# Patient Record
Sex: FEMALE | Race: WHITE | HISPANIC OR LATINO | Employment: UNEMPLOYED
[De-identification: names, ages, dates, MRNs, and addresses within clinical notes are randomized per-mention and may not be internally consistent; named-entity substitution may affect disease eponyms.]

---

## 2017-04-18 ENCOUNTER — RECORDS - HEALTHEAST (OUTPATIENT)
Dept: ADMINISTRATIVE | Facility: OTHER | Age: 27
End: 2017-04-18

## 2017-04-18 ENCOUNTER — HOSPITAL ENCOUNTER (OUTPATIENT)
Dept: ULTRASOUND IMAGING | Facility: CLINIC | Age: 27
Discharge: HOME | End: 2017-04-18

## 2017-04-18 DIAGNOSIS — Z87.59 CONFIRM FETAL VIABILITY WITH HISTORY OF MISCARRIAGE, ULTRASOUND: ICD-10-CM

## 2017-04-18 DIAGNOSIS — O36.80X0 CONFIRM FETAL VIABILITY WITH HISTORY OF MISCARRIAGE, ULTRASOUND: ICD-10-CM

## 2017-05-30 ENCOUNTER — MEDICAL CORRESPONDENCE (OUTPATIENT)
Dept: HEALTH INFORMATION MANAGEMENT | Facility: CLINIC | Age: 27
End: 2017-05-30

## 2017-05-30 ENCOUNTER — TRANSFERRED RECORDS (OUTPATIENT)
Dept: HEALTH INFORMATION MANAGEMENT | Facility: CLINIC | Age: 27
End: 2017-05-30

## 2017-05-31 DIAGNOSIS — O35.EXX0 PYELECTASIS OF FETUS ON PRENATAL ULTRASOUND: Primary | ICD-10-CM

## 2017-06-19 ENCOUNTER — PRE VISIT (OUTPATIENT)
Dept: MATERNAL FETAL MEDICINE | Facility: CLINIC | Age: 27
End: 2017-06-19

## 2017-06-21 ENCOUNTER — OFFICE VISIT (OUTPATIENT)
Dept: MATERNAL FETAL MEDICINE | Facility: CLINIC | Age: 27
End: 2017-06-21
Attending: OBSTETRICS & GYNECOLOGY
Payer: COMMERCIAL

## 2017-06-21 ENCOUNTER — HOSPITAL ENCOUNTER (OUTPATIENT)
Dept: ULTRASOUND IMAGING | Facility: CLINIC | Age: 27
Discharge: HOME | End: 2017-06-21
Attending: OBSTETRICS & GYNECOLOGY | Admitting: SOCIAL WORKER
Payer: COMMERCIAL

## 2017-06-21 DIAGNOSIS — E66.01 MORBID OBESITY DUE TO EXCESS CALORIES (H): ICD-10-CM

## 2017-06-21 DIAGNOSIS — O35.EXX0 PYELECTASIS OF FETUS ON PRENATAL ULTRASOUND: ICD-10-CM

## 2017-06-21 DIAGNOSIS — O35.EXX0 ULTRASOUND RECHECK OF FETAL PYELECTASIS, ANTEPARTUM, NOT APPLICABLE OR UNSPECIFIED FETUS: Primary | ICD-10-CM

## 2017-06-21 PROCEDURE — 76811 OB US DETAILED SNGL FETUS: CPT

## 2017-06-21 NOTE — PROGRESS NOTES
Please see ultrasound report under imaging tab for details on ultrasound performed today.    Ricarda Nunez MD  , OB/GYN  Maternal-Fetal Medicine  [email or web address]  [phone] (Academic office)  [phone] (Pager)

## 2017-06-21 NOTE — MR AVS SNAPSHOT
After Visit Summary   6/21/2017    Mariela Adair    MRN: [id number]           Patient Information     Date Of Birth          1990        Visit Information        Provider Department      6/21/2017 2:00 PM Ricarda Nunez MD [facility] Maternal Fetal Medicine [facility]        Today's Diagnoses     Ultrasound recheck of fetal pyelectasis, antepartum, not applicable or unspecified fetus    -  1    Morbid obesity due to excess calories (H)           Follow-ups after your visit        Your next 10 appointments already scheduled     Jul 19, 2017  1:30 PM CDT   MFM US COMPRE SINGLE F/U with [id number]   [facility] Maternal Fetal Medicine Ultrasound - [place] ([facility])    [address]  [facility] [id number] [phone]           Wear comfortable clothes and leave your valuables at home.            Jul 19, 2017  2:00 PM CDT   Radiology MD with UR COREY PEGUERO   [facility] Maternal Fetal Medicine - [facility])    [address]  [facility] [address]   [phone]           Please arrive at the time given for your first appointment.  This visit is used internally to schedule the physician's time during your ultrasound.              Future tests that were ordered for you today     Open Future Orders        Priority Expected Expires Ordered    MFM US Comprehensive Single F/U Routine 7/19/2017 6/21/2018 6/21/2017            Who to contact     If you have questions or need follow up information about today's clinic visit or your schedule please contact [facility] MATERNAL FETAL MEDICINE [facility] directly at [phone].  Normal or non-critical lab and imaging results will be communicated to you by MyChart, letter or phone within 4 business days after the clinic has received the results. If you do not hear from us within 7 days, please contact the clinic through MyChart or phone. If you have a critical or  "abnormal lab result, we will notify you by phone as soon as possible.  Submit refill requests through [facility] or call your pharmacy and they will forward the refill request to us. Please allow 3 business days for your refill to be completed.          Additional Information About Your Visit        MyChart Information     [facility] lets you send messages to your doctor, view your test results, renew your prescriptions, schedule appointments and more. To sign up, go to [email or web address] . Click on \"Log in\" on the left side of the screen, which will take you to the Welcome page. Then click on \"Sign up Now\" on the right side of the page.     You will be asked to enter the access code listed below, as well as some personal information. Please follow the directions to create your username and password.     Your access code is: [email or web address]  Expires: 2017  4:22 PM     Your access code will  in 90 days. If you need help or a new code, please call your [facility] clinic or [phone].        Care EveryWhere ID     This is your Care EveryWhere ID. This could be used by other organizations to access your [facility] medical records  [id number]        Your Vitals Were     Last Period                   2016            Blood Pressure from Last 3 Encounters:   04/02/15 126/74   14 115/55    Weight from Last 3 Encounters:   04/02/15 (!) 150.6 kg (332 lb)   14 (!) 147.4 kg (325 lb)               Primary Care Provider Office Phone # Fax #    Kenzie Mccoy [phone]       [address] [place] [facility] [address]        Equal Access to Services     [facility]: Hadii talia Castañeda, lizabeth ferrara, qaybbatool merrillalyahaira coto . So [facility] [phone].    ATENCIÓN: Si habla español, tiene a patrick disposición servicios gratuitos de asistencia lingüística. Llame al [phone].    We comply with applicable federal civil rights " laws and [place] laws. We do not discriminate on the basis of race, color, national origin, age, disability sex, sexual orientation or gender identity.            Thank you!     Thank you for choosing [facility] MATERNAL FETAL [facility]IC[facility]  for your care. Our goal is always to provide you with excellent care. Hearing back from our patients is one way we can continue to improve our services. Please take a few minutes to complete the written survey that you may receive in the mail after your visit with us. Thank you!             Your Updated Medication List - Protect others around you: Learn how to safely use, store and throw away your medicines at www.disposemymeds.org.      Notice  As of 6/21/2017  4:22 PM    You have not been prescribed any medications.

## 2017-07-31 ENCOUNTER — RECORDS - HEALTHEAST (OUTPATIENT)
Dept: ADMINISTRATIVE | Facility: OTHER | Age: 27
End: 2017-07-31

## 2017-08-29 ENCOUNTER — RECORDS - HEALTHEAST (OUTPATIENT)
Dept: ADMINISTRATIVE | Facility: OTHER | Age: 27
End: 2017-08-29

## 2017-09-14 ENCOUNTER — RECORDS - HEALTHEAST (OUTPATIENT)
Dept: ADMINISTRATIVE | Facility: OTHER | Age: 27
End: 2017-09-14

## 2017-09-18 ENCOUNTER — ANESTHESIA - HEALTHEAST (OUTPATIENT)
Dept: OBGYN | Facility: HOSPITAL | Age: 27
End: 2017-09-18

## 2017-09-18 ASSESSMENT — MIFFLIN-ST. JEOR: SCORE: 2268.98

## 2017-09-19 ENCOUNTER — SURGERY - HEALTHEAST (OUTPATIENT)
Dept: OBGYN | Facility: HOSPITAL | Age: 27
End: 2017-09-19

## 2017-09-19 ASSESSMENT — MIFFLIN-ST. JEOR: SCORE: 2268.98

## 2017-09-22 ENCOUNTER — HOME CARE/HOSPICE - HEALTHEAST (OUTPATIENT)
Dept: HOME HEALTH SERVICES | Facility: HOME HEALTH | Age: 27
End: 2017-09-22

## 2017-09-23 ENCOUNTER — HOME CARE/HOSPICE - HEALTHEAST (OUTPATIENT)
Dept: HOME HEALTH SERVICES | Facility: HOME HEALTH | Age: 27
End: 2017-09-23

## 2017-09-24 ENCOUNTER — HOME CARE/HOSPICE - HEALTHEAST (OUTPATIENT)
Dept: HOME HEALTH SERVICES | Facility: HOME HEALTH | Age: 27
End: 2017-09-24

## 2017-10-18 ENCOUNTER — OFFICE VISIT - HEALTHEAST (OUTPATIENT)
Dept: VASCULAR SURGERY | Facility: CLINIC | Age: 27
End: 2017-10-18

## 2017-10-18 DIAGNOSIS — L98.491: ICD-10-CM

## 2017-10-25 ENCOUNTER — OFFICE VISIT - HEALTHEAST (OUTPATIENT)
Dept: VASCULAR SURGERY | Facility: CLINIC | Age: 27
End: 2017-10-25

## 2017-10-25 DIAGNOSIS — Z98.891 H/O CESAREAN SECTION: ICD-10-CM

## 2017-10-25 DIAGNOSIS — L98.491: ICD-10-CM

## 2017-11-08 ENCOUNTER — COMMUNICATION - HEALTHEAST (OUTPATIENT)
Dept: VASCULAR SURGERY | Facility: CLINIC | Age: 27
End: 2017-11-08

## 2018-06-28 ASSESSMENT — MIFFLIN-ST. JEOR: SCORE: 2059.42

## 2018-06-29 ENCOUNTER — ANESTHESIA - HEALTHEAST (OUTPATIENT)
Dept: SURGERY | Facility: HOSPITAL | Age: 28
End: 2018-06-29

## 2018-06-29 ENCOUNTER — SURGERY - HEALTHEAST (OUTPATIENT)
Dept: SURGERY | Facility: HOSPITAL | Age: 28
End: 2018-06-29

## 2018-07-13 ENCOUNTER — COMMUNICATION - HEALTHEAST (OUTPATIENT)
Dept: SURGERY | Facility: CLINIC | Age: 28
End: 2018-07-13

## 2018-07-13 ENCOUNTER — OFFICE VISIT - HEALTHEAST (OUTPATIENT)
Dept: SURGERY | Facility: CLINIC | Age: 28
End: 2018-07-13

## 2018-07-13 DIAGNOSIS — Z48.89 POSTOPERATIVE VISIT: ICD-10-CM

## 2018-07-13 DIAGNOSIS — G89.18 POST-OP PAIN: ICD-10-CM

## 2018-07-16 ENCOUNTER — COMMUNICATION - HEALTHEAST (OUTPATIENT)
Dept: SURGERY | Facility: CLINIC | Age: 28
End: 2018-07-16

## 2018-07-17 ENCOUNTER — OFFICE VISIT - HEALTHEAST (OUTPATIENT)
Dept: SURGERY | Facility: CLINIC | Age: 28
End: 2018-07-17

## 2018-07-17 DIAGNOSIS — L76.34 POSTOPERATIVE SEROMA OF SUBCUTANEOUS TISSUE AFTER NON-DERMATOLOGIC PROCEDURE: ICD-10-CM

## 2018-07-17 ASSESSMENT — MIFFLIN-ST. JEOR: SCORE: 2070.3

## 2018-07-24 ENCOUNTER — OFFICE VISIT - HEALTHEAST (OUTPATIENT)
Dept: SURGERY | Facility: CLINIC | Age: 28
End: 2018-07-24

## 2018-07-24 DIAGNOSIS — G89.18 POST-OP PAIN: ICD-10-CM

## 2018-07-24 ASSESSMENT — MIFFLIN-ST. JEOR: SCORE: 2069.4

## 2018-07-25 ENCOUNTER — HOSPITAL ENCOUNTER (OUTPATIENT)
Dept: CT IMAGING | Facility: HOSPITAL | Age: 28
Discharge: HOME | End: 2018-07-25
Attending: PHYSICIAN ASSISTANT

## 2018-07-25 DIAGNOSIS — G89.18 POST-OP PAIN: ICD-10-CM

## 2018-07-27 ENCOUNTER — COMMUNICATION - HEALTHEAST (OUTPATIENT)
Dept: SURGERY | Facility: CLINIC | Age: 28
End: 2018-07-27

## 2018-07-27 ENCOUNTER — OFFICE VISIT - HEALTHEAST (OUTPATIENT)
Dept: SURGERY | Facility: CLINIC | Age: 28
End: 2018-07-27

## 2018-07-27 DIAGNOSIS — Z98.890 POST-OPERATIVE STATE: ICD-10-CM

## 2018-07-31 ENCOUNTER — OFFICE VISIT - HEALTHEAST (OUTPATIENT)
Dept: SURGERY | Facility: CLINIC | Age: 28
End: 2018-07-31

## 2018-07-31 DIAGNOSIS — Z48.89 POSTOPERATIVE VISIT: ICD-10-CM

## 2018-07-31 ASSESSMENT — MIFFLIN-ST. JEOR: SCORE: 2069.4

## 2018-08-14 ENCOUNTER — OFFICE VISIT - HEALTHEAST (OUTPATIENT)
Dept: SURGERY | Facility: CLINIC | Age: 28
End: 2018-08-14

## 2018-08-14 DIAGNOSIS — Z48.89 POSTOPERATIVE VISIT: ICD-10-CM

## 2018-08-14 ASSESSMENT — MIFFLIN-ST. JEOR: SCORE: 2069.4

## 2019-06-04 LAB
HBV SURFACE AG SERPL QL IA: NEGATIVE
HIV 1+2 AB+HIV1 P24 AG SERPL QL IA: NEGATIVE
RUBELLA ABY IGG: 1.48
TREPONEMA ANTIBODIES: NEGATIVE

## 2019-09-24 ENCOUNTER — AMBULATORY - HEALTHEAST (OUTPATIENT)
Dept: MATERNAL FETAL MEDICINE | Facility: HOSPITAL | Age: 29
End: 2019-09-24

## 2019-09-24 DIAGNOSIS — O26.90 PREGNANCY, ANTEPARTUM, COMPLICATIONS: ICD-10-CM

## 2019-09-25 ENCOUNTER — AMBULATORY - HEALTHEAST (OUTPATIENT)
Dept: MATERNAL FETAL MEDICINE | Facility: HOSPITAL | Age: 29
End: 2019-09-25

## 2019-09-27 ENCOUNTER — RECORDS - HEALTHEAST (OUTPATIENT)
Dept: ULTRASOUND IMAGING | Facility: HOSPITAL | Age: 29
End: 2019-09-27

## 2019-09-27 ENCOUNTER — OFFICE VISIT - HEALTHEAST (OUTPATIENT)
Dept: MATERNAL FETAL MEDICINE | Facility: HOSPITAL | Age: 29
End: 2019-09-27

## 2019-09-27 ENCOUNTER — RECORDS - HEALTHEAST (OUTPATIENT)
Dept: ADMINISTRATIVE | Facility: OTHER | Age: 29
End: 2019-09-27

## 2019-09-27 DIAGNOSIS — O44.02 COMPLETE PLACENTA PREVIA NOS OR WITHOUT HEMORRHAGE, SECOND TRIMESTER: ICD-10-CM

## 2019-09-27 DIAGNOSIS — O44.02 PLACENTA PREVIA FOUND DURING PREGNANCY WITHOUT HEMORRHAGE, SECOND TRIMESTER: ICD-10-CM

## 2019-09-27 DIAGNOSIS — O26.90 PREGNANCY RELATED CONDITIONS, UNSPECIFIED, UNSPECIFIED TRIMESTER: ICD-10-CM

## 2019-10-08 LAB — GLU GEST SCREEN 1HR 50G: 82

## 2019-10-29 ENCOUNTER — TRANSFERRED RECORDS (OUTPATIENT)
Dept: HEALTH INFORMATION MANAGEMENT | Facility: CLINIC | Age: 29
End: 2019-10-29

## 2019-11-01 ENCOUNTER — RECORDS - HEALTHEAST (OUTPATIENT)
Dept: ULTRASOUND IMAGING | Facility: HOSPITAL | Age: 29
End: 2019-11-01

## 2019-11-01 ENCOUNTER — OFFICE VISIT - HEALTHEAST (OUTPATIENT)
Dept: MATERNAL FETAL MEDICINE | Facility: HOSPITAL | Age: 29
End: 2019-11-01

## 2019-11-01 DIAGNOSIS — O44.02 COMPLETE PLACENTA PREVIA NOS OR WITHOUT HEMORRHAGE, SECOND TRIMESTER: ICD-10-CM

## 2019-11-01 DIAGNOSIS — O44.00 PLACENTA PREVIA ANTEPARTUM: ICD-10-CM

## 2019-11-06 ENCOUNTER — RECORDS - HEALTHEAST (OUTPATIENT)
Dept: ADMINISTRATIVE | Facility: OTHER | Age: 29
End: 2019-11-06

## 2019-11-14 ENCOUNTER — OFFICE VISIT - HEALTHEAST (OUTPATIENT)
Dept: MATERNAL FETAL MEDICINE | Facility: HOSPITAL | Age: 29
End: 2019-11-14

## 2019-11-14 ENCOUNTER — RECORDS - HEALTHEAST (OUTPATIENT)
Dept: ULTRASOUND IMAGING | Facility: HOSPITAL | Age: 29
End: 2019-11-14

## 2019-11-14 ENCOUNTER — RECORDS - HEALTHEAST (OUTPATIENT)
Dept: ADMINISTRATIVE | Facility: OTHER | Age: 29
End: 2019-11-14

## 2019-11-14 DIAGNOSIS — O44.00 COMPLETE PLACENTA PREVIA NOS OR WITHOUT HEMORRHAGE, UNSPECIFIED TRIMESTER: ICD-10-CM

## 2019-11-22 ENCOUNTER — HOSPITAL ENCOUNTER (INPATIENT)
Facility: CLINIC | Age: 29
LOS: 25 days | Discharge: HOME | End: 2019-12-17
Attending: OBSTETRICS & GYNECOLOGY | Admitting: OBSTETRICS & GYNECOLOGY
Payer: COMMERCIAL

## 2019-11-22 ENCOUNTER — HOSPITAL ENCOUNTER (EMERGENCY)
Facility: CLINIC | Age: 29
Discharge: HOME | End: 2019-11-22
Payer: COMMERCIAL

## 2019-11-22 DIAGNOSIS — Z98.891 HISTORY OF CESAREAN SECTION: ICD-10-CM

## 2019-11-22 DIAGNOSIS — Z98.891 S/P C-SECTION: ICD-10-CM

## 2019-11-22 DIAGNOSIS — D62 ANEMIA DUE TO BLOOD LOSS, ACUTE: ICD-10-CM

## 2019-11-22 DIAGNOSIS — F41.9 ANXIETY: Primary | ICD-10-CM

## 2019-11-22 LAB
ABO + RH BLD: NORMAL
ABO + RH BLD: NORMAL
BASOPHILS # BLD AUTO: 0 10E9/L (ref 0–0.2)
BASOPHILS NFR BLD AUTO: 0.1 %
BLD GP AB SCN SERPL QL: NORMAL
BLOOD BANK CMNT PATIENT-IMP: NORMAL
DIFFERENTIAL METHOD BLD: ABNORMAL
EOSINOPHIL # BLD AUTO: 0 10E9/L (ref 0–0.7)
EOSINOPHIL NFR BLD AUTO: 0.3 %
ERYTHROCYTE [DISTWIDTH] IN BLOOD BY AUTOMATED COUNT: 14 % (ref 10–15)
HCT VFR BLD AUTO: 32.5 % (ref 35–47)
HGB BLD-MCNC: 10.6 G/DL (ref 11.7–15.7)
IMM GRANULOCYTES # BLD: 0 10E9/L (ref 0–0.4)
IMM GRANULOCYTES NFR BLD: 0.3 %
LYMPHOCYTES # BLD AUTO: 2.2 10E9/L (ref 0.8–5.3)
LYMPHOCYTES NFR BLD AUTO: 22.4 %
MCH RBC QN AUTO: 29.8 PG (ref 26.5–33)
MCHC RBC AUTO-ENTMCNC: 32.6 G/DL (ref 31.5–36.5)
MCV RBC AUTO: 91 FL (ref 78–100)
MONOCYTES # BLD AUTO: 0.5 10E9/L (ref 0–1.3)
MONOCYTES NFR BLD AUTO: 5 %
NEUTROPHILS # BLD AUTO: 7 10E9/L (ref 1.6–8.3)
NEUTROPHILS NFR BLD AUTO: 71.9 %
NRBC # BLD AUTO: 0 10*3/UL
NRBC BLD AUTO-RTO: 0 /100
PLATELET # BLD AUTO: 194 10E9/L (ref 150–450)
RBC # BLD AUTO: 3.56 10E12/L (ref 3.8–5.2)
SPECIMEN EXP DATE BLD: NORMAL
WBC # BLD AUTO: 9.8 10E9/L (ref 4–11)

## 2019-11-22 PROCEDURE — 36415 COLL VENOUS BLD VENIPUNCTURE: CPT | Performed by: STUDENT IN AN ORGANIZED HEALTH CARE EDUCATION/TRAINING PROGRAM

## 2019-11-22 PROCEDURE — 25000132 ZZH RX MED GY IP 250 OP 250 PS 637: Performed by: STUDENT IN AN ORGANIZED HEALTH CARE EDUCATION/TRAINING PROGRAM

## 2019-11-22 PROCEDURE — 85025 COMPLETE CBC W/AUTO DIFF WBC: CPT | Performed by: STUDENT IN AN ORGANIZED HEALTH CARE EDUCATION/TRAINING PROGRAM

## 2019-11-22 PROCEDURE — 86850 RBC ANTIBODY SCREEN: CPT | Performed by: STUDENT IN AN ORGANIZED HEALTH CARE EDUCATION/TRAINING PROGRAM

## 2019-11-22 PROCEDURE — 25000128 H RX IP 250 OP 636: Performed by: STUDENT IN AN ORGANIZED HEALTH CARE EDUCATION/TRAINING PROGRAM

## 2019-11-22 PROCEDURE — 90715 TDAP VACCINE 7 YRS/> IM: CPT | Performed by: STUDENT IN AN ORGANIZED HEALTH CARE EDUCATION/TRAINING PROGRAM

## 2019-11-22 PROCEDURE — 86901 BLOOD TYPING SEROLOGIC RH(D): CPT | Performed by: STUDENT IN AN ORGANIZED HEALTH CARE EDUCATION/TRAINING PROGRAM

## 2019-11-22 PROCEDURE — 86900 BLOOD TYPING SEROLOGIC ABO: CPT | Performed by: STUDENT IN AN ORGANIZED HEALTH CARE EDUCATION/TRAINING PROGRAM

## 2019-11-22 PROCEDURE — 12000001 ZZH R&B MED SURG/OB UMMC

## 2019-11-22 RX ORDER — ACETAMINOPHEN 325 MG/1
975 TABLET ORAL EVERY 8 HOURS PRN
Status: DISCONTINUED | OUTPATIENT
Start: 2019-11-22 | End: 2019-12-13

## 2019-11-22 RX ORDER — ASPIRIN 81 MG/1
81 TABLET, CHEWABLE ORAL DAILY
Status: DISCONTINUED | OUTPATIENT
Start: 2019-11-22 | End: 2019-12-13

## 2019-11-22 RX ORDER — BETAMETHASONE SODIUM PHOSPHATE AND BETAMETHASONE ACETATE 3; 3 MG/ML; MG/ML
12 INJECTION, SUSPENSION INTRA-ARTICULAR; INTRALESIONAL; INTRAMUSCULAR; SOFT TISSUE EVERY 24 HOURS
Status: COMPLETED | OUTPATIENT
Start: 2019-11-22 | End: 2019-11-23

## 2019-11-22 RX ORDER — AMOXICILLIN 250 MG
2 CAPSULE ORAL 2 TIMES DAILY
Status: DISCONTINUED | OUTPATIENT
Start: 2019-11-22 | End: 2019-12-01

## 2019-11-22 RX ORDER — SIMETHICONE 80 MG
160 TABLET,CHEWABLE ORAL EVERY 4 HOURS PRN
Status: DISCONTINUED | OUTPATIENT
Start: 2019-11-22 | End: 2019-12-13

## 2019-11-22 RX ORDER — AMOXICILLIN 250 MG
1 CAPSULE ORAL 2 TIMES DAILY
Status: DISCONTINUED | OUTPATIENT
Start: 2019-11-22 | End: 2019-12-01

## 2019-11-22 RX ORDER — ONDANSETRON 2 MG/ML
4 INJECTION INTRAMUSCULAR; INTRAVENOUS EVERY 6 HOURS PRN
Status: DISCONTINUED | OUTPATIENT
Start: 2019-11-22 | End: 2019-12-13

## 2019-11-22 RX ORDER — PRENATAL VIT/IRON FUM/FOLIC AC 27MG-0.8MG
1 TABLET ORAL DAILY
Status: DISCONTINUED | OUTPATIENT
Start: 2019-11-22 | End: 2019-12-13

## 2019-11-22 RX ADMIN — BETAMETHASONE SODIUM PHOSPHATE AND BETAMETHASONE ACETATE 12 MG: 3; 3 INJECTION, SUSPENSION INTRA-ARTICULAR; INTRALESIONAL; INTRAMUSCULAR at 16:58

## 2019-11-22 RX ADMIN — PRENATAL VIT W/ FE FUMARATE-FA TAB 27-0.8 MG 1 TABLET: 27-0.8 TAB at 16:57

## 2019-11-22 RX ADMIN — ACETAMINOPHEN 975 MG: 325 TABLET, FILM COATED ORAL at 18:58

## 2019-11-22 RX ADMIN — CLOSTRIDIUM TETANI TOXOID ANTIGEN (FORMALDEHYDE INACTIVATED), CORYNEBACTERIUM DIPHTHERIAE TOXOID ANTIGEN (FORMALDEHYDE INACTIVATED), BORDETELLA PERTUSSIS TOXOID ANTIGEN (GLUTARALDEHYDE INACTIVATED), BORDETELLA PERTUSSIS FILAMENTOUS HEMAGGLUTININ ANTIGEN (FORMALDEHYDE INACTIVATED), BORDETELLA PERTUSSIS PERTACTIN ANTIGEN, AND BORDETELLA PERTUSSIS FIMBRIAE 2/3 ANTIGEN 0.5 ML: 5; 2; 2.5; 5; 3; 5 INJECTION, SUSPENSION INTRAMUSCULAR at 16:58

## 2019-11-22 RX ADMIN — ASPIRIN 81 MG CHEWABLE TABLET 81 MG: 81 TABLET CHEWABLE at 16:57

## 2019-11-22 RX ADMIN — SENNOSIDES AND DOCUSATE SODIUM 1 TABLET: 8.6; 5 TABLET ORAL at 20:09

## 2019-11-22 NOTE — H&P
Maternal Fetal Medicine   History and Physical    Patient's last menstrual period was 2019.  Estimated Date of Delivery: 2020   Gestational age:  32w0d   Obstetric History:              History of Present Illness      Mariela Hawkins is a 29 year old  at 32w0d by LMP c/w 7w5d US who presents today for scheduled admission for vasa previa and placenta previa This patient received regular prenatal care at [facility] in [place] and also followed with MFM at [facility]. The patient reports an increase in her baseline anxiety because of all that is going on with this pregnancy. She has had some difficulty sleeping, light headaches, and one episode of vomiting yesterday that she attributes to her increase in stress and anxiety. She would like to speak with NICU to discuss what an early delivery would mean for the health and care of baby. The patient is also aware that mental health professionals are available while she is here to talk to regarding her anxiety, but she states she currently feels like it is manageable.     The patient had one episode of bleeding at 8 weeks, but none after that episode. She has not had any high blood pressures in this or prior pregnancies. Denies any contractions, cramping, or LOF, Active fetus. Also denies recent illnesses, fevers, chills, headaches, vision changes/spots, chest pain, shortness of breath, upper abdominal pain, contractions, nausea, diarrhea, or constipation.     Her pregnancy is complicated by:  1. Vasa previa  2. Placenta previa  3. Possible placenta accreta  4. History of CS x 3  5. BMI 50     OB history notable for:   OB History    Para Term  AB Living   5 3 3 0 1 2   SAB TAB Ectopic Multiple Live Births   1 0 0 0 2      # Outcome Date GA Lbr Narinder/2nd Weight Sex Delivery Anes PTL Lv   5 Current            4 Term 2017 39w0d   M -SEC   SREEKANTH   3 SAB 2016     SAB      2 Term  40w0d   M -SEC   SREEKANTH    1 Term  41w0d   M   N       Complications: Fetal Intolerance       Prenatal Labs  Rh +, rhogam not indicated   Senait negative   HBV surface antigen negative   HIV negative  G/C negative (19)  RPR NR   Rubella immune  GCT: 85  GBS Status:   Last Pap: 17  Low risk NIPT    Weight  Pre-pregnancy BMI: 50         Past Medical History     Past Medical History:   Diagnosis Date     Acanthosis nigricans      BV (bacterial vaginosis)      Migraines      Ovarian cystic mass      Pain in limb      Pelvic pain in female             Past Surgical History     Past Surgical History:   Procedure Laterality Date      SECTION       RECESSION RESECTION (REPAIR STRABISMUS) BILATERAL Bilateral 2014    Procedure: RECESSION RESECTION (REPAIR STRABISMUS) BILATERAL;  Surgeon: Titi Awan MD;  Location:  [facility]     RECESSION RESECTION (REPAIR STRABISMUS) BILATERAL Bilateral 2015    Procedure: RECESSION RESECTION (REPAIR STRABISMUS) BILATERAL;  Surgeon: Titi Awan MD;  Location:  [facility]            Medications     Medications:   Sertraline 50 mg daily  Aspirin 81 mg daily   Prenatal vitamin     Allergies   Allergen Reactions     Amoxicillin Rash            Social History     Social History     Tobacco Use     Smoking status: Never Smoker   Substance Use Topics     Alcohol use: No     Drug use: No            Review of Systems     Pertinent positives and negatives as described above in HPI           Physical Exam   There were no vitals filed for this visit.     Gen: Cooperative, asking appropriate questions. Sitting up in bed, in NAD.  Heart:: regular rate and rhythm without murmur, gallop or rub    Lungs: CTA B/L, no wheezing or crackles.  Abdomen: Non-tender, gravid  Extremities: Trace lower extremity edema   Psychiatry: anxious, tearful, appropriate affect     Membranes: Intact        Contractions: none  FHR:  Rate 140, moderate variability, present accelerations, no decelerations.             Category: 1         Laboratory/Imaging     Pending labs: Hg, Type and Screen, RPR     Ultrasounds:  -Dating US: 7w5d    [facility] US Comp  IMPRESSION  ---------------------------------------------------------------------------------------------------------  1) Martinez intrauterine pregnancy at 24 & 0/7 weeks gestational age.  2) None of the anomalies commonly detected by ultrasound were evident in the detailed fetal anatomic survey, however some views were suboptimal, as  described above.  3) Growth parameters and estimated fetal weight were consistent with established dates.  4) The amniotic fluid volume appeared normal.  5) Normal fetal activity for gestational age.  6) On transvaginal imaging the cervix appears long and closed.  7) There is a complete placenta previa. A posterior succenturiate lobe of the placenta is noted and overlies the cervix. There are fetal vessels coursing through  the membranes connecting the two lobes of the placenta, however these do not appear to lie over the cervical os at this time. The anterior lobe of the placenta  overlies her previous  scars, with no obvious evidence of morbidly adherent placenta.    [facility] US IMPRESSION  ---------------------------------------------------------------------------------------------------------  1) Martinez intrauterine pregnancy at 30 & 6/7 weeks gestational age.  2) Normal fetal activity for gestational age  3) The amniotic fluid volume appeared normal.  4) There appears to be an unsupported fetal vessel within one centimeter of the internal cervical os. There may be an attenuated area of placenta beneath this,  however I cannot see this in all transvaginal views.  5) On transvaginal imaging, the cervix is long and closed.           Assessment and Plan     Mariela Hawkins is a 29 year old  at 32w0d by LMP c/w 7w5d US who presents for scheduled admission for monitoring d/t vasa previa and placenta previa with concern for possible  accreta.     Vasa previa  Complete anterior placenta previa  - Admission until delivery   - Plan for RLTCS at 35 weeks or sooner with bleeding or concerns for accreta   - Outpatient MFM consult recommended MRI for surgical planning if there is concern for accreta   - Type/screen q72h   - TVUS to assess placenta     Obesity  - Continue aspirin 81 mg daily   - SCDs while in bed, lovenox in postpartum period     Anxiety  - Continue PTA sertraline   - Social work antepartum assessment ordered   - Monitor mood closely     Fetal well being  - Category 1 fetal heart tracing.  - EFW at 29w0d US: 1151g (34%ile), appropriate for gestational age.  - Betamethasone series to be started today   - NICU consult to be requested   - Continue Qshift NSTs  - Q4 week growth US (next ).    Prenatal care  - Rh positive, Antibody negative.  - Rubella immune, Hep B SAg NR, RPR neg, HIV NR, GC/CT NR.  - GCT 85  - Low risk NIPT   - s/p influenza immunization and Tdap to be given today.    Method of delivery  - History of LTCS x 3. Plan for RLTCS at 35 weeks or sooner with bleeding or concerns for accreta   - S/p ventral hernia repair with mesh (2018)  - Fetal presentation: transverse, head to maternal left. Placenta: anterior previa   - Plan to consent for low transverse  delivery if no concerns for accreta     Xena Garcia MD  Obstetrics/Gynecology, PGY-1

## 2019-11-22 NOTE — PROGRESS NOTES
Data: Patient admitted to room 408 at  1305. Patient is a . Prenatal record reviewed.   OB History    Para Term  AB Living   5 3 3 0 1 2   SAB TAB Ectopic Multiple Live Births   1 0 0 0 2      # Outcome Date GA Lbr Narinder/2nd Weight Sex Delivery Anes PTL Lv   5 Current            4 Term 2017 39w0d   M -SEC   SREEKANTH   3 SAB 2016     SAB      2 Term  40w0d   M -SEC   SREEKANTH   1 Term  41w0d   M   N       Complications: Fetal Intolerance   .  Medical History:   Past Medical History:   Diagnosis Date     Acanthosis nigricans      BV (bacterial vaginosis)      Migraines      Ovarian cystic mass      Pain in limb      Pelvic pain in female    .  Gestational age 32w0d. Vital signs per doc flowsheet. Fetal movement present. Patient reports vasa previa as reason for admission. Support persons present.  Action: Verbal consent for EFM, external fetal monitors applied. Admission assessment completed. Patient instructed to report change in fetal movement, contractions, vaginal leaking of fluid or bleeding, abdominal pain, or any concerns related to the pregnancy to her nurse/physician. Patient oriented to room, call light in reach.   Response: Resident informed of arrival. Plan per provider is to stay until patient delivered. Patient verbalized understanding of education and verbalized agreement with plan.

## 2019-11-22 NOTE — DISCHARGE SUMMARY
[facility] HYSTERECTOM[facility] DISCHARGE SUMMARY    Mariela Hawkins  : 1990  MRN: [id number]    Admit date: 2019  Discharge date: 2019    Admit Dx:   - 29 year old  at 32w0d  - Vasa previa  - Placenta previa  - Obesity (BMI 54)  - s/p CS x 3  - s/p ventral hernia repair with mesh     Discharge Dx:  - Same as above, s/p  hysterectomy via VML incision   - Post-op pain  - Acute blood loss anemia    Procedures:  -  hysterectomy, bilateral salpingectomy, cystoscopy  - Spinal analgesia  - Fetal monitoring  - Betamethasone administration x2  - Bilateral salpingectomy  - Cystoscopy  - TAP block x2    Admit HPI:  Ms. Mariela Hawkins is a 29 year old  at 32w0d who was a planned admission on 2019 for close monitoring due to vasa previa and placenta previa with plan for delivery at 35 weeks.     Please see her admit H&P for full details of her PMH, PSH, Meds, Allergies and exam on admit.    Hospital course:  During her admission, she received BMZ for fetal lung maturity (-). There was initially some concern for MAP on ultrasound, however on repeat images this was thought not be to be the case. She was too claustrophobic to obtain an MRI. She was monitored TID without concerns. Consent for repeat  section with possible classical incision or hysterectomy was signed on admission.  She had no complaints while admitted and was managed expectantly. She received rescue betamethasone prior to delivery. On HD#22, she underwent her scheduled surgery. She had planned for a  section; however, was found to have a morbidly adherent placenta and a hysterectomy was also performed.    Operative Findings:  1. Minimal rectofascial adhesions. Omental adhesions to anterior abdominal wall. Minimal filmy intraabdominal adhesions.  2. Viable male infant, Apgars 5, 9, and 9  3. Evidence of morbidly adherent placenta with markedly increased vascularity visible at bulging  lower uterine segment, no evidence of bladder involvement.  4. Upon closure, defect in fascia approximately 2.5 cm in diameter noted to left of umbilicus. Not contiguous with incision. Discussed with Dr. Doyle who recommended completing closure as planned as defect likely has been present for awhile.   5. Cystoscopy revealed brisk efflux from bilateral ureteral ostia and no evidence of injury to dome.    EBL from the delivery was 100mL. Please see her Operative Notes for full details regarding her delivery.    Her postoperative course was overall uncomplicated.  She had some difficulty with pain control and had a second TAP block performed on POD#4.  On POD#4, she was meeting all of her postpartum goals and deemed stable for discharge. She was voiding without difficulty, tolerating a regular diet without nausea and vomiting, her pain was well controlled on oral pain medicines and her lochia was appropriate. Her hemoglobin prior to delivery was 11.0 and after delivery, at time of discharge was 9.1. Her Rh status was positive and Rhogam was not indicated.      Discharge Medications:  Current Discharge Medication List      START taking these medications    Details   acetaminophen (TYLENOL) 325 MG tablet Take 2 tablets (650 mg) by mouth every 6 hours as needed for mild pain Start after Delivery.  Qty: 100 tablet, Refills: 0    Associated Diagnoses: S/P       ferrous sulfate (FEROSUL) 325 (65 Fe) MG tablet Take 1 tablet (325 mg) by mouth daily (with breakfast)  Qty: 60 tablet, Refills: 0    Associated Diagnoses: Anemia due to blood loss, acute      ibuprofen (ADVIL/MOTRIN) 600 MG tablet Take 1 tablet (600 mg) by mouth every 6 hours as needed for moderate pain Start after delivery  Qty: 60 tablet, Refills: 0    Associated Diagnoses: S/P       oxyCODONE (ROXICODONE) 5 MG tablet Take 1-2 tablets (5-10 mg) by mouth every 3 hours as needed for moderate to severe pain  Qty: 28 tablet, Refills: 0     Associated Diagnoses: S/P       pregabalin (LYRICA) 75 MG capsule Take 1 capsule (75 mg) by mouth 2 times daily  Qty: 30 capsule, Refills: 1    Associated Diagnoses: S/P       senna-docusate (SENOKOT-S/PERICOLACE) 8.6-50 MG tablet Take 1 tablet by mouth daily Start after delivery.  Qty: 100 tablet, Refills: 0    Associated Diagnoses: S/P          CONTINUE these medications which have CHANGED    Details   sertraline (ZOLOFT) 50 MG tablet Take 1 tablet (50 mg) by mouth daily  Qty: 60 tablet, Refills: 1    Associated Diagnoses: Anxiety         CONTINUE these medications which have NOT CHANGED    Details   Prenatal Multivit-Min-Fe-FA (PRENATAL VITAMINS PO) Take 1 tablet by mouth daily         STOP taking these medications       aspirin (ASA) 81 MG chewable tablet Comments:   Reason for Stopping:               Discharge/Disposition:  Mariela Hawkins was discharged to home in stable condition with the following instructions/medications:  1) Call for temperature > 100.4, bright red vaginal bleeding >1 pad an hour x 2 hours, foul smelling vaginal discharge, pain not controlled by usual oral pain meds, persistent nausea and vomiting not controlled on medications, drainage or redness from incision site  2) For feeding she decided to breast feed.  3) She was instructed to follow-up with her primary OB in 6 weeks for a routine postpartum visit and in 2 days after discharge for incision/mood check and follow up of pain. She will need staples removed 10 days post-op.  4) Discharge activity:  No heavy lifting >15 lbs or strenuous activity for 6 weeks, pelvic rest for 6 weeks, no driving or operating machinery while on narcotics.      Eva Oneill MD  Obstetrics and Gynecology, PGY2  2019 7:39 PM    Appreciate Dr. Oneill's summary above, patient also seen and examined by me on the day of discharge. I agree with the summary above.   Bhavya Medina MD

## 2019-11-23 LAB
ALBUMIN UR-MCNC: NEGATIVE MG/DL
APPEARANCE UR: CLEAR
BILIRUB UR QL STRIP: NEGATIVE
COLOR UR AUTO: NORMAL
GLUCOSE UR STRIP-MCNC: NEGATIVE MG/DL
HGB UR QL STRIP: NEGATIVE
KETONES UR STRIP-MCNC: NEGATIVE MG/DL
LEUKOCYTE ESTERASE UR QL STRIP: NEGATIVE
NITRATE UR QL: NEGATIVE
PH UR STRIP: 6.5 PH (ref 5–7)
SOURCE: NORMAL
SP GR UR STRIP: 1.01 (ref 1–1.03)
UROBILINOGEN UR STRIP-MCNC: NORMAL MG/DL (ref 0–2)

## 2019-11-23 PROCEDURE — 12000001 ZZH R&B MED SURG/OB UMMC

## 2019-11-23 PROCEDURE — 87086 URINE CULTURE/COLONY COUNT: CPT | Performed by: STUDENT IN AN ORGANIZED HEALTH CARE EDUCATION/TRAINING PROGRAM

## 2019-11-23 PROCEDURE — 25000132 ZZH RX MED GY IP 250 OP 250 PS 637: Performed by: STUDENT IN AN ORGANIZED HEALTH CARE EDUCATION/TRAINING PROGRAM

## 2019-11-23 PROCEDURE — 81003 URINALYSIS AUTO W/O SCOPE: CPT | Performed by: STUDENT IN AN ORGANIZED HEALTH CARE EDUCATION/TRAINING PROGRAM

## 2019-11-23 PROCEDURE — 87088 URINE BACTERIA CULTURE: CPT | Performed by: STUDENT IN AN ORGANIZED HEALTH CARE EDUCATION/TRAINING PROGRAM

## 2019-11-23 PROCEDURE — 25000128 H RX IP 250 OP 636: Performed by: STUDENT IN AN ORGANIZED HEALTH CARE EDUCATION/TRAINING PROGRAM

## 2019-11-23 PROCEDURE — 99231 SBSQ HOSP IP/OBS SF/LOW 25: CPT | Performed by: PHYSICIAN ASSISTANT

## 2019-11-23 PROCEDURE — 87186 SC STD MICRODIL/AGAR DIL: CPT | Performed by: STUDENT IN AN ORGANIZED HEALTH CARE EDUCATION/TRAINING PROGRAM

## 2019-11-23 RX ORDER — HYDROXYZINE HYDROCHLORIDE 50 MG/1
50 TABLET, FILM COATED ORAL
Status: COMPLETED | OUTPATIENT
Start: 2019-11-23 | End: 2019-11-23

## 2019-11-23 RX ORDER — HYDROXYZINE HYDROCHLORIDE 50 MG/1
100 TABLET, FILM COATED ORAL
Status: COMPLETED | OUTPATIENT
Start: 2019-11-23 | End: 2019-11-23

## 2019-11-23 RX ADMIN — BETAMETHASONE SODIUM PHOSPHATE AND BETAMETHASONE ACETATE 12 MG: 3; 3 INJECTION, SUSPENSION INTRA-ARTICULAR; INTRALESIONAL; INTRAMUSCULAR at 16:59

## 2019-11-23 RX ADMIN — HYDROXYZINE HYDROCHLORIDE 100 MG: 50 TABLET, FILM COATED ORAL at 01:08

## 2019-11-23 RX ADMIN — SENNOSIDES AND DOCUSATE SODIUM 1 TABLET: 8.6; 5 TABLET ORAL at 10:30

## 2019-11-23 RX ADMIN — SENNOSIDES AND DOCUSATE SODIUM 1 TABLET: 8.6; 5 TABLET ORAL at 21:56

## 2019-11-23 RX ADMIN — PRENATAL VIT W/ FE FUMARATE-FA TAB 27-0.8 MG 1 TABLET: 27-0.8 TAB at 10:31

## 2019-11-23 RX ADMIN — ASPIRIN 81 MG CHEWABLE TABLET 81 MG: 81 TABLET CHEWABLE at 10:31

## 2019-11-23 RX ADMIN — SERTRALINE HYDROCHLORIDE 50 MG: 50 TABLET ORAL at 10:31

## 2019-11-23 NOTE — PROGRESS NOTES
VSS. Reactive NST. Reports positive fetal movement. Denies VB, LOF, ctx, N&V, HA, and/or RUQ pain. Received first dose of beta today, will receive second dose tomorrow. Plan is to do csection at 35 weeks or sooner if concerns.

## 2019-11-23 NOTE — PLAN OF CARE
Pt is G53, 32.1 wks here w vasa previa, placenta previa and acreta. Pr reports no vag bleeding. Intact. No s/s of PTL.  +FM.  VSS.  Pt reports since 11/22 frequency with urination.  Dr Martinez and Dr Nunez updated during rounds.  Pt states understanding to leave a midstream sample to send for UA/UC.  Pt would like to sleep longer as reports did not sleep well last night.  Requesting meds after breakfast.  Call light within reach.

## 2019-11-23 NOTE — CONSULTS
[facility]  MATERNAL CHILD HEALTH   SOCIAL WORK PROGRESS NOTE        DATA:      Pt is Mariela, admitted to the Patient Specialty Care Unit since 2019 for Vasa Previa. She is presently at 32w1d gestation, with a delivery plan for CSection at 35 weeks (), if not urgently indicated beforehand. FOB is Joel, pt's .       INTERVENTION:        Writer met with pt at bedside, provided supportive listening and validation of emotions.    Discussed pt's stressors and concerns, primarily    Anxiety related to being  from her children, as well as guilt for not being home.    Anticipated boredom during long hospital stay    Provided information about hospital and community resources, primarily    Wellness Center Activities, Family Resource Center activities    Ideas for staying in touch with her family while they are .    Provided notebook and pen for journaling    Provided monthly parking support.    Encouraged pt to reach out with additional needs or concerns.     ASSESSMENT:      Mariela is pleasant and easy to engage.  She has good insight into her anxiety and is able to appreciate the normalcy of its presence. She demonstrates mindfulness toward self care as she speaks about taking time off from work (vs. Offering to work from home), as she views this period of bedrest and hospitalization as a sign for her to be in the quiet space for a while.    She has good family support and good internal resources, and expresses an appreciation from ongoing support and visits from SW.     PLAN:      SW will continue to follow to provide supportive intervention, particularly regarding:       Wellness support    Friday Moms in Waiting group     MARIO Alston, SORAYA   Social Worker  Maternal Child Health  [facility]  Direct: [phone]  Pager: [phone]

## 2019-11-23 NOTE — PROVIDER NOTIFICATION
11/23/19 1350   Provider Notification   Provider Name/Title Dr SARIKA Nunez   Method of Notification Electronic Page   pt reports nasal congestion requesting medication to tx it.

## 2019-11-23 NOTE — PLAN OF CARE
Patient had trouble falling asleep due to new environment/noises  and was given PRN 100mg oral vistaril around 0100. Patient was able to sleep more comfortably the rest of the night. VSS, patient denies any contractions, leaking of fluid, cramping, bleeding, RUQ pain or headaches. Pt complaining of increase in frequency of urination, but denies any UTI like symptoms. EFM applied, normal baseline with moderate variability and accles, no decels or contractions.Plan is to receive second dose of beta later today at 1700 and to have a repeat  around 35 weeks or sooner if concerns occur.

## 2019-11-23 NOTE — PROVIDER NOTIFICATION
11/22/19 8573   Provider Notification   Provider Name/Title Dr. Myhre   Method of Notification Electronic Page   Notification Reason Patient Request  (pt wants a medication to help her sleep )   pt requested to have something to help her sleep. Pt c/o new environment and extra noises. Verbal order was given from Dr. Myhre to order 100mg oral vistaril to help pt sleep. Given to pt and will continue to monitor.

## 2019-11-23 NOTE — PROGRESS NOTES
Pt comfortable and stable.  No vaginal bleeding.  FHR AGA w accels.  No s/s of PTL.  Intact.  +FM.  Pt takes zoloft.  Pt was in darkened room sleeping on/off.  Pleasant and cooperative w cares.  Per pt, she took vistaril last night to sleep and has been groggy all day.  ERNESTINA VeraW in to see pt.  Shift change report to CM Torres RN.

## 2019-11-23 NOTE — PROGRESS NOTES
Antepartum Progress Note    S: Patient feeling well. No complaints this morning. Did report to the RN that she was feeling some urinary frequency. Otherwise denies vaginal bleeding, contractions or leakage of fluid. + FM.     O:   Patient Vitals for the past 24 hrs:   BP Temp Temp src Pulse Resp   19 0754 92/54 98.4  F (36.9  C) Oral 86 16   19 2319 124/58 97.5  F (36.4  C) -- 84 16   19 1600 126/56 -- -- -- --   19 1500 120/80 -- -- 68 --   19 1407 120/73 98.3  F (36.8  C) -- 71 16     Gen: Appears comfortable, NAD  CV: Regular rate  Pulm: non-labored breathing  Abd: Gravid, obese    FHT: 130 baseline, moderate variability, 15x15 accels +, no decels  TOCO: 0 ctx in 10 minute period    Labs:   No new labs    Imaging:   No recent US    A/P: Mariela Hawkins is a 29 year old  female at 32w1d by LMP c/w 7w5d US, HD#2 here for a planned admission for vasa previa and placenta previa. Pregnancy notable for obesity, anxiety, h/o prior CS x3, h/o ventral hernia repair, anixety.    # Vasa previa  # Complete anterior placenta previa  - Admission until delivery   - Plan for RLTCS at 35 weeks or sooner with bleeding or concerns for accreta   - Outpatient MFM consult recommended MRI for surgical planning if there is concern for accreta, will likely perform this next week   - T&S q72h   - Weekly cervical lengths  - S/p Betamethasone for fetal lung maturity x1, will repeat second dose today     # Obesity  - Continue aspirin 81 mg daily   - SCDs while in bed, lovenox in postpartum period      # Anxiety  - Continue PTA sertraline   - Social work antepartum assessment ordered   - Monitor mood closely     # Urinary frequency  - UA with UCx ordered     # Fetal well being  - Category 1, reactive and reassuring  - EFW at 29w0d US: 1151g (34%ile), appropriate for gestational age.  - NICU NNP consult ordered, not seen yet  - TID monitoring  - Q4 week growth US (next ).     # Prenatal care  - Rh  positive, Antibody negative.  - Rubella immune, Hep B SAg NR, RPR neg, HIV NR, GC/CT NR.  - GCT 85  - Low risk NIPT   - s/p influenza immunization, s/p Tdap   - GBS to be collected     # Method of delivery  - History of LTCS x 3. Had a prior wound dehiscence with prolonged healing with prior Pfannenstiel incision. Plan for RLTCS at 35 weeks or sooner with bleeding or concerns for accreta.   - S/p ventral hernia repair with mesh (2018)  - Fetal presentation: transverse, head to maternal left.  - Discussed repeat  section today. Discussed possible risk of classical incision. Discussed possible risk for hysterectomy either for abnormal placentation of massive hemorrhage. Reviewed risks of bleeding, infection, injury to surrounding organs, fetal injury in event of . She consented to a blood transfusion in the event of a life threatening amount of bleeding. She had time to ask questions and agreed to proceed. Reviewed that there is a chance the delivery MD would perform a vertical midline incision due to h/o prior CS x3 and wound dehiscence. Also reviewed concern for ventral mesh. Patient amendable to whichever incision is safest. She declined a tubal ligation for contraception postpartum. Surgical consent was signed today.    Delivery Plan: Repeat  section at 35 weeks    Patient seen and care plan discussed with Dr. Martinez.     María Nunez MD  OBGYN Resident PGY3  2019 9:16 AM

## 2019-11-24 PROCEDURE — 87186 SC STD MICRODIL/AGAR DIL: CPT | Performed by: STUDENT IN AN ORGANIZED HEALTH CARE EDUCATION/TRAINING PROGRAM

## 2019-11-24 PROCEDURE — 12000001 ZZH R&B MED SURG/OB UMMC

## 2019-11-24 PROCEDURE — 25000132 ZZH RX MED GY IP 250 OP 250 PS 637: Performed by: STUDENT IN AN ORGANIZED HEALTH CARE EDUCATION/TRAINING PROGRAM

## 2019-11-24 PROCEDURE — 87653 STREP B DNA AMP PROBE: CPT | Performed by: STUDENT IN AN ORGANIZED HEALTH CARE EDUCATION/TRAINING PROGRAM

## 2019-11-24 RX ADMIN — ASPIRIN 81 MG CHEWABLE TABLET 81 MG: 81 TABLET CHEWABLE at 08:32

## 2019-11-24 RX ADMIN — PRENATAL VIT W/ FE FUMARATE-FA TAB 27-0.8 MG 1 TABLET: 27-0.8 TAB at 08:32

## 2019-11-24 RX ADMIN — SENNOSIDES AND DOCUSATE SODIUM 1 TABLET: 8.6; 5 TABLET ORAL at 08:32

## 2019-11-24 RX ADMIN — SERTRALINE HYDROCHLORIDE 50 MG: 50 TABLET ORAL at 08:33

## 2019-11-24 NOTE — PLAN OF CARE
Patient slept well throughout the night with no complaints. Pt took a shower before bed independently. Denies any cramping, bleeding, headaches, leaking of fluids, headaches or RUQ pain. VSS, EFM applied with normal baseline, moderate varaibility and accelerations, no decels or contractions. Continue to monitor patient and present plan of care.

## 2019-11-24 NOTE — PLAN OF CARE
Received shift change report from Radha Darby RN.  Seen by Dr Martinez and Dr Myhre.  Pt is comfortable.  No vag bleeding.  Intact. +FM.  VSS.  Plan to collect GBS today.  Pt reports no voiding frequency today.  No complaints.  Pt states understanding to put on call light while awake.  Call light is within reach.

## 2019-11-24 NOTE — PLAN OF CARE
Pt has not had bleeding, denies pain or contractions. Monitoring shows moderate variability with accels, no decels. VSS. 2nd beta shot given this evening. Continue to monitor.

## 2019-11-24 NOTE — PROGRESS NOTES
Antepartum Progress Note    S: Patient feeling well this morning. Sitting upright eating breakfast. Has some difficulty sleeping and feels that vistaril makes her groggy. No other complaints this morning. Denies vaginal bleeding, contractions or leakage of fluid. Active fetal movement.    O:   Patient Vitals for the past 24 hrs:   BP Temp Temp src Pulse Resp   19 0105 119/60 97.9  F (36.6  C) Oral 99 20   19 1656 123/70 98.2  F (36.8  C) Oral -- 20     Gen: Appears comfortable, NAD  CV: Regular rate, well perfused  Pulm: non-labored breathing  Abd: Gravid, obese    FHT: 130 baseline, moderate variability, 15x15 accels +, no decels  TOCO: 0 ctx in 10 minute period    Labs:   UC pending    Imaging:   No recent US    A/P: Mariela Hawkins is a 29 year old  female at 32w2d by LMP c/w 7w5d US, HD#3 here for a planned admission for vasa previa and placenta previa. Pregnancy notable for obesity, anxiety, h/o prior CS x3, h/o ventral hernia repair, anixety.    # Vasa previa  # Complete anterior placenta previa  - Admission until delivery   - Plan for RLTCS at 35 weeks or sooner with bleeding or concerns for accreta   - Outpatient MFM consult recommended MRI for surgical planning if there is concern for accreta, will likely perform this next week   - T&S q72h   - Weekly cervical lengths  - S/p Betamethasone for fetal lung maturity x2     # Obesity  - Continue aspirin 81 mg daily   - SCDs while in bed, lovenox in postpartum period      # Anxiety  - Continue PTA sertraline   - Social work antepartum assessment ordered   - Monitor mood closely     # Urinary frequency  - UA negative, UC pending     # Fetal well being  - Category 1, reactive and reassuring  - EFW at 29w0d US: 1151g (34%ile), appropriate for gestational age.  - NICU NNP consult ordered, not seen yet  - TID monitoring  - Q4 week growth US (next ), w/ TVUS cervical length     # Prenatal care  - Rh positive, Antibody negative.  - Rubella  immune, Hep B SAg NR, RPR neg, HIV NR, GC/CT NR.  - GCT 85  - Low risk NIPT   - s/p influenza immunization, s/p Tdap   - GBS to be collected     # Method of delivery  - History of LTCS x 3. Had a prior wound dehiscence with prolonged healing with prior Pfannenstiel incision. Plan for RLTCS at 35 weeks or sooner with bleeding or concerns for accreta.   - S/p ventral hernia repair with mesh (2018)  - Fetal presentation: transverse, head to maternal left.  - Discussed repeat  section today. Discussed possible risk of classical incision. Discussed possible risk for hysterectomy either for abnormal placentation of massive hemorrhage. Reviewed risks of bleeding, infection, injury to surrounding organs, fetal injury in event of . She consented to a blood transfusion in the event of a life threatening amount of bleeding. She had time to ask questions and agreed to proceed. Reviewed that there is a chance the delivery MD would perform a vertical midline incision due to h/o prior CS x3 and wound dehiscence. Also reviewed concern for ventral mesh. Patient amendable to whichever incision is safest. She declined a tubal ligation for contraception postpartum. Surgical consent was signed today.    Delivery Plan: Repeat  section at 35 weeks    Patient seen and care plan discussed with Dr. Martinez.     Alicia Myhre,   Obstetrics and Gyncology, PGY-3  2019, 9:22 AM

## 2019-11-24 NOTE — CONSULTS
Neonatology Antepartum Counseling Consult    I was asked to provide antepartum counseling for Mariela Hawkins at the request of Kevyn Martinez MD secondary to vasa previa. Ms. Hawkins is currently 32 weeks and has a hx significant for vasa previa with planned caesarean section for 35 weeks. Ms. Hawkins was counseled on the expected hospital course, potential risks, and outcomes associated with an infant born at approximately 35 weeks gestation. The counseling included: morbidity, mortality, initial delivery room stabilization, respiratory course, lung development, hyperbilirubinemia, nutrition, growth and development, and long term outcomes. Please feel free to call with any additional questions or concerns.      Floor Time (min): 5  Face to Face Time (min): 15  Total Time (minutes): 20  More than 50% of my time was spent in direct, face to face, antepartum counseling with the above patient.    Mireya Tang PA-C 6:21 PM 2019   Advanced Practice Provider  [facility]

## 2019-11-25 LAB
BACTERIA SPEC CULT: ABNORMAL
GP B STREP DNA SPEC QL NAA+PROBE: POSITIVE
Lab: ABNORMAL
SPECIMEN SOURCE: ABNORMAL
SPECIMEN SOURCE: ABNORMAL

## 2019-11-25 PROCEDURE — 25000132 ZZH RX MED GY IP 250 OP 250 PS 637: Performed by: STUDENT IN AN ORGANIZED HEALTH CARE EDUCATION/TRAINING PROGRAM

## 2019-11-25 PROCEDURE — 12000001 ZZH R&B MED SURG/OB UMMC

## 2019-11-25 RX ORDER — NITROFURANTOIN 25; 75 MG/1; MG/1
100 CAPSULE ORAL EVERY 12 HOURS SCHEDULED
Status: COMPLETED | OUTPATIENT
Start: 2019-11-25 | End: 2019-11-30

## 2019-11-25 RX ADMIN — PRENATAL VIT W/ FE FUMARATE-FA TAB 27-0.8 MG 1 TABLET: 27-0.8 TAB at 09:42

## 2019-11-25 RX ADMIN — ASPIRIN 81 MG CHEWABLE TABLET 81 MG: 81 TABLET CHEWABLE at 09:42

## 2019-11-25 RX ADMIN — SERTRALINE HYDROCHLORIDE 50 MG: 50 TABLET ORAL at 09:42

## 2019-11-25 RX ADMIN — SENNOSIDES AND DOCUSATE SODIUM 1 TABLET: 8.6; 5 TABLET ORAL at 09:42

## 2019-11-25 NOTE — PROGRESS NOTES
Antepartum Progress Note    S: Patient feeling well this morning. Rare cramping. No VB or LOF. Many questions about planned assessment of placenta and delivery planning.     O:   Patient Vitals for the past 24 hrs:   BP Temp Temp src Pulse Resp   19 0208 118/56 97.7  F (36.5  C) Oral -- 18   19 1553 115/70 -- -- -- 20   19 0948 117/58 98.3  F (36.8  C) Oral 90 16     Gen: Appears comfortable, NAD  CV: Regular rate, well perfused  Pulm: non-labored breathing  Abd: Gravid, obese, nontender    FHT: 140 baseline, moderate variability, 15x15 accels +, no decels  TOCO: 0 ctx in 10 minute period    Labs:   UC pending    Imaging:   No recent US    A/P: Mariela Hwakins is a 29 year old  female at 32w3d by LMP c/w 7w5d US, HD#4 here for a planned admission for vasa previa and placenta previa. Pregnancy notable for obesity, anxiety, h/o prior CS x3, h/o ventral hernia repair, anixety.    # Vasa previa  # Complete anterior placenta previa  - Admission until delivery   - Plan for RLTCS at 35 weeks or sooner with bleeding or concerns for accreta, consented for  and possible hysterectomy  - T&S q72h   - Weekly cervical lengths, next tomorrow  with growth and placental evaluation  -  Consider MRI, but likely will not need as previous imaging not concerning for accreta.  Patient is also claustrophobic and is not sure if she could tolerate laying flat x 45 min.  - S/p Betamethasone for fetal lung maturity x2     # Obesity  - Continue aspirin 81 mg daily   - SCDs while in bed, lovenox in postpartum period      # Anxiety  - Continue PTA sertraline   - Social work antepartum assessment ordered   - Monitor mood closely     # Urinary frequency  - UA negative, UC pending     # Fetal well being  -  reactive and reassuring  - EFW at 29w0d US: 1151g (34%ile), appropriate for gestational age.  - s/p NICU NNP consult  - TID monitoring  - Q4 week growth US (next ), w/ TVUS cervical length     #  Prenatal care  - Rh positive, Antibody negative.  - Rubella immune, Hep B SAg NR, RPR neg, HIV NR, GC/CT NR.  - GCT 85  - Low risk NIPT   - s/p influenza immunization, s/p Tdap   - GBS to be collected     # Method of delivery  - History of LTCS x 3. Had a prior wound dehiscence with prolonged healing with prior Pfannenstiel incision. Plan for RLTCS at 35 weeks or sooner with bleeding or concerns for accreta.   - S/p ventral hernia repair with mesh (2018)  - Fetal presentation: transverse, head to maternal left.  - s/p discussion of: possible risk of classical incision. Discussed possible risk for hysterectomy either for abnormal placentation of massive hemorrhage. Reviewed risks of bleeding, infection, injury to surrounding organs, fetal injury in event of . She consented to a blood transfusion in the event of a life threatening amount of bleeding. She had time to ask questions and agreed to proceed. Reviewed that there is a chance the delivery MD would perform a vertical midline incision due to h/o prior CS x3 and wound dehiscence. Also reviewed concern for ventral mesh. Patient amendable to whichever incision is safest. She declined a tubal ligation for contraception postpartum. Surgical consent was signed     Delivery Plan: Repeat  section at 35 weeks    Ally Silvestre MD, MD  Ob/Gyn PGY-3  19 10:42 AM    Physician Attestation   I, Kiki Hauser DO, saw and evaluated Mariela Hawkins with the resident.      I personally reviewed the vital signs, medications, labs and imaging.    My key history or physical exam findings:   Patient feels well.  Denies contractions, leaking or bleeding. +FM.    Key management decisions made by me:   Central placenta previa with attenuated area near cord insertion near internal os.  Being managed as a vasa previa.  The placental/uterine interface appears normal.  Plan for growth US and TVUS tomorrow.  Will further assess placental/bladder  interface.  Patient is claustrophobic and does not think she would tolerate an MRI very well.  Reviewed that both US and MRI are not 100% sensitive/specific for placenta accreta spectrum.      Kiki Hauser DO  Date of Service (when I saw the patient): 11/25/19    Time Spent on this Encounter   I, Kiki Hauser DO, spent a total of 15 minutes face to face or coordinating care of Mariela Hawkins.  Over 50% of my time on the unit was spent counseling the patient and/or coordinating care regarding probable vasa previa.

## 2019-11-25 NOTE — PLAN OF CARE
Pt denies contractions or bleeding. States baby is active. [facility]SS[facility] Monitoring shows moderate variability with accels, no decels. No contractions. Continue to monitor.

## 2019-11-25 NOTE — PLAN OF CARE
VSS. Pt is afebrile. No signs of loss of fluid, contractions or pain. Aside from one cramp, no contractions or vaginal bleeding. See flow sheet for FHR and contraction pattern. Pt comfortable and slept most of the night during shift. Continue with plan of care.

## 2019-11-26 ENCOUNTER — HOSPITAL ENCOUNTER (INPATIENT)
Dept: ULTRASOUND IMAGING | Facility: CLINIC | Age: 29
End: 2019-11-26
Attending: STUDENT IN AN ORGANIZED HEALTH CARE EDUCATION/TRAINING PROGRAM
Payer: COMMERCIAL

## 2019-11-26 PROCEDURE — 25000132 ZZH RX MED GY IP 250 OP 250 PS 637: Performed by: STUDENT IN AN ORGANIZED HEALTH CARE EDUCATION/TRAINING PROGRAM

## 2019-11-26 PROCEDURE — 76816 OB US FOLLOW-UP PER FETUS: CPT

## 2019-11-26 PROCEDURE — 12000001 ZZH R&B MED SURG/OB UMMC

## 2019-11-26 PROCEDURE — 76819 FETAL BIOPHYS PROFIL W/O NST: CPT | Performed by: OBSTETRICS & GYNECOLOGY

## 2019-11-26 RX ADMIN — SERTRALINE HYDROCHLORIDE 50 MG: 50 TABLET ORAL at 08:44

## 2019-11-26 RX ADMIN — PRENATAL VIT W/ FE FUMARATE-FA TAB 27-0.8 MG 1 TABLET: 27-0.8 TAB at 08:44

## 2019-11-26 RX ADMIN — NITROFURANTOIN (MONOHYDRATE/MACROCRYSTALS) 100 MG: 75; 25 CAPSULE ORAL at 08:44

## 2019-11-26 RX ADMIN — NITROFURANTOIN (MONOHYDRATE/MACROCRYSTALS) 100 MG: 75; 25 CAPSULE ORAL at 00:12

## 2019-11-26 RX ADMIN — NITROFURANTOIN (MONOHYDRATE/MACROCRYSTALS) 100 MG: 75; 25 CAPSULE ORAL at 22:09

## 2019-11-26 RX ADMIN — ASPIRIN 81 MG CHEWABLE TABLET 81 MG: 81 TABLET CHEWABLE at 08:44

## 2019-11-26 RX ADMIN — SENNOSIDES AND DOCUSATE SODIUM 1 TABLET: 8.6; 5 TABLET ORAL at 08:43

## 2019-11-26 NOTE — PLAN OF CARE
Patient resting this morning after not being able to sleep through the night last night.  MD talked with patient during her ultrasound and will return to discuss plan of care once the ultrasound is read this morning.  Will continue to monitor patient closely and notify MD of any change in patient status.

## 2019-11-26 NOTE — PLAN OF CARE
VSS.  FHT- reactive (see flow sheets).  No contractions. Denies pain. Light pink spotting. Napped this evening. Plan on showering tonight before bed.

## 2019-11-26 NOTE — PROGRESS NOTES
Antepartum Progress Note    S: Patient feeling well this morning. Denies cramping or bleeding. Good FM.     O:   Patient Vitals for the past 24 hrs:   BP Temp Temp src Resp Weight   19 1330 -- 98  F (36.7  C) Oral 18 --   19 0945 116/63 98  F (36.7  C) Oral 18 --   19 0538 -- 98  F (36.7  C) Oral -- 145.4 kg (320 lb 8 oz)   19 2324 106/55 97.7  F (36.5  C) Oral 18 --   19 2155 121/65 97.8  F (36.6  C) Oral 18 --   19 1735 114/58 97.8  F (36.6  C) Oral -- --     Gen: Appears comfortable, NAD  CV: Regular rate, well perfused  Pulm: non-labored breathing  Abd: Gravid, obese, nontender    FHT: 140 baseline, moderate variability, 15x15 accels +, no decels  TOCO: 0 ctx in 10 minute period    Labs:   Ucx: 50,000 to 100,000 colonies/mL   Enterococcus faecalis     Imagin) Intrauterine pregnancy at 32 4/7 weeks gestational age.  2) Visualized fetal anatomy appears normal, but very limited due to advanced gestational age and maternal body habitus.  3) Growth parameters and estimated fetal weight were consistent with fetal growth restriction with lagging AC.  4) The amniotic fluid volume appeared low/normal.  5) The UA Doppler waveform is within normal limits.  6) The BPP is reassuring.  7) The transvaginal cervical length is reassuring. There is a bilobed placenta with attenuation cephalad to the internal os. The cord insertion is difficult to see, however there  are fetal vessels directly cephalad to the internal os.       A/P: Mariela Hawkins is a 29 year old  female at 32w4d by LMP c/w 7w5d US, HD#5 here for a planned admission for vasa previa and placenta previa. Pregnancy notable for obesity, anxiety, h/o prior CS x3, h/o ventral hernia repair, anixety.    # Vasa previa  # Complete anterior placenta previa  - Admission until delivery   - Plan for RLTCS at 35 weeks or sooner with bleeding or concerns for accreta, consented for  and possible hysterectomy. Likely  plan for   - T&S q72h   - Weekly cervical lengths  -  Will continue to review imaging; though imaging today does not seem c/w accreta, so likely will not proceed with MRI  - S/p Betamethasone for fetal lung maturity x2     # Obesity  - Continue aspirin 81 mg daily   - SCDs while in bed, lovenox in postpartum period      # Anxiety  - Continue PTA sertraline   - Social work antepartum assessment ordered   - Monitor mood closely     # Urinary tract infection  - UCx as above--D#1 Macrobid    # IUGR/lagging AC  - newly diagnosed today  - EFW 11%ile  - weekly BPPs with dopplers, normal today    # Fetal well being  - FHR reactive and reassuring  - EFW at 29w0d US: 1151g (34%ile), appropriate for gestational age.  - s/p NICU NNP consult  - TID monitoring  - Q4 week growth US (next ), w/ TVUS cervical length     # Prenatal care  - Rh positive, Antibody negative.  - Rubella immune, Hep B SAg NR, RPR neg, HIV NR, GC/CT NR.  - GCT 85  - Low risk NIPT   - s/p influenza immunization, s/p Tdap   - GBS Positive     # Method of delivery  - History of LTCS x 3. Had a prior wound dehiscence with prolonged healing with prior Pfannenstiel incision. Plan for RLTCS at 35 weeks or sooner with bleeding or concerns for accreta.   - S/p ventral hernia repair with mesh (2018)  - Fetal presentation: transverse, head to maternal left.  - s/p discussion of: possible risk of classical incision. Discussed possible risk for hysterectomy either for abnormal placentation of massive hemorrhage. Reviewed risks of bleeding, infection, injury to surrounding organs, fetal injury in event of . She consented to a blood transfusion in the event of a life threatening amount of bleeding. She had time to ask questions and agreed to proceed. Reviewed that there is a chance the delivery MD would perform a vertical midline incision due to h/o prior CS x3 and wound dehiscence. Also reviewed concern for ventral mesh. Patient amendable to  whichever incision is safest. She declined a tubal ligation for contraception postpartum. Surgical consent was signed .    Delivery Plan: Repeat  section at 35 weeks    Ally Silvestre MD  Ob/Gyn PGY-3  19    Physician Attestation   Kiki WHITEHEAD DO, saw and evaluated Mariela Hawkins with the resident.     I personally reviewed the vital signs, medications, labs and imaging.    My key history or physical exam findings:   No complaints.+FM.  No LOF or VB.  Denies contractions.    Key management decisions made by me:   Reviewed imaging including suspected vasa previa and lagging AC growth.  Plan for weekly BPPs with UA Dopplers along with TVUS.  Plan for delivery at 35 weeks, but could be earlier due to shortened cervical length, contractions, etc.  Recommend staying on the unit due to the potential for catastrophic fetal outcome with ROM.      Risk for placenta accreta spectrum is approx 60% (this will be 4th c/s + previa).  No obvious sonographic findings that are concerning for accreta.  Discussed that MRI would likely not be that helpful and patient is claustrophobic.      Consider delivery in main OR in case of need for hyst.  Will discuss this with [facility] and MFM on staff during planned delivery time (35 weeks).  If urgent delivery is needed recommend L&D OR.  Plan for anesthesia consultation given maternal body habitus in case GETA is needed.    Patient is not against birth control, but did not like having hormonal manipulation of her cycles.  Discussed that there is increased risk for patient with each c/s.  She does want to know how much scarring she has, etc.  Her partner has not considered a vasectomy, but he may consider it if further pregnancies were felt to be dangerous for patient.    Kiki Hauser DO  Date of Service (when I saw the patient): 19    Time Spent on this Encounter   IKiki DO, spent a total of 25 minutes face to face or  coordinating care of Mariela Hawkins.  Over 50% of my time on the unit was spent counseling the patient and/or coordinating care regarding vasa previa, hx of 3 prior c/s, possible placenta accreta spectrum.

## 2019-11-26 NOTE — PLAN OF CARE
VSS. Pt is afebrile. Denies any cramping, contractions or vaginal bleeding. No signs of leakage. See flow sheet for FHR and contraction pattern. Pt comfortable and slept most of the night during shift. Continue with plan of care.

## 2019-11-26 NOTE — PROGRESS NOTES
VSS. Afebrile. Reports intermittent cramping earlier this am but none since. Denies ctx, LOF, or VB. Reactive NST. Positive fetal movement noted. Ambulated in room and throughout unit during day. Plan is to repeat  section at 35 weeks. Continue POC.

## 2019-11-27 ENCOUNTER — PREP FOR PROCEDURE (OUTPATIENT)
Dept: OBGYN | Facility: CLINIC | Age: 29
End: 2019-11-27

## 2019-11-27 DIAGNOSIS — Z98.891 HISTORY OF CESAREAN SECTION: Primary | ICD-10-CM

## 2019-11-27 DIAGNOSIS — Z98.891 HISTORY OF C-SECTION: Primary | ICD-10-CM

## 2019-11-27 PROCEDURE — 25000132 ZZH RX MED GY IP 250 OP 250 PS 637: Performed by: STUDENT IN AN ORGANIZED HEALTH CARE EDUCATION/TRAINING PROGRAM

## 2019-11-27 PROCEDURE — 12000001 ZZH R&B MED SURG/OB UMMC

## 2019-11-27 RX ORDER — SODIUM CHLORIDE, SODIUM LACTATE, POTASSIUM CHLORIDE, CALCIUM CHLORIDE 600; 310; 30; 20 MG/100ML; MG/100ML; MG/100ML; MG/100ML
INJECTION, SOLUTION INTRAVENOUS CONTINUOUS
Status: CANCELLED | OUTPATIENT
Start: 2019-11-27

## 2019-11-27 RX ORDER — CEFAZOLIN SODIUM IN 0.9 % NACL 3 G/100 ML
3 INTRAVENOUS SOLUTION, PIGGYBACK (ML) INTRAVENOUS
Status: CANCELLED | OUTPATIENT
Start: 2019-11-27

## 2019-11-27 RX ORDER — CEFAZOLIN SODIUM 1 G/3ML
1 INJECTION, POWDER, FOR SOLUTION INTRAMUSCULAR; INTRAVENOUS SEE ADMIN INSTRUCTIONS
Status: CANCELLED | OUTPATIENT
Start: 2019-11-27

## 2019-11-27 RX ORDER — CITRIC ACID/SODIUM CITRATE 334-500MG
30 SOLUTION, ORAL ORAL
Status: CANCELLED | OUTPATIENT
Start: 2019-11-27

## 2019-11-27 RX ADMIN — NITROFURANTOIN (MONOHYDRATE/MACROCRYSTALS) 100 MG: 75; 25 CAPSULE ORAL at 21:46

## 2019-11-27 RX ADMIN — SERTRALINE HYDROCHLORIDE 50 MG: 50 TABLET ORAL at 08:23

## 2019-11-27 RX ADMIN — ASPIRIN 81 MG CHEWABLE TABLET 81 MG: 81 TABLET CHEWABLE at 08:20

## 2019-11-27 RX ADMIN — NITROFURANTOIN (MONOHYDRATE/MACROCRYSTALS) 100 MG: 75; 25 CAPSULE ORAL at 08:20

## 2019-11-27 RX ADMIN — SENNOSIDES AND DOCUSATE SODIUM 2 TABLET: 8.6; 5 TABLET ORAL at 08:21

## 2019-11-27 RX ADMIN — PRENATAL VIT W/ FE FUMARATE-FA TAB 27-0.8 MG 1 TABLET: 27-0.8 TAB at 08:20

## 2019-11-27 NOTE — PLAN OF CARE
Pt has had no bleeding, leaking of fluid. VSS. Monitoring shows moderate variability with accels. See provider notification note re: uterine activity. Pt does report occasional feeling a cramp that she associates with baby moving and has been ongoing on/off while in the hospital. Feeling sad about missing her family and wants to be home with them. Continue to monitor.

## 2019-11-27 NOTE — PROVIDER NOTIFICATION
11/27/19 0910   Provider Notification   Provider Name/Title Konrad Haro   Method of Notification At Bedside   Request Evaluate in Person   Notification Reason Status Update   Provider at bedside to evaluate in person. Pt. reports tightening and cramping overnight. Denies currently, to proceed with ongoing assessment related to uterine activity. Plan for anesthesia to assess in person and discuss plan for anesthesia during delivery. Plan at this time for 35 week c/s delivery, and to discuss hx of amoxicillin allergy with pharmacy prior to c/s. Will proceed with ongoing assessment.

## 2019-11-27 NOTE — PROGRESS NOTES
CLINICAL NUTRITION SERVICES    Reviewed nutrition risk factors due to LOS. Pt is tolerating diet and ordering TID meals appropriately per [facility]. No nutrition issues identified at this time. RD will do a full assessment by LOS day 14.       Taylor Olivares RD, LD  Pager: [phone]

## 2019-11-27 NOTE — PROVIDER NOTIFICATION
11/26/19 2216   Provider Notification   Provider Name/Title Dr. SARIKA Nunez   Method of Notification In Department   Request Evaluate - Remote   Notification Reason Uterine Activity     Pt having 40 sec irritability/contractions during first part of monitoring. TOCO was moved due to difficulty finding baby and then was quiet. Pt does not feel contractions.  Response: Continue to monitor, may come off monitoring after 1hr time.

## 2019-11-27 NOTE — PROGRESS NOTES
Antepartum Progress Note    S: Patient feeling well this morning. Had some cramping last night but it was not too painful. She was unsure what to count as cramping/contractions but did feel some lower abdominal lightenings. No strong contractions like when she was in labor with her first baby. Good FM.     O:   Patient Vitals for the past 24 hrs:   BP Temp Temp src Resp   19 0815 -- 97.9  F (36.6  C) Oral 17   19 0810 105/58 -- -- --   19 2343 114/62 98.1  F (36.7  C) Oral 18   19 1600 121/76 98.4  F (36.9  C) Oral 20   19 1330 -- 98  F (36.7  C) Oral 18     Gen: Appears comfortable, NAD  CV: Regular rate, well perfused  Pulm: non-labored breathing  Abd: Gravid, obese, nontender    FHT: 140 baseline, moderate variability, 15x15 accels +, no decels  TOCO: 0 ctx in 10 minute period    Labs:   No new labs    Imaging:   No new imaging     A/P: Mariela Hawkins is a 29 year old  female at 32w5d by LMP c/w 7w5d US, HD#6 here for a planned admission for vasa previa and placenta previa. Pregnancy notable for obesity, anxiety, h/o prior CS x3, h/o ventral hernia repair, anixety.    # Vasa previa  # Complete anterior placenta previa  - Admission until delivery   - Plan for RLTCS at 35 weeks or sooner with bleeding or concerns for accreta, consented for  and possible hysterectomy. Likely plan for --worksheet completed and message sent to surgery scheduler today.   - T&S q72h   - Weekly cervical lengths  -  Will continue to review imaging; though imaging today does not seem c/w accreta, so likely will not proceed with MRI  - S/p Betamethasone (-)     # Obesity  - Continue aspirin 81 mg daily   - SCDs while in bed, lovenox in postpartum period      # Anxiety  - Continue PTA sertraline   - Social work antepartum assessment ordered   - Monitor mood closely   - Discussed  Psych consult ordered after discussion with patient    # Urinary tract infection  - UCx as  above--D#2 Macrobid    # IUGR/lagging AC  - : EFW 11%ile  - weekly BPPs with dopplers, normal today    # Fetal well being  - FHR reactive and reassuring  - EFW at 29w0d US: 1151g (34%ile), appropriate for gestational age.  - s/p NICU NNP consult  - TID monitoring  - Q4 week growth US (next ), w/ TVUS cervical length  - S/p Betamethasone (-)      # Prenatal care  - Rh positive, Antibody negative.  - Rubella immune, Hep B SAg NR, RPR neg, HIV NR, GC/CT NR.  - GCT 85  - Low risk NIPT   - s/p influenza immunization, s/p Tdap   - GBS Positive     # Method of delivery  - History of LTCS x 3. Had a prior wound dehiscence with prolonged healing with prior Pfannenstiel incision. Plan for RLTCS at 35 weeks or sooner with bleeding or concerns for accreta.   - S/p ventral hernia repair with mesh (2018)  - Fetal presentation: transverse, head to maternal left.  - s/p discussion of: possible risk of classical incision. Discussed possible risk for hysterectomy either for abnormal placentation of massive hemorrhage. Reviewed risks of bleeding, infection, injury to surrounding organs, fetal injury in event of . She consented to a blood transfusion in the event of a life threatening amount of bleeding. She had time to ask questions and agreed to proceed. Reviewed that there is a chance the delivery MD would perform a vertical midline incision due to h/o prior CS x3 and wound dehiscence. Also reviewed concern for ventral mesh. Patient amendable to whichever incision is safest. She declined a tubal ligation for contraception postpartum. Surgical consent was signed .    # Ppx  - SCDs while in bed    Delivery Plan: Repeat  section at 35 weeks    Ally Silvestre MD  Ob/Gyn PGY-3  19    Physician Attestation   I, Kiki Hauser DO, saw and evaluated Mariela NESTOR Hawkins with the resident.     I personally reviewed the vital signs, medications, labs and imaging.    My key history or  physical exam findings:   Patient is doing well.  Figuring out how to cope with hospitalization- misses her family.  They are coming to visit tomorrow.  +FM.  Possible contractions, very mild that were noted overnight. They are not painful.      Key management decisions made by me:   Continue close observation due to vasa previa.      Kiki Hauser DO  Date of Service (when I saw the patient): 11/27/19    Time Spent on this Encounter   I, Kiki Hauser DO, spent a total of 15 minutes face to face or coordinating care of Mariela Hawkins.  Over 50% of my time on the unit was spent counseling the patient and/or coordinating care regarding vasa previa.

## 2019-11-27 NOTE — PLAN OF CARE
"Patient VSS and afebrile. Patient denies any s/s of bleeding, pain or change in condition, pt reports no tightening/cramping/ctx during shift. Patient reports feeling \"hopeless and helpless\" related to inpatient status away from family.  psych order placed, awaiting completion. 's, category 1, no ctx noted on toco. Patient notified when to call out to RN, verbalized understanding and agreed to plan, will proceed with ongoing assessment.   "

## 2019-11-27 NOTE — PLAN OF CARE
Afebrile, VSS. Pt denies bldg, LOF, pain, ctx. EFM as charted. Continue to monitor, contact MD with questions/concerns.

## 2019-11-28 LAB
ABO + RH BLD: NORMAL
ABO + RH BLD: NORMAL
BACTERIA SPEC CULT: ABNORMAL
BLD GP AB SCN SERPL QL: NORMAL
BLOOD BANK CMNT PATIENT-IMP: NORMAL
ERYTHROCYTE [DISTWIDTH] IN BLOOD BY AUTOMATED COUNT: 14.1 % (ref 10–15)
HCT VFR BLD AUTO: 35.6 % (ref 35–47)
HGB BLD-MCNC: 11.3 G/DL (ref 11.7–15.7)
MCH RBC QN AUTO: 29.1 PG (ref 26.5–33)
MCHC RBC AUTO-ENTMCNC: 31.7 G/DL (ref 31.5–36.5)
MCV RBC AUTO: 92 FL (ref 78–100)
PLATELET # BLD AUTO: 231 10E9/L (ref 150–450)
RBC # BLD AUTO: 3.88 10E12/L (ref 3.8–5.2)
SPECIMEN EXP DATE BLD: NORMAL
SPECIMEN SOURCE: ABNORMAL
WBC # BLD AUTO: 14.6 10E9/L (ref 4–11)

## 2019-11-28 PROCEDURE — 25000132 ZZH RX MED GY IP 250 OP 250 PS 637: Performed by: STUDENT IN AN ORGANIZED HEALTH CARE EDUCATION/TRAINING PROGRAM

## 2019-11-28 PROCEDURE — 85027 COMPLETE CBC AUTOMATED: CPT | Performed by: STUDENT IN AN ORGANIZED HEALTH CARE EDUCATION/TRAINING PROGRAM

## 2019-11-28 PROCEDURE — 25800030 ZZH RX IP 258 OP 636: Performed by: STUDENT IN AN ORGANIZED HEALTH CARE EDUCATION/TRAINING PROGRAM

## 2019-11-28 PROCEDURE — 86900 BLOOD TYPING SEROLOGIC ABO: CPT | Performed by: STUDENT IN AN ORGANIZED HEALTH CARE EDUCATION/TRAINING PROGRAM

## 2019-11-28 PROCEDURE — 86901 BLOOD TYPING SEROLOGIC RH(D): CPT | Performed by: STUDENT IN AN ORGANIZED HEALTH CARE EDUCATION/TRAINING PROGRAM

## 2019-11-28 PROCEDURE — 36415 COLL VENOUS BLD VENIPUNCTURE: CPT | Performed by: STUDENT IN AN ORGANIZED HEALTH CARE EDUCATION/TRAINING PROGRAM

## 2019-11-28 PROCEDURE — 86850 RBC ANTIBODY SCREEN: CPT | Performed by: STUDENT IN AN ORGANIZED HEALTH CARE EDUCATION/TRAINING PROGRAM

## 2019-11-28 PROCEDURE — 86780 TREPONEMA PALLIDUM: CPT | Performed by: STUDENT IN AN ORGANIZED HEALTH CARE EDUCATION/TRAINING PROGRAM

## 2019-11-28 PROCEDURE — 12000001 ZZH R&B MED SURG/OB UMMC

## 2019-11-28 RX ORDER — SODIUM CHLORIDE, SODIUM LACTATE, POTASSIUM CHLORIDE, CALCIUM CHLORIDE 600; 310; 30; 20 MG/100ML; MG/100ML; MG/100ML; MG/100ML
INJECTION, SOLUTION INTRAVENOUS CONTINUOUS
Status: DISCONTINUED | OUTPATIENT
Start: 2019-11-28 | End: 2019-12-08

## 2019-11-28 RX ADMIN — SERTRALINE HYDROCHLORIDE 50 MG: 50 TABLET ORAL at 09:40

## 2019-11-28 RX ADMIN — SODIUM CHLORIDE, POTASSIUM CHLORIDE, SODIUM LACTATE AND CALCIUM CHLORIDE: 600; 310; 30; 20 INJECTION, SOLUTION INTRAVENOUS at 08:38

## 2019-11-28 RX ADMIN — SODIUM CHLORIDE, POTASSIUM CHLORIDE, SODIUM LACTATE AND CALCIUM CHLORIDE 500 ML: 600; 310; 30; 20 INJECTION, SOLUTION INTRAVENOUS at 08:00

## 2019-11-28 RX ADMIN — ACETAMINOPHEN 975 MG: 325 TABLET, FILM COATED ORAL at 12:17

## 2019-11-28 RX ADMIN — ASPIRIN 81 MG CHEWABLE TABLET 81 MG: 81 TABLET CHEWABLE at 08:53

## 2019-11-28 RX ADMIN — NITROFURANTOIN (MONOHYDRATE/MACROCRYSTALS) 100 MG: 75; 25 CAPSULE ORAL at 20:13

## 2019-11-28 RX ADMIN — NITROFURANTOIN (MONOHYDRATE/MACROCRYSTALS) 100 MG: 75; 25 CAPSULE ORAL at 08:53

## 2019-11-28 RX ADMIN — PRENATAL VIT W/ FE FUMARATE-FA TAB 27-0.8 MG 1 TABLET: 27-0.8 TAB at 08:53

## 2019-11-28 NOTE — PROVIDER NOTIFICATION
11/28/19 0905   Provider Notification   Provider Name/Title Dr Harris   Method of Notification In Department   Notification Reason Other (Comment)   Patient is requesting to removed from monitor. Provider okayed patient to come off for now.

## 2019-11-28 NOTE — PLAN OF CARE
"Patient VSS. Patient complains of headache this afternoon, Tylenol has eased it but patient states it is \"still there.\" FHT appropriate for afternoon monitoring, see flow record. Patient denies any needs at this time. Patient denies bleeding, leaking, cramping, contractions. Continue with plan of care.  "

## 2019-11-28 NOTE — PROVIDER NOTIFICATION
11/28/19 1505   Provider Notification   Provider Name/Title Dr Harris   Method of Notification In Department   Notification Reason Other (Comment)   Patient not feeling well, requested RN to take temperature, 98.4F orally. BP stable. Patient just feels foggy and tired. Provider just wants to monitor patient at this time, no other concerns, possibly getting a cold.

## 2019-11-28 NOTE — PROVIDER NOTIFICATION
11/28/19 1223   Provider Notification   Provider Name/Title Dr Harris   Method of Notification In Department   Request Evaluate - Remote   Notification Reason Other (Comment)   Patient stated she wasn't feeling well, has been sleepy all day, has a headache that just started, and feeling foggy. BP was stable and provided patient with Tylenol. FHT on monitor looks appropriate for gestational age, will continue to monitor closely.

## 2019-11-28 NOTE — PROVIDER NOTIFICATION
11/28/19 0645   Provider Notification   Provider Name/Title Nunez   Method of Notification In Department   Request Evaluate - Remote   Notification Reason Fetal Baseline Change  (tachy)   Started 500mL fluid bolus.

## 2019-11-28 NOTE — PLAN OF CARE
Pt denies pain or bleeding. Has not felt tightening or cramping tonight. VSS. /80 but recheck 115/55. Monitoring shows moderate variability with accles, no decels. No contractions noted. Continue to monitor.

## 2019-11-28 NOTE — PROGRESS NOTES
Notified that on routine monitoring this AM at 0630 fetal tachycardia developed with baseline of 180.     Patient feeling a little congested and chilled. One of her other children has a URI and she thinks she may be developing one. Denies back pain, cramping, fundal tenderness, vaginal bleeding, abnormal vaginal discharge.     Patient Vitals for the past 4 hrs:   Temp Temp src   11/28/19 0640 97.8  F (36.6  C) Oral   /55, HR 87     Gen: Well appearing, laying in bed  CV: RRR  Resp: Breathing comfortably on room air  Abd: Morbidly obese, no fundal tenderness present    FHT: Baseline 180, moderate variability, difficult to differentiate accels vs decels due to frequent baseline changes  No ctx     - 1L IV fluid bolus   - IV noted to be leaking and possibly infiltrated while bolusing. New IV stat.   - CBC, T&S, RPR stat  - Continuous fetal monitoring  - NPO   - Discussed possible need of moving toward CS delivery if continued tachycardia or other signs of chorioamnionitis    Moni Harris MD  Ob/Gyn, PGY4

## 2019-11-28 NOTE — PLAN OF CARE
VSS. Pt is afebrile. Pt mentioned that she woke up this morning sweating, which is new for her. No signs of loss of fluid. Denies any contractions, cramping, vaginal bleeding or pain. FHR 150s into the 180s. No contractions. Provider was paged. Fluid bolus started for five minutes until her IV gave out. New IV site started. Pt expressed feeling sad that she cannot go black Friday shopping and to pick out a Rahul tree with her family this year. Continue with plan of care.

## 2019-11-28 NOTE — PROGRESS NOTES
Antepartum Progress Note    S: Feeling a little congested and had an episode of feeling sweaty this AM. Denies nausea, vomiting, diarrhea, back pain, cramping, vaginal bleeding, abnormal vaginal discharge, dysuria. Good FM.     O:   Patient Vitals for the past 24 hrs:   BP Temp Temp src Resp   19 0742 -- -- -- 18   19 0720 -- 98.2  F (36.8  C) Oral --   19 0646 110/55 -- -- 18   19 0640 -- 97.8  F (36.6  C) Oral --   19 2342 113/57 97.5  F (36.4  C) Oral 18   19 2206 115/55 -- -- --   19 1732 (!) 142/80 -- -- 20     Gen: Appears comfortable, NAD  CV: Regular rate, well perfused  Pulm: non-labored breathing  Abd: Gravid, obese, nontender    FHT: HR improved from tachycardia earlier to 150 baseline, moderate variability, 15x15 accels +, no decels  TOCO: 0 ctx    Labs:   WBC 14.6  Hgb 11.3  Plt 231  O pos, vidya neg    Imaging:   No new imaging     A/P: Mariela Hawkins is a 29 year old  female at 32w6d by LMP c/w 7w5d US, HD#7 here for a planned admission for vasa previa and placenta previa. Pregnancy notable for obesity, anxiety, h/o prior CS x3, h/o ventral hernia repair, anixety.    # Vasa previa  # Complete anterior placenta previa  - Admission until delivery   - Plan for RLTCS at 35 weeks or sooner with bleeding or concerns for accreta, consented for  and possible hysterectomy. Likely plan for --worksheet completed and message sent to surgery scheduler.  - T&S q72h   - Weekly cervical lengths  -  Will continue to review imaging; though imaging thus far does not seem c/w accreta, so likely will not proceed with MRI  - S/p Betamethasone (-)  - Anesthesia consult ordered      # Obesity  - Continue aspirin 81 mg daily   - SCDs while in bed, lovenox in postpartum period      # Anxiety  - Continue PTA sertraline   - Social work antepartum assessment ordered   - Monitor mood closely   -  Psych consult ordered after discussion with  patient    # Urinary tract infection  - UCx as above--D#3 Macrobid    # IUGR/lagging AC  - : EFW 11%ile 1445g  - weekly BPPs with dopplers and cervical length, next 12/3    # Fetal well being  - FHR reactive and reassuring after tachycardia this AM  - s/p NICU NNP consult  - TID monitoring  - S/p Betamethasone (-)      # Prenatal care  - Rh positive, Antibody negative.  - Rubella immune, Hep B SAg NR, RPR neg, HIV NR, GC/CT NR.  - GCT 85  - Low risk NIPT   - s/p influenza immunization, s/p Tdap   - GBS Positive     # Method of delivery  - History of LTCS x 3. Had a prior wound dehiscence with prolonged healing with prior Pfannenstiel incision. Plan for RLTCS through vertical midline incision at 35 weeks or sooner with bleeding or concerns for accreta.    - S/p ventral hernia repair with mesh (2018)- Records in care everywhere- small supraumbilical hernia with fat strangulation, small amt of mesh used.  - Fetal presentation: transverse, head to maternal left.  - s/p discussion of: possible risk of classical incision. Discussed possible risk for hysterectomy either for abnormal placentation of massive hemorrhage. Reviewed risks of bleeding, infection, injury to surrounding organs, fetal injury in event of . She consented to a blood transfusion in the event of a life threatening amount of bleeding. She had time to ask questions and agreed to proceed. Reviewed that there is a chance the delivery MD would perform a vertical midline incision due to h/o prior CS x3 and wound dehiscence. Also reviewed concern for ventral mesh. Prior operative note describes a small piece of mesh placed supraumbilically which should not interfere with incision for . Patient amendable to whichever incision is safest. She declined a tubal ligation for contraception postpartum. Surgical consent was signed .    # Ppx  - SCDs while in bed    Delivery Plan: Repeat  section at 35 weeks    Moni  MD Kimberly  Ob/Gyn, PGY4    Physician Attestation   IKiki DO, saw and evaluated Mariela Hawkins with the resident.      I personally reviewed the vital signs, medications, labs and imaging.    My key history or physical exam findings:   Feels like she is getting a cold.  Denies CP or SOB. +FM.  No cramping.  Nasal congestion. Denies fever. Fetal tachycardia this morning- now resolved.  Reactive with accels to 180s.    Key management decisions made by me:   Continue close observation  Saline nasal spray as needed  Watch BPs  Plan delivery via repeat c/s at 34-35 weeks depending on ctx pattern, cervical length due to vasa previa.    Kiki Hauser DO  Date of Service (when I saw the patient): 11/28/19    Time Spent on this Encounter   I, Kiki Hauser DO, spent a total of 15 minutes face to face or coordinating care of Mariela Hawkins.  Over 50% of my time on the unit was spent counseling the patient and/or coordinating care regarding vasa previa, 3 prior c/s.

## 2019-11-29 LAB — T PALLIDUM AB SER QL: NONREACTIVE

## 2019-11-29 PROCEDURE — 12000001 ZZH R&B MED SURG/OB UMMC

## 2019-11-29 PROCEDURE — 25000132 ZZH RX MED GY IP 250 OP 250 PS 637: Performed by: STUDENT IN AN ORGANIZED HEALTH CARE EDUCATION/TRAINING PROGRAM

## 2019-11-29 PROCEDURE — 90834 PSYTX W PT 45 MINUTES: CPT | Performed by: SOCIAL WORKER

## 2019-11-29 RX ADMIN — ACETAMINOPHEN 975 MG: 325 TABLET, FILM COATED ORAL at 09:43

## 2019-11-29 RX ADMIN — NITROFURANTOIN (MONOHYDRATE/MACROCRYSTALS) 100 MG: 75; 25 CAPSULE ORAL at 20:01

## 2019-11-29 RX ADMIN — SERTRALINE HYDROCHLORIDE 50 MG: 50 TABLET ORAL at 08:13

## 2019-11-29 RX ADMIN — PRENATAL VIT W/ FE FUMARATE-FA TAB 27-0.8 MG 1 TABLET: 27-0.8 TAB at 08:13

## 2019-11-29 RX ADMIN — ASPIRIN 81 MG CHEWABLE TABLET 81 MG: 81 TABLET CHEWABLE at 08:13

## 2019-11-29 RX ADMIN — NITROFURANTOIN (MONOHYDRATE/MACROCRYSTALS) 100 MG: 75; 25 CAPSULE ORAL at 08:13

## 2019-11-29 NOTE — PLAN OF CARE
Patient VSS and afebrile. Pt. Denies any bleeding, pain, s/s PTL or change in condition. Patient 's, moderate variability, no ctx noted on monitor, see flowsheet for more details. Patient seen by  psych today, declines further visits. Patient notified when to call out to RN, verbalized understanding and agreed to plan, will proceed with ongoing assessment.

## 2019-11-29 NOTE — PLAN OF CARE
Data: Contraction pattern none . Fetal assessment Reactive. Interventions: Continue uterine/fetal assessment 3 times daily. Activity level:Regular activity. Encourage active range of motion and frequent position changes.  Plan: Observe for and notify care provider of indications of progressing labor or signs of fetal/maternal compromise.

## 2019-11-29 NOTE — PROGRESS NOTES
Antepartum Progress Note    S: Has some nasal congestion.  No fever/chills.  Slept well. +FM.  No LOF or VB.  Denies contractions.     O:   Patient Vitals for the past 24 hrs:   BP Temp Temp src Resp   19 0400 110/57 98  F (36.7  C) Oral 16   19 2340 135/60 97.9  F (36.6  C) Oral 16   19 2138 132/71 98  F (36.7  C) Oral 18   19 1502 108/54 -- -- 18   19 1501 -- 98.4  F (36.9  C) Oral --   19 1221 123/58 -- -- --   19 1102 125/72 98.5  F (36.9  C) Oral 18   19 0742 -- -- -- 18     Gen: Appears comfortable, NAD  CV: Regular rate, well perfused  Pulm: non-labored breathing  Abd: Gravid, obese, nontender    FHT: 150 baseline, moderate variability, reactive, no decels, Cat 1   TOCO: none    Labs:   No new labs today    Imaging:   No new imaging today    A/P: Mariela Hawkins is a 29 year old  female at 33w0d by LMP c/w 7w5d US, HD#8 here for a planned admission for vasa previa and placenta previa. Pregnancy notable for obesity, anxiety, h/o prior CS x3, h/o ventral hernia repair, anixety.    # Vasa previa  # Complete anterior placenta previa  - Admission until delivery   - Plan for RLTCS at 35 weeks or sooner with bleeding or concerns for accreta, consented for  and possible hysterectomy. Likely plan for --worksheet completed and message sent to surgery scheduler.  - T&S q72h   - Weekly cervical lengths (Tues)  - Will continue to review imaging; though imaging thus far does not seem c/w accreta, so likely will not proceed with MRI  - S/p Betamethasone (-)  - Anesthesia consult ordered      # Obesity  - Continue aspirin 81 mg daily   - SCDs while in bed, lovenox in postpartum period      # Anxiety  - Continue PTA sertraline   - Social work antepartum assessment ordered   - Monitor mood closely   -  Psych consult ordered after discussion with patient    # Urinary tract infection  - UCx as above--D#4 Macrobid    # IUGR/lagging AC  -  : EFW 11%ile 1445g  - weekly BPPs with dopplers and cervical length, next 12/3    # Fetal well being  - FHR reactive and reassuring after tachycardia this AM  - s/p NICU NNP consult  - TID monitoring  - S/p Betamethasone (-)      # Prenatal care  - Rh positive, Antibody negative.  - Rubella immune, Hep B SAg NR, RPR neg, HIV NR, GC/CT NR.  - GCT 85  - Low risk NIPT   - s/p influenza immunization, s/p Tdap   - GBS Positive     # Method of delivery  - History of LTCS x 3. Had a prior wound dehiscence with prolonged healing with prior Pfannenstiel incision. Plan for RLTCS through vertical midline incision at 35 weeks or sooner with bleeding or concerns for accreta.  - S/p ventral hernia repair with mesh ()- Records in care everywhere- small supraumbilical hernia with fat strangulation, small amt of mesh used.  - Fetal presentation: transverse, head to maternal left.  - s/p discussion of: possible risk of classical incision. Discussed possible risk for hysterectomy either for abnormal placentation of massive hemorrhage. Reviewed risks of bleeding, infection, injury to surrounding organs, fetal injury in event of . She consented to a blood transfusion in the event of a life threatening amount of bleeding. She had time to ask questions and agreed to proceed. Reviewed that there is a chance the delivery MD would perform a vertical midline incision due to h/o prior CS x3 and wound dehiscence. Also reviewed concern for ventral mesh. Prior operative note describes a small piece of mesh placed supraumbilically which should not interfere with incision for . Patient amendable to whichever incision is safest. She declined a tubal ligation for contraception postpartum. Surgical consent was signed .    # Ppx  - SCDs while in bed    Delivery Plan: Repeat  section at 35 weeks    Kiki Hauser DO [facility] [place]  Maternal Fetal Medicine Specialist  Pager: [phone]  Mobile:  [phone]    Time Spent on this Encounter   I, Kiki Hauser DO, spent a total of 15 minutes face to face or coordinating care of Mariela Hawkins.  Over 50% of my time on the unit was spent counseling the patient and/or coordinating care regarding vasa previa, hx of 3 prior c/s.

## 2019-11-29 NOTE — CONSULTS
"                                                                                        Mental Health Inpatient Consult      PATIENT'S NAME: Mariela Hawkins  MRN:   [id number]  :   1990  DATE OF SERVICE: 2019   START TIME: 9:00  END TIME: 9:45  CONSULT LENGTH: 45      Identifying Information:  Patient is a 29 year old year old, ,  female.  Patient is 32 weeks pregnant .  Patient was referred for a consultation by Dr. Silvestre at [facility]. Patient is currently employed full time and reports @HIS@ is able to function appropriately at work.  She has started to get on FMLA.  Patient was alone during this consult.        Reason for Consult:    The reason for this consultation is: recommendation by treating physician to assess for anxiety and depressive symptoms.        Patient's Report of Presenting Concern:  Patient reports that she has been experiencing anxiety symptoms as it related to being on Antepartum and away from her family.  Patient has three young sons at home (11, 7, and 2) and feels that she \"should\" be at home.  She is worried about being away from her children despite knowing that they are getting care at home (mother and  are taking care of the children).  Worries are mainly around the seperation.  She denies any other worries (work, relationships).  She has been worried about her baby- knows that baby will arrive in two weeks.  Client is able to verbalize her understanding that these are normal thoughts (worrying about children at home and baby).  She uses self-talk to help herself manage through them.        Review of Symptoms:    Patient was assessed for the following symptoms and reports the following:    Depression: No symptoms  Yvonne:  No symptoms  Psychosis: No symptoms  Anxiety: Worries Nervousness  Panic:  No symptoms  Post Traumatic Stress Disorder: No symptoms  Obsessive Compulsive Disorder: No symptoms  Eating Disorder: No symptoms  ADD / " ADHD: No symptoms      Mental Health History:  Patient previously received the following mental health diagnosis: Anxiety.  Patient has received the following mental health services in the past: medication(s) from physician / PCP.  Psychiatric Hospitalizations: None.  Patient is not currently receiving mental health services.  Patient reported no family history of mental health issues.        Chemical Use Review:    Patient denies using alcohol.  Patient denies using tobacco.  Patient denies using marijuana.  Patient denies using caffeine.  Patient denies using street drugs.  Patient denies the non-medical use of prescription or over the counter drugs.    CAGE: None of the patient's responses to the CAGE screening were positive / Negative CAGE score   Based on the negative Cage-Aid score and clinical interview there  are not indications of drug or alcohol abuse.    Patient denies a history of substance use problems.    Patient has not received chemical dependency treatment in the past. Patient is not currently receiving any chemical dependency treatment. Patient reported no family history of chemical health issues.        Medical Issues:  Patient reports the following current medical concerns: related to pregnancy and early delivery of the baby.    Patient reports current meds as:   Current Facility-Administered Medications   Medication     acetaminophen (TYLENOL) tablet 975 mg     aspirin (ASA) chewable tablet 81 mg     lactated ringers infusion     nitroFURantoin macrocrystal-monohydrate (MACROBID) capsule 100 mg     No Tdap Needed - Assessment: Patient does not need Tdap vaccine     ondansetron (ZOFRAN) injection 4 mg     prenatal multivitamin w/iron per tablet 1 tablet     senna-docusate (SENOKOT-S/PERICOLACE) 8.6-50 MG per tablet 1 tablet    Or     senna-docusate (SENOKOT-S/PERICOLACE) 8.6-50 MG per tablet 2 tablet     sertraline (ZOLOFT) tablet 50 mg     simethicone (MYLICON) chewable tablet 160 mg     sodium  chloride (OCEAN) 0.65 % nasal spray 1 spray     sodium chloride (PF) 0.9% PF flush 3 mL       Patient reports they were not  taking psychiatric medications prior to pregnancy       the following allergies to medications: Amoxicillin    Medical History:  No past medical history on file.      Medication Adherence:  Patient reports taking prescribed medications as prescribed    Follow-up: Patient was encouraged to follow-up with their prescribing provider      Safety Issues and Plan for Safety and Risk Management:    Patient denies a history of suicidal ideation, suicide attempts, self-injurious behavior, homicidal ideation, homicidal behavior and and other safety concerns    Patient denies current fears or concerns for personal safety.  Patient denies current or recent suicidal ideation or behaviors.  Patient denies current or recent homicidal ideation or behaviors.  Patient denies any current or recent ideation and/or behaviors to harm her baby  Patient denies current or recent self injurious behavior or ideation.  Patient denies other safety concerns.  Patient reports there are no firearms in the house      Report to child / adult protection services was NA.    Mental Status Assessment:  Appearance:   Appropriate   Eye Contact:   Fair   Psychomotor Behavior: Normal   Attitude:   Guarded   Orientation:   All  Speech   Rate / Production: Monotone    Volume:  Normal   Mood:    Anxious   Affect:    Flat   Thought Content:  Clear   Thought Form:  Coherent  Logical   Insight:    Fair     Diagnostic Criteria:  Anxiety disorder is present, but at this time therapist is unable to determine whether it is primary.  Further assessment needed.   - Excessive anxiety and worry about a number of events or activities (such as work or school performance).    - The person finds it difficult to control the worry.   - Being easily fatigued.    - Sleep disturbance (difficulty falling or staying asleep, or restless unsatisfying sleep).     - The anxiety, worry, or physical symptoms cause clinically significant distress or impairment in social, occupational, or other important areas of functioning.     - The aformentioned symptoms began worsening through her pregnancy   ago and occurs 7 days per week and is experienced as mild.      Functional Status:  Patient's symptoms are causing reduced functional status in the following areas: tolerating current medical restrictions      DSM5 Diagnoses: (Sustained by DSM5 Criteria Listed Above)  Behavioral and Medical Diagnosis: 300.00 (F41.9) Unspecified Anxiety Disorder;   Psychosocial & Contextual Factors: Pregnancy complications  WHODAS2.0 Score: not completed  SUMMARY:    Client meets criteria for Anxiety NOS.  Client reports that she is doing well with the anxiety and her current use of coping skills.  Denies needing any additional supports. Located within [facility] Consults while she in inpatient was offered.     screened for depression using ROBIN-7, conducted diagnostic interview and assessed for safety risk issues    RECOMMENDATIONS:      Provider reviewed the results of this consultation and treatment recommendations with referring provider and nurse.     Referral to another professional/service is not indicated at this time..      Julissa Heart, [facility] Center[facility]   November 29, 2019

## 2019-11-29 NOTE — PLAN OF CARE
"VSS. Denies LOF, bleeding, ctx. No ctx on monitor. FHR appropriate for gestational age - see flowsheets for details. Pts family visited this evening. Pt discussed how difficult it has been for her to be \"stuck here in mom alf.\" Encouraged patient to stay busy with different activities on the unit. Pt is looking forward to acupuncture tomorrow. Will continue with current plan of care.  "

## 2019-11-29 NOTE — PROVIDER NOTIFICATION
11/29/19 0920   Provider Notification   Provider Name/Title Dr. Hauser   Method of Notification At Bedside   Request Evaluate in Person   Notification Reason Status Update   Provider present to evaluate in person. Patient denies any new complaints, change in condition, requests. Will proceed with ongoing assessment.

## 2019-11-30 PROCEDURE — 25000132 ZZH RX MED GY IP 250 OP 250 PS 637: Performed by: STUDENT IN AN ORGANIZED HEALTH CARE EDUCATION/TRAINING PROGRAM

## 2019-11-30 PROCEDURE — 25800030 ZZH RX IP 258 OP 636: Performed by: STUDENT IN AN ORGANIZED HEALTH CARE EDUCATION/TRAINING PROGRAM

## 2019-11-30 PROCEDURE — 12000001 ZZH R&B MED SURG/OB UMMC

## 2019-11-30 RX ADMIN — ACETAMINOPHEN 975 MG: 325 TABLET, FILM COATED ORAL at 15:22

## 2019-11-30 RX ADMIN — SODIUM CHLORIDE, POTASSIUM CHLORIDE, SODIUM LACTATE AND CALCIUM CHLORIDE 500 ML: 600; 310; 30; 20 INJECTION, SOLUTION INTRAVENOUS at 07:02

## 2019-11-30 RX ADMIN — SERTRALINE HYDROCHLORIDE 50 MG: 50 TABLET ORAL at 08:29

## 2019-11-30 RX ADMIN — NITROFURANTOIN (MONOHYDRATE/MACROCRYSTALS) 100 MG: 75; 25 CAPSULE ORAL at 08:29

## 2019-11-30 RX ADMIN — PRENATAL VIT W/ FE FUMARATE-FA TAB 27-0.8 MG 1 TABLET: 27-0.8 TAB at 08:29

## 2019-11-30 RX ADMIN — ASPIRIN 81 MG CHEWABLE TABLET 81 MG: 81 TABLET CHEWABLE at 08:29

## 2019-11-30 NOTE — PROVIDER NOTIFICATION
11/30/19 0649   Provider Notification   Provider Name/Title Dr. Myhre   Method of Notification Electronic Page   Request Evaluate - Remote   Notification Reason Fetal Baseline Change   Notified provider of FHR baseline change. FHR tachy in 170's-180.

## 2019-11-30 NOTE — PROVIDER NOTIFICATION
11/30/19 0715   Provider Notification   Provider Name/Title Hauser   Method of Notification At Bedside   Request Evaluate in Person   Notification Reason Status Update   Provider at bedside for AM rounds. Aware of FHR, plan for IVFB. Pt denies any s/s of bleeding, pain or other change in condition. Will proceed with ongoing assessment.

## 2019-11-30 NOTE — PROVIDER NOTIFICATION
11/30/19 0650   Provider Notification   Provider Name/Title Dr. Myhre   Method of Notification Phone   Notification Reason Fetal Baseline Change   Discussed tachy FHR. Will provide 500 mL fluid bolus per provider orders and continue to monitor closely.

## 2019-11-30 NOTE — PLAN OF CARE
Second/Third Trimester Bleeding Shift Note  Data: Bleeding: Absent. Vital Signs: WNL.  Contractions: none. Contraction pattern stable and within parameters, uterus palpates soft. Fetal assessment Reactive.    Interventions:  Continue uterine/fetal assessment 3 times daily. Activity level:Regular activity.    Plan:  Pad weights/notify provider for any bleeding.  Labs as ordered.

## 2019-11-30 NOTE — PROGRESS NOTES
Antepartum Progress Note    S: Feels well.  New IV site is tender.  Using hot packs.  Lots of fetal movement today.  Denies contractions.  No bleeding or leaking.    O:   Patient Vitals for the past 24 hrs:   BP Temp Temp src Resp   19 0605 102/52 97.7  F (36.5  C) Oral 16   19 2337 116/64 98.5  F (36.9  C) Oral 16   19 2130 122/55 98.2  F (36.8  C) Oral 18   19 1527 112/58 98.2  F (36.8  C) Oral 18   19 0808 107/55 98.1  F (36.7  C) Oral 16     Gen: Appears comfortable, NAD  Abd: obese, gravid, non tender  Ext: no edema, no calf pain.    FHT: 150 baseline, moderate variability, reactive, no decels, Cat 1.  Prolonged accels to 170-180 this morning, settled back down to baseline.  TOCO: none    Labs:   No new labs today    Imaging:   No new imaging today    A/P: Mariela Hawkins is a 29 year old  female at 33w1d by LMP c/w 7w5d US, HD#9 here for a planned admission for vasa previa and placenta previa. Pregnancy notable for obesity, anxiety, h/o prior CS x3, h/o ventral hernia repair, anixety.    # Vasa previa  # Complete anterior placenta previa  - Admission until delivery   - Plan for RLTCS at 35 weeks or sooner with bleeding or concerns for accreta, consented for  and possible hysterectomy. Likely plan for --worksheet completed and message sent to surgery scheduler.  - T&S q72h   - Weekly cervical lengths (Tues)  - Will continue to review imaging; though imaging thus far does not seem c/w accreta, so likely will not proceed with MRI  - S/p Betamethasone (-)  - Anesthesia consult ordered      # Obesity  - Continue aspirin 81 mg daily   - SCDs while in bed, lovenox in postpartum period      # Anxiety  - Continue PTA sertraline   - Social work antepartum assessment ordered   - Monitor mood closely   -  Psych consult ordered after discussion with patient    # Urinary tract infection  - UCx as above--D#4 Macrobid    # IUGR/lagging AC  - : TATUM  11%ile 1445g  - weekly BPPs with dopplers and cervical length, next 12/3    # Fetal well being  - FHR reactive and reassuring after tachycardia this AM  - s/p NICU NNP consult  - TID monitoring  - S/p Betamethasone (-)      # Prenatal care  - Rh positive, Antibody negative.  - Rubella immune, Hep B SAg NR, RPR neg, HIV NR, GC/CT NR.  - GCT 85  - Low risk NIPT   - s/p influenza immunization, s/p Tdap   - GBS Positive     # Method of delivery  - History of LTCS x 3. Had a prior wound dehiscence with prolonged healing with prior Pfannenstiel incision. Plan for RLTCS through vertical midline incision at 35 weeks or sooner with bleeding or concerns for accreta.  - S/p ventral hernia repair with mesh (2018)- Records in care everywhere- small supraumbilical hernia with fat strangulation, small amt of mesh used.  - Fetal presentation: transverse, head to maternal left.  - s/p discussion of: possible risk of classical incision. Discussed possible risk for hysterectomy either for abnormal placentation of massive hemorrhage. Reviewed risks of bleeding, infection, injury to surrounding organs, fetal injury in event of . She consented to a blood transfusion in the event of a life threatening amount of bleeding. She had time to ask questions and agreed to proceed. Reviewed that there is a chance the delivery MD would perform a vertical midline incision due to h/o prior CS x3 and wound dehiscence. Also reviewed concern for ventral mesh. Prior operative note describes a small piece of mesh placed supraumbilically which should not interfere with incision for . Patient amendable to whichever incision is safest. She declined a tubal ligation for contraception postpartum. Surgical consent was signed .    # Ppx  - SCDs while in bed    Delivery Plan: Repeat  section at 35 weeks    Kiki Hauser DO FACOG  Maternal Fetal Medicine Specialist  Pager: [phone]  Mobile: [phone]    Time  Spent on this Encounter   I, Kiki Hauser DO, spent a total of 15 minutes face to face or coordinating care of Mariela Hawkins.  Over 50% of my time on the unit was spent counseling the patient and/or coordinating care regarding vasa previa, hx of 3 prior c/s.

## 2019-11-30 NOTE — PLAN OF CARE
Pt stable overnight. VSS and afebrile. Denies bleeding, LOF, pain, or s/s of PTL. FHR tachy during monitoring (see flowsheets for details). IV fluid bolus administered per provider orders. No ctxs present. Pt offers no complaints. Pt is aware when to notify RN and has call light within reach. Will continue to monitor.

## 2019-11-30 NOTE — PLAN OF CARE
Patient VSS and afebrile. Patient denies any s/s of bleeding, pain or change in condition. Patient spent day resting in bed and speaking with family on video chat. Patient 's category 1, occasional variability noted on toco, see flowsheet for more details. Patient notified when to call out to RN, verbalized understanding and agreed to plan. Will proceed with ongoing assessment.

## 2019-12-01 LAB
ABO + RH BLD: NORMAL
ABO + RH BLD: NORMAL
BLD GP AB SCN SERPL QL: NORMAL
BLOOD BANK CMNT PATIENT-IMP: NORMAL
SPECIMEN EXP DATE BLD: NORMAL

## 2019-12-01 PROCEDURE — 25000132 ZZH RX MED GY IP 250 OP 250 PS 637: Performed by: STUDENT IN AN ORGANIZED HEALTH CARE EDUCATION/TRAINING PROGRAM

## 2019-12-01 PROCEDURE — 86900 BLOOD TYPING SEROLOGIC ABO: CPT | Performed by: STUDENT IN AN ORGANIZED HEALTH CARE EDUCATION/TRAINING PROGRAM

## 2019-12-01 PROCEDURE — 86850 RBC ANTIBODY SCREEN: CPT | Performed by: STUDENT IN AN ORGANIZED HEALTH CARE EDUCATION/TRAINING PROGRAM

## 2019-12-01 PROCEDURE — 86901 BLOOD TYPING SEROLOGIC RH(D): CPT | Performed by: STUDENT IN AN ORGANIZED HEALTH CARE EDUCATION/TRAINING PROGRAM

## 2019-12-01 PROCEDURE — 12000001 ZZH R&B MED SURG/OB UMMC

## 2019-12-01 PROCEDURE — 36415 COLL VENOUS BLD VENIPUNCTURE: CPT | Performed by: STUDENT IN AN ORGANIZED HEALTH CARE EDUCATION/TRAINING PROGRAM

## 2019-12-01 RX ORDER — ASPIRIN 81 MG/1
81 TABLET, CHEWABLE ORAL DAILY
Status: ON HOLD | COMMUNITY
End: 2019-12-17

## 2019-12-01 RX ORDER — AMOXICILLIN 250 MG
2 CAPSULE ORAL DAILY PRN
Status: DISCONTINUED | OUTPATIENT
Start: 2019-12-01 | End: 2019-12-13

## 2019-12-01 RX ORDER — SERTRALINE HYDROCHLORIDE 100 MG/1
50 TABLET, FILM COATED ORAL DAILY
Status: ON HOLD | COMMUNITY
Start: 2019-03-01 | End: 2019-12-14 | Stop reason: DRUGHIGH

## 2019-12-01 RX ORDER — AMOXICILLIN 250 MG
1 CAPSULE ORAL DAILY PRN
Status: DISCONTINUED | OUTPATIENT
Start: 2019-12-01 | End: 2019-12-13

## 2019-12-01 RX ADMIN — SERTRALINE HYDROCHLORIDE 50 MG: 50 TABLET ORAL at 09:42

## 2019-12-01 RX ADMIN — PRENATAL VIT W/ FE FUMARATE-FA TAB 27-0.8 MG 1 TABLET: 27-0.8 TAB at 09:42

## 2019-12-01 RX ADMIN — ASPIRIN 81 MG CHEWABLE TABLET 81 MG: 81 TABLET CHEWABLE at 09:42

## 2019-12-01 NOTE — PROVIDER NOTIFICATION
12/01/19 0024   Provider Notification   Provider Name/Title Dr. Inman   Method of Notification At Bedside   Notification Reason Other (Comment)  (bedside ultrasound)   RN requested bedside US to determine fetal positioning after two RNs had difficulties maintaining continuous tracing with FHR monitoring.

## 2019-12-01 NOTE — PLAN OF CARE
Data: Patient reports sleeping very poorly last night, but after a lengthy nap she is in much better spirits. Bleeding absent. Vital Signs: WNL. Contraction pattern stable and within parameters, uterus palpates soft. Fetal assessment Reactive.    Interventions:  Continue uterine/fetal assessment 3 times daily. Activity level:Regular activity.    Plan:  Pad weights for any bleeding. Labs as ordered.

## 2019-12-01 NOTE — PLAN OF CARE
Assumed care [id number].  Pt in stable condition this evening, denies bleeding, leaking, cramping/carroll.  Has visitors this evening and refusing monitoring until visitors leave, states that will be within the next hour.  Plan to monitor as soon as visitors leave, continue with current care plan.

## 2019-12-01 NOTE — PROGRESS NOTES
Antepartum Progress Note    S: Feels well.  Plans to take a nap today as she didn't sleep well.  +FM.  Denies leaking,bleeding or cramping/contractions.    O:   Patient Vitals for the past 24 hrs:   BP Temp Temp src Heart Rate Resp   19 0730 119/62 98.3  F (36.8  C) Oral 85 18   19 0016 103/53 97.8  F (36.6  C) Oral -- 18   19 1621 120/62 98.2  F (36.8  C) Oral -- 20   19 1146 92/53 -- -- -- --     Gen: Appears comfortable, NAD  Abd: obese, gravid, non tender  Ext: no edema, no calf pain.    FHT: 140 baseline, moderate variability, reactive, no decels, Cat 1.   [place]: quiet     Labs:   No new labs today    Imaging:   No new imaging today    A/P: Mariela Hawkins is a 29 year old  female at 33w2d by LMP c/w 7w5d US, HD#10 here for a planned admission for vasa previa and placenta previa. Pregnancy notable for obesity, anxiety, h/o prior CS x3, h/o ventral hernia repair, anixety.    # Vasa previa  # Complete anterior placenta previa  - Admission until delivery   - Plan for RLTCS at 35 weeks or sooner with bleeding or concerns for accreta, consented for  and possible hysterectomy. Likely plan for --worksheet completed and message sent to surgery scheduler.  - T&S q72h   - Weekly cervical lengths (Tues)  - Will continue to review imaging; though imaging thus far does not seem c/w accreta, so likely will not proceed with MRI  - S/p Betamethasone (-).  Consider rescue course at 33 5/7 and 33 6/7 weeks if patient desires.  - Anesthesia consult ordered      # Obesity  - Continue aspirin 81 mg daily   - SCDs while in bed, lovenox in postpartum period      # Anxiety  - Continue PTA sertraline   - Social work antepartum assessment ordered   - Monitor mood closely   -  Psych consult ordered after discussion with patient    # Urinary tract infection  - UCx as above--s/p macrobid    # IUGR/lagging AC  - : EFW 11%ile 1445g  - weekly BPPs with dopplers and  cervical length, next 12/3    # Fetal well being  - FHR reactive   - s/p NICU NNP consult  - TID monitoring  - S/p Betamethasone (-)      # Prenatal care  - Rh positive, Antibody negative.  - Rubella immune, Hep B SAg NR, RPR neg, HIV NR, GC/CT NR.  - GCT 85  - Low risk NIPT   - s/p influenza immunization, s/p Tdap   - GBS Positive     # Method of delivery  - History of LTCS x 3. Had a prior wound dehiscence with prolonged healing with prior Pfannenstiel incision. Plan for RLTCS through vertical midline incision at 35 weeks or sooner with bleeding or concerns for accreta.  - S/p ventral hernia repair with mesh (2018)- Records in care everywhere- small supraumbilical hernia with fat strangulation, small amt of mesh used.  - Fetal presentation: transverse, head to maternal left.  - s/p discussion of: possible risk of classical incision. Discussed possible risk for hysterectomy either for abnormal placentation of massive hemorrhage. Reviewed risks of bleeding, infection, injury to surrounding organs, fetal injury in event of . She consented to a blood transfusion in the event of a life threatening amount of bleeding. She had time to ask questions and agreed to proceed. Reviewed that there is a chance the delivery MD would perform a vertical midline incision due to h/o prior CS x3 and wound dehiscence. Also reviewed concern for ventral mesh. Prior operative note describes a small piece of mesh placed supraumbilically which should not interfere with incision for . Patient amendable to whichever incision is safest. She declined a tubal ligation for contraception postpartum. Surgical consent was signed .    # Ppx  - SCDs while in bed    Delivery Plan: Repeat  section at 35 weeks    Kiki Hauser DO FACOG  Maternal Fetal Medicine Specialist  Pager: [phone]  Mobile: [phone]    Time Spent on this Encounter   I, Kiki Hauser DO, spent a total of 15 minutes  face to face or coordinating care of Mariela Hawkins.  Over 50% of my time on the unit was spent counseling the patient and/or coordinating care regarding vasa previa, hx of 3 prior c/s.

## 2019-12-01 NOTE — PLAN OF CARE
Pt stable overnight. Visitor present at beginning of shift. VSS and afebrile. Denies bleeding, LOF, uterine ctxs, or s/s of PTL. FHR in 140s-150s, no UC's noted on monitor (see flowsheet more details). Pt offers no complaints. Pt states she will notify RN of any change in status. Will continue with plan of care.

## 2019-12-02 ENCOUNTER — ANESTHESIA EVENT (OUTPATIENT)
Dept: SURGERY | Facility: CLINIC | Age: 29
End: 2019-12-02
Payer: COMMERCIAL

## 2019-12-02 PROCEDURE — 25000132 ZZH RX MED GY IP 250 OP 250 PS 637: Performed by: STUDENT IN AN ORGANIZED HEALTH CARE EDUCATION/TRAINING PROGRAM

## 2019-12-02 PROCEDURE — 12000001 ZZH R&B MED SURG/OB UMMC

## 2019-12-02 RX ADMIN — PRENATAL VIT W/ FE FUMARATE-FA TAB 27-0.8 MG 1 TABLET: 27-0.8 TAB at 08:04

## 2019-12-02 RX ADMIN — ACETAMINOPHEN 975 MG: 325 TABLET, FILM COATED ORAL at 14:48

## 2019-12-02 RX ADMIN — SERTRALINE HYDROCHLORIDE 50 MG: 50 TABLET ORAL at 08:04

## 2019-12-02 RX ADMIN — ASPIRIN 81 MG CHEWABLE TABLET 81 MG: 81 TABLET CHEWABLE at 08:04

## 2019-12-02 NOTE — PLAN OF CARE
"Pt denies any LOF, bleeding, cramping or pelvic pressure this morning. Had a couple \"jolts\" in her lower abdomen last evening but resolved quickly with change of position and drinking water. IV flushes but is tender and she has had it for a couple days. Stable uterine activity and FHR. See flowsheets. VSS, afebrile. Pleasant mood overnight. Will continue to monitor and with plan of care.  "

## 2019-12-02 NOTE — ANESTHESIA PREPROCEDURE EVALUATION
Anesthesia Pre-Procedure Evaluation    Patient: Mariela Hawkins   MRN:     [id number] Gender:   female   Age:    29 year old :      1990        Preoperative Diagnosis: History of  section [Z98.891]  Vasa previa [O69.4XX0]   Procedure(s):  repeat  section     Past Medical History:   Diagnosis Date     Acanthosis nigricans      BV (bacterial vaginosis)      Migraines      Ovarian cystic mass      Pain in limb      Pelvic pain in female       Past Surgical History:   Procedure Laterality Date      SECTION       RECESSION RESECTION (REPAIR STRABISMUS) BILATERAL Bilateral 2014    Procedure: RECESSION RESECTION (REPAIR STRABISMUS) BILATERAL;  Surgeon: Titi Awan MD;  Location: [facility]     RECESSION RESECTION (REPAIR STRABISMUS) BILATERAL Bilateral 2015    Procedure: RECESSION RESECTION (REPAIR STRABISMUS) BILATERAL;  Surgeon: Titi Awan MD;  Location: [facility]          Anesthesia Evaluation     . Pt has had prior anesthetic. Type: General, MAC and Regional    No history of anesthetic complications          ROS/MED HX    ENT/Pulmonary:  - neg pulmonary ROS     Neurologic:     (+)migraines,     Cardiovascular:  - neg cardiovascular ROS   (+) ----. : . . . :. . No previous cardiac testing       METS/Exercise Tolerance:  >4 METS   Hematologic:     (+) Anemia, -      Musculoskeletal:   (+)  other musculoskeletal- pelvic pain      GI/Hepatic:  - neg GI/hepatic ROS       Renal/Genitourinary:  - ROS Renal section negative       Endo: Comment: Acanthosis nigricans suggestive of possible insulin resistance    (+) Obesity, .      Psychiatric:     (+) psychiatric history depression      Infectious Disease:  - neg infectious disease ROS       Malignancy:      - no malignancy   Other: Comment: - 29 year old  at 32w0d  - Vasa previa  - Placenta previa  - s/p CS x 3  - s/p ventral hernia repair with mesh    (+) H/O Chronic Pain,no other significant disability  "                       PHYSICAL EXAM:   Mental Status/Neuro: A/A/O   Airway: Facies: Feasible  Mallampati: II  Mouth/Opening: Full  TM distance: > 6 cm  Neck ROM: Full   Respiratory: Auscultation: CTAB     Resp. Rate: Normal     Resp. Effort: Normal      CV: Rhythm: Regular  Rate: Age appropriate  Heart: Normal Sounds  Edema: None   Comments:      Dental: Normal Dentition                LABS:  CBC:   Lab Results   Component Value Date    WBC 14.6 (H) 11/28/2019    WBC 9.8 11/22/2019    HGB 11.3 (L) 11/28/2019    HGB 10.6 (L) 11/22/2019    HCT 35.6 11/28/2019    HCT 32.5 (L) 11/22/2019     11/28/2019     11/22/2019     BMP: No results found for: NA, POTASSIUM, CHLORIDE, CO2, BUN, CR, GLC  COAGS: No results found for: PTT, INR, FIBR  POC:   Lab Results   Component Value Date    HCG Negative 04/02/2015     OTHER: No results found for: PH, LACT, A1C, NURIS, PHOS, MAG, ALBUMIN, PROTTOTAL, ALT, AST, GGT, ALKPHOS, BILITOTAL, BILIDIRECT, LIPASE, AMYLASE, UVALDO, TSH, T4, T3, CRP, SED     Preop Vitals    BP Readings from Last 3 Encounters:   12/01/19 112/64   04/02/15 126/74   12/11/14 115/55    Pulse Readings from Last 3 Encounters:   11/24/19 90      Resp Readings from Last 3 Encounters:   12/01/19 18   04/02/15 16   12/11/14 14    SpO2 Readings from Last 3 Encounters:   04/02/15 94%   12/11/14 97%      Temp Readings from Last 1 Encounters:   12/01/19 36.8  C (98.3  F) (Oral)    Ht Readings from Last 1 Encounters:   04/02/15 1.676 m (5' 5.98\")      Wt Readings from Last 1 Encounters:   11/26/19 145.4 kg (320 lb 8 oz)    Estimated body mass index is 51.76 kg/m  as calculated from the following:    Height as of 4/2/15: 1.676 m (5' 5.98\").    Weight as of this encounter: 145.4 kg (320 lb 8 oz).     LDA:  Peripheral IV 11/30/19 Right;Dorsal Lower forearm (Active)   Site Assessment WDL 12/1/2019 10:00 PM   Line Status Saline locked 12/2/2019  8:05 AM   Phlebitis Scale 0-->no symptoms 12/1/2019 10:00 PM   Infiltration " Scale 0 2019 10:00 PM   Dressing Intervention New dressing  2019  8:00 AM   Number of days: 2        Assessment:   ASA SCORE: 3    H&P: History and physical reviewed and following examination; no interval change.   Smoking Status:  Non-Smoker/Unknown   NPO Status: ELEVATED Aspiration Risk/Unknown     Plan:   Anes. Type:  For Post-op pain in coordination with surgeon; Spinal; General   Pre-Medication: None   Induction:  IV (RSI)   Airway: ETT   Access/Monitoring: PIV; 2nd PIV; A-Line (A-line if indicated at the time of surgery)   Maintenance: Balanced     Blood products: PRBC; Cell Saver     Postop Plan:   Postop Pain: Regional  Postop Sedation/Airway: Not planned  Disposition: Inpatient/Admit     PONV Management:   Adult Risk Factors: Female, Non-Smoker   Prevention: Ondansetron     CONSENT: Direct conversation   Plan and risks discussed with: Patient   Blood Products: Consented (ALL Blood Products)                PAC Discussion and Assessment    ASA Classification: 3  [place] suitable [place] [facility] [place]  Anesthetic techniques and relevant risks discussed: GA, Regional, GA with regional block for post-op pain control and MAC with GA as backup  Invasive monitoring and risk discussed: Yes  Types: A-line  Possibility and Risk of blood transfusion discussed: Yes  NPO instructions given: No solids 6 hours before surgery, stop clear liquids 2 hours before surgery  Additional anesthetic preparation and risks discussed: Regional anesthesia for procedure, Invasive monitoring and Regional anesthesia for post-op pain control  Needs early admission to pre-op area: No  Other:     PAC Resident/NP Anesthesia Assessment:  Mariela Hawkins is a 29 year old female  at 32w0d scheduled for  and possible hysterectomy in the main ORat 35 weeks or sooner if bleeding.  Anesthesiology consult for risk assessment and optimization for anesthesia with comorbid conditions of:  - Vasa previa  - Placenta previa  - Obesity  (BMI 54)  - s/p CS x 3  - s/p ventral hernia repair with mesh     Pre-operative considerations:  1.  Cardiac:  Functional status- METS > 4.  Low risk surgery with 3.9% (RCRI #1) risk of major adverse cardiac event.   2.  Pulm:  Airway feasible.  KANDICE risk: low  3.  GI:  Risk of PONV score = 2.  If > 2, anti-emetic intervention recommended.  4.  Morbid obesity: consider use of long spinal needle or Pencan   5.  Complete placenta previa and blood loss anemia s/p consent for c-hyst; procedure will be done in the main OR; type + screen; consented for all blood products; informed of possible need for invasive monitoring and backup GA+ETT  6.  Please refer to anesthesia plan for details; recommend CSE for anesthesia + 2 PIV + possible A-line+ TAP block for post-op pain control + NSAIDs pending discussion with surgeons + post-op opioids if needed      VTE risk: high; thrombophylaxis recommended, mechanical and pharmacologic when feasible      Patient is optimized and is acceptable candidate for the proposed procedure. CBC on day of procedure.      Patient also evaluated by Dr. Yee. See recommendations below.       19:  Evita Rodríguez MD   Anesthesia re-consulted:  I discussed at length with the patient the anesthesia plan for her future    -spinal anesthesia was discussed at length and she is agreeable   -combined spinal/epidural was discussed in case day of surgery anesthesia team felt this was a better option and is agreeable.   Previous CSE in 2017:   Location: L3-L4  Injection technique: LINDA saline  Number of Attempts:1  Spinal Needle  Needle type: Quincke   Needle gauge: 24 G  Epidural Needle and Catheter:   Needle type: Other   Epidural needle gauge: 17 G.  : LINDA at 8 cm. Catheter at 19 cm at skin.  Assessment  Sensory level: T4  SAB with marcaine 0.75% 1.6 cc, fentanyl 10 mcg, PF morphine 150 mcg.  -general anesthesia was discussed as a back up(last resort) option in case of hemorrhage, or patient  intolerance and is agreeable   -She has a lot of anxiety at baseline and requests she arms not be strapped down. She requests no facemask and is agreeable to nasal cannula if needed. She understands the risks of receiving benzodiazapine's before baby is out and is ok with holding off. She may request anti anxiety medicine once baby is out. IF conversion GA is necessary patient requests anti anxiety medication to help facilitate masking.         Reviewed and Signed by PAC Mid-Level Provider/Resident  Mid-Level Provider/Resident: Kassi Griffin  Date: 12/2/2019  Time: 0917    Attending Anesthesiologist Anesthesia Assessment:        Anesthesiologist:   Date:   Time:   Pass/Fail:   Disposition:     PAC Pharmacist Assessment:        Pharmacist:   Date:   Time:    Kassi Griffin MD

## 2019-12-02 NOTE — PLAN OF CARE
Pt slept most of the day. Woke pt up at 1330 for monitoring. No complaints other than her IV is tender/hurting with saline flush, although flushed well. Pt hesitant to have a new one because it took numerous pokes to place it. Pt will let us know if it gets worse. No contractions or bleeding. Continue close monitoring.

## 2019-12-02 NOTE — PROGRESS NOTES
Antepartum Progress Note    Subjective:   Mariela Hawkins feels well this morning.  No new concerns or symptoms. Becoming more difficult to stay inpatient.     Objective:   Patient Vitals for the past 24 hrs:   BP Temp Temp src Heart Rate Resp   19 2100 112/64 98.3  F (36.8  C) Oral -- 18   19 1500 129/74 98.4  F (36.9  C) Oral 86 18     Gen: Appears comfortable, NAD  Pulm: Normal respiratory effort  CV: Regular rate  Abd: obese, gravid, non tender  Ext: no edema, non-tender.    FHT: baseline 150s, moderate variability, accels present, no decels  [place]: 0 contractions in 10 minutes     Assessment/Plan:   Mariela Hawkins is a 29 year old  female at 33w3d by LMP c/w 7w5d US, HD#11 here for a planned admission for vasa previa and placenta previa. Pregnancy notable for obesity, anxiety, h/o prior CS x3, h/o ventral hernia repair, anixety.    # Vasa previa  # Complete anterior placenta previa  - Planned admission until delivery   - Plan for RLTCS at 35 weeks or sooner with bleeding or concerns for accreta. S/p consent for  and possible hysterectomy. Current plan for delivery on . Anesthesia consult ordered.  - S/p Betamethasone (-).  Consider rescue course at 33 5/7 and 33 6/7 weeks if patient desires.  - Continue T&S q72h   - Continue weekly cervical lengths (Tues)  - Will continue to review imaging; though imaging thus far does not seem c/w accreta, so likely will not proceed with MRI      # Obesity  - Continue aspirin 81 mg daily   - SCDs while in bed, lovenox in postpartum period      # Anxiety  - Continue PTA sertraline   - Social work antepartum assessment ordered   - Monitor mood closely   -  Psych consult ordered after discussion with patient    # Urinary tract infection  - UCx as above--s/p macrobid    # IUGR/lagging AC  - : EFW 11%ile 1445g  - weekly BPPs with dopplers and cervical length, next 12/3    # Fetal well being  - FHR reactive   - s/p NICU  NNP consult  - TID monitoring  - S/p Betamethasone (-)      # Prenatal care  - Rh positive, Antibody negative.  - Rubella immune, Hep B SAg NR, RPR neg, HIV NR, GC/CT NR.  - GCT 85  - Low risk NIPT   - s/p influenza immunization, s/p Tdap   - GBS Positive     # Method of delivery  - History of LTCS x 3. Had a prior wound dehiscence with prolonged healing with prior Pfannenstiel incision. Plan for RLTCS through vertical midline incision at 35 weeks or sooner with bleeding or concerns for accreta.  - S/p ventral hernia repair with mesh (2018)- Records in care everywhere- small supraumbilical hernia with fat strangulation, small amt of mesh used.  - Fetal presentation: transverse, head to maternal left.  - s/p discussion of: possible risk of classical incision. Discussed possible risk for hysterectomy either for abnormal placentation of massive hemorrhage. Reviewed risks of bleeding, infection, injury to surrounding organs, fetal injury in event of . She consented to a blood transfusion in the event of a life threatening amount of bleeding. She had time to ask questions and agreed to proceed. Reviewed that there is a chance the delivery MD would perform a vertical midline incision due to h/o prior CS x3 and wound dehiscence. Also reviewed concern for ventral mesh. Prior operative note describes a small piece of mesh placed supraumbilically which should not interfere with incision for . Patient amendable to whichever incision is safest. She declined a tubal ligation for contraception postpartum. Surgical consent was signed .    # Ppx  - SCDs while in bed    # Delivery Plan:  - Repeat  section at 35 weeks    Seen and staffed with Dr. Juan Whitman MD  Maternal Fetal Medicine Fellow  2019 8:52 AM

## 2019-12-03 ENCOUNTER — HOSPITAL ENCOUNTER (INPATIENT)
Dept: ULTRASOUND IMAGING | Facility: CLINIC | Age: 29
End: 2019-12-03
Payer: COMMERCIAL

## 2019-12-03 ENCOUNTER — PREP FOR PROCEDURE (OUTPATIENT)
Dept: OBGYN | Facility: CLINIC | Age: 29
End: 2019-12-03

## 2019-12-03 PROCEDURE — 76820 UMBILICAL ARTERY ECHO: CPT | Performed by: OBSTETRICS & GYNECOLOGY

## 2019-12-03 PROCEDURE — 12000001 ZZH R&B MED SURG/OB UMMC

## 2019-12-03 PROCEDURE — 76817 TRANSVAGINAL US OBSTETRIC: CPT | Performed by: OBSTETRICS & GYNECOLOGY

## 2019-12-03 PROCEDURE — 25000132 ZZH RX MED GY IP 250 OP 250 PS 637: Performed by: STUDENT IN AN ORGANIZED HEALTH CARE EDUCATION/TRAINING PROGRAM

## 2019-12-03 PROCEDURE — 76819 FETAL BIOPHYS PROFIL W/O NST: CPT

## 2019-12-03 PROCEDURE — 25000125 ZZHC RX 250

## 2019-12-03 RX ORDER — LIDOCAINE HYDROCHLORIDE 10 MG/ML
INJECTION, SOLUTION EPIDURAL; INFILTRATION; INTRACAUDAL; PERINEURAL
Status: COMPLETED
Start: 2019-12-03 | End: 2019-12-03

## 2019-12-03 RX ADMIN — PRENATAL VIT W/ FE FUMARATE-FA TAB 27-0.8 MG 1 TABLET: 27-0.8 TAB at 08:43

## 2019-12-03 RX ADMIN — LIDOCAINE HYDROCHLORIDE 1 ML: 10 INJECTION, SOLUTION EPIDURAL; INFILTRATION; INTRACAUDAL; PERINEURAL at 01:45

## 2019-12-03 RX ADMIN — SERTRALINE HYDROCHLORIDE 50 MG: 50 TABLET ORAL at 08:43

## 2019-12-03 RX ADMIN — ASPIRIN 81 MG CHEWABLE TABLET 81 MG: 81 TABLET CHEWABLE at 08:43

## 2019-12-03 NOTE — PLAN OF CARE
Pt did not sleep well last night. Very tired today. Slept off and on from [id number]. Denies contractions and bleeding. Baby reactive, moving a lot. Continue current plan of care.

## 2019-12-03 NOTE — PLAN OF CARE
Pt in stable condition this shift.  Denies bleeding, leaking, cramping, carroll.  FHT appropriate for GA this evening, difficult to monitor as very active and had hiccups.  PIV flushed this evening, very painful for patient and noted to be infiltrated so pulled out.  Anesthesia requested to come to bedside to replace at 2240, anesthesia resident states he will come after placing epidural.  Plan to secure PIV access, continue with current care plan.

## 2019-12-03 NOTE — PROGRESS NOTES
Antepartum Progress Note    Subjective:   Mariela Hawkins feels well this morning.  No new concerns or symptoms. Requesting to leave the floor.     Objective:   Patient Vitals for the past 24 hrs:   BP Temp Temp src Pulse Resp Weight   19 0655 -- -- -- -- -- 147.4 kg (324 lb 14.4 oz)   19 0100 126/65 98  F (36.7  C) Oral 87 18 --   19 1330 130/69 98.4  F (36.9  C) Oral -- 18 --     Gen: Appears comfortable, NAD  Pulm: Normal respiratory effort  CV: Regular rate  Abd: obese, gravid  Ext: no edema, non-tender.    FHT: baseline 145, moderate variability, accels present, no decels  [place]: 0 contractions in 10 minutes     Imaging:   [facility] US 12/3  IMPRESSION  ---------------------------------------------------------------------------------------------------------  Normal amniotic fluid volume. BPP is reassuring. Normal umbilical artery doppler flow studies. Normal cervical length with no funneling.       Assessment/Plan:   Mariela Hawkins is a 29 year old  female at 33w3d by LMP c/w 7w5d US, HD#11 here for a planned admission for vasa previa and placenta previa. Pregnancy notable for obesity, anxiety, h/o prior CS x3, h/o ventral hernia repair, anixety.    # Vasa previa  # Complete anterior placenta previa  - Planned admission until delivery   - Plan for RLTCS at 35 weeks or sooner with bleeding or concerns for accreta. S/p consent for  and possible hysterectomy. Current plan for delivery on . Anesthesia consult ordered.  - S/p Betamethasone (-).  Consider rescue course at 33 5/7 and 33 6/7 weeks if patient desires.  - Continue T&S q72h   - Continue weekly cervical lengths, today is normal (Tues)  - 60% risk of accreta due to h/o 3 C-sections and complete anterior previa, but US does not show signs of accreta, and patient is claustrophobic so will forgo MRI  - patient has been stable, so ok to be off the floor in the hospital for <1 hour at a time    # Obesity  -  Continue aspirin 81 mg daily   - SCDs while in bed, lovenox in postpartum period      # Anxiety  - Continue PTA sertraline   - Social work antepartum assessment ordered   - Monitor mood closely   -  Psych consult ordered after discussion with patient    # Urinary tract infection  - UCx as above--s/p macrobid    # IUGR/lagging AC  - : EFW 11%ile 1445g  - weekly BPPs with dopplers; normal today    # Fetal well being  - FHR reactive   - s/p NICU NNP consult  - TID monitoring  - S/p Betamethasone (-)      # Prenatal care  - Rh positive, Antibody negative.  - Rubella immune, Hep B SAg NR, RPR neg, HIV NR, GC/CT NR.  - GCT 85  - Low risk NIPT   - s/p influenza immunization, s/p Tdap   - GBS Positive     # Method of delivery  - History of LTCS x 3. Had a prior wound dehiscence with prolonged healing with prior Pfannenstiel incision. Plan for RLTCS through vertical midline incision at 35 weeks or sooner with bleeding or concerns for accreta.   - S/p ventral hernia repair with mesh (2018)- Records in care everywhere- small supraumbilical hernia with fat strangulation, small amt of mesh used.  - Fetal presentation: transverse, head to maternal right  - s/p discussion of: possible risk of classical incision. Discussed possible risk for hysterectomy either for abnormal placentation of massive hemorrhage. Reviewed risks of bleeding, infection, injury to surrounding organs, fetal injury in event of . She consented to a blood transfusion in the event of a life threatening amount of bleeding. She had time to ask questions and agreed to proceed. Reviewed that there is a chance the delivery MD would perform a vertical midline incision due to h/o prior CS x3 and wound dehiscence. Also reviewed concern for ventral mesh. Prior operative note describes a small piece of mesh placed supraumbilically which should not interfere with incision for . Patient amendable to whichever incision is  safest. She declined a tubal ligation for contraception postpartum. Surgical consent was signed .    # Ppx  - SCDs while in bed    # Delivery Plan:  - Repeat  section at 35 weeks    Seen and staffed with Dr. Juan Silvestre MD  Ob/Gyn PGY-3  19 9:01 AM

## 2019-12-03 NOTE — PROVIDER NOTIFICATION
12/03/19 0715   Uterine Activity Assessment   Method external tocotransducer   Contraction Frequency (Minutes) None seen   Contraction Intensity Patient not feeling   Uterine Resting Tone soft by palpation     During application of monitors, nurse had TOCO sitting on pt's abdomen without strap. First 15 minutes of monitoring was not irritability.

## 2019-12-03 NOTE — PLAN OF CARE
VSS. Pt denies any leaking of fluid, bleeding, or cramping/pain. FHR monitored and within normal, see flowsheet. PIV replaced by anesthesia at 0145 with US. Pt able to rest throughout night. Continue with current care plan, US scheduled for this morning.

## 2019-12-04 PROCEDURE — 86901 BLOOD TYPING SEROLOGIC RH(D): CPT | Performed by: STUDENT IN AN ORGANIZED HEALTH CARE EDUCATION/TRAINING PROGRAM

## 2019-12-04 PROCEDURE — 25000128 H RX IP 250 OP 636: Performed by: STUDENT IN AN ORGANIZED HEALTH CARE EDUCATION/TRAINING PROGRAM

## 2019-12-04 PROCEDURE — 86850 RBC ANTIBODY SCREEN: CPT | Performed by: STUDENT IN AN ORGANIZED HEALTH CARE EDUCATION/TRAINING PROGRAM

## 2019-12-04 PROCEDURE — 12000001 ZZH R&B MED SURG/OB UMMC

## 2019-12-04 PROCEDURE — 86900 BLOOD TYPING SEROLOGIC ABO: CPT | Performed by: STUDENT IN AN ORGANIZED HEALTH CARE EDUCATION/TRAINING PROGRAM

## 2019-12-04 PROCEDURE — 25000132 ZZH RX MED GY IP 250 OP 250 PS 637: Performed by: STUDENT IN AN ORGANIZED HEALTH CARE EDUCATION/TRAINING PROGRAM

## 2019-12-04 PROCEDURE — 36415 COLL VENOUS BLD VENIPUNCTURE: CPT | Performed by: STUDENT IN AN ORGANIZED HEALTH CARE EDUCATION/TRAINING PROGRAM

## 2019-12-04 RX ORDER — BETAMETHASONE SODIUM PHOSPHATE AND BETAMETHASONE ACETATE 3; 3 MG/ML; MG/ML
12 INJECTION, SUSPENSION INTRA-ARTICULAR; INTRALESIONAL; INTRAMUSCULAR; SOFT TISSUE EVERY 24 HOURS
Status: COMPLETED | OUTPATIENT
Start: 2019-12-04 | End: 2019-12-05

## 2019-12-04 RX ADMIN — PRENATAL VIT W/ FE FUMARATE-FA TAB 27-0.8 MG 1 TABLET: 27-0.8 TAB at 09:04

## 2019-12-04 RX ADMIN — BETAMETHASONE SODIUM PHOSPHATE AND BETAMETHASONE ACETATE 12 MG: 3; 3 INJECTION, SUSPENSION INTRA-ARTICULAR; INTRALESIONAL; INTRAMUSCULAR at 15:49

## 2019-12-04 RX ADMIN — ACETAMINOPHEN 975 MG: 325 TABLET, FILM COATED ORAL at 23:32

## 2019-12-04 RX ADMIN — ASPIRIN 81 MG CHEWABLE TABLET 81 MG: 81 TABLET CHEWABLE at 09:03

## 2019-12-04 RX ADMIN — ACETAMINOPHEN 975 MG: 325 TABLET, FILM COATED ORAL at 10:25

## 2019-12-04 RX ADMIN — SERTRALINE HYDROCHLORIDE 50 MG: 50 TABLET ORAL at 09:04

## 2019-12-04 NOTE — PROGRESS NOTES
Antepartum Progress Note    Subjective:   Mariela is feeling fine today. Questions about surgery. Had NICU visit yesterday which ws good for her mood.    Objective:   Patient Vitals for the past 24 hrs:   BP Temp Temp src Resp   19 2330 112/57 97.9  F (36.6  C) Oral 18   19 1752 131/69 97.8  F (36.6  C) Oral 18     Gen: Appears comfortable, NAD  Pulm: Normal respiratory effort  CV: Regular rate  Abd: obese, gravid  Ext: no edema    FHT: baseline 130, moderate variability, accels present, no decels  [place]: 0 contractions in 10 minutes     Imaging:     Assessment/Plan:   Mariela Hawkins is a 29 year old  female at 33w5d by LMP c/w 7w5d US, HD#13 here for a planned admission for vasa previa and placenta previa. Pregnancy notable for obesity, anxiety, h/o prior CS x3, h/o ventral hernia repair, anixety.    # Vasa previa  # Complete anterior placenta previa  - Planned admission until delivery   - Plan for RLTCS at 35 weeks or sooner with bleeding or concerns for accreta. S/p consent for  and possible hysterectomy. Current plan for delivery on . Anesthesia consult ordered.  - S/p Betamethasone (-). Consider rescue coures next week.   - Continue T&S q72h.    - Continue weekly cervical lengths, today is normal (Tues)  - 60% risk of accreta due to h/o 3 C-sections and complete anterior previa, but US does not show signs of accreta, and patient is claustrophobic so will forgo MRI  - patient has been stable, so ok to be off the floor in the hospital for <1 hour at a time    # Obesity  - Continue aspirin 81 mg daily   - SCDs while in bed, lovenox in postpartum period      # Anxiety  - Continue PTA sertraline   - Social work antepartum assessment ordered   - Monitor mood closely   -  Psych consult ordered after discussion with patient    # Urinary tract infection  - UCx as above--s/p macrobid    # IUGR/lagging AC  - : EFW 11%ile 1445g  - weekly BPPs with dopplers; last  normal (12/3)    # Fetal well being  - FHR reactive   - s/p NICU NNP consult  - TID monitoring  - S/p Betamethasone (-)      # Prenatal care  - Rh positive, Antibody negative.  - Rubella immune, Hep B SAg NR, RPR neg, HIV NR, GC/CT NR.  - GCT 85  - Low risk NIPT   - s/p influenza immunization, s/p Tdap   - GBS Positive     # Method of delivery  - History of LTCS x 3. Had a prior wound dehiscence with prolonged healing with prior Pfannenstiel incision. Plan for RLTCS through vertical midline incision at 35 weeks or sooner with bleeding or concerns for accreta.   - S/p ventral hernia repair with mesh ()- Records in care everywhere- small supraumbilical hernia with fat strangulation, small amt of mesh used.  - Fetal presentation: transverse, head to maternal right  - s/p discussion of: possible risk of classical incision. Discussed possible risk for hysterectomy either for abnormal placentation of massive hemorrhage. Reviewed risks of bleeding, infection, injury to surrounding organs, fetal injury in event of . She consented to a blood transfusion in the event of a life threatening amount of bleeding. She had time to ask questions and agreed to proceed. Reviewed that there is a chance the delivery MD would perform a vertical midline incision due to h/o prior CS x3 and wound dehiscence. Also reviewed concern for ventral mesh. Prior operative note describes a small piece of mesh placed supraumbilically which should not interfere with incision for . Patient amendable to whichever incision is safest. She declined a tubal ligation for contraception postpartum. Surgical consent was signed .    - Reviewed planned surgery and consent form today. Patient consented for bilateral salpingectomy with hysterectomy and cystoscopy. Reviewed plan for an infraumbilical VML incision for delivery and hysterectomy w/BS and cysto if the placenta does not deliver easily.     # Ppx  - SCDs while in  bed    # Delivery Plan:  - Repeat  section at 35 weeks    Seen and staffed with Dr. Juan Silvestre MD  Ob/Gyn PGY-3  19 9:01 AM

## 2019-12-04 NOTE — PROVIDER NOTIFICATION
12/04/19 1630   Provider Notification   Provider Name/Title Dr. Whitman   Method of Notification In Department   Request Evaluate - Remote   Notification Reason Other (Comment)   Dr. Whitman was asked to review fetal and uterine tracing, and plan per MD that patient may have oral hydration and continue to monitor for now.

## 2019-12-04 NOTE — PLAN OF CARE
Pt A&Ox4, RR even & unlabored, pwd, nad noted. Pt denies any pain, cramping, bleeding, or other symptoms. Slept well. Aware of planned  on  @ 12 pm and agreed to plan. No changes to Pt overnight

## 2019-12-04 NOTE — PLAN OF CARE
Pt A&Ox4, RR even & unlabored, nad noted. Pt denies any pain, bleeding, or changes overnight. Pt aware no change in plan of care at this time since she is stable. Plan for  on

## 2019-12-04 NOTE — PLAN OF CARE
VSS. FHR appropriate for gestational age - see flowsheets for details. No ctx on toco, pt denies cramping/bleeding/leaking fluid. Pt very happy she has off floor privileges. Pt had NICU and postpartum tours this evening. Will continue with current plan of care.

## 2019-12-05 PROCEDURE — 25000132 ZZH RX MED GY IP 250 OP 250 PS 637: Performed by: STUDENT IN AN ORGANIZED HEALTH CARE EDUCATION/TRAINING PROGRAM

## 2019-12-05 PROCEDURE — 25000128 H RX IP 250 OP 636: Performed by: STUDENT IN AN ORGANIZED HEALTH CARE EDUCATION/TRAINING PROGRAM

## 2019-12-05 PROCEDURE — 12000001 ZZH R&B MED SURG/OB UMMC

## 2019-12-05 RX ADMIN — SERTRALINE HYDROCHLORIDE 50 MG: 50 TABLET ORAL at 08:45

## 2019-12-05 RX ADMIN — PRENATAL VIT W/ FE FUMARATE-FA TAB 27-0.8 MG 1 TABLET: 27-0.8 TAB at 08:45

## 2019-12-05 RX ADMIN — BETAMETHASONE SODIUM PHOSPHATE AND BETAMETHASONE ACETATE 12 MG: 3; 3 INJECTION, SUSPENSION INTRA-ARTICULAR; INTRALESIONAL; INTRAMUSCULAR at 16:30

## 2019-12-05 RX ADMIN — ASPIRIN 81 MG CHEWABLE TABLET 81 MG: 81 TABLET CHEWABLE at 08:45

## 2019-12-05 NOTE — PROVIDER NOTIFICATION
12/05/19 1412   Provider Notification   Provider Name/Title Dr. Whitman   Method of Notification In Department   Request Evaluate - Remote   Notification Reason Status Update   Dr. Whitman reviewing EFM and toco, after EFM hand-held by RN.  Plan per MD that patient may be off of monitor now.

## 2019-12-05 NOTE — PLAN OF CARE
Patient denies contractions, bleeding and leaking fluid.  Patient having left thigh pain from betamethasone injection.  Tylenol and hot packs given, patient states that helped and pain was decreased.  FHT's are appropriate for gestational age.  Positive fetal movement per patient.  Plan of care reviewed with patient, understanding verbalized.  Continue with current plan of care.

## 2019-12-05 NOTE — PROGRESS NOTES
Antepartum Progress Note    Subjective:   Doing well today. Had a chance to meet with anesthesia yesterday and feels as though the surgical planning has been well explained to her. She denies any cramping, contractions, leakage of fluid or vaginal bleeding. Confirms active fetal movement.    Objective:   Patient Vitals for the past 24 hrs:   BP Temp Temp src Resp   19 0600 105/56 98.2  F (36.8  C) Oral 18   19 -- 97.9  F (36.6  C) Oral 18   19 1557 103/59 98.1  F (36.7  C) Oral 18   19 0907 130/70 97.9  F (36.6  C) Oral 18     Gen: Appears comfortable, NAD  Pulm: Normal respiratory effort  CV: Regular rate  Abd: obese, gravid  Ext: no edema    FHT: baseline 130, moderate variability, accels present, no decels  [place]: 0 contractions in 10 minutes     Imaging:     Assessment/Plan:   Mariela Hawkins is a 29 year old  female at 33w6d by LMP c/w 7w5d US, HD#14 here for a planned admission for vasa previa and placenta previa. Pregnancy notable for obesity, anxiety, h/o prior CS x3, h/o ventral hernia repair, anixety.    # Vasa previa  # Complete anterior placenta previa  - Planned admission until delivery   - Plan for RLTCS at 35 weeks or sooner with bleeding or concerns for accreta. S/p consent for  and possible hysterectomy. Current plan for delivery on . Anesthesia consult ordered.  - S/p Betamethasone (-). Consider rescue coures next week.  - Continue T&S q72h.    - Continue weekly cervical lengths, today is normal (Tues)  - 60% risk of accreta due to h/o 3 C-sections and complete anterior previa, but US does not show signs of accreta, and patient is claustrophobic so will forgo MRI  - patient has been stable, approved for time within the hospital and off floor for <1 hour at a time    # Obesity  - Continue aspirin 81 mg daily   - SCDs while in bed, lovenox in postpartum period      # Anxiety  - Continue PTA sertraline   - Social work antepartum assessment  ordered   - Monitor mood closely   -  Psych consult ordered after discussion with patient    # IUGR/lagging AC  - : EFW 11%ile 1445g  - Weekly BPPs with dopplers; last normal (12/3)    # Fetal well being  - FHR reactive. Continue TID monitoring  - S/p NICU, [facility] (-)      # Prenatal care  - Rh positive, Antibody negative.  - Rubella immune, Hep B SAg NR, RPR neg, HIV NR, GC/CT NR.  - GCT 85  - Low risk NIPT   - s/p influenza immunization, s/p Tdap   - GBS Positive     # Method of delivery  - History of LTCS x 3. Had a prior wound dehiscence with prolonged healing with prior Pfannenstiel incision. Plan for RLTCS through vertical midline incision at 35 weeks or sooner with bleeding or concerns for accreta.   - S/p ventral hernia repair with mesh ()- Records in care everywhere- small supraumbilical hernia with fat strangulation, small amt of mesh used.  - Fetal presentation: transverse, head to maternal right  - S/p extensive surgical counseling, please see previous notes for details. Surgical consent was signed .     # Ppx  - SCDs while in bed    # Delivery Plan:  - Repeat  section at 35 weeks, scheduled for     Seen and staffed with Dr. Juan Whitman MD  Maternal Fetal Medicine Fellow  2019 8:31 AM

## 2019-12-05 NOTE — PLAN OF CARE
Data: Contraction pattern absent . Fetal assessment Appropriate for Gestational Age.  Interventions: Continue uterine/fetal assessment every shift. Activity level:Regular activity, and preventive measures including Positioning and Frequent voiding. Encourage active range of motion and frequent position changes.  Plan: Continue expectant management. Observe for and notify care provider of indications of progressing labor or signs of fetal/maternal compromise.

## 2019-12-05 NOTE — PROVIDER NOTIFICATION
12/04/19 1729   Provider Notification   Provider Name/Title Dr. Whitman   Method of Notification In Department   Request Evaluate - Remote   Notification Reason Status Update   Dr. Whitman reviewing fetal and uterine tracing, and plan per MD, that monitors may be off now and continue to monitor every shift.  Patient verbalizing agreement with plan, and EFM and toco removed.

## 2019-12-06 PROCEDURE — 25000132 ZZH RX MED GY IP 250 OP 250 PS 637: Performed by: STUDENT IN AN ORGANIZED HEALTH CARE EDUCATION/TRAINING PROGRAM

## 2019-12-06 PROCEDURE — 12000001 ZZH R&B MED SURG/OB UMMC

## 2019-12-06 RX ORDER — BENZOCAINE/MENTHOL 6 MG-10 MG
LOZENGE MUCOUS MEMBRANE 2 TIMES DAILY PRN
Status: DISCONTINUED | OUTPATIENT
Start: 2019-12-06 | End: 2019-12-13

## 2019-12-06 RX ADMIN — HYDROCORTISONE: 1 CREAM TOPICAL at 17:55

## 2019-12-06 RX ADMIN — ASPIRIN 81 MG CHEWABLE TABLET 81 MG: 81 TABLET CHEWABLE at 08:24

## 2019-12-06 RX ADMIN — SERTRALINE HYDROCHLORIDE 50 MG: 50 TABLET ORAL at 08:24

## 2019-12-06 RX ADMIN — PRENATAL VIT W/ FE FUMARATE-FA TAB 27-0.8 MG 1 TABLET: 27-0.8 TAB at 08:24

## 2019-12-06 NOTE — LACTATION NOTE
D:  I met with Mariela in her room today, she had requested an antepartum consult from us.  I:  I brought her an information sheet of things she can do/learn prior to delivery.  I asked for her questions, they centered mainly on losing the time after birth to be skin to skin with her baby due to a Csection with a planned hysterectomy.  We talked through different scenarios, as the time her surgery will take and the condition of her baby are unknowns at this time.  I explained that if her baby needs NICU care, we want her to hold the baby skin to skin as soon as possible (could be done when she swings by on a gurney prior to going to her room).  We also talked about types of pumps, as she did not feel she had good luck with the brand she chose last time and has had a history of low supply.  A:  Mom's questions were answered, information was given.  P:  I told her she can call with any other questions she may have before delivery.    Hailey Gregorio, RNC, IBCLC    No

## 2019-12-06 NOTE — PROGRESS NOTES
Antepartum Progress Note    Subjective:   Doing well today. Reports anxiety is increasing as delivery date approaches. She is requesting something for sleep. She denies other systemic symptoms.    Objective:   Patient Vitals for the past 24 hrs:   BP Temp Temp src Resp   19 2234 123/68 98.1  F (36.7  C) Oral 18   19 1411 107/56 98.1  F (36.7  C) Oral 18     Gen: Appears comfortable, NAD  Pulm: Normal respiratory effort  CV: Regular rate  Abd: obese, gravid  Ext: no edema    FHT: baseline 130, moderate variability, accels present, no decels  [place]: 0 contractions in 10 minutes     Imaging:     Assessment/Plan:   Mariela Hawkins is a 29 year old  female at 34w0d by LMP c/w 7w5d US, HD#15 here for a planned admission for vasa previa and placenta previa. Pregnancy notable for obesity, anxiety, h/o prior CS x3, h/o ventral hernia repair, anixety.    # Vasa previa  # Complete anterior placenta previa  - Planned admission until delivery. There is a 60% risk of accreta due to h/o 3 C-sections and complete anterior previa. Thus far, US has not demonstrated any evidence of accreta. Due to lack of ultrasound findings and claustrophobia, MRI was not obtained.  - Plan for RLTCS at 35 weeks or sooner with bleeding or concerns for accreta. S/p consent for  and possible hysterectomy. Current plan for delivery on  1200. S/p Anesthesia consult.  - S/p Betamethasone (-) and rescure course (-).  - Continue T&S q72h.    - Continue weekly cervical lengths. Last check on .  - She has remained stable and is approved for time off the floor within the hospital for short durations <1 hour    # Obesity  - Continue aspirin 81 mg daily   - SCDs while in bed, lovenox in postpartum period      # Anxiety  - Continue PTA sertraline   - Social work antepartum assessment ordered   - Monitor mood closely. S/p  psych.    # IUGR/lagging AC  - : EFW 11%ile 1445g  - Weekly BPPs with  dopplers; last normal (12/3)    # Fetal well being  - FHR reactive. Continue TID monitoring  - S/p NICU, BM (-)      # Prenatal care  - PNL:    - Rh positive, Antibody negative. Rubella immune, Hep B SAg NR, RPR neg, HIV NR, GC/CT NR.   - GCT 85   - GBS Positive  - Genetics: Low risk NIPT   - Immunizations: s/p influenza and TDap  - Other: Melatonin and vistaril for sleep     # Method of delivery  - History of LTCS x 3. Had a prior wound dehiscence with prolonged healing with prior Pfannenstiel incision. Plan for RLTCS through vertical midline incision at 35 weeks or sooner with bleeding or concerns for accreta.   - S/p ventral hernia repair with mesh ()- Records in care everywhere- small supraumbilical hernia with fat strangulation, small amt of mesh used.  - S/p extensive surgical counseling, please see previous notes for details. Surgical consent was signed .     # Ppx  - SCDs while in bed    # Delivery Plan:  - Repeat  section at 35 weeks, scheduled for  1200    Seen and staffed with Dr. Juan Whitman MD  Maternal Fetal Medicine Fellow  2019 8:17 AM

## 2019-12-06 NOTE — PROGRESS NOTES
"CLINICAL NUTRITION SERVICES - ASSESSMENT NOTE     Nutrition Prescription    RECOMMENDATIONS FOR MDs/PROVIDERS TO ORDER:  None    Malnutrition Status:    Patient does not meet two of the established criteria necessary for diagnosing malnutrition    Recommendations already ordered by Registered Dietitian (RD):  -Allow PEDS menu options due to menu fatigue     Future/Additional Recommendations:  -Monitor meal intakes      REASON FOR ASSESSMENT  Mariela Hawkins is a/an 29 year old female assessed by the dietitian for [facility]    NUTRITION HISTORY  Reviewed pre-pregnancy weight below. Pt reports eating well, denies needing any nutrition interventions, admits to menu fatigue, RD provided PEDS menu and will allow PEDS options.     CURRENT NUTRITION ORDERS  Diet: Regular  Intake/Tolerance: Per health touch review over the last 3 days, pt ordering on average at least 2200 kcal and 80 gm protein/day    LABS  Labs reviewed    MEDICATIONS  Medications reviewed    ANTHROPOMETRICS  Height: 5' 6\" per weight history review   Most Recent Weight: 147.4 kg (324 lb 14.4 oz)    Pre-pregnancy Weight: 280 lbs per pt report   Suggested pregnancy wt gain: 5-9 kg/11-20 lbs  IBW: 59 kg   Pre-pregnancy Adjusted BW: 76.1 kg   BMI: Obesity Grade III BMI >40  Weight History:   Wt Readings from Last 10 Encounters:   19 147.4 kg (324 lb 14.4 oz)   04/02/15 (!) 150.6 kg (332 lb)   14 (!) 147.4 kg (325 lb)     Dosing Weight: 76.1 kg    ASSESSED NUTRITION NEEDS  Estimated Energy Needs: 6165-0396 kcals/day (20 - 25 kcals/kg - +450 kcals for 3rd Trimester)   Justification: Pregnancy   Estimated Protein Needs:  grams protein/day (1.1 - 1.4 grams of pro/kg)  Justification: Pregnancy   Estimated Fluid Needs: 3000 mL/day (Pregnancy guide)   Justification: Pregnancy     PHYSICAL FINDINGS   female at 34w0d     MALNUTRITION  % Intake: No decreased intake noted  % Weight Loss: None noted  Subcutaneous Fat Loss: None observed  Muscle Loss: " None observed  Fluid Accumulation/Edema: None noted  Malnutrition Diagnosis: Patient does not meet two of the established criteria necessary for diagnosing malnutrition    NUTRITION DIAGNOSIS  None    INTERVENTIONS  Implementation  Menu options reviewed/encouraged oral intakes     Goals  Patient to consume % of nutritionally adequate meal trays TID, or the equivalent with supplements/snacks.     Monitoring/Evaluation  Progress toward goals will be monitored and evaluated per protocol.

## 2019-12-06 NOTE — PLAN OF CARE
Afebrile. VSS. Mariela denies leaking of fluid, contractions, or bleeding. Her  surprised her with a visit this afternoon and feeling in better spirits. EFM applied. Baby with moderate variability and accels. No complaints at this time.

## 2019-12-06 NOTE — PLAN OF CARE
VSS. Patient denies leaking of fluid, vaginal bleeding or contractions/cramping. IV dressing replaced due to being wet from shower. EFM per flowheet.

## 2019-12-06 NOTE — PROVIDER NOTIFICATION
12/05/19 9259   Provider Notification   Provider Name/Title Dr. Nunez    Method of Notification Phone   Request Evaluate - Remote   Notification Reason Status Update   EFM monitoring only 25 minutes total. RN at bedside holding monitor on and frequently re adjusting

## 2019-12-06 NOTE — PLAN OF CARE
Data: Contraction pattern absent . Fetal assessment appropriate for gestational age . No vaginal bleeding reported today.  Interventions: Continue uterine/fetal assessment every shift. Activity level:Regular activity, and preventive measures including Positioning and Frequent voiding. Encourage active range of motion and frequent position changes.  Plan: Continue expectant management. Observe for and notify care provider of indications of progressing labor or signs of fetal/maternal compromise.

## 2019-12-07 PROCEDURE — 12000001 ZZH R&B MED SURG/OB UMMC

## 2019-12-07 PROCEDURE — 25000132 ZZH RX MED GY IP 250 OP 250 PS 637: Performed by: STUDENT IN AN ORGANIZED HEALTH CARE EDUCATION/TRAINING PROGRAM

## 2019-12-07 RX ADMIN — ASPIRIN 81 MG CHEWABLE TABLET 81 MG: 81 TABLET CHEWABLE at 11:47

## 2019-12-07 RX ADMIN — PRENATAL VIT W/ FE FUMARATE-FA TAB 27-0.8 MG 1 TABLET: 27-0.8 TAB at 11:48

## 2019-12-07 RX ADMIN — Medication 1 MG: at 03:50

## 2019-12-07 RX ADMIN — SERTRALINE HYDROCHLORIDE 50 MG: 50 TABLET ORAL at 11:48

## 2019-12-07 NOTE — PLAN OF CARE
Patient's VSS. Patient denies any pain or cramping. Patient denies any bleeding. No concerns at this time. Will continue to monitor and update provider with any changes or concerns.

## 2019-12-07 NOTE — PROGRESS NOTES
Antepartum Progress Note    Subjective:   Doing well today. Anxiety continues to increase, and she is having trouble sleeping. She took a melatonin at 2 AM and finally fell asleep around 4. Staying busy and distracted during the day, but nights are harder.     Objective:   Patient Vitals for the past 24 hrs:   BP Temp Temp src Resp   19 1020 116/59 98.1  F (36.7  C) Oral 18   19 0353 124/65 98.5  F (36.9  C) -- 18   19 2218 109/56 -- -- 18   19 1420 112/55 98.2  F (36.8  C) Oral 18     Gen: Appears comfortable, NAD  Pulm: Normal respiratory effort  CV: Regular rate  Abd: obese, gravid    FHT: baseline 145, moderate variability, accels present, no decels  [place]: 0 contractions in 10 minutes     Imaging:     Assessment/Plan:   Mariela Hawkins is a 29 year old  female at 34w1d by LMP c/w 7w5d US, HD#16 here for a planned admission for vasa previa and placenta previa. Pregnancy notable for obesity, anxiety, h/o prior CS x3, h/o ventral hernia repair, anixety.    # Vasa previa  # Complete anterior placenta previa  - Planned admission until delivery. There is a 60% risk of accreta due to h/o 3 C-sections and complete anterior previa. Thus far, US has not demonstrated any evidence of accreta. Due to lack of ultrasound findings and claustrophobia, MRI was not obtained.  - Plan for RLTCS at 35 weeks or sooner with bleeding or concerns for accreta. S/p consent for  and possible hysterectomy. Current plan for delivery on  1200. S/p Anesthesia consult.  - S/p Betamethasone (-) and rescue course (-).  - Continue T&S q72h.    - Continue weekly cervical lengths. Next will be 12/10, which will be the last time.  - She has remained stable and is approved for time off the floor within the hospital for short durations <1 hour    # Obesity  - Continue aspirin 81 mg daily   - SCDs while in bed, lovenox in postpartum period      # Anxiety  - Continue PTA sertraline   -  Social work antepartum assessment ordered   - Monitor mood closely. S/p  psych.  - will have  visit during the week.     # IUGR/lagging AC  - : EFW 11%ile 1445g  - Weekly BPPs with Dopplers; last normal (12/3.) Next will be 12/10    # Fetal well being  - FHR reactive. Continue TID monitoring  - S/p NICU, BMZ (-, -)      # Prenatal care  - PNL:    - Rh positive, Antibody negative. Rubella immune, Hep B SAg NR, RPR neg, HIV NR, GC/CT NR.   - GCT 85   - GBS Positive  - Genetics: Low risk NIPT   - Immunizations: s/p influenza and TDap  - Other: Melatonin and vistaril for sleep     # Method of delivery  - History of LTCS x 3. Had a prior wound dehiscence with prolonged healing with prior Pfannenstiel incision. Plan for RLTCS through vertical midline incision at 35 weeks or sooner with bleeding or concerns for accreta.   - S/p ventral hernia repair with mesh (2018)- Records in care everywhere- small supraumbilical hernia with fat strangulation, small amt of mesh used.  - S/p extensive surgical counseling, please see previous notes for details. Surgical consent was signed .     # Ppx  - SCDs while in bed    # Delivery Plan:  - Repeat  section at 35 weeks, scheduled for  1200    Seen and staffed with Dr. Thao.    Ally Silvestre MD  Ob/Gyn PGY-3  19 11:18 AM    Physician Attestation   I, Arti Thao MD, saw this patient with the resident and agree with the resident/fellow's findings and plan of care as documented in the note.      I personally reviewed vital signs, medications, labs and imaging.    Key findings: No new concerns. Mariela reports increasing difficult sleeping due to anxiety over upcoming delivery. Reports that she cannot stop thinking about all the potential scenarios that could happen. Noted that melatonin did help her sleep, but that it took until 4 am. We discussed measures to help stay in the present.     No new obstetrical  concerns. No bleeding or leakage of fluid or signs or symptoms of labor. Planned delivery on Friday if remains stable.    Arti Thao MD  Date of Service (when I saw the patient): 12/07/19

## 2019-12-08 ENCOUNTER — ANESTHESIA EVENT (OUTPATIENT)
Dept: OBGYN | Facility: CLINIC | Age: 29
End: 2019-12-08
Payer: COMMERCIAL

## 2019-12-08 ENCOUNTER — ANESTHESIA (OUTPATIENT)
Dept: OBGYN | Facility: CLINIC | Age: 29
End: 2019-12-08
Payer: COMMERCIAL

## 2019-12-08 PROCEDURE — 86901 BLOOD TYPING SEROLOGIC RH(D): CPT | Performed by: OBSTETRICS & GYNECOLOGY

## 2019-12-08 PROCEDURE — 40000671 ZZH STATISTIC ANESTHESIA CASE

## 2019-12-08 PROCEDURE — 86850 RBC ANTIBODY SCREEN: CPT | Performed by: OBSTETRICS & GYNECOLOGY

## 2019-12-08 PROCEDURE — 25000132 ZZH RX MED GY IP 250 OP 250 PS 637: Performed by: STUDENT IN AN ORGANIZED HEALTH CARE EDUCATION/TRAINING PROGRAM

## 2019-12-08 PROCEDURE — 25000125 ZZHC RX 250: Performed by: NURSE ANESTHETIST, CERTIFIED REGISTERED

## 2019-12-08 PROCEDURE — 86900 BLOOD TYPING SEROLOGIC ABO: CPT | Performed by: OBSTETRICS & GYNECOLOGY

## 2019-12-08 PROCEDURE — 12000001 ZZH R&B MED SURG/OB UMMC

## 2019-12-08 RX ORDER — LIDOCAINE HYDROCHLORIDE 10 MG/ML
INJECTION, SOLUTION INFILTRATION; PERINEURAL PRN
Status: DISCONTINUED | OUTPATIENT
Start: 2019-12-08 | End: 2019-12-08

## 2019-12-08 RX ADMIN — PRENATAL VIT W/ FE FUMARATE-FA TAB 27-0.8 MG 1 TABLET: 27-0.8 TAB at 13:28

## 2019-12-08 RX ADMIN — LIDOCAINE HYDROCHLORIDE 0.2 ML: 10 INJECTION, SOLUTION INFILTRATION; PERINEURAL at 14:02

## 2019-12-08 RX ADMIN — ASPIRIN 81 MG CHEWABLE TABLET 81 MG: 81 TABLET CHEWABLE at 13:28

## 2019-12-08 RX ADMIN — SERTRALINE HYDROCHLORIDE 50 MG: 50 TABLET ORAL at 13:28

## 2019-12-08 NOTE — PROGRESS NOTES
Antepartum Progress Note    Subjective:   Doing well today. Continuing to have difficulty sleeping. Dreamt that missed surgery. No obstetric concerns - no LOF, bleeding or cramping.    Objective:   Patient Vitals for the past 24 hrs:   BP Temp Temp src Resp   19 0735 106/52 98.2  F (36.8  C) Oral 18   19 0000 111/59 97.8  F (36.6  C) Oral 16   19 1525 123/67 98  F (36.7  C) Oral 18   19 1020 116/59 98.1  F (36.7  C) Oral 18     Gen: Appears comfortable, NAD  Pulm: Normal respiratory effort  CV: Regular rate  Abd: obese, gravid  Ext: Negative    FHT: baseline 135, moderate variability, accels present, no decels  [place]: 0 contractions in 10 minutes     Imaging:     Assessment/Plan:   Mariela Hawkins is a 29 year old  female at 34w2d by LMP c/w 7w5d US, HD#17 here for a planned admission for vasa previa and placenta previa. Pregnancy notable for obesity, anxiety, h/o prior CS x3, h/o ventral hernia repair, anixety.    # Vasa previa  # Complete anterior placenta previa  - Planned admission until delivery. There is a 60% risk of accreta due to h/o 3 C-sections and complete anterior previa. Thus far, US has not demonstrated any evidence of accreta. Due to lack of ultrasound findings and claustrophobia, MRI was not obtained.  - Plan for RLTCS on  1200, but will attempt to move case to 7:30 am due OR/staff availability or sooner with bleeding or concerns for accreta. Consented for  and possible hysterectomy. S/p Anesthesia consult.  - S/p Betamethasone (-) and rescue course (-).  - Continue T&S q72h - ordered for today. Will attempt to   - Continue weekly cervical lengths. Next will be 12/10, which will be the last time.  - She has remained stable and is approved for time off the floor within the hospital for short durations <1 hour    # Obesity  - Continue aspirin 81 mg daily   - SCDs while in bed, lovenox in postpartum period      # Anxiety  - Continue PTA  sertraline   - Social work antepartum assessment ordered   - Monitor mood closely. S/p  psych.  - will have  visit during the week.     # IUGR/lagging AC  - : EFW 11%ile 1445g  - Weekly BPPs with Dopplers; last normal (12/3.) Next will be 12/10    # Fetal well being  - FHR reactive. Continue TID monitoring  - S/p NICU, BMZ (-, -)      # Prenatal care  - PNL:    - Rh positive, Antibody negative. Rubella immune, Hep B SAg NR, RPR neg, HIV NR, GC/CT NR.   - GCT 85   - GBS Positive  - Genetics: Low risk NIPT   - Immunizations: s/p influenza and TDap  - Other: Melatonin and vistaril for sleep     # Delivery Plan  - History of LTCS x 3. Had a prior wound dehiscence with prolonged healing with prior Pfannenstiel incision. Plan for RLTCS through vertical midline incision on  or sooner with bleeding or concerns for accreta.   - S/p ventral hernia repair with mesh (2018)- Records in care everywhere- small supraumbilical hernia with fat strangulation, small amt of mesh used.  - S/p extensive surgical counseling, please see previous notes for details. Surgical consent was signed .     # Ppx  - SCDs while in bed      Arti Thao MD  Specialist in Maternal-Fetal Medicine

## 2019-12-08 NOTE — PLAN OF CARE
Pt A&Ox4, rr even & unlabored, pwd, nad noted. VSS. Pt denies pain, contractions, bleeding or leaking fluid. See flow sheets for FHR tracing. Will continue with plan of care

## 2019-12-08 NOTE — PLAN OF CARE
Cared for the patient from [id number]. Denies LOF, bleeding, ctx. No ctx on monitor. FHR appropriate for gestational age - see flowsheets for details. Will continue with current plan of care.

## 2019-12-08 NOTE — PLAN OF CARE
Afebrile. VSSRozina Mariela denies leaking of fluid, bleeding, or contractions. She had a difficult time sleeping last night so has been trying to nap throughout the morning. Baby is active. EFM applied with moderate variability and accels.

## 2019-12-08 NOTE — PLAN OF CARE
Mariela was able to sleep in this morning until 10 and had morning monitoring late. Afebrile. VSS. Baby is active. Denies bleeding or contractions. Baby with moderate variability and accels. No complaints at this time.

## 2019-12-09 ENCOUNTER — PREP FOR PROCEDURE (OUTPATIENT)
Dept: OBGYN | Facility: CLINIC | Age: 29
End: 2019-12-09

## 2019-12-09 PROCEDURE — 25000132 ZZH RX MED GY IP 250 OP 250 PS 637: Performed by: STUDENT IN AN ORGANIZED HEALTH CARE EDUCATION/TRAINING PROGRAM

## 2019-12-09 PROCEDURE — 12000001 ZZH R&B MED SURG/OB UMMC

## 2019-12-09 RX ADMIN — ASPIRIN 81 MG CHEWABLE TABLET 81 MG: 81 TABLET CHEWABLE at 08:44

## 2019-12-09 RX ADMIN — PRENATAL VIT W/ FE FUMARATE-FA TAB 27-0.8 MG 1 TABLET: 27-0.8 TAB at 08:44

## 2019-12-09 RX ADMIN — Medication 1 MG: at 00:51

## 2019-12-09 RX ADMIN — SERTRALINE HYDROCHLORIDE 50 MG: 50 TABLET ORAL at 08:44

## 2019-12-09 NOTE — PLAN OF CARE
Pt alert, active, and stable. No signs or symptoms of distress. VSS. Pt not sleeping well while at the hospital. Denies loss of fluid, bleeding, contractions, and pain. Nurse unable to find FHR via EFM. Dr. Thao to bedside with ultrasound to help assist with placement for FHR. Still unable to get FHR on EFM. Dr. Thao stated to retry at 1530. Next nurse was passed on this information. No questions or concerns at this time. Will continue to monitor pt closely.

## 2019-12-09 NOTE — PLAN OF CARE
Pt up ad amanda in unit tonight. Pt denies any pain, contractions, bleeding, or leaking of fluid. See flowsheet for FHR monitoring.

## 2019-12-09 NOTE — PROGRESS NOTES
Called to assist with locating heart position for fetal heart rate monitoring. Ultrasound demonstrates fetus is cephalic. The arm and leg are over the fetal thorax. The fetus was very active with flexion and extension, as well as gross body movements observed.  by M-mode. Due to current fetal position and reassuring ultrasound findings will try again to obtain fetal heart rate tracing in approximately 30 minutes.    Arti Thao MD  Specialist in Maternal-Fetal Medicine

## 2019-12-10 ENCOUNTER — HOSPITAL ENCOUNTER (INPATIENT)
Dept: ULTRASOUND IMAGING | Facility: CLINIC | Age: 29
End: 2019-12-10
Attending: STUDENT IN AN ORGANIZED HEALTH CARE EDUCATION/TRAINING PROGRAM
Payer: COMMERCIAL

## 2019-12-10 PROCEDURE — 76819 FETAL BIOPHYS PROFIL W/O NST: CPT | Performed by: OBSTETRICS & GYNECOLOGY

## 2019-12-10 PROCEDURE — 25000132 ZZH RX MED GY IP 250 OP 250 PS 637: Performed by: STUDENT IN AN ORGANIZED HEALTH CARE EDUCATION/TRAINING PROGRAM

## 2019-12-10 PROCEDURE — 12000001 ZZH R&B MED SURG/OB UMMC

## 2019-12-10 PROCEDURE — 76816 OB US FOLLOW-UP PER FETUS: CPT

## 2019-12-10 RX ORDER — LANOLIN ALCOHOL/MO/W.PET/CERES
3 CREAM (GRAM) TOPICAL
Status: DISCONTINUED | OUTPATIENT
Start: 2019-12-10 | End: 2019-12-13

## 2019-12-10 RX ADMIN — SERTRALINE HYDROCHLORIDE 50 MG: 50 TABLET ORAL at 08:36

## 2019-12-10 RX ADMIN — ASPIRIN 81 MG CHEWABLE TABLET 81 MG: 81 TABLET CHEWABLE at 08:37

## 2019-12-10 RX ADMIN — PRENATAL VIT W/ FE FUMARATE-FA TAB 27-0.8 MG 1 TABLET: 27-0.8 TAB at 08:36

## 2019-12-10 NOTE — PLAN OF CARE
Data: VSS, afebrile, no complains. Maternal status stable. Fetal assessment is normal.   Action: Continue with plan of care, which is TID fetal and uterine monitoring. Patient encouraged to move extremities while in bed. Patient in up ad amanda ambulate in the [address] way sometimes.   Response: Patient coping with plan of care.

## 2019-12-10 NOTE — CARE CONFERENCE
This is protected health information and must be strictly protected for patient privacy. Violations will be followed up per compliance and HIPPA policies.    Dec 10,  2019 Plan of Care for Mariela Hawkins MRN: [id number] :  1990   Multidisciplinary Team care conference held 2019    Situation:    Mariela Hawkins is a 29 year old  at 32w0d by LMP c/w 7w5d US who presents today for scheduled admission for vasa previa and placenta previa This patient received regular prenatal care at [facility] in [place] and also followed with MFM at [facility].          Back Ground:      HPI:   OBSTETRIC HISTORY:  Pregnancy Complications:   Her pregnancy is complicated by:  Vasa previa and Complete anterior placenta previa  - Planned admission until delivery. There is a 60% risk of accreta due to h/o 3 C-sections and complete anterior previa. US has not demonstrated any evidence of accreta. Due to lack of ultrasound findings and claustrophobia, MRI was not obtained.  - Plan for RLTCS on  0730 or sooner with bleeding or concerns for accreta. Consented for  and possible hysterectomy. S/p Anesthesia consult.  - Continue T&S q72h until surgery.  - Continue weekly cervical lengths, next scheduled for 12/10.  - She has remained stable and is approved for time off the floor within the hospital for short durations <1 hour  History of CS x 3  BMI 50  - Continue aspirin 81 mg daily   - Thromboprophylaxis not used in the antepartum period given activity not restricted, risk for urgent delivery and bleeding risk with placenta previa  - SCDs while in bed, lovenox in postpartum period    Imagin19                                                                   IMPRESSION  ---------------------------------------------------------------------------------------------------------  1) Intrauterine pregnancy at 32 4/7 weeks gestational age.  2) Visualized fetal anatomy appears  normal, but very limited due to advanced gestational age and maternal body habitus.  3) Growth parameters and estimated fetal weight were consistent with fetal growth restriction with lagging AC.  4) The amniotic fluid volume appeared low/normal.  5) The UA Doppler waveform is within normal limits.  6) The BPP is reassuring.  7) The transvaginal cervical length is reassuring. There is a bilobed placenta with attenuation cephalad to the internal os. The cord insertion is difficult to see, however there are fetal vessels directly cephalad to the internal os.       Medical History:    Surgical History: previous LTCS x3  Had a prior wound dehiscence with prolonged healing with prior Pfannenstiel incision.   - S/p ventral hernia repair with mesh ()- Records in care everywhere- small supraumbilical hernia with fat strangulation, small amt of mesh used.    Social History:    Anxiety patient reports an increase in her baseline anxiety because of all that is going on with this pregnancy.   - Continue PTA sertraline   - Social work antepartum assessment ordered   - Monitor mood closely. S/p  psych.  -  visit during the week  - S/P Social Work visit.    Assessment and Plan:     Fetal Well Being:  IUGR/lagging AC  - : EFW 11%ile 1445g  - Weekly BPPs with Dopplers; last normal (12/3). Next scheduled for 12/10.S/p NICU, MOOK (-, -)  - S/P NICU consult     Obstetric Plan/Surgery Plan:    Plan for RLTCS through vertical midline incision at 35 weeks or sooner with bleeding or concerns for accreta.    Scheduled  with possible hysterectomy  in main OR 3rd Floor at 0730    Patient is currently in patient on the Pregnancy Special Care Unit (PSCU).      Continue T & S every 72 hours    Pre-op labs and pre-op showers will be completed on the PSCU. Pre-op CS orders will be placed the evening before the case.  If orders are not available, call the Senior Resident  [phone].    3A Main Pre-op area will call unit to bring patient to 3A - have patient ready by 0600     Dot Carmona MD is the primary surgeon. Dr. Ruiz Gyn/Onc will be available to scrub in as needed.    Dr. Faith s radical hyst tray and Bookwalter Retractor available pre-counted prior to incision. Urinary tray, stents and cysto available, but not open     Have the OR Sonosite US available with a long sterile sleeve.    Lithotomy position - confirm positioning.     Type and prepare for 2 units PRBCs, available in a cooler in the OR at the beginning surgery.  Resident to place orders for blood and any additional pre-op labs.     Oxytocin in the OR at the start of the case along with Tranexamic available in the pharmacy OR satellite at beginning of surgery - not recommend prophylactically. Team must notify pharmacy to make TXA and send to the OR.  If 2nd dose is needed, may administer 30 minutes following the first dose.    Overall reminders to everyone on the care team.  Minimize conversations and extra chatter in the surgical suite.  Minimize extra people (limit to 20 if possible) in the room to minimize the risk of infection and door swings.     NICU to set up room between [id number].  OR team will call NICU for delivery when needed.     Recommend that extra team members and NICU wait outside of the surgical suite until the anesthesia induction is complete.    Anesthesia Plan: Consult 12/2/10  ASA Classification: 3  [place] [facility]  Anesthetic techniques and relevant risks discussed: GA, Regional, GA with regional block for post-op pain control and MAC with GA as backup  Invasive monitoring and risk discussed: Yes  Types: A-line  Possibility and Risk of blood transfusion discussed: Yes  NPO instructions given: No solids 6 hours before surgery, stop clear liquids 2 hours before surgery  Additional anesthetic preparation and risks discussed: Regional anesthesia for procedure, Invasive  monitoring and Regional anesthesia for post-op pain control  Needs early admission to pre-op area: No  Anesthesia re-consulted:  Discussed at length with the patient the anesthesia plan for her future    -spinal anesthesia was discussed at length and she is agreeable   -combined spinal/epidural was discussed in case day of surgery anesthesia team felt this was a better option and is agreeable.   -general anesthesia was discussed as a back up (last resort) option in case of hemorrhage, or patient intolerance and is agreeable   -She has a lot of anxiety at baseline and requests she arms not be strapped down. She requests no facemask and is agreeable to nasal cannula if needed. She understands the risks of receiving benzodiazapine's before baby is out and is ok with holding off. She may request anti-anxiety medicine once baby is out. IF conversion GA is necessary patient requests anti-anxiety medication to help facilitate masking.        Perioperative Plan:  Main OR team will have primary circulator and scrub tech.  OB will supply RN who will not be the main circulator role.  Main OR circulator responsible for specimen collection and sending to pathology.  OB RN will collect umbilical cord for umbilical cord gases.    Nursing Plan:     See Preop orders for showering. No solids 6 hours before surgery, stop clear liquids 2 hours before surgery NPO status    Patient is currently inpatient on Pregnancy Specialty Care Unit.    Continue T & S every 72 hours    Pre-op labs and pre-op showers will be completed on the PSCU. Pre-op CS orders will be placed the evening before the case.  If orders are not available, call the Senior Resident [phone].    Pre-op checklist to be completed on PSCU.    3A Main Pre-op area will call unit to bring patient to 3A - have patient ready by 0600 including fetal strip completed     Her  Joel, plans to be present for the birth in the operating room.  OB RN responsible for  assisting dad out of the room in case of an emergency.     The OB RN will perform the usual maternal and fetal assessments preoperatively, be present during the surgery and is responsible for delivery documentation and  identification.  Bring  bands, belly band for skin to skin, clear drape, sterile blankets purple oral feeding syringes and cord gas collection kit as well as the portable fetal monitor to the 3rd floor for fetal assessment. Save the paper fetal strip and send to HIM per downtime procedure if strip is performed on 3rd floor.    The Panda warmer in the L & D OR#3 will be brought to the 3rd floor OR.  The warmer is stocked with C/S supplies,  resuscitation supplies, and doptone.      NICU team will set up the room between 8807-8802 and then can return to the NICU until needed for the delivery.     The OB RN will notify the NICU when they are needed for delivery.    OB RN will verify that an infusion pump, oxytocin and Tranexamic Acid are available in the OR during the case.      Due to the logistics of moving from a spinal to possible general anesthesia and the hysterectomy procedure, the NICU will stabilize the baby and may bring the baby to mom for STS a short timeframe.  Retractors on mother will limit the space for STS. OB RN to negotiate this with the NICU keeping in mind that her  will need to be escorted out of the room when spinal is converted to general. Disposition to the NICU or NFCC depending on baby s status.     The OB RN is responsible for assisting her  out of the OR and escorting him to the NICU or NFCC as needed. (May need to call for a bassinette for transfer to [facility])    The OB RN is responsible for completing the Delivery Summary with the exception of the  Apgar score and resuscitation notes.      Plan for OB RN to assist with manual expression/massage as she is able in the PACU. Please discuss the possibility of use of donor breast milk  preoperatively in case she doesn t recover well from the surgery.  Consider Donor milk agreement as needed. If not able to complete, the FOB may sign the form.    Routine postpartum and post-surgical care for post c/s (with possible hysterectomy).  Occasionally, there may be a concern regarding hemodynamic stability in the post-operative setting and for this, she may need to be maintained in the SICU that is [place]. She will need some support with hand expression and breast pumping there if that is the case and will need to be set up with video Care Space if available. A plan will be made at that time if needed.    Placement after surgery depends on course of the surgery and patient status.  It is anticipated that she will recover in the Main PACU with a Main PACU RN and OB RN performing collaborative care.  There is a possibility she will not have a uterus so OB RN to help with milk expression at this time and OB RN may not need to stay the entire recovery.  If she did not have a hysterectomy, the OB RN will stay with her for the entire recovery and provide collaborative care. Her postpartum stay is anticipated to be on the [facility] but could need transfer to [facility] if her condition warrants a higher level of care.    [facility] has been notified of baby possibly coming to [facility] however is 35 weeks and IUGR.    NICU lactation has consulted 12/6.  Patient expressed concerns about missing skin to skin time during the surgery.    Blood Bank Plan:     Periop lab staff carry Ascom [phone].    Type and cross for 2 PRBCs in a cooler at the beginning surgery. OB resident will place orders for crossmatched blood products available and checked in a cooler in the OR at the time of the incision.  If case is emergent, plan on an MTP being called.    MTP is a possibility even with a scheduled and controlled case. Blood bank is alerted to possibility of MTP. Reminder that an Ascom phone will be included with the  first cooler of blood and is to be used for primary communication back and forth with the blood bank.    The [facility] (cell saver provider) [phone] is not alerted to her case per Periop Team.    If the case is emergent or she needs to deliver soon than the planned date, the team has a 30 minute timeframe to be available . Perfusion Team has devised a technique to differentiate the various suction tubing that will be needed for the case - wall suction for amniotic fluid, and a reservoir system with differentiated  to assist the surgeons will appropriate collection of blood. He will also obtain the leukocyte filters.     Plan:    Baby will be Delivery at 35 weeks     NICU will attend the delivery in the 3rd floor Surgery Area and are responsible for any resuscitation needs.    OB or NICU nurse will support skin to skin  - space will be tight due to maternal retractors    Plan for delayed cord clamping at delivery     NICU to set up room between 0600  and 0630.  OR team will call NICU for delivery when needed.     Recommend that extra team members and NICU wait outside of the surgical suite until the anesthesia induction is complete.    Depending on the baby s clinical condition at birth:   o If not well-appearing, NICU will stabilize and transport the baby to the NICU and NICU team to communicate with her  about accompanying the baby to the NICU.  o If well-appearing, OB RN will transfer to [facility] immediately after birth and OB RN will talk to her  about accompanying the baby to the [facility].  May need to call for a bassinette for transfer.    The OB RN is responsible for completing the Delivery Summary with the exception of the  Apgar score and resuscitation notes.      Infant feeding plans are breastfeeding. Donor breastmilk agreement may need to be reviewed.    Social Work:   Continue to support family in the postpartum recovery  time.    Contact Phone #s and Pagers -     Maternal Fetal Medicine/OB Team  If any questions about medications or routine cares please page the senior MFM resident: [phone]   OB surgeon, Dot Carmona [phone]  MFM Fellow    MFM on service   Resident - [phone]  Fellow - Gyn Onc  yes one will be present  MFM Patient Care Coordinator line: [phone]    Gyn/Oncology Team   Dr. Sara Ortega is  [phone]    Periop Team:  3rd floor Control Desk [phone]  PACU [phone]  Scarlet BLACK () for gyn/urology in the [phone] main contact for OB/ gyn/urology in the OR  Annita Villatoro [phone] OR day Patient Care Supervisor & main support for OB/GYN/Urol and Ortho/Spine AHNs/teams   Katy Robles OR Manager [phone]  Julianne Paulisch Pre/Post Nurse Manager [phone]  Farhana Person Pre/Post Patient Care Supervisor [phone]    Blood Bank/Transfusion Medicine:  Blood Bank, [place] [phone];  [place] [facility] [phone]   Kaley Tolbert [phone]  Liliam Vazquez Acute Lab Supervisor [phone]  Karlee Duran-[place] Blood Bank Manager [phone]     Cell Saver  Saran [email or web address] [email or web address]. [phone]  [facility] (cell saver provider) [phone], Jc Cornejo, clinical manager    Labor and Delivery Unit   Charge Nurse Ascom [phone]  [phone] (to call from another campus [phone] and enter the Ascom #)  [place] [facility]   Charge Nurse Ascom [phone] (to call from another campus [phone] and enter the Ascom #)  HUNTER Lindquist, Birthplace [phone], pgr [phone], cell [phone]  Kaylah Macias, [place] Labor and Delivery and Pregnancy Special Care Unit, [phone], cell   Klaudia Finn or Jesika Ang, IBCLC, [phone] for lactation support  ZAYDA Aparicio [facility], Labor and Delivery and Pregnancy Special Care Unit, [phone]  Maggie Whiteside,  Director [phone]    Anesthesia/Surgery  Main Pager  - [phone]   Noemi [email or web address]  cell is [phone]    NICU Team  Re Kim MD Neonatology pgr [phone]  Kelly Bedolla, Lead NNP, [phone]   Jessica Gooden, [facility] [phone]  Celia Taylor, [place] NICU, [phone]  Mana Rodriguez, [place] NICU, [phone]    Support Services  Krys Shepard [facility] Social Work [facility] [phone], pager [phone]  Magi Stuart [facility] Social Work [facility] [phone], pager [phone]  Jody Montano [facility] Social Work [facility] [phone]  Kjerstin Rydeen, [facility] Social Work [facility] [phone]  Genevieve carpenter pager [phone]  Maciel Thomas [facility] [phone]  ANS and PPM teams Sneha Corona

## 2019-12-10 NOTE — PLAN OF CARE
Pt stable and excited for delivery. Status unchanged, will continue to monitor for bleeding or signs of labor.

## 2019-12-10 NOTE — PROGRESS NOTES
Antepartum Progress Note    Subjective:   Doing well today.  Continues to have difficulty sleeping due to excitement over upcoming delivery.  She denies any contractions, leakage of fluid, vaginal bleeding, or other systemic symptoms. +FM.    Objective:   Patient Vitals for the past 24 hrs:   BP Temp Temp src Heart Rate Resp Weight   12/10/19 0600 -- -- -- -- -- 148.3 kg (326 lb 14.4 oz)   12/10/19 0001 109/52 98  F (36.7  C) Oral 96 16 --   19 1546 114/66 -- -- -- -- --   19 1545 -- 98.1  F (36.7  C) Oral -- 20 --     Gen: Appears comfortable, NAD  Pulm: Normal respiratory effort  CV: Regular rate  Abd: obese, gravid  Ext: Negative    FHT: baseline 130s, moderate variability, accels absent, decels absent  [place]: 0 contractions in 10 minutes     Imaging: EFW 4# 11 ounces, bilobed placenta with velamentous vessels overlying the os. BPP 8/8. Uteroplacental interface appears intact.    Assessment/Plan:   Mariela Hawkins is a 29 year old  female at 34w4d by LMP c/w 7w5d US, HD#19 here for a planned admission for vasa previa and placenta previa. Pregnancy notable for obesity, anxiety, h/o prior CS x3, h/o ventral hernia repair, anixety.    # Vasa previa  # Complete anterior placenta previa  - Planned admission until delivery. There is a 60% risk of accreta due to h/o 3 C-sections and complete anterior previa. Ultrasound still does not demonstrate any evidence of accreta, although any imaging cannot rule out accreta definitively.  - Plan for RLTCS on  0730 or sooner with bleeding or concerns for accreta. Consented for  and possible hysterectomy. S/p Anesthesia consult.  - S/p Betamethasone (-) and rescue course (-).  - Continue T&S q72h until surgery.  - She has remained stable and is approved for time off the floor within the hospital for short durations <1 hour    # Obesity  - Continue aspirin 81 mg daily   - Thromboprophylaxis not used in the antepartum period given  activity not restricted, risk for urgent delivery and bleeding risk with placenta previa  - SCDs while in bed, lovenox in postpartum period      # Anxiety  - Continue PTA sertraline   - Social work antepartum assessment ordered   - Monitor mood closely. S/p  psych.  -  visit during the week.     # IUGR/lagging AC - resolved  - No further ultrasound surveillance planned as delivery scheduled     # Fetal well being  - FHR reactive. Continue TID monitoring  - S/p NICU, BM (-, -)      # Prenatal care  - PNL:    - Rh positive, Antibody negative. Rubella immune, Hep B SAg NR, RPR neg, HIV NR, GC/CT NR.   - GCT 85   - GBS Positive  - Genetics: Low risk NIPT   - Immunizations: s/p influenza and TDap  - Other: Melatonin and vistaril for sleep     # Delivery Plan  - History of LTCS x 3. Had a prior wound dehiscence with prolonged healing with prior Pfannenstiel incision. Plan for RLTCS through vertical midline incision on  at 7:30 am or sooner with bleeding or concerns for accreta.   - S/p ventral hernia repair with mesh (2018)- Records in care everywhere- small supraumbilical hernia with fat strangulation, small amt of mesh used.  - S/p extensive surgical counseling, please see previous notes for details. Surgical consent was signed .     # Ppx  - SCDs while in bed    Arti Thao MD  Specialist in Maternal-Fetal Medicine

## 2019-12-10 NOTE — PLAN OF CARE
Pt denies pain, contractions, bleeding. Baby is active. SL in place. Monitoring shows moderate variability with accels, no decels. Continue to monitor.

## 2019-12-11 ENCOUNTER — PREP FOR PROCEDURE (OUTPATIENT)
Dept: OBGYN | Facility: CLINIC | Age: 29
End: 2019-12-11

## 2019-12-11 PROCEDURE — 86923 COMPATIBILITY TEST ELECTRIC: CPT | Performed by: STUDENT IN AN ORGANIZED HEALTH CARE EDUCATION/TRAINING PROGRAM

## 2019-12-11 PROCEDURE — 25000132 ZZH RX MED GY IP 250 OP 250 PS 637: Performed by: STUDENT IN AN ORGANIZED HEALTH CARE EDUCATION/TRAINING PROGRAM

## 2019-12-11 PROCEDURE — 12000001 ZZH R&B MED SURG/OB UMMC

## 2019-12-11 PROCEDURE — 86900 BLOOD TYPING SEROLOGIC ABO: CPT | Performed by: STUDENT IN AN ORGANIZED HEALTH CARE EDUCATION/TRAINING PROGRAM

## 2019-12-11 PROCEDURE — 86901 BLOOD TYPING SEROLOGIC RH(D): CPT | Performed by: STUDENT IN AN ORGANIZED HEALTH CARE EDUCATION/TRAINING PROGRAM

## 2019-12-11 PROCEDURE — 86850 RBC ANTIBODY SCREEN: CPT | Performed by: STUDENT IN AN ORGANIZED HEALTH CARE EDUCATION/TRAINING PROGRAM

## 2019-12-11 PROCEDURE — 36415 COLL VENOUS BLD VENIPUNCTURE: CPT | Performed by: STUDENT IN AN ORGANIZED HEALTH CARE EDUCATION/TRAINING PROGRAM

## 2019-12-11 RX ADMIN — SERTRALINE HYDROCHLORIDE 50 MG: 50 TABLET ORAL at 08:07

## 2019-12-11 RX ADMIN — Medication 3 MG: at 02:43

## 2019-12-11 RX ADMIN — PRENATAL VIT W/ FE FUMARATE-FA TAB 27-0.8 MG 1 TABLET: 27-0.8 TAB at 08:07

## 2019-12-11 RX ADMIN — ASPIRIN 81 MG CHEWABLE TABLET 81 MG: 81 TABLET CHEWABLE at 08:07

## 2019-12-11 NOTE — PLAN OF CARE
VSS. Patient denies cramping/contractions, bleeding, leaking of fluid. Patient able to sleep overnight. EFM per flowsheet.

## 2019-12-11 NOTE — PLAN OF CARE
Data: VSS. Denies contractions.  EFM Reactive. Denies vaginal bleeding or LOF.  Interventions: Continue uterine/fetal assessment every shift. Activity level:Regular activity, and preventive measures including Positioning and Frequent voiding. Encourage active range of motion and frequent position changes.  Plan: Continue expectant management. Observe for and notify care provider of indications of progressing labor, vaginal bleeding, or signs of fetal/maternal compromise.

## 2019-12-11 NOTE — PROGRESS NOTES
Antepartum Progress Note    Subjective:   Doing well today. Increase in melatonin has improved her sleep.  She denies any contractions, leakage of fluid, vaginal bleeding, or other systemic symptoms. Confirms active fetal movement    Objective:   Patient Vitals for the past 24 hrs:   BP Temp Temp src Resp   19 0814 119/83 98  F (36.7  C) Oral 18   12/10/19 1709 128/68 -- -- --   12/10/19 1708 -- 98.7  F (37.1  C) Oral 16   12/10/19 1200 114/61 98.1  F (36.7  C) Oral 16     Gen: Appears comfortable, NAD  Pulm: Normal respiratory effort  CV: Regular rate  Abd: obese, gravid  Ext: Negative    FHT: baseline 140s, moderate variability, accels present, decels absent  [place]: 0 contractions in 10 minutes     Assessment/Plan:   Mariela Hawkins is a 29 year old  female at 34w5d by LMP c/w 7w5d US, HD#20 here for a planned admission for vasa previa and placenta previa. Pregnancy notable for obesity, anxiety, h/o prior CS x3, h/o ventral hernia repair, anixety.    # Vasa previa  # Complete anterior placenta previa  - Planned admission until delivery. There is a 60% risk of accreta due to h/o 3 C-sections and complete anterior previa. Ultrasound still does not demonstrate any evidence of accreta, although any imaging cannot rule out accreta definitively.  - Plan for RLTCS on  0730 or sooner with bleeding or concerns for accreta. Consented for  and possible hysterectomy. S/p Anesthesia consult.  - S/p Betamethasone (-) and rescue course (-).  - Continue T&S q72h until surgery.  - She has remained stable and is approved for time off the floor within the hospital for short durations <1 hour    # Obesity  - Continue aspirin 81 mg daily   - Thromboprophylaxis not used in the antepartum period given activity not restricted, risk for urgent delivery and bleeding risk with placenta previa  - SCDs while in bed, lovenox in postpartum period      # Anxiety  - Continue PTA sertraline   - Social  work antepartum assessment ordered   - Monitor mood closely. S/p  psych.  -  visit during the week.     # IUGR/lagging AC - resolved  - No further ultrasound surveillance planned as delivery scheduled     # Fetal well being  - FHR reactive. Continue TID monitoring  - S/p NICU, BMZ (-, -)      # Prenatal care  - PNL:    - Rh positive, Antibody negative. Rubella immune, Hep B SAg NR, RPR neg, HIV NR, GC/CT NR.   - GCT 85   - GBS Positive  - Genetics: Low risk NIPT   - Immunizations: s/p influenza and TDap  - Other: Melatonin and vistaril for sleep     # Delivery Plan  - History of LTCS x 3. Had a prior wound dehiscence with prolonged healing with prior Pfannenstiel incision. Plan for RLTCS through vertical midline incision on  at 7:30 am or sooner with bleeding or concerns for accreta.   - S/p ventral hernia repair with mesh ()- Records in care everywhere- small supraumbilical hernia with fat strangulation, small amt of mesh used.  - S/p extensive surgical counseling, please see previous notes for details. Surgical consent was signed .     # Ppx  - SCDs while in bed    Seen and staffed with Dr. Master Whitman MD  Maternal Fetal Medicine Fellow  2019 9:38 AM      Physician Attestation   I, Arti Thao MD, saw this patient with the resident and agree with the resident/fellow's findings and plan of care as documented in the note.      I personally reviewed vital signs, medications, labs and imaging.    Key findings: No bleeding, LOF or contractions. Excited about planned delivery 19. Planning C/S with possible C-hyst if invasive placental disease is encountered. Case scheduled with [facility]/Gynecologic Oncology. Plan to type and cross for 2 units of PRBC.    Arti Thao MD  Date of Service (when I saw the patient): 19

## 2019-12-12 PROCEDURE — 25000132 ZZH RX MED GY IP 250 OP 250 PS 637: Performed by: STUDENT IN AN ORGANIZED HEALTH CARE EDUCATION/TRAINING PROGRAM

## 2019-12-12 PROCEDURE — 12000001 ZZH R&B MED SURG/OB UMMC

## 2019-12-12 RX ORDER — MISOPROSTOL 200 UG/1
TABLET ORAL
Status: DISCONTINUED
Start: 2019-12-12 | End: 2019-12-12 | Stop reason: HOSPADM

## 2019-12-12 RX ORDER — OXYTOCIN/0.9 % SODIUM CHLORIDE 30/500 ML
PLASTIC BAG, INJECTION (ML) INTRAVENOUS
Status: DISCONTINUED
Start: 2019-12-12 | End: 2019-12-12 | Stop reason: HOSPADM

## 2019-12-12 RX ADMIN — SERTRALINE HYDROCHLORIDE 50 MG: 50 TABLET ORAL at 07:37

## 2019-12-12 RX ADMIN — ASPIRIN 81 MG CHEWABLE TABLET 81 MG: 81 TABLET CHEWABLE at 07:37

## 2019-12-12 RX ADMIN — ACETAMINOPHEN 975 MG: 325 TABLET, FILM COATED ORAL at 07:37

## 2019-12-12 RX ADMIN — Medication 3 MG: at 02:02

## 2019-12-12 RX ADMIN — PRENATAL VIT W/ FE FUMARATE-FA TAB 27-0.8 MG 1 TABLET: 27-0.8 TAB at 07:37

## 2019-12-12 NOTE — PROGRESS NOTES
Antepartum Progress Note    Subjective:   Doing well today. Feeling very excited for delivery tomorrow! Denies cramping/contractions or any new issues. Good FM.    Objective:   Patient Vitals for the past 24 hrs:   BP Temp Temp src Resp   19 0740 116/63 98.2  F (36.8  C) Oral 18   19 2228 116/67 97.9  F (36.6  C) Oral 18   19 1657 134/65 97.9  F (36.6  C) Oral 18     Gen: Appears comfortable, NAD  Pulm: Normal respiratory effort  CV: Regular rate  Abd: obese, gravid  Ext: Negative    FHT: baseline 130, moderate variability, accels present, decels absent  [place]: 0 contractions in 10 minutes     Assessment/Plan:   Mariela Hawkins is a 29 year old  female at 34w6d by LMP c/w 7w5d US, HD#21 here for a planned admission for vasa previa and placenta previa. Pregnancy notable for obesity, anxiety, h/o prior CS x3, h/o ventral hernia repair, anixety.    # Vasa previa  # Complete anterior placenta previa  - Planned admission until delivery. There is a 60% risk of accreta due to h/o 3 C-sections and complete anterior previa. Ultrasound still does not demonstrate any evidence of accreta, although any imaging cannot rule out accreta definitively.  - Plan for RLTCS on 730 or sooner with bleeding or concerns for accreta. Consented for  and possible hysterectomy. S/p Anesthesia consult and Care Conference.  - S/p Betamethasone (-) and rescue course (-).  - Continue T&S q72h until surgery.  - She has remained stable and is approved for time off the floor within the hospital for short durations <1 hour    # Obesity  - Continue aspirin 81 mg daily   - Thromboprophylaxis not used in the antepartum period given activity not restricted, risk for urgent delivery and bleeding risk with placenta previa  - SCDs while in bed, lovenox in postpartum period      # Anxiety  - Continue PTA sertraline   - Social work antepartum assessment ordered   - Monitor mood closely. S/p   psych.  -  visit during the week.     # IUGR/lagging AC - resolved  - No further ultrasound surveillance planned as delivery scheduled 12/13    # Fetal well being  - FHR reactive. Continue TID monitoring  - S/p NICU, BMZ (11/22-11/23, 12/4-12/5)      # Prenatal care  - PNL:    - Rh positive, Antibody negative. Rubella immune, Hep B SAg NR, RPR neg, HIV NR, GC/CT NR.   - GCT 85   - GBS Positive  - Genetics: Low risk NIPT   - Immunizations: s/p influenza and TDap  - Other: Melatonin and vistaril for sleep     # Delivery Plan  - History of LTCS x 3. Had a prior wound dehiscence with prolonged healing with prior Pfannenstiel incision. Plan for RLTCS through vertical midline incision on 12/13 at 7:30 am or sooner with bleeding or concerns for accreta.   - S/p ventral hernia repair with mesh (2018)- Records in care everywhere- small supraumbilical hernia with fat strangulation, small amt of mesh used.  - S/p extensive surgical counseling, please see previous notes for details. Surgical consent was signed 11/24.     # Ppx  - SCDs while in bed    Seen and staffed with Dr. Master Silvestre MD  Ob/Gyn PGY-3  12/12/19 9:20 AM    Physician Attestation   I, Arti Thao MD, saw this patient with the resident and agree with the resident/fellow's findings and plan of care as documented in the note.      I personally reviewed vital signs, medications, labs and imaging.    Key findings: Excited for delivery tomorrow. Dr. Carmona from S and Dr. Johnson from Gyn/Onc scheduled for delivery tomorrow with Dr. Johnson to assist with hysterectomy if indicated. Recommend classical C/S to avoid placenta. Type and cross 2 units for case. Blood bank aware of possible MTP if proceed with hysterectomy if invasive placental disease is encountered.    Arti Thao MD  Date of Service (when I saw the patient): 12/12/19

## 2019-12-13 ENCOUNTER — SURGERY (OUTPATIENT)
Age: 29
End: 2019-12-13
Payer: COMMERCIAL

## 2019-12-13 ENCOUNTER — ANESTHESIA (OUTPATIENT)
Dept: SURGERY | Facility: CLINIC | Age: 29
End: 2019-12-13
Payer: COMMERCIAL

## 2019-12-13 PROBLEM — Z98.891 S/P C-SECTION: Status: ACTIVE | Noted: 2019-12-13

## 2019-12-13 LAB
ABO + RH BLD: NORMAL
ABO + RH BLD: NORMAL
BLD GP AB SCN SERPL QL: NORMAL
BLD PROD TYP BPU: NORMAL
BLD UNIT ID BPU: 0
BLD UNIT ID BPU: 0
BLOOD BANK CMNT PATIENT-IMP: NORMAL
BLOOD PRODUCT CODE: NORMAL
BLOOD PRODUCT CODE: NORMAL
BPU ID: NORMAL
BPU ID: NORMAL
HGB BLD-MCNC: 11 G/DL (ref 11.7–15.7)
NUM BPU REQUESTED: 4
SPECIMEN EXP DATE BLD: NORMAL
TRANSFUSION STATUS PATIENT QL: NORMAL

## 2019-12-13 PROCEDURE — 25000566 ZZH SEVOFLURANE, EA 15 MIN: Performed by: OBSTETRICS & GYNECOLOGY

## 2019-12-13 PROCEDURE — 71000017 ZZH RECOVERY PHASE 1 LEVEL 3 EA ADDTL HR: Performed by: OBSTETRICS & GYNECOLOGY

## 2019-12-13 PROCEDURE — 88307 TISSUE EXAM BY PATHOLOGIST: CPT | Performed by: OBSTETRICS & GYNECOLOGY

## 2019-12-13 PROCEDURE — 37000009 ZZH ANESTHESIA TECHNICAL FEE, EACH ADDTL 15 MIN: Performed by: OBSTETRICS & GYNECOLOGY

## 2019-12-13 PROCEDURE — 25000132 ZZH RX MED GY IP 250 OP 250 PS 637: Performed by: STUDENT IN AN ORGANIZED HEALTH CARE EDUCATION/TRAINING PROGRAM

## 2019-12-13 PROCEDURE — 25000128 H RX IP 250 OP 636: Performed by: STUDENT IN AN ORGANIZED HEALTH CARE EDUCATION/TRAINING PROGRAM

## 2019-12-13 PROCEDURE — 82565 ASSAY OF CREATININE: CPT | Performed by: STUDENT IN AN ORGANIZED HEALTH CARE EDUCATION/TRAINING PROGRAM

## 2019-12-13 PROCEDURE — 36415 COLL VENOUS BLD VENIPUNCTURE: CPT | Performed by: OBSTETRICS & GYNECOLOGY

## 2019-12-13 PROCEDURE — 0UT70ZZ RESECTION OF BILATERAL FALLOPIAN TUBES, OPEN APPROACH: ICD-10-PCS | Performed by: OBSTETRICS & GYNECOLOGY

## 2019-12-13 PROCEDURE — 88302 TISSUE EXAM BY PATHOLOGIST: CPT | Performed by: OBSTETRICS & GYNECOLOGY

## 2019-12-13 PROCEDURE — 58150 TOTAL HYSTERECTOMY: CPT | Mod: GC | Performed by: OBSTETRICS & GYNECOLOGY

## 2019-12-13 PROCEDURE — 88302 TISSUE EXAM BY PATHOLOGIST: CPT | Mod: 26 | Performed by: OBSTETRICS & GYNECOLOGY

## 2019-12-13 PROCEDURE — 25000125 ZZHC RX 250: Performed by: STUDENT IN AN ORGANIZED HEALTH CARE EDUCATION/TRAINING PROGRAM

## 2019-12-13 PROCEDURE — 0TJB8ZZ INSPECTION OF BLADDER, VIA NATURAL OR ARTIFICIAL OPENING ENDOSCOPIC: ICD-10-PCS | Performed by: OBSTETRICS & GYNECOLOGY

## 2019-12-13 PROCEDURE — 36416 COLLJ CAPILLARY BLOOD SPEC: CPT | Performed by: STUDENT IN AN ORGANIZED HEALTH CARE EDUCATION/TRAINING PROGRAM

## 2019-12-13 PROCEDURE — 40000170 ZZH STATISTIC PRE-PROCEDURE ASSESSMENT II: Performed by: OBSTETRICS & GYNECOLOGY

## 2019-12-13 PROCEDURE — C9290 INJ, BUPIVACAINE LIPOSOME: HCPCS | Performed by: STUDENT IN AN ORGANIZED HEALTH CARE EDUCATION/TRAINING PROGRAM

## 2019-12-13 PROCEDURE — 25800030 ZZH RX IP 258 OP 636: Performed by: STUDENT IN AN ORGANIZED HEALTH CARE EDUCATION/TRAINING PROGRAM

## 2019-12-13 PROCEDURE — 85018 HEMOGLOBIN: CPT | Performed by: OBSTETRICS & GYNECOLOGY

## 2019-12-13 PROCEDURE — 25000128 H RX IP 250 OP 636: Performed by: ANESTHESIOLOGY

## 2019-12-13 PROCEDURE — 37000008 ZZH ANESTHESIA TECHNICAL FEE, 1ST 30 MIN: Performed by: OBSTETRICS & GYNECOLOGY

## 2019-12-13 PROCEDURE — 27210794 ZZH OR GENERAL SUPPLY STERILE: Performed by: OBSTETRICS & GYNECOLOGY

## 2019-12-13 PROCEDURE — 0WQF0ZZ REPAIR ABDOMINAL WALL, OPEN APPROACH: ICD-10-PCS | Performed by: OBSTETRICS & GYNECOLOGY

## 2019-12-13 PROCEDURE — 36000068 ZZH SURGERY LEVEL 5 1ST 30 MIN - UMMC: Performed by: OBSTETRICS & GYNECOLOGY

## 2019-12-13 PROCEDURE — 36000070 ZZH SURGERY LEVEL 5 EA 15 ADDTL MIN - UMMC: Performed by: OBSTETRICS & GYNECOLOGY

## 2019-12-13 PROCEDURE — 0UT90ZZ RESECTION OF UTERUS, OPEN APPROACH: ICD-10-PCS | Performed by: OBSTETRICS & GYNECOLOGY

## 2019-12-13 PROCEDURE — 88307 TISSUE EXAM BY PATHOLOGIST: CPT | Mod: 26 | Performed by: OBSTETRICS & GYNECOLOGY

## 2019-12-13 PROCEDURE — 85027 COMPLETE CBC AUTOMATED: CPT | Performed by: STUDENT IN AN ORGANIZED HEALTH CARE EDUCATION/TRAINING PROGRAM

## 2019-12-13 PROCEDURE — P9016 RBC LEUKOCYTES REDUCED: HCPCS | Performed by: STUDENT IN AN ORGANIZED HEALTH CARE EDUCATION/TRAINING PROGRAM

## 2019-12-13 PROCEDURE — 12000001 ZZH R&B MED SURG/OB UMMC

## 2019-12-13 PROCEDURE — 71000016 ZZH RECOVERY PHASE 1 LEVEL 3 FIRST HR: Performed by: OBSTETRICS & GYNECOLOGY

## 2019-12-13 RX ORDER — BISACODYL 10 MG
10 SUPPOSITORY, RECTAL RECTAL DAILY PRN
Status: DISCONTINUED | OUTPATIENT
Start: 2019-12-15 | End: 2019-12-17 | Stop reason: HOSPADM

## 2019-12-13 RX ORDER — NALBUPHINE HYDROCHLORIDE 10 MG/ML
2.5-5 INJECTION, SOLUTION INTRAMUSCULAR; INTRAVENOUS; SUBCUTANEOUS EVERY 6 HOURS PRN
Status: DISCONTINUED | OUTPATIENT
Start: 2019-12-13 | End: 2019-12-13

## 2019-12-13 RX ORDER — METHYLERGONOVINE MALEATE 0.2 MG/ML
200 INJECTION INTRAVENOUS
Status: DISCONTINUED | OUTPATIENT
Start: 2019-12-13 | End: 2019-12-17 | Stop reason: HOSPADM

## 2019-12-13 RX ORDER — FENTANYL CITRATE 50 UG/ML
INJECTION, SOLUTION INTRAMUSCULAR; INTRAVENOUS PRN
Status: DISCONTINUED | OUTPATIENT
Start: 2019-12-13 | End: 2019-12-13

## 2019-12-13 RX ORDER — PHENYLEPHRINE HCL IN 0.9% NACL 1 MG/10 ML
SYRINGE (ML) INTRAVENOUS CONTINUOUS PRN
Status: DISCONTINUED | OUTPATIENT
Start: 2019-12-13 | End: 2019-12-13

## 2019-12-13 RX ORDER — CITRIC ACID/SODIUM CITRATE 334-500MG
30 SOLUTION, ORAL ORAL
Status: DISCONTINUED | OUTPATIENT
Start: 2019-12-13 | End: 2019-12-13 | Stop reason: HOSPADM

## 2019-12-13 RX ORDER — ONDANSETRON 2 MG/ML
4 INJECTION INTRAMUSCULAR; INTRAVENOUS EVERY 6 HOURS PRN
Status: DISCONTINUED | OUTPATIENT
Start: 2019-12-13 | End: 2019-12-17 | Stop reason: HOSPADM

## 2019-12-13 RX ORDER — ONDANSETRON 4 MG/1
4 TABLET, ORALLY DISINTEGRATING ORAL EVERY 30 MIN PRN
Status: DISCONTINUED | OUTPATIENT
Start: 2019-12-13 | End: 2019-12-13

## 2019-12-13 RX ORDER — BUPIVACAINE HYDROCHLORIDE 7.5 MG/ML
INJECTION, SOLUTION INTRASPINAL PRN
Status: DISCONTINUED | OUTPATIENT
Start: 2019-12-13 | End: 2019-12-13

## 2019-12-13 RX ORDER — LIDOCAINE 40 MG/G
CREAM TOPICAL
Status: DISCONTINUED | OUTPATIENT
Start: 2019-12-13 | End: 2019-12-13

## 2019-12-13 RX ORDER — ACETAMINOPHEN 325 MG/1
975 TABLET ORAL EVERY 8 HOURS
Status: COMPLETED | OUTPATIENT
Start: 2019-12-13 | End: 2019-12-16

## 2019-12-13 RX ORDER — CITRIC ACID/SODIUM CITRATE 334-500MG
SOLUTION, ORAL ORAL
Status: DISCONTINUED
Start: 2019-12-13 | End: 2019-12-13 | Stop reason: WASHOUT

## 2019-12-13 RX ORDER — ONDANSETRON 2 MG/ML
4 INJECTION INTRAMUSCULAR; INTRAVENOUS EVERY 30 MIN PRN
Status: DISCONTINUED | OUTPATIENT
Start: 2019-12-13 | End: 2019-12-13 | Stop reason: HOSPADM

## 2019-12-13 RX ORDER — CEFAZOLIN SODIUM IN 0.9 % NACL 3 G/100 ML
3 INTRAVENOUS SOLUTION, PIGGYBACK (ML) INTRAVENOUS
Status: DISCONTINUED | OUTPATIENT
Start: 2019-12-13 | End: 2019-12-13 | Stop reason: HOSPADM

## 2019-12-13 RX ORDER — ONDANSETRON 2 MG/ML
4 INJECTION INTRAMUSCULAR; INTRAVENOUS EVERY 30 MIN PRN
Status: DISCONTINUED | OUTPATIENT
Start: 2019-12-13 | End: 2019-12-13

## 2019-12-13 RX ORDER — DEXTROSE, SODIUM CHLORIDE, SODIUM LACTATE, POTASSIUM CHLORIDE, AND CALCIUM CHLORIDE 5; .6; .31; .03; .02 G/100ML; G/100ML; G/100ML; G/100ML; G/100ML
INJECTION, SOLUTION INTRAVENOUS CONTINUOUS
Status: DISCONTINUED | OUTPATIENT
Start: 2019-12-13 | End: 2019-12-17 | Stop reason: HOSPADM

## 2019-12-13 RX ORDER — NALOXONE HYDROCHLORIDE 0.4 MG/ML
.1-.4 INJECTION, SOLUTION INTRAMUSCULAR; INTRAVENOUS; SUBCUTANEOUS
Status: DISCONTINUED | OUTPATIENT
Start: 2019-12-13 | End: 2019-12-13

## 2019-12-13 RX ORDER — NALOXONE HYDROCHLORIDE 0.4 MG/ML
.1-.4 INJECTION, SOLUTION INTRAMUSCULAR; INTRAVENOUS; SUBCUTANEOUS
Status: ACTIVE | OUTPATIENT
Start: 2019-12-13 | End: 2019-12-14

## 2019-12-13 RX ORDER — MORPHINE SULFATE 1 MG/ML
INJECTION, SOLUTION EPIDURAL; INTRATHECAL; INTRAVENOUS PRN
Status: DISCONTINUED | OUTPATIENT
Start: 2019-12-13 | End: 2019-12-13

## 2019-12-13 RX ORDER — OXYTOCIN 10 [USP'U]/ML
10 INJECTION, SOLUTION INTRAMUSCULAR; INTRAVENOUS
Status: DISCONTINUED | OUTPATIENT
Start: 2019-12-13 | End: 2019-12-17 | Stop reason: HOSPADM

## 2019-12-13 RX ORDER — ACETAMINOPHEN 325 MG/1
650 TABLET ORAL EVERY 4 HOURS PRN
Status: DISCONTINUED | OUTPATIENT
Start: 2019-12-16 | End: 2019-12-17 | Stop reason: HOSPADM

## 2019-12-13 RX ORDER — CARBOPROST TROMETHAMINE 250 UG/ML
INJECTION, SOLUTION INTRAMUSCULAR
Status: DISCONTINUED
Start: 2019-12-13 | End: 2019-12-13 | Stop reason: HOSPADM

## 2019-12-13 RX ORDER — EPHEDRINE SULFATE 50 MG/ML
5 INJECTION, SOLUTION INTRAMUSCULAR; INTRAVENOUS; SUBCUTANEOUS
Status: DISCONTINUED | OUTPATIENT
Start: 2019-12-13 | End: 2019-12-13

## 2019-12-13 RX ORDER — KETOROLAC TROMETHAMINE 30 MG/ML
30 INJECTION, SOLUTION INTRAMUSCULAR; INTRAVENOUS ONCE
Status: COMPLETED | OUTPATIENT
Start: 2019-12-13 | End: 2019-12-13

## 2019-12-13 RX ORDER — HYDROCORTISONE 2.5 %
CREAM (GRAM) TOPICAL 3 TIMES DAILY PRN
Status: DISCONTINUED | OUTPATIENT
Start: 2019-12-13 | End: 2019-12-17 | Stop reason: HOSPADM

## 2019-12-13 RX ORDER — PROCHLORPERAZINE 25 MG
25 SUPPOSITORY, RECTAL RECTAL EVERY 12 HOURS PRN
Status: DISCONTINUED | OUTPATIENT
Start: 2019-12-13 | End: 2019-12-17 | Stop reason: HOSPADM

## 2019-12-13 RX ORDER — CITRIC ACID/SODIUM CITRATE 334-500MG
30 SOLUTION, ORAL ORAL
Status: COMPLETED | OUTPATIENT
Start: 2019-12-13 | End: 2019-12-13

## 2019-12-13 RX ORDER — METHYLERGONOVINE MALEATE 0.2 MG/ML
INJECTION INTRAVENOUS
Status: DISCONTINUED
Start: 2019-12-13 | End: 2019-12-13 | Stop reason: HOSPADM

## 2019-12-13 RX ORDER — PROPOFOL 10 MG/ML
INJECTION, EMULSION INTRAVENOUS PRN
Status: DISCONTINUED | OUTPATIENT
Start: 2019-12-13 | End: 2019-12-13

## 2019-12-13 RX ORDER — CEFAZOLIN SODIUM 1 G/3ML
1 INJECTION, POWDER, FOR SOLUTION INTRAMUSCULAR; INTRAVENOUS SEE ADMIN INSTRUCTIONS
Status: DISCONTINUED | OUTPATIENT
Start: 2019-12-13 | End: 2019-12-13 | Stop reason: HOSPADM

## 2019-12-13 RX ORDER — SODIUM CHLORIDE, SODIUM LACTATE, POTASSIUM CHLORIDE, CALCIUM CHLORIDE 600; 310; 30; 20 MG/100ML; MG/100ML; MG/100ML; MG/100ML
INJECTION, SOLUTION INTRAVENOUS CONTINUOUS
Status: DISCONTINUED | OUTPATIENT
Start: 2019-12-13 | End: 2019-12-13 | Stop reason: HOSPADM

## 2019-12-13 RX ORDER — ONDANSETRON 4 MG/1
4 TABLET, ORALLY DISINTEGRATING ORAL EVERY 30 MIN PRN
Status: DISCONTINUED | OUTPATIENT
Start: 2019-12-13 | End: 2019-12-13 | Stop reason: HOSPADM

## 2019-12-13 RX ORDER — OXYCODONE HYDROCHLORIDE 5 MG/1
5-10 TABLET ORAL
Status: DISCONTINUED | OUTPATIENT
Start: 2019-12-13 | End: 2019-12-14

## 2019-12-13 RX ORDER — SODIUM CHLORIDE, SODIUM LACTATE, POTASSIUM CHLORIDE, CALCIUM CHLORIDE 600; 310; 30; 20 MG/100ML; MG/100ML; MG/100ML; MG/100ML
INJECTION, SOLUTION INTRAVENOUS CONTINUOUS
Status: DISCONTINUED | OUTPATIENT
Start: 2019-12-13 | End: 2019-12-13

## 2019-12-13 RX ORDER — OXYTOCIN/0.9 % SODIUM CHLORIDE 30/500 ML
100 PLASTIC BAG, INJECTION (ML) INTRAVENOUS CONTINUOUS
Status: DISCONTINUED | OUTPATIENT
Start: 2019-12-13 | End: 2019-12-17 | Stop reason: HOSPADM

## 2019-12-13 RX ORDER — DIPHENHYDRAMINE HYDROCHLORIDE 50 MG/ML
25 INJECTION INTRAMUSCULAR; INTRAVENOUS EVERY 6 HOURS PRN
Status: DISCONTINUED | OUTPATIENT
Start: 2019-12-13 | End: 2019-12-17 | Stop reason: HOSPADM

## 2019-12-13 RX ORDER — CARBOPROST TROMETHAMINE 250 UG/ML
250 INJECTION, SOLUTION INTRAMUSCULAR
Status: DISCONTINUED | OUTPATIENT
Start: 2019-12-13 | End: 2019-12-17 | Stop reason: HOSPADM

## 2019-12-13 RX ORDER — METOCLOPRAMIDE HYDROCHLORIDE 5 MG/ML
10 INJECTION INTRAMUSCULAR; INTRAVENOUS EVERY 6 HOURS PRN
Status: DISCONTINUED | OUTPATIENT
Start: 2019-12-13 | End: 2019-12-17 | Stop reason: HOSPADM

## 2019-12-13 RX ORDER — AMOXICILLIN 250 MG
1 CAPSULE ORAL 2 TIMES DAILY
Status: DISCONTINUED | OUTPATIENT
Start: 2019-12-13 | End: 2019-12-17 | Stop reason: HOSPADM

## 2019-12-13 RX ORDER — HYDROMORPHONE HYDROCHLORIDE 1 MG/ML
.3-.5 INJECTION, SOLUTION INTRAMUSCULAR; INTRAVENOUS; SUBCUTANEOUS EVERY 30 MIN PRN
Status: DISCONTINUED | OUTPATIENT
Start: 2019-12-13 | End: 2019-12-14

## 2019-12-13 RX ORDER — CEFAZOLIN SODIUM IN 0.9 % NACL 3 G/100 ML
3 INTRAVENOUS SOLUTION, PIGGYBACK (ML) INTRAVENOUS
Status: COMPLETED | OUTPATIENT
Start: 2019-12-13 | End: 2019-12-13

## 2019-12-13 RX ORDER — DIPHENHYDRAMINE HCL 25 MG
25 CAPSULE ORAL EVERY 6 HOURS PRN
Status: DISCONTINUED | OUTPATIENT
Start: 2019-12-13 | End: 2019-12-17 | Stop reason: HOSPADM

## 2019-12-13 RX ORDER — AMOXICILLIN 250 MG
2 CAPSULE ORAL 2 TIMES DAILY
Status: DISCONTINUED | OUTPATIENT
Start: 2019-12-13 | End: 2019-12-17 | Stop reason: HOSPADM

## 2019-12-13 RX ORDER — BUPIVACAINE HYDROCHLORIDE 2.5 MG/ML
INJECTION, SOLUTION EPIDURAL; INFILTRATION; INTRACAUDAL PRN
Status: DISCONTINUED | OUTPATIENT
Start: 2019-12-13 | End: 2019-12-13

## 2019-12-13 RX ORDER — FENTANYL CITRATE 50 UG/ML
25-50 INJECTION, SOLUTION INTRAMUSCULAR; INTRAVENOUS
Status: DISCONTINUED | OUTPATIENT
Start: 2019-12-13 | End: 2019-12-13 | Stop reason: HOSPADM

## 2019-12-13 RX ORDER — OXYTOCIN/0.9 % SODIUM CHLORIDE 30/500 ML
340 PLASTIC BAG, INJECTION (ML) INTRAVENOUS CONTINUOUS PRN
Status: DISCONTINUED | OUTPATIENT
Start: 2019-12-13 | End: 2019-12-17 | Stop reason: HOSPADM

## 2019-12-13 RX ORDER — LIDOCAINE 40 MG/G
CREAM TOPICAL
Status: DISCONTINUED | OUTPATIENT
Start: 2019-12-13 | End: 2019-12-17 | Stop reason: HOSPADM

## 2019-12-13 RX ORDER — MISOPROSTOL 200 UG/1
TABLET ORAL
Status: DISCONTINUED
Start: 2019-12-13 | End: 2019-12-13 | Stop reason: HOSPADM

## 2019-12-13 RX ORDER — NALOXONE HYDROCHLORIDE 0.4 MG/ML
.1-.4 INJECTION, SOLUTION INTRAMUSCULAR; INTRAVENOUS; SUBCUTANEOUS
Status: DISCONTINUED | OUTPATIENT
Start: 2019-12-13 | End: 2019-12-17 | Stop reason: HOSPADM

## 2019-12-13 RX ORDER — SIMETHICONE 80 MG
80 TABLET,CHEWABLE ORAL 4 TIMES DAILY PRN
Status: DISCONTINUED | OUTPATIENT
Start: 2019-12-13 | End: 2019-12-17 | Stop reason: HOSPADM

## 2019-12-13 RX ORDER — LANOLIN 100 %
OINTMENT (GRAM) TOPICAL
Status: DISCONTINUED | OUTPATIENT
Start: 2019-12-13 | End: 2019-12-17 | Stop reason: HOSPADM

## 2019-12-13 RX ORDER — MISOPROSTOL 200 UG/1
800 TABLET ORAL
Status: DISCONTINUED | OUTPATIENT
Start: 2019-12-13 | End: 2019-12-17 | Stop reason: HOSPADM

## 2019-12-13 RX ORDER — OXYTOCIN/0.9 % SODIUM CHLORIDE 30/500 ML
PLASTIC BAG, INJECTION (ML) INTRAVENOUS CONTINUOUS PRN
Status: DISCONTINUED | OUTPATIENT
Start: 2019-12-13 | End: 2019-12-13

## 2019-12-13 RX ADMIN — FENTANYL CITRATE 50 MCG: 50 INJECTION, SOLUTION INTRAMUSCULAR; INTRAVENOUS at 13:53

## 2019-12-13 RX ADMIN — FENTANYL CITRATE 50 MCG: 50 INJECTION, SOLUTION INTRAMUSCULAR; INTRAVENOUS at 12:56

## 2019-12-13 RX ADMIN — Medication 2 MG: at 00:20

## 2019-12-13 RX ADMIN — KETOROLAC TROMETHAMINE 30 MG: 30 INJECTION, SOLUTION INTRAMUSCULAR; INTRAVENOUS at 17:28

## 2019-12-13 RX ADMIN — SODIUM CHLORIDE, POTASSIUM CHLORIDE, SODIUM LACTATE AND CALCIUM CHLORIDE 1000 ML: 600; 310; 30; 20 INJECTION, SOLUTION INTRAVENOUS at 06:45

## 2019-12-13 RX ADMIN — HYDROMORPHONE HYDROCHLORIDE 0.3 MG: 1 INJECTION, SOLUTION INTRAMUSCULAR; INTRAVENOUS; SUBCUTANEOUS at 20:06

## 2019-12-13 RX ADMIN — HYDROMORPHONE HYDROCHLORIDE 0.25 MG: 1 INJECTION, SOLUTION INTRAMUSCULAR; INTRAVENOUS; SUBCUTANEOUS at 18:14

## 2019-12-13 RX ADMIN — Medication 0.1 MG: at 09:54

## 2019-12-13 RX ADMIN — PROPOFOL 300 MG: 10 INJECTION, EMULSION INTRAVENOUS at 11:13

## 2019-12-13 RX ADMIN — FENTANYL CITRATE 50 MCG: 50 INJECTION, SOLUTION INTRAMUSCULAR; INTRAVENOUS at 16:27

## 2019-12-13 RX ADMIN — FENTANYL CITRATE 15 MCG: 50 INJECTION, SOLUTION INTRAMUSCULAR; INTRAVENOUS at 09:54

## 2019-12-13 RX ADMIN — HYDROMORPHONE HYDROCHLORIDE 0.25 MG: 1 INJECTION, SOLUTION INTRAMUSCULAR; INTRAVENOUS; SUBCUTANEOUS at 17:13

## 2019-12-13 RX ADMIN — HYDROMORPHONE HYDROCHLORIDE 0.25 MG: 1 INJECTION, SOLUTION INTRAMUSCULAR; INTRAVENOUS; SUBCUTANEOUS at 17:08

## 2019-12-13 RX ADMIN — OXYCODONE HYDROCHLORIDE 5 MG: 5 TABLET ORAL at 18:37

## 2019-12-13 RX ADMIN — HYDROMORPHONE HYDROCHLORIDE 0.25 MG: 1 INJECTION, SOLUTION INTRAMUSCULAR; INTRAVENOUS; SUBCUTANEOUS at 17:56

## 2019-12-13 RX ADMIN — SODIUM CITRATE AND CITRIC ACID MONOHYDRATE 30 ML: 500; 334 SOLUTION ORAL at 07:23

## 2019-12-13 RX ADMIN — ONDANSETRON 4 MG: 2 INJECTION INTRAMUSCULAR; INTRAVENOUS at 09:48

## 2019-12-13 RX ADMIN — FENTANYL CITRATE 35 MCG: 50 INJECTION, SOLUTION INTRAMUSCULAR; INTRAVENOUS at 12:12

## 2019-12-13 RX ADMIN — HYDROMORPHONE HYDROCHLORIDE 0.5 MG: 1 INJECTION, SOLUTION INTRAMUSCULAR; INTRAVENOUS; SUBCUTANEOUS at 23:13

## 2019-12-13 RX ADMIN — FENTANYL CITRATE 100 MCG: 50 INJECTION, SOLUTION INTRAMUSCULAR; INTRAVENOUS at 11:14

## 2019-12-13 RX ADMIN — SODIUM CHLORIDE, POTASSIUM CHLORIDE, SODIUM LACTATE AND CALCIUM CHLORIDE: 600; 310; 30; 20 INJECTION, SOLUTION INTRAVENOUS at 09:30

## 2019-12-13 RX ADMIN — OXYTOCIN-SODIUM CHLORIDE 0.9% IV SOLN 30 UNIT/500ML 300 ML/HR: 30-0.9/5 SOLUTION at 11:07

## 2019-12-13 RX ADMIN — OXYCODONE HYDROCHLORIDE 10 MG: 5 TABLET ORAL at 23:13

## 2019-12-13 RX ADMIN — FENTANYL CITRATE 50 MCG: 50 INJECTION, SOLUTION INTRAMUSCULAR; INTRAVENOUS at 16:08

## 2019-12-13 RX ADMIN — SODIUM CHLORIDE, POTASSIUM CHLORIDE, SODIUM LACTATE AND CALCIUM CHLORIDE: 600; 310; 30; 20 INJECTION, SOLUTION INTRAVENOUS at 09:19

## 2019-12-13 RX ADMIN — HYDROMORPHONE HYDROCHLORIDE 0.25 MG: 1 INJECTION, SOLUTION INTRAMUSCULAR; INTRAVENOUS; SUBCUTANEOUS at 18:20

## 2019-12-13 RX ADMIN — Medication 1 G: at 13:45

## 2019-12-13 RX ADMIN — Medication 1 G: at 11:45

## 2019-12-13 RX ADMIN — PHENYLEPHRINE HYDROCHLORIDE 100 MCG: 10 INJECTION INTRAVENOUS at 09:56

## 2019-12-13 RX ADMIN — FENTANYL CITRATE 50 MCG: 50 INJECTION, SOLUTION INTRAMUSCULAR; INTRAVENOUS at 14:48

## 2019-12-13 RX ADMIN — ONDANSETRON 4 MG: 2 INJECTION INTRAMUSCULAR; INTRAVENOUS at 11:01

## 2019-12-13 RX ADMIN — FENTANYL CITRATE 50 MCG: 50 INJECTION, SOLUTION INTRAMUSCULAR; INTRAVENOUS at 16:20

## 2019-12-13 RX ADMIN — BUPIVACAINE HYDROCHLORIDE 20 ML: 2.5 INJECTION, SOLUTION EPIDURAL; INFILTRATION; INTRACAUDAL at 16:05

## 2019-12-13 RX ADMIN — HYDROMORPHONE HYDROCHLORIDE 0.25 MG: 1 INJECTION, SOLUTION INTRAMUSCULAR; INTRAVENOUS; SUBCUTANEOUS at 16:48

## 2019-12-13 RX ADMIN — FENTANYL CITRATE 50 MCG: 50 INJECTION, SOLUTION INTRAMUSCULAR; INTRAVENOUS at 12:20

## 2019-12-13 RX ADMIN — FENTANYL CITRATE 50 MCG: 50 INJECTION, SOLUTION INTRAMUSCULAR; INTRAVENOUS at 16:12

## 2019-12-13 RX ADMIN — HYDROMORPHONE HYDROCHLORIDE 0.25 MG: 1 INJECTION, SOLUTION INTRAMUSCULAR; INTRAVENOUS; SUBCUTANEOUS at 16:37

## 2019-12-13 RX ADMIN — Medication 150 MG: at 11:13

## 2019-12-13 RX ADMIN — Medication 3 G: at 09:45

## 2019-12-13 RX ADMIN — ACETAMINOPHEN 975 MG: 325 TABLET, FILM COATED ORAL at 18:14

## 2019-12-13 RX ADMIN — HYDROMORPHONE HYDROCHLORIDE 0.25 MG: 1 INJECTION, SOLUTION INTRAMUSCULAR; INTRAVENOUS; SUBCUTANEOUS at 17:50

## 2019-12-13 RX ADMIN — Medication 50 MCG/MIN: at 09:55

## 2019-12-13 RX ADMIN — FENTANYL CITRATE 100 MCG: 50 INJECTION, SOLUTION INTRAMUSCULAR; INTRAVENOUS at 13:59

## 2019-12-13 RX ADMIN — HYDROMORPHONE HYDROCHLORIDE 0.4 MG: 1 INJECTION, SOLUTION INTRAMUSCULAR; INTRAVENOUS; SUBCUTANEOUS at 21:40

## 2019-12-13 RX ADMIN — BUPIVACAINE 20 ML: 13.3 INJECTION, SUSPENSION, LIPOSOMAL INFILTRATION at 16:05

## 2019-12-13 RX ADMIN — BUPIVACAINE HYDROCHLORIDE IN DEXTROSE 1.6 ML: 7.5 INJECTION, SOLUTION SUBARACHNOID at 09:54

## 2019-12-13 RX ADMIN — FENTANYL CITRATE 50 MCG: 50 INJECTION, SOLUTION INTRAMUSCULAR; INTRAVENOUS at 14:56

## 2019-12-13 RX ADMIN — OXYCODONE HYDROCHLORIDE 5 MG: 5 TABLET ORAL at 20:06

## 2019-12-13 NOTE — PLAN OF CARE
VSS. Patient excited and nervous for her C/S this AM. Denies contractions/cramping, leaking of fluid, or vaginal bleeding. EFM per flowsheet. Report given to Pre-op nurse Bhavya OVERTON RN [id number]. Patient left PSCU for Pre-op at 0630.

## 2019-12-13 NOTE — ANESTHESIA PROCEDURE NOTES
Spinal/LP Procedure Note    Spinal Block  Staff:     Anesthesiologist:  Noemi Choudhary MD    Resident/CRNA:  Gordy Goldberg III, MD    Spinal/LP performed by resident/CRNA in presence of a teaching physician.    Location: In OR BEFORE Induction  Procedure Start/Stop Times:      12/13/2019 9:40 AM     12/13/2019 9:54 AM    patient identified, IV checked, site marked, risks and benefits discussed, informed consent, monitors and equipment checked, pre-op evaluation, at physician/surgeon's request and post-op pain management      Correct Patient: Yes      Correct Position: Yes      Correct Site: Yes      Correct Procedure: Yes      Correct Laterality:  Yes    Site Marked:  Yes  Procedure:     Procedure:  Intrathecal    ASA:  3    Diagnosis:  C section     Position:  Sitting    Sterile Prep: chloraprep, mask, sterile gloves and patient draped      Insertion site:  L4-5    Approach:  Midline    Needle Type:  Giulia    Needle gauge (G):  22    Local Skin Infiltration:  1% lidocaine    Needle Length (in):  4    Introducer used: No      Attempts:  3    Redirects:  0    CSF:  Clear    Paresthesias:  No  Assessment/Narrative:      Spinal performed by Dr. Choudhary.  First attempt by Yusuf unsuccessfull, first attempt by Kenrick unsuccessful.  Ultimately used 22 G [place] without introducter fully hubbed into skin

## 2019-12-13 NOTE — BRIEF OP NOTE
[facility], [place]    Brief Operative Note    Pre-operative diagnosis:   IUP at 35w0d   Vasa previa  Placenta previa  Obesity  History of 3 previous  sections  History of umbilical hernia s/p repair with 4cm x 4 cm mesh  History of Pfannenstiel incision dehiscence     Post-operative diagnosis  Same  Morbidly adherent placenta  Viable male infant    Procedure: Procedure(s):  Repeat  section, hysterectomy, Exploritory Laporotomy,Bilateral Salpigectomy, Cystoscopy  Surgeon: Surgeon(s) and Role:     * Joan Carmona MD - Primary     * Alex Ruiz MD - Assisting     * Nevaeh Whitman MD - Resident - Assisting     * Aurelio Colmenares MD - Resident - Assisting     * Kari Bishop MD - Fellow - Observing     * Ally Silvestre MD  Anesthesia: Choice   Estimated blood loss: 1000 ml  UOP: 375 ml  IVF: 3000 ml chrystalloid  Drains: None  Specimens:   ID Type Source Tests Collected by Time Destination   A : Uterus, Cervex and PLACENTA Organ Uterus and Cervix SURGICAL PATHOLOGY EXAM Joan Carmona MD 2019  1:05 PM    B :  Tissue Fallopian Tube, Right SURGICAL PATHOLOGY EXAM Joan Carmona MD 2019  1:09 PM    C :  Tissue Fallopian Tube, Left SURGICAL PATHOLOGY EXAM Joan Carmona MD 2019  1:09 PM      Findings:     1. Minimal rectofascial adhesions. Omental adhesions to anterior abdominal wall. Minimal filmy intraabdominal adhesions.  2. Viable male infant, Apgars 5, 9, and 9  3. Evidence of morbidly adherent placenta with markedly increased vascularity visible at bulging lower uterine segment, no evidence of bladder involvement.  4. Upon closure, defect in fascia approximately 2.5 cm in diameter noted to left of umbilicus. Not contiguous with incision. Discussed with Dr. Doyle who recommended completing closure as planned as defect likely has been present for awhile.   5. Cystoscopy revealed brisk efflux from bilateral  ureteral ostia and no evidence of injury to dome.    Complications: None.  Implants: * No implants in log *    Ally Silvestre MD  Ob/Gyn PGY-3  12/13/19 3:47 PM

## 2019-12-13 NOTE — PROGRESS NOTES
Patient seen in preop this morning with Dr. Carmona. Reviewed operative plan of VML incision and classical hysterotomy and plan for wound vac placement. Reviewed surgical risks of bleeding (confirmed consent to blood transfusion,) infection, injury to surrounding structures, wound dehiscence, VTE. Answered patient questions.    Ally Silvestre MD  Ob/Gyn PGY-3  12/13/19 8:34 AM

## 2019-12-13 NOTE — ANESTHESIA PROCEDURE NOTES
Peripheral Nerve Block Procedure Note    Staff:     Anesthesiologist:  Luther Lewis MD    Resident/CRNA:  Gordy Goldberg III, MD    Block performed by resident/CRNA in the presence of a teaching physician    Location: OR AFTER induction  Procedure Start/Stop TImes:     patient identified, IV checked, site marked, risks and benefits discussed, informed consent, monitors and equipment checked, pre-op evaluation, at physician/surgeon's request and post-op pain management      Correct Patient: Yes      Correct Position: Yes      Correct Site: Yes      Correct Procedure: Yes      Correct Laterality:  Yes    Site Marked:  Yes  Procedure details:     Procedure:  TAP    ASA:  3    Laterality:  Bilateral    Position:  Supine    Sterile Prep: chloraprep, mask and sterile gloves      Needle:  Insulated    Needle gauge:  22    Needle length (inches):  4    Ultrasound: Yes      Ultrasound used to identify targeted nerve, plexus, or vascular structure and placed a needle adjacent to it      A permanent image is NOT entered into the patient's record.      Abnormal pain on injection: No      Blood Aspirated: No      Paresthesias:  No    Bleeding at site: No      Test dose negative for signs of intravascular injection: Yes      Bolus via:  Needle    Infusion Method:  Single Shot    Blood aspirated via catheter: No      Complications:  None  Assessment/Narrative:      Bilateral TAP for  done in OR after induction

## 2019-12-13 NOTE — PLAN OF CARE
Patient is stable with her previa and vasa previa. Scheduled for  delivery tomorrow morning. VSS; EFM as charted. Denies contractions, leaking fluid or bleeding. Has back pain that she identifies with position in bed. States she is ready for delivery in the morning. Supplies gathered in crib in supply room in antepartum.

## 2019-12-13 NOTE — ANESTHESIA CARE TRANSFER NOTE
"Patient: Mariela Hawkins    Procedure(s):  Repeat  section, hysterectomy, Exploritory Laporotomy,Bilateral Salpigectomy, Cystoscopy    Diagnosis: History of  section [Z98.891]  Vasa previa [O69.4XX0]  Diagnosis Additional Information: No value filed.    Anesthesia Type:   For Post-op pain in coordination with surgeon, Spinal, General     Note:  Airway :Face Mask  Patient transferred to:PACU  Comments: VSS. Breathing spontaneously at a regular rate with adequate tidal volumes and maintaining O2 sats on 6L via facemask. No immediate post-op nausea.  Having significant pain and started on fentanyl during handoff. No apparent complications from anesthesia.     Gordy \"Tahir\" Yusuf TRIPATHI MD  CA-1 Handoff Report: Identifed the Patient, Identified the Reponsible Provider, Reviewed the pertinent medical history, Discussed the surgical course, Reviewed Intra-OP anesthesia mangement and issues during anesthesia, Set expectations for post-procedure period and Allowed opportunity for questions and acknowledgement of understanding      Vitals: (Last set prior to Anesthesia Care Transfer)    CRNA VITALS  2019 1529 - 2019 1615      2019             Pulse:  135    Ht Rate:  135    SpO2:  97 %    Resp Rate (observed):  (!) 2                Electronically Signed By: Gordy Goldberg III, MD  2019  4:15 PM  "

## 2019-12-13 NOTE — H&P
[facility]  Full Operative Progress Note     Surgery Date:  2019     Surgeon:  MD Alex Wright MD    Assistants:  Nevaeh Whitman MD [facility] Fellow    Kari Bishop MD Fellow    Ally Silvestre MD, PGY-3    Pre-op Diagnosis:  1. Intrauterine pregnancy at 35w0d     2. Vasa previa     3. Placenta previa     4. Obesity     5. History of 3 previous  sections     6. History of umbilical hernia s/p repair with 4x4 cm mesh     7. History of Pfannenstiel incision dehiscence     8. Depression/anxiety    Post-op Diagnosis:  1. Same      2. Liveborn male infant      3. Morbidly adherent placent    Procedure:   hysterectomy, bilateral salpingectomy, cystoscopy    Anesthesia: Spinal converted to GETA    EBL:  1000 cc    IVF:  3000 mL crystalloid    UOP:  375 mL clear urine at the end of the case    Drains: Barahona Catheter     Specimens:  Uterus, cervix, and placenta, bilateral fallopian tubes    Complications: None apparent    Indications:   Mariela Hawkins is a 29 year old  at 35w0d admitted at 32w0d for vasa previa. She had a placenta previa as well. She had a 60% baseline risk of morbidly adherent placenta due to her history of 3 previous  sections and anterior placenta previa, but ultrasound findings did not confer increased suspicion of morbidly adherent placenta. She did not have an MRI as she is claustrophobic. Delivery was planned at 35 weeks. Due to concern for postpartum hemorrhage and morbidly adherent placenta, the decision was made to schedule  section in the main operating room. Operative plan included vertical midline incision with classical hysterotomy to avoid placenta. She was consented for a classical  section via vertical midline incision with possible hysterectomy, bilateral salpingectomy, and cystoscopy. The risks, benefits, and alternatives were discussed with the patient, and she agreed to proceed. Risks  discussed included bleeding, infection, injury to surrounding structures, wound complications, venous thromboembolism. She consented to a blood transfusion. She was typed and crossed for 4U prior to the case, and 2U were available in the room.     Findings:   1. Minimal rectofascial adhesions. Omental adhesions to anterior abdominal wall. Minimal filmy intraabdominal adhesions.  2. Viable male infant, Apgars 5, 9, and 9  3. Evidence of morbidly adherent placenta with markedly increased vascularity visible at bulging lower uterine segment, no evidence of bladder involvement.  4. Upon closure, defect in fascia approximately 2.5 cm in diameter noted to left of umbilicus. Not contiguous with incision. Discussed with Dr. Doyle who recommended completing closure as planned as defect likely has been present for awhile.   5. Cystoscopy revealed brisk efflux from bilateral ureteral ostia and no evidence of injury to dome.    Procedure Details:   The patient was brought to the OR, where adequate spinal anesthesia was administered.  She was placed in the lithotomy position with a slight leftward tilt. She was prepped and draped in the usual sterile fashion. A villa catheter was placed. A surgical time out was performed. A vertical mideline skin incision was made with the scalpel, and carried down to the underlying fascia with sharp and blunt dissection. The fascia was incised, and the fascial incision was extended with cautery. The peritoneum was grasped with Traci clamps and entered sharply with Metzenbaum scissors. The final layer of peritoneum was entered bluntly. The fascial and peritoneum incision was extended in layers with cautery, with care to avoid underlying structures. The uterus was noted to have increased vascularity with a bulging lower uterine segment. At this point, the decision was made to proceed with hysterectomy following delivery. Intraoperative ultrasound confirmed cephalic presentation and placental  location. Once the fascial incision was noted to be adequate, a classical hysterotomy was made to avoid the placenta. The fetus was noted to be in the breech position. The right foot was grasped and delivered. The left foot was then grasped and delivered. The infant was delivered to the level of the scapulae, and the two upper extremities were delivered with rotation. The fetal head was then flexed and delivered. The cord was doubly clamped and cut, and the infant was passed off to the awaiting NICU staff. The cord was tied off with 0 Vicryl. The uterus was exteriorized, and the hysterotomy was closed with a running locked suture of 0 Vicryl.     At this point, the Gynecology Oncology completed the hysterectomy portion of the procedure.    After they hysterectomy, cystoscopy was performed, and the above findings were noted. A 2.5 x 2.5 cm defect in the fascia was noted to the left of the previous mesh site. An intraoperative General Surgery phone consult was obtained, and they recommended closing the fascial incision as planned as the fascial defect is likely chronic and has not been problematic.     The fascia was closed with two running sutures of 0 looped PDS. The subcutaneous tissue was irrigated with warm saline. Areas of oozing were controlled with cautery. The subcutaneous tissue was closed in two layers using 3-0 Monocryl. The skin was closed with staples, and a wound vac with two foam pieces was placed over the incision.      All sponge, needle, and instrument counts were correct. The patient tolerated the procedure well, and was transferred to recovery in stable condition. Dr. Carmona was present and scrubbed for the entirety of our procedure.     Ally Silvestre MD  OBGYN PGY-3  4:15 PM 12/13/2019

## 2019-12-13 NOTE — OP NOTE
GYNECOLOGIC ONCOLOGY OPERATION NOTE    PATIENT: Mariela Hawkins  MRN: [id number]  DATE OF SURGERY: 19    PREOPERATIVE DIAGNOSES:   Placenta accreta     POSTOPERATIVE DIAGNOSES:   Same as above     OPERATIVE PROCEDURES:    hysterectomy     SURGEON: Alex Ruiz MD     ASSISTANTS:   Kari Bishop, 2nd year fellow      ANESTHESIA: General endotracheal.     ESTIMATED BLOOD LOSS: 250 mL for this portion of the procedure     TOTAL INTRAVENOUS FLUIDS: See anesthesia records     TOTAL URINE OUTPUT: See anesthesia records     TRANSFUSIONS: None    DRAIN: villa cathether     SPECIMENS REMOVED:  uterus, cervix, bilateral tubes a    COMPLICATIONS: None noted.     CONDITION: Extubated, stable on transfer.     INDICATIONS FOR PROCEDURE: This is an intraoperative consultation on Mariela Hawkins who was seen at the request of Dr. Carmona for placenta accreta.        OPERATIVE PROCEDURE IN DETAIL:   At the time of the presentation, the patient had an approximately 500 mL blood loss.  The baby had been delivered, and the placenta had been clamped and tied. Hysterotomy incision  was closed.  This portion will be dictated by Dr. Carmona's team.     At our arrival, the hysterotomy site was largely hemostatic.   The Bookwalter retractor was positioned and small bowel was packed up into the upper abdomen. The specimen was delivered into the surgical field.  Both round ligaments were identified, grasped with a Kocher, transected and isolated with cautery. The incisions through the round ligament were continued both cephalad and caudad to permit right retroperitoneal exploration to identify the ureter; the  ureters were identified deep in the pelvis on both sides. Both ovaries were  from the uterus and transposed into the upper abdomen. The fallopian tubes at this point  were left intact with the ovaries and removed at a later point in order to expedite delivery of the specimen.      The uterine arteries  were then identified.  They were transected, suture ligated, and the dissection was carried inferiorly until we were confident that the bulk of the blood supply was taken.  At this point, the dissection of the bladder flap was undertaken,  no sukhdev placenta coming through that we could appreciate.  The bladder was able to be developed inferiorly, and we continued the uterine artery dissection until  the anterior and posterior vagina could be approximated, and at this point the specimen was amputated.  The anterior and posterior surfaces of the vaginal cuff were then also grasped with double tooth tenacula.The vagina was then irrigated with betadine. The vaginal cuff was closed with 0-Vicryl in an imbricating fashion in one direction and oversewn along the cuff with a second running layer. There was excellent reapproximation. At this point, the decision was made to remove the fallopian tubes, which were elevated in the surgical field.  Using Ligasure device , the fallopian tubes were excised from the ovaries with care to avoid the IP ligament and ovarian blood supply.  We then irrigated copiously.  There was good hemostasis, and the case was turned back over to 's team for closure.       Alex Ruiz MD, MS    Department of Obstetrics and Gynecology   Division of Gynecologic Oncology   [facility]  Phone: [phone]

## 2019-12-14 LAB
CREAT SERPL-MCNC: 0.38 MG/DL (ref 0.52–1.04)
ERYTHROCYTE [DISTWIDTH] IN BLOOD BY AUTOMATED COUNT: 14.5 % (ref 10–15)
ERYTHROCYTE [DISTWIDTH] IN BLOOD BY AUTOMATED COUNT: 14.6 % (ref 10–15)
GFR SERPL CREATININE-BSD FRML MDRD: >90 ML/MIN/{1.73_M2}
HCT VFR BLD AUTO: 31 % (ref 35–47)
HCT VFR BLD AUTO: 31.2 % (ref 35–47)
HGB BLD-MCNC: 9.7 G/DL (ref 11.7–15.7)
HGB BLD-MCNC: 9.9 G/DL (ref 11.7–15.7)
MCH RBC QN AUTO: 28.9 PG (ref 26.5–33)
MCH RBC QN AUTO: 29 PG (ref 26.5–33)
MCHC RBC AUTO-ENTMCNC: 31.3 G/DL (ref 31.5–36.5)
MCHC RBC AUTO-ENTMCNC: 31.7 G/DL (ref 31.5–36.5)
MCV RBC AUTO: 91 FL (ref 78–100)
MCV RBC AUTO: 93 FL (ref 78–100)
PLATELET # BLD AUTO: 166 10E9/L (ref 150–450)
PLATELET # BLD AUTO: 168 10E9/L (ref 150–450)
RBC # BLD AUTO: 3.35 10E12/L (ref 3.8–5.2)
RBC # BLD AUTO: 3.42 10E12/L (ref 3.8–5.2)
WBC # BLD AUTO: 12.1 10E9/L (ref 4–11)
WBC # BLD AUTO: 12.2 10E9/L (ref 4–11)

## 2019-12-14 PROCEDURE — 25000128 H RX IP 250 OP 636: Performed by: STUDENT IN AN ORGANIZED HEALTH CARE EDUCATION/TRAINING PROGRAM

## 2019-12-14 PROCEDURE — 85027 COMPLETE CBC AUTOMATED: CPT | Performed by: STUDENT IN AN ORGANIZED HEALTH CARE EDUCATION/TRAINING PROGRAM

## 2019-12-14 PROCEDURE — 12000001 ZZH R&B MED SURG/OB UMMC

## 2019-12-14 PROCEDURE — 36415 COLL VENOUS BLD VENIPUNCTURE: CPT | Performed by: STUDENT IN AN ORGANIZED HEALTH CARE EDUCATION/TRAINING PROGRAM

## 2019-12-14 PROCEDURE — 25000132 ZZH RX MED GY IP 250 OP 250 PS 637: Performed by: STUDENT IN AN ORGANIZED HEALTH CARE EDUCATION/TRAINING PROGRAM

## 2019-12-14 PROCEDURE — 25000132 ZZH RX MED GY IP 250 OP 250 PS 637: Performed by: OBSTETRICS & GYNECOLOGY

## 2019-12-14 RX ORDER — KETOROLAC TROMETHAMINE 30 MG/ML
30 INJECTION, SOLUTION INTRAMUSCULAR; INTRAVENOUS EVERY 6 HOURS PRN
Status: DISCONTINUED | OUTPATIENT
Start: 2019-12-14 | End: 2019-12-16

## 2019-12-14 RX ORDER — OXYCODONE HYDROCHLORIDE 5 MG/1
15 TABLET ORAL
Status: COMPLETED | OUTPATIENT
Start: 2019-12-14 | End: 2019-12-14

## 2019-12-14 RX ORDER — OXYCODONE HYDROCHLORIDE 5 MG/1
5-15 TABLET ORAL
Status: DISCONTINUED | OUTPATIENT
Start: 2019-12-14 | End: 2019-12-14

## 2019-12-14 RX ORDER — PREGABALIN 75 MG/1
75 CAPSULE ORAL 2 TIMES DAILY
Status: DISCONTINUED | OUTPATIENT
Start: 2019-12-14 | End: 2019-12-17 | Stop reason: HOSPADM

## 2019-12-14 RX ORDER — HYDROMORPHONE HYDROCHLORIDE 1 MG/ML
.3-.5 INJECTION, SOLUTION INTRAMUSCULAR; INTRAVENOUS; SUBCUTANEOUS
Status: DISCONTINUED | OUTPATIENT
Start: 2019-12-14 | End: 2019-12-17

## 2019-12-14 RX ORDER — OXYCODONE HYDROCHLORIDE 5 MG/1
10-15 TABLET ORAL
Status: DISCONTINUED | OUTPATIENT
Start: 2019-12-14 | End: 2019-12-17

## 2019-12-14 RX ORDER — LIDOCAINE 4 G/G
1 PATCH TOPICAL
Status: DISCONTINUED | OUTPATIENT
Start: 2019-12-14 | End: 2019-12-17 | Stop reason: HOSPADM

## 2019-12-14 RX ADMIN — HYDROMORPHONE HYDROCHLORIDE 0.5 MG: 1 INJECTION, SOLUTION INTRAMUSCULAR; INTRAVENOUS; SUBCUTANEOUS at 04:44

## 2019-12-14 RX ADMIN — OXYCODONE HYDROCHLORIDE 15 MG: 5 TABLET ORAL at 18:54

## 2019-12-14 RX ADMIN — SERTRALINE HYDROCHLORIDE 50 MG: 50 TABLET ORAL at 07:43

## 2019-12-14 RX ADMIN — SIMETHICONE CHEW TAB 80 MG 80 MG: 80 TABLET ORAL at 18:54

## 2019-12-14 RX ADMIN — SIMETHICONE CHEW TAB 80 MG 80 MG: 80 TABLET ORAL at 11:56

## 2019-12-14 RX ADMIN — KETOROLAC TROMETHAMINE 30 MG: 30 INJECTION, SOLUTION INTRAMUSCULAR at 13:17

## 2019-12-14 RX ADMIN — ACETAMINOPHEN 975 MG: 325 TABLET, FILM COATED ORAL at 02:14

## 2019-12-14 RX ADMIN — HYDROMORPHONE HYDROCHLORIDE 0.5 MG: 1 INJECTION, SOLUTION INTRAMUSCULAR; INTRAVENOUS; SUBCUTANEOUS at 00:35

## 2019-12-14 RX ADMIN — ACETAMINOPHEN 975 MG: 325 TABLET, FILM COATED ORAL at 18:54

## 2019-12-14 RX ADMIN — HYDROMORPHONE HYDROCHLORIDE 0.5 MG: 1 INJECTION, SOLUTION INTRAMUSCULAR; INTRAVENOUS; SUBCUTANEOUS at 17:37

## 2019-12-14 RX ADMIN — HYDROMORPHONE HYDROCHLORIDE 0.5 MG: 1 INJECTION, SOLUTION INTRAMUSCULAR; INTRAVENOUS; SUBCUTANEOUS at 15:29

## 2019-12-14 RX ADMIN — PREGABALIN 75 MG: 75 CAPSULE ORAL at 20:04

## 2019-12-14 RX ADMIN — PREGABALIN 75 MG: 75 CAPSULE ORAL at 13:17

## 2019-12-14 RX ADMIN — LIDOCAINE 1 PATCH: 560 PATCH PERCUTANEOUS; TOPICAL; TRANSDERMAL at 20:06

## 2019-12-14 RX ADMIN — HYDROMORPHONE HYDROCHLORIDE 0.5 MG: 1 INJECTION, SOLUTION INTRAMUSCULAR; INTRAVENOUS; SUBCUTANEOUS at 10:59

## 2019-12-14 RX ADMIN — HYDROMORPHONE HYDROCHLORIDE 0.5 MG: 1 INJECTION, SOLUTION INTRAMUSCULAR; INTRAVENOUS; SUBCUTANEOUS at 06:46

## 2019-12-14 RX ADMIN — ACETAMINOPHEN 975 MG: 325 TABLET, FILM COATED ORAL at 09:53

## 2019-12-14 RX ADMIN — ENOXAPARIN SODIUM 40 MG: 40 INJECTION SUBCUTANEOUS at 14:35

## 2019-12-14 RX ADMIN — SENNOSIDES AND DOCUSATE SODIUM 2 TABLET: 8.6; 5 TABLET ORAL at 07:43

## 2019-12-14 RX ADMIN — KETOROLAC TROMETHAMINE 30 MG: 30 INJECTION, SOLUTION INTRAMUSCULAR at 07:43

## 2019-12-14 RX ADMIN — KETOROLAC TROMETHAMINE 30 MG: 30 INJECTION, SOLUTION INTRAMUSCULAR at 01:30

## 2019-12-14 RX ADMIN — HYDROMORPHONE HYDROCHLORIDE 0.5 MG: 1 INJECTION, SOLUTION INTRAMUSCULAR; INTRAVENOUS; SUBCUTANEOUS at 13:23

## 2019-12-14 RX ADMIN — SENNOSIDES AND DOCUSATE SODIUM 2 TABLET: 8.6; 5 TABLET ORAL at 20:04

## 2019-12-14 RX ADMIN — OXYCODONE HYDROCHLORIDE 15 MG: 5 TABLET ORAL at 04:46

## 2019-12-14 RX ADMIN — OXYCODONE HYDROCHLORIDE 15 MG: 5 TABLET ORAL at 11:41

## 2019-12-14 RX ADMIN — OXYCODONE HYDROCHLORIDE 15 MG: 5 TABLET ORAL at 01:41

## 2019-12-14 RX ADMIN — OXYCODONE HYDROCHLORIDE 15 MG: 5 TABLET ORAL at 08:02

## 2019-12-14 RX ADMIN — OXYCODONE HYDROCHLORIDE 15 MG: 5 TABLET ORAL at 14:35

## 2019-12-14 RX ADMIN — HYDROMORPHONE HYDROCHLORIDE 0.5 MG: 1 INJECTION, SOLUTION INTRAMUSCULAR; INTRAVENOUS; SUBCUTANEOUS at 22:09

## 2019-12-14 RX ADMIN — SIMETHICONE CHEW TAB 80 MG 80 MG: 80 TABLET ORAL at 01:41

## 2019-12-14 RX ADMIN — HYDROMORPHONE HYDROCHLORIDE 0.5 MG: 1 INJECTION, SOLUTION INTRAMUSCULAR; INTRAVENOUS; SUBCUTANEOUS at 09:05

## 2019-12-14 RX ADMIN — ONDANSETRON 4 MG: 2 INJECTION INTRAMUSCULAR; INTRAVENOUS at 22:09

## 2019-12-14 RX ADMIN — HYDROMORPHONE HYDROCHLORIDE 0.5 MG: 1 INJECTION, SOLUTION INTRAMUSCULAR; INTRAVENOUS; SUBCUTANEOUS at 00:00

## 2019-12-14 RX ADMIN — KETOROLAC TROMETHAMINE 30 MG: 30 INJECTION, SOLUTION INTRAMUSCULAR at 18:54

## 2019-12-14 NOTE — OR NURSING
PACU to Inpatient Nursing Handoff    Patient Mariela Hawkins is a 29 year old female who speaks English.   Procedure Procedure(s):  Repeat  section, hysterectomy, Exploritory Laporotomy,Bilateral Salpigectomy, Cystoscopy   Surgeon(s) Primary: Joan Carmona MD  Assisting: Alex Ruiz MD  Resident - Assisting: Nevaeh Whitman MD; Aurelio Colmenares MD  Fellow - Observing: Kari Bishop MD     Allergies   Allergen Reactions     Amoxicillin Rash       Isolation  [unfilled]     Past Medical History   has a past medical history of Acanthosis nigricans, BV (bacterial vaginosis), Migraines, Ovarian cystic mass, Pain in limb, and Pelvic pain in female.    Anesthesia Choice   Dermatome Level Dermatomes Left: L4  Dermatomes Right: L4   Preop Meds Not applicable   Nerve block Transversus abdominus plane (TAP).  Location:bilateral. Med:bupivacaine. Time given: 1145   Intraop Meds fentanyl (Sublimaze): 400 mcg total  ondansetron (Zofran): last given at 1400  pitocin given after c section and before hysterectomy   Local Meds No   Antibiotics cefazolin (Ancef) - last given at 1345     Pain Patient Currently in Pain: yes  Comfort: tolerable with discomfort  Pain Control: inadequate pain control   PACU meds  acetaminophen (Tylenol): 975 mg (total dose) last given at 1822   fentanyl (Sublimaze): 200 mcg (total dose) last given at 1625   hydromorphone (Dilaudid): 2 mg (total dose) last given at 1822   ketorolac (Toradol): 30 mg last given at 1728  oxycodone (Roxicodone): 5 mg (total dose) last given at 1835    PCA / epidural No   Capnography     Telemetry ECG Rhythm: (P) Sinus tachycardia   Inpatient Telemetry Monitor Ordered? No        Labs Glucose No results found for: GLC    Hgb Lab Results   Component Value Date    HGB 11.0 2019       INR No results found for: INR   PACU Imaging Not applicable     Wound/Incision Negative Pressure Wound Therapy Abdomen Lower;Midline;Medial (Active)   Wound Type  Surgical 12/13/2019  6:00 PM   Dressing Type Black foam 12/13/2019  6:00 PM   Cycle Continuous;On 12/13/2019  6:00 PM   Target Pressure (mmHg) 125 12/13/2019  6:00 PM   Dressing Status Clean, dry, intact 12/13/2019  6:00 PM   Number of days: 0       Incision/Surgical Site 12/13/19 Midline Abdomen (Active)   Incision Assessment UTV 12/13/2019  4:06 PM   Closure Staples 12/13/2019  3:13 PM   Dressing Intervention Clean, dry, intact 12/13/2019  4:06 PM   Number of days: 0       Incision/Surgical Site 12/13/19 Bilateral Abdomen (Active)   Number of days: 0      CMS        Equipment ice pack   Other LDA       IV Access Peripheral IV 12/13/19 Right Lower forearm (Active)   Site Assessment WDL 12/13/2019  6:00 PM   Line Status Infusing;Checked every 1 hour 12/13/2019  6:00 PM   Phlebitis Scale 0-->no symptoms 12/13/2019  6:00 PM   Infiltration Scale 0 12/13/2019  6:00 PM   Extravasation? No 12/13/2019  6:00 PM   Number of days: 0      Blood Products Not applicable EBL 1000 mL   Intake/Output Date 12/13/19 0700 - 12/14/19 0659   Shift 9801-1181 0312-3896 1650-3742 24 Hour Total   INTAKE   P.O.  720  720   I.V. 3000   3000   Shift Total(mL/kg) 3000(20.23) 720(4.86)  3720(25.09)   OUTPUT   Urine 250 125  375   Blood 800   800   Shift Total(mL/kg) 1050(7.08) 125(0.84)  1175(7.92)   Weight (kg) 148.28 148.28 148.28 148.28      Drains / Barahona Negative Pressure Wound Therapy Abdomen Lower;Midline;Medial (Active)   Wound Type Surgical 12/13/2019  6:00 PM   Dressing Type Black foam 12/13/2019  6:00 PM   Cycle Continuous;On 12/13/2019  6:00 PM   Target Pressure (mmHg) 125 12/13/2019  6:00 PM   Dressing Status Clean, dry, intact 12/13/2019  6:00 PM   Number of days: 0       Urethral Catheter Non-latex 16 fr (Active)   Collection Container Standard 12/13/2019  4:00 PM   Securement Method Securing device (Describe) 12/13/2019  4:00 PM   Rationale for Continued Use Anesthesia 12/13/2019  4:00 PM   Number of days: 0      Time of void  PreOp Void Prior to Procedure: 0600 (12/13/19 0637)    PostOp  250 out of villa    Diapered? No   Bladder Scan      mL(apple juice/water) (12/13/19 1730)  water and ice chips     Vitals    B/P: 129/87  T: 98.4  F (36.9  C)    Temp src: Axillary  P:  Pulse: 106 (12/13/19 1815)    Heart Rate: 105 (12/13/19 1815)     R: 26  O2:  SpO2: 98 %    O2 Device: None (Room air) (12/13/19 1815)    Oxygen Delivery: 3 LPM (12/13/19 1615)         Family/support present significant other   Patient belongings     Patient transported on cart   DC meds/scripts (obs/outpt) Not applicable   Inpatient Pain Meds Released? Yes       Special needs/considerations None   Tasks needing completion None       Taylor Cosby, RN  ASCOM [id number]

## 2019-12-14 NOTE — PROVIDER NOTIFICATION
12/14/19 1542   Provider Notification   Provider Name/Title Myhre   Method of Notification Electronic Page   Request Evaluate-Remote   Notification Reason Other   [id number] Please call to discuss care questions with bedside RN regarding villa catheter and pain control. Thank you Magali [id number]

## 2019-12-14 NOTE — PLAN OF CARE
Pt still experiencing periods of intolerable pain at times over night. Dr. Nunez notified, orders modified and increased dose of oxycodone. Pt states pain has improved overnight. Pt tachycardic at times, Dr. Nunez aware, RN continue to monitor. IV saline locked, pt tolerating PO fluids and adequate urine output. Pt ate a small snack and tolerated fine, no N/V. Pt able to stand at bedside with assistance. Barahona still in place, provider aware. Pt pumping with assistance. Continue current plan of care.

## 2019-12-14 NOTE — PLAN OF CARE
Focus: Physio  D: Continues with poor pain control. -116 as high as 120's at times.  Ambulated to the bathroom door and back using walker but couldn't go any further due to poor pain control. Started on lyrica. Has oxycodone 15mg on board and every 3 hours, Dilaudid ivp 0.5mg every 2 hours, Toradol 30 mg every 6 hours and scheduled tylenol. Barahona drained 1500 ml of urine. Drinking well. Poor intake of solid foods. Has not passed gas yet. Wound vac patent. P: Continue current plan of care.

## 2019-12-14 NOTE — ANESTHESIA POSTPROCEDURE EVALUATION
Anesthesia POST Procedure Evaluation    Patient: Mariela Hawkins   MRN:     [id number] Gender:   female   Age:    29 year old :      1990        Preoperative Diagnosis: History of  section [Z98.891]  Vasa previa [O69.4XX0]   Procedure(s):  Repeat  section, hysterectomy, Exploritory Laporotomy,Bilateral Salpigectomy, Cystoscopy   Postop Comments: No value filed.       Anesthesia Type:  Not documented  For Post-op pain in coordination with surgeon, Spinal, General    Reportable Event: NO     PAIN: Uncomplicated   Sign Out status: Comfortable, Well controlled pain     PONV: No PONV   Sign Out status:  No Nausea or Vomiting     Neuro/Psych: Uneventful perioperative course   Sign Out Status: Preoperative baseline; Age appropriate mentation     Airway/Resp.: Uneventful perioperative course   Sign Out Status: Non labored breathing, age appropriate RR; Resp. Status within EXPECTED Parameters     CV: Uneventful perioperative course   Sign Out status: Appropriate BP and perfusion indices; Appropriate HR/Rhythm     Disposition:   Sign Out in:  PACU  Disposition:  Phase II; Home  Recovery Course: Uneventful  Follow-Up: Not required           Last Anesthesia Record Vitals:  CRNA VITALS  2019 1529 - 2019 1629      2019             Pulse:  135    Ht Rate:  135    SpO2:  97 %    Resp Rate (observed):  (!) 2          Last PACU Vitals:  Vitals Value Taken Time   /70 2019  7:15 PM   Temp 36.9  C (98.4  F) 2019  4:30 PM   Pulse 98 2019  7:15 PM   Resp 20 2019  7:25 PM   SpO2 98 % 2019  7:26 PM   Temp src     NIBP     Pulse     SpO2     Resp     Temp     Ht Rate     Temp 2     Vitals shown include unvalidated device data.      Electronically Signed By: Alvarez Ratliff MD, 2019, 7:58 PM

## 2019-12-14 NOTE — PROVIDER NOTIFICATION
12/13/19 8346   Provider Notification   Provider Name/Title Nunez   Method of Notification Electronic Page   Request Evaluate-Remote   Notification Reason Status Update;Medication Request   Patient's pain is not being well controlled with the Dilaudid, could she be on a PCA pump? Or could you come to the bedside and talk to her?

## 2019-12-14 NOTE — PROVIDER NOTIFICATION
Focus: Physio  D: Continues with -116 as high as 120's. BP stable.  Poor pain control. Was able to ambulate to the bathroom door and back but was limited due to pain. Do you want villa removed and commode ordered for bedside? I: Dr. Myhre notified the above.

## 2019-12-14 NOTE — PLAN OF CARE
Patient transferred to postpartum unit at 2000 12/13/19. Patient has had stable vitals. Her pain has been intolerable for the patient and all the pain medications that were available were given to patient. Wound Vac is in place and at 125 for continuous suctioning. Barahona catheter is in place and there has been adequate output. Patient has been drinking fluids but has not had an appetite to eat food. Will continue plan of care and continue monitoring.

## 2019-12-14 NOTE — PROGRESS NOTES
"[facility] Obstetrics   Postpartum Progress Note    Name: Mariela Hawkins  : 1990  MRN: [id number]    POD#1 s/p  hysterectomy    S: Patient is sitting up working on breastfeeding. Had pain control issues overnight, which improved with addition of toradol. Pain \"feels deep.\" Stood up once, felt a little dizzy. Barahona is still in place. Had small bits to eat last night without nausea. Tolerating fluids. Bleeding is light. No flatus yet. Happy that her uterus is out and she won't have to go through another pregnancy. Denies fevers/chills, headache, vision changes, CP, SOB.    O:  Patient Vitals for the past 24 hrs:   BP Temp Temp src Pulse Heart Rate Resp SpO2   19 0545 115/68 98.5  F (36.9  C) Oral -- 112 20 98 %   19 0145 (!) 121/96 98.1  F (36.7  C) Oral -- 112 20 100 %   19 0045 111/82 98.5  F (36.9  C) Oral -- 111 18 100 %   19 2345 114/69 97.7  F (36.5  C) Oral -- 115 20 100 %   19 2245 123/75 98.7  F (37.1  C) Oral -- 108 20 98 %   195 129/72 98.5  F (36.9  C) Oral -- 107 20 100 %   19 133/72 98.3  F (36.8  C) Oral -- 108 18 99 %   19 1915 122/70 -- -- 98 91 18 98 %   19 1900 135/75 -- -- 96 94 12 99 %   19 1845 135/80 -- -- 100 92 22 98 %   19 1830 130/79 -- -- 99 97 (!) 33 98 %   19 1815 129/87 -- -- 106 105 26 98 %   19 1800 129/73 -- -- 102 108 18 98 %   19 1745 128/75 -- -- 106 106 16 99 %   12/13/19 1730 124/75 -- -- 102 106 18 98 %   19 1715 138/83 -- -- -- 114 16 99 %   19 1700 (!) 147/72 -- -- 106 111 14 99 %   19 1645 134/68 -- -- 110 105 17 98 %   19 1630 122/89 98.4  F (36.9  C) Axillary 121 117 18 100 %   19 1615 138/84 -- -- -- 120 22 100 %   19 1611 123/88 -- -- 119 123 (!) 6 100 %   19 1606 (!) 125/104 98.6  F (37  C) Axillary 128 -- -- 100 %   19 0847 108/68 97.7  F (36.5  C) Oral -- -- 16 97 %     Gen: NAD. Alert, oriented. Resting comfortably " in bed.  CV: RRR  Resp:  CTAB, no increased work of breathing  Abd: Soft, moderately tender, no rebound, non-distended  Incision: wound vac in place  Ext: 1+ lower extremity edema bilaterally     BMP  Recent Labs   Lab 19  2354   CR 0.38*     CBC  Recent Labs   Lab 19  0641 19  2354 19  0726   WBC 12.1* 12.2*  --    RBC 3.35* 3.42*  --    HGB 9.7* 9.9* 11.0*   HCT 31.0* 31.2*  --    MCV 93 91  --    MCH 29.0 28.9  --    MCHC 31.3* 31.7  --    RDW 14.6 14.5  --     166  --      A/P: Mariela Hawkins is a 29 year old  female, now POD#1 s/p  hysterectomy. Pregnancy was complicated by h/o CS x3, h/o ventral hernia repair, vasa previa, anxiety. Stable in the postoperative period.      # Anxiety  - Continue PTA zoloft  - 1 week mood check    # Postoperative care  - Continue routine postoperative management  - Rh pos, Rubella immune  - Pain: Continue ibuprofen/Tylenol scheduled.  Roxicodone PRN for breakthrough.  - ABLA: HGB 11.0> EBL 1000mL> 9.9> 9.7. Has had dizziness, though only up once. Continue to monitor throughout the day. Will discharge with PO Fe if Hgb <10.  - FEN/GI: regular diet, stool softeners PRN  - INC: wound vac in place, needs 1 week incision check. Remove wound vac prior to discharge.   - : Barahona in place, remove later today when ambulating  - Contraception: s/p hysterectomy  - Breastfeeding  - Baby doing well in room  - PPX: lovenox this morning    Dispo: anticipate discharge to home on POD#3-4.    María Nunez MD  OBGYN PGY3  2019 7:21 AM    OBGYN Attending Addendum     Ms. Mariela Hawkins was seen and examined by me separately from the team.  I have reviewed and agree with the above note by Dr. Nunez.    I personally reviewed the following: vitals, meds, labs.     Mariela is a 29 Y  who is POD#1 after  hysterectomy. She continues to have poor pain control. She is mildly tachycardic, but with normal BP, no fever, and a stable  hgb from yesterday. Do not suspect ongoing bleeding or infection/abscess contributing to pain. Will add lidocaine patch and pregabalin to pain regimen and monitor closely for improvement.     Peggy Saravia MD, MSCI  Date of Service: 12/14/2019

## 2019-12-15 LAB
BLD PROD TYP BPU: NORMAL
BLD PROD TYP BPU: NORMAL
BLD UNIT ID BPU: 0
BLD UNIT ID BPU: 0
BLOOD PRODUCT CODE: NORMAL
BLOOD PRODUCT CODE: NORMAL
BPU ID: NORMAL
BPU ID: NORMAL
HGB BLD-MCNC: 9.1 G/DL (ref 11.7–15.7)
TRANSFUSION STATUS PATIENT QL: NORMAL

## 2019-12-15 PROCEDURE — 85018 HEMOGLOBIN: CPT | Performed by: STUDENT IN AN ORGANIZED HEALTH CARE EDUCATION/TRAINING PROGRAM

## 2019-12-15 PROCEDURE — 25000132 ZZH RX MED GY IP 250 OP 250 PS 637: Performed by: STUDENT IN AN ORGANIZED HEALTH CARE EDUCATION/TRAINING PROGRAM

## 2019-12-15 PROCEDURE — 25000128 H RX IP 250 OP 636: Performed by: STUDENT IN AN ORGANIZED HEALTH CARE EDUCATION/TRAINING PROGRAM

## 2019-12-15 PROCEDURE — 25000132 ZZH RX MED GY IP 250 OP 250 PS 637: Performed by: OBSTETRICS & GYNECOLOGY

## 2019-12-15 PROCEDURE — 36415 COLL VENOUS BLD VENIPUNCTURE: CPT | Performed by: STUDENT IN AN ORGANIZED HEALTH CARE EDUCATION/TRAINING PROGRAM

## 2019-12-15 PROCEDURE — 12000001 ZZH R&B MED SURG/OB UMMC

## 2019-12-15 RX ADMIN — OXYCODONE HYDROCHLORIDE 15 MG: 5 TABLET ORAL at 18:45

## 2019-12-15 RX ADMIN — SERTRALINE HYDROCHLORIDE 50 MG: 50 TABLET ORAL at 08:27

## 2019-12-15 RX ADMIN — ACETAMINOPHEN 975 MG: 325 TABLET, FILM COATED ORAL at 03:00

## 2019-12-15 RX ADMIN — ACETAMINOPHEN 975 MG: 325 TABLET, FILM COATED ORAL at 10:28

## 2019-12-15 RX ADMIN — BENZOCAINE, MENTHOL 1 LOZENGE: 15; 3.6 LOZENGE ORAL at 18:45

## 2019-12-15 RX ADMIN — HYDROMORPHONE HYDROCHLORIDE 0.5 MG: 1 INJECTION, SOLUTION INTRAMUSCULAR; INTRAVENOUS; SUBCUTANEOUS at 03:00

## 2019-12-15 RX ADMIN — SENNOSIDES AND DOCUSATE SODIUM 2 TABLET: 8.6; 5 TABLET ORAL at 08:27

## 2019-12-15 RX ADMIN — KETOROLAC TROMETHAMINE 30 MG: 30 INJECTION, SOLUTION INTRAMUSCULAR at 16:42

## 2019-12-15 RX ADMIN — PREGABALIN 75 MG: 75 CAPSULE ORAL at 19:49

## 2019-12-15 RX ADMIN — OXYCODONE HYDROCHLORIDE 15 MG: 5 TABLET ORAL at 07:26

## 2019-12-15 RX ADMIN — ONDANSETRON 4 MG: 2 INJECTION INTRAMUSCULAR; INTRAVENOUS at 22:04

## 2019-12-15 RX ADMIN — OXYCODONE HYDROCHLORIDE 15 MG: 5 TABLET ORAL at 04:31

## 2019-12-15 RX ADMIN — OXYCODONE HYDROCHLORIDE 15 MG: 5 TABLET ORAL at 10:28

## 2019-12-15 RX ADMIN — BENZOCAINE, MENTHOL 1 LOZENGE: 15; 3.6 LOZENGE ORAL at 14:15

## 2019-12-15 RX ADMIN — ONDANSETRON 4 MG: 2 INJECTION INTRAMUSCULAR; INTRAVENOUS at 15:03

## 2019-12-15 RX ADMIN — HYDROMORPHONE HYDROCHLORIDE 0.5 MG: 1 INJECTION, SOLUTION INTRAMUSCULAR; INTRAVENOUS; SUBCUTANEOUS at 20:05

## 2019-12-15 RX ADMIN — HYDROMORPHONE HYDROCHLORIDE 0.5 MG: 1 INJECTION, SOLUTION INTRAMUSCULAR; INTRAVENOUS; SUBCUTANEOUS at 16:09

## 2019-12-15 RX ADMIN — ENOXAPARIN SODIUM 40 MG: 40 INJECTION SUBCUTANEOUS at 14:15

## 2019-12-15 RX ADMIN — KETOROLAC TROMETHAMINE 30 MG: 30 INJECTION, SOLUTION INTRAMUSCULAR at 04:00

## 2019-12-15 RX ADMIN — SENNOSIDES AND DOCUSATE SODIUM 1 TABLET: 8.6; 5 TABLET ORAL at 19:49

## 2019-12-15 RX ADMIN — PREGABALIN 75 MG: 75 CAPSULE ORAL at 08:32

## 2019-12-15 RX ADMIN — KETOROLAC TROMETHAMINE 30 MG: 30 INJECTION, SOLUTION INTRAMUSCULAR at 23:02

## 2019-12-15 RX ADMIN — KETOROLAC TROMETHAMINE 30 MG: 30 INJECTION, SOLUTION INTRAMUSCULAR at 10:28

## 2019-12-15 RX ADMIN — LIDOCAINE 1 PATCH: 560 PATCH PERCUTANEOUS; TOPICAL; TRANSDERMAL at 19:56

## 2019-12-15 RX ADMIN — SIMETHICONE CHEW TAB 80 MG 80 MG: 80 TABLET ORAL at 07:26

## 2019-12-15 RX ADMIN — OXYCODONE HYDROCHLORIDE 15 MG: 5 TABLET ORAL at 14:10

## 2019-12-15 RX ADMIN — OXYCODONE HYDROCHLORIDE 15 MG: 5 TABLET ORAL at 00:15

## 2019-12-15 RX ADMIN — ACETAMINOPHEN 975 MG: 325 TABLET, FILM COATED ORAL at 18:45

## 2019-12-15 RX ADMIN — OXYCODONE HYDROCHLORIDE 15 MG: 5 TABLET ORAL at 22:00

## 2019-12-15 NOTE — PLAN OF CARE
Patient moves well in the room independently. Her pain is comfortably manage with current pain regime. Was nauseous when she tried to  eat toast and zofran was given, helpful.  Wound vac in placed and working. Hand expressed x 2 with assist from staff and pumped x 1 with minimal result. She's breastfeeding with assist and encouraged to use nipple shield due to baby's  status but only used once. Will continue with plan of care.

## 2019-12-15 NOTE — PROGRESS NOTES
[facility] Obstetrics   Postpartum Progress Note    Name: Mariela Hawkins  : 1990  MRN: [id number]    POD#2 s/p  hysterectomy    S: Patient is feeling discouraged this morning. Complains of pain that improves with pain medications. Also complains of numbness in her low back/bottom area. Complains of sore throat following surgery. Voiding spontaneously. Tolerating minimal PO. Complains of nausea with solids. Not passing flatus.  Denies fevers/chills, headache, vision changes, CP, SOB. Is planning to breast feed.     O:  Patient Vitals for the past 24 hrs:   BP Temp Temp src Pulse Heart Rate Resp SpO2   12/15/19 0331 123/68 98.7  F (37.1  C) Oral 111 -- 20 --   19 2250 105/68 97.5  F (36.4  C) Oral -- 107 20 --   19 1530 127/64 98.9  F (37.2  C) Oral -- 118 20 98 %   19 1300 121/85 98.3  F (36.8  C) Oral -- 116 20 98 %   19 0800 128/79 98.4  F (36.9  C) Oral -- 113 20 98 %   19 0545 115/68 98.5  F (36.9  C) Oral -- 112 20 98 %     Gen: standing at bedside  CV: RRR  Resp:  CTAB, no increased work of breathing  Abd: Soft, moderately tender, no rebound, non-distended, diminished bowel sounds  Incision: wound vac in place  Ext: 1+ lower extremity edema bilaterally     BMP  Recent Labs   Lab 19  2354   CR 0.38*     CBC  Recent Labs   Lab 12/15/19  0821 19  0641 19  2354 19  0726   WBC  --  12.1* 12.2*  --    RBC  --  3.35* 3.42*  --    HGB 9.1* 9.7* 9.9* 11.0*   HCT  --  31.0* 31.2*  --    MCV  --  93 91  --    MCH  --  29.0 28.9  --    MCHC  --  31.3* 31.7  --    RDW  --  14.6 14.5  --    PLT  --  168 166  --      A/P: Mariela Hawkins is a 29 year old  female, now POD#2 s/p  hysterectomy. Pregnancy was complicated by h/o CS x3, h/o ventral hernia repair, vasa previa, anxiety. Stable in the postoperative period.      # Anxiety  - Continue PTA zoloft  - 1 week mood check    # Postoperative care  - Patient encouraged to continue to ambulate  short distances. Try small bland meals as able.  - Lozenges ordered for sore throat 2/2 ET tube   - Continue routine postoperative management  - Rh pos, Rubella immune  - Pain: Continue ibuprofen/Tylenol scheduled.  Roxicodone PRN for breakthrough.  - ABLA: HGB 11.0> EBL 1000mL> 9.9> 9.7>9.1. Will discharge with PO Fe  - FEN/GI: regular diet, stool softeners PRN  - INC: wound vac in place, needs 1 week incision check. Remove wound vac prior to discharge.   - : s/p villa   - Contraception: s/p hysterectomy  - Breastfeeding  - Baby doing well in room  - PPX: lovenox     Dispo: anticipate discharge to home on POD#3-4.    Alicia Myhre, DO  Obstetrics and Gyncology, PGY-3  December 15, 2019 , 8:35 AM    [facility] staff:  Appreciate note by Dr. Myhre.  I have seen and examined the patient without the resident. I have reviewed, edited, and agree with the note.    My findings are:    Appears well. Pain control reasonable. Wound vac is working well. No signs of erythema or redness.   Continued current cares.   I agree with above note.       Rozina Odom MD, FACOG  12/15/2019  10:20 AM

## 2019-12-15 NOTE — PLAN OF CARE
"VSS. Incision UTV wound vac in place at continuous 125 mmHg. Barahona removed at 1745 and pt voided once unmeasured; hat placed in toilet and pt educated on need to measure urine post void. Pain better controlled this afternoon per pt reports. Writer assisted patient to shower and encouraged pt to try to manage pain with PO medications and use of dilaudid IV for breakthrough pain. Pt agreeable. Lidocaine patches in place on sides of incision abdomen. Ice applied to incision for comfort as well. Pt reports passing small amounts of flatus; no BM yet. Mild intermittent nausea after eating;  IV zofran administered. Pt reports nauesa and mild dizziness occurs when standing. Breastfeeding with assist with supplementation of donormilk via SNS at the breast. Encouraged continued hand expression and pumping; pt expresses concerns about delayed milk production. Pt ambulating room frequently; also encouraged pt to sit and rest if feeling dizzy or lightheaded. Abdominal binder in place. Continue to offer social support and distractions to help with pain management as well as schedule around the clock pain meds. Encouraged rest; pt has not been able to nap today and reports \"feeling exhausted.\" Support and encourage spouse Joel involvement with cares at the bedside.   "

## 2019-12-15 NOTE — PLAN OF CARE
VSS. Ambulating to bathroom with assist of 1 due to wound vac. Wound vac observed in incision and has continuous suction at 125 mmHg with very minimal serous output (not enough to measure). Incisional pain moderately controlled overnight but at 0300 pt required dilaudid x1 for severe pain. Otherwise oxycodone, tylenol and toradol given as ordered. Lidocaine patches and ice applied also in place around incision. IV replaced overnight. Breastfeeding independently and pumping with home pump. Hydrogel dressings on nipples due to soreness. Voiding adequately and tolerating water and apple juice. Denies nausea. MALGORZATA Maldonado minimally attentive to pt and infant, slept majority of shift. Writer in room helping care for infant to allow mom time to rest between feedings. Continue current plan of care.

## 2019-12-16 LAB — PLATELET # BLD AUTO: 192 10E9/L (ref 150–450)

## 2019-12-16 PROCEDURE — 25000128 H RX IP 250 OP 636: Performed by: STUDENT IN AN ORGANIZED HEALTH CARE EDUCATION/TRAINING PROGRAM

## 2019-12-16 PROCEDURE — 12000001 ZZH R&B MED SURG/OB UMMC

## 2019-12-16 PROCEDURE — 85049 AUTOMATED PLATELET COUNT: CPT | Performed by: STUDENT IN AN ORGANIZED HEALTH CARE EDUCATION/TRAINING PROGRAM

## 2019-12-16 PROCEDURE — 25000132 ZZH RX MED GY IP 250 OP 250 PS 637: Performed by: STUDENT IN AN ORGANIZED HEALTH CARE EDUCATION/TRAINING PROGRAM

## 2019-12-16 PROCEDURE — 25000132 ZZH RX MED GY IP 250 OP 250 PS 637: Performed by: OBSTETRICS & GYNECOLOGY

## 2019-12-16 PROCEDURE — 36415 COLL VENOUS BLD VENIPUNCTURE: CPT | Performed by: STUDENT IN AN ORGANIZED HEALTH CARE EDUCATION/TRAINING PROGRAM

## 2019-12-16 RX ORDER — IBUPROFEN 600 MG/1
600 TABLET, FILM COATED ORAL EVERY 6 HOURS
Status: DISCONTINUED | OUTPATIENT
Start: 2019-12-16 | End: 2019-12-17 | Stop reason: HOSPADM

## 2019-12-16 RX ORDER — FUROSEMIDE 20 MG
20 TABLET ORAL ONCE
Status: COMPLETED | OUTPATIENT
Start: 2019-12-16 | End: 2019-12-16

## 2019-12-16 RX ORDER — FUROSEMIDE 20 MG
20 TABLET ORAL DAILY
Status: DISCONTINUED | OUTPATIENT
Start: 2019-12-17 | End: 2019-12-17

## 2019-12-16 RX ADMIN — PREGABALIN 75 MG: 75 CAPSULE ORAL at 07:51

## 2019-12-16 RX ADMIN — IBUPROFEN 600 MG: 600 TABLET ORAL at 23:39

## 2019-12-16 RX ADMIN — ENOXAPARIN SODIUM 40 MG: 40 INJECTION SUBCUTANEOUS at 14:35

## 2019-12-16 RX ADMIN — ACETAMINOPHEN 650 MG: 325 TABLET, FILM COATED ORAL at 20:31

## 2019-12-16 RX ADMIN — ACETAMINOPHEN 975 MG: 325 TABLET, FILM COATED ORAL at 11:20

## 2019-12-16 RX ADMIN — OXYCODONE HYDROCHLORIDE 15 MG: 5 TABLET ORAL at 20:33

## 2019-12-16 RX ADMIN — ACETAMINOPHEN 975 MG: 325 TABLET, FILM COATED ORAL at 02:52

## 2019-12-16 RX ADMIN — HYDROMORPHONE HYDROCHLORIDE 0.5 MG: 1 INJECTION, SOLUTION INTRAMUSCULAR; INTRAVENOUS; SUBCUTANEOUS at 04:26

## 2019-12-16 RX ADMIN — FUROSEMIDE 20 MG: 20 TABLET ORAL at 15:30

## 2019-12-16 RX ADMIN — OXYCODONE HYDROCHLORIDE 15 MG: 5 TABLET ORAL at 14:35

## 2019-12-16 RX ADMIN — SERTRALINE HYDROCHLORIDE 50 MG: 50 TABLET ORAL at 07:51

## 2019-12-16 RX ADMIN — IBUPROFEN 600 MG: 600 TABLET ORAL at 11:20

## 2019-12-16 RX ADMIN — HYDROMORPHONE HYDROCHLORIDE 0.3 MG: 1 INJECTION, SOLUTION INTRAMUSCULAR; INTRAVENOUS; SUBCUTANEOUS at 15:00

## 2019-12-16 RX ADMIN — OXYCODONE HYDROCHLORIDE 15 MG: 5 TABLET ORAL at 01:32

## 2019-12-16 RX ADMIN — SENNOSIDES AND DOCUSATE SODIUM 1 TABLET: 8.6; 5 TABLET ORAL at 07:51

## 2019-12-16 RX ADMIN — SENNOSIDES AND DOCUSATE SODIUM 1 TABLET: 8.6; 5 TABLET ORAL at 20:36

## 2019-12-16 RX ADMIN — OXYCODONE HYDROCHLORIDE 15 MG: 5 TABLET ORAL at 17:23

## 2019-12-16 RX ADMIN — OXYCODONE HYDROCHLORIDE 15 MG: 5 TABLET ORAL at 07:51

## 2019-12-16 RX ADMIN — KETOROLAC TROMETHAMINE 30 MG: 30 INJECTION, SOLUTION INTRAMUSCULAR at 05:12

## 2019-12-16 RX ADMIN — OXYCODONE HYDROCHLORIDE 15 MG: 5 TABLET ORAL at 04:30

## 2019-12-16 RX ADMIN — SIMETHICONE CHEW TAB 80 MG 80 MG: 80 TABLET ORAL at 20:43

## 2019-12-16 RX ADMIN — SIMETHICONE CHEW TAB 80 MG 80 MG: 80 TABLET ORAL at 04:33

## 2019-12-16 RX ADMIN — IBUPROFEN 600 MG: 600 TABLET ORAL at 17:23

## 2019-12-16 RX ADMIN — OXYCODONE HYDROCHLORIDE 15 MG: 5 TABLET ORAL at 11:21

## 2019-12-16 RX ADMIN — PREGABALIN 75 MG: 75 CAPSULE ORAL at 20:35

## 2019-12-16 NOTE — LACTATION NOTE
This note was copied from a baby's chart.  Consult for: Fourth baby, but this is first late  infant; now less than 5#.    History:  delivery @ 35w0d, AGA per [place] scale @ 5# 2.9 oz. birthweight, 4.2% loss at 24 hours, -7.5% from birthweight @ 48 and -8.1% at 72 hours with low risk serum bilirubin, all BG checks WNL.  Infant has been falling asleep at the breast, fatigued when taking supplements via SNS.  Maternal history of ovarian cyst, obesity BMI >50, anxiety.     Breast exam of mom: Soft, pendulous but somewhat empty sac appearance of breasts, mainly symmetrical with intact, everted nipples bilaterally. Mariela reports minimal breast growth during pregnancy and has had difficulty with supply for her other babies, had to supplement after each feeding with all of them. Ended up breastfeeding for a couple months each time. .      Oral exam of baby: WNL    Feeding assessment:  Infant latches very well without shield, mostly non nutritive sucking and fatigues after about 5 minutes. Using nipple shield he had to re-latch a few times but sustained suck for another 5 minutes or so, minimal evidence of transfer with one drop visible in shield when he came off.     Education provided: Discussed potential feeding challenges of and ways to support LPT infants including benefits of extra supplements and rationale for nipple shield use initially and importance of LC follow up outpatient. Reviewed ways to apply nipple shield well then get and maintain deep latch with it, encouraged breast compressions to enhance milk transfer & point out nutritive vs. non-nutritive sucking but difficult to know how much they're really transferring. Reviewed benefits of frequent skin to skin and feeding on cue but no longer than 3 hours between, limiting feeding time in early weeks to less than 30 minutes (15-20 max ideal until milk is in) supply and demand and importance of early days initiating supply, benefits of and how to  do breast massage, hand expression & hands on pumping, how to tell when satiated and if getting enough, normal  weight loss. Encouraged to try using [employer] rental pump for initiating supply but endorse using her own from home if she feels more comfortable with it and getting good results. Discussed benefits of rental pump for  delivery & maximizing supply, encouraged to check with insurance on coverage and watch Maximizing Milk & other videos. Answered questions about options for supplementing after discharge and different types of formula. Demo paced bottle feeding.    Plan:   Frequent skin to skin, breastfeed on cue encourage using nipple shield, goal of 8 to 12 feedings in 24 hours. No longer than 3 hours between feedings, limit to about 15-20 minutes each time using breast compressions to enhance milk transfer. Continue to supplement after according to guidelines, suggest using bottle with slow flow nipple and increasing amounts as tolerated. Hand express and/or hands on pumping after each feeding, pumping goal at least 6-8 times in 24 hours to help bring in full milk supply. Follow up with outpatient lactation consultant through their [facility] within a week of discharge for support with LPT infant, help to monitor infant weight and milk transfer, establish supply & eventually wean from pumping and nipple shield. Due to infant prematurity, recommend renting hospital grade breast pump at discharge to help bring in full milk supply.     Supplement Guidelines for infants <37 weeks gestation or < 6 lbs at birth per the [place] [employer] for the  Infant (35-42 weeks) Policy ([facility] Guidelines):  Infants <37 weeks OR <6 pounds   Birth-24 hours of age: 5ml (1 tsp) every 2 - 3 hours, at least 8 times in 24 hours   24-48 hours of age: 10 ml (2 tsp) every 2 - 3 hours, at least 8 times in 24 hours   48-72 hours of age: 15 ml (3 tsp) every 2 - 3 hours, at  least 8 times in 24 hours

## 2019-12-16 NOTE — PLAN OF CARE
Pt VSS overnight. Pt states pain has decreased overnight and managed well with current meds. Pt ambulating in room independently, able to ambulate in [place] with assistance of RN. Pt received IV zofran x1 and then was able to eat small meal. BM 12/15, passing flatus. Voiding without difficulty. Wound vac still in place and continuous suction on, no drainage. Pt pumping and hand expressing breast milk. Pt encouraged to continue to hand express and pump, pt discouraged at times related to breastfeeding. Continue with current plan of care.

## 2019-12-16 NOTE — PROGRESS NOTES
[facility] Obstetrics   Postpartum Progress Note    Name: Mariela Hawkins  : 1990  MRN: [id number]    POD#3 s/p  hysterectomy    S: Patient is feeling discouraged this morning. Complains of pain that improves with pain medications. She is trying to space out her dilaudid use, but still requiring some. She doesn't think the lidocaine patches have worked. Complains of sore throat following surgery. Voiding spontaneously. Was able to eat [employer] for dinner last night after some antiemetics. Passed flatus and had a bowel movement. Denies fevers/chills, headache, vision changes, CP, SOB. Is pumping for baby.    O:  Patient Vitals for the past 24 hrs:   BP Temp Temp src Pulse Resp   19 0500 126/50 97.7  F (36.5  C) Oral 80 20   12/15/19 2030 128/52 97.6  F (36.4  C) Oral 104 20   12/15/19 1655 105/55 98.3  F (36.8  C) Oral 93 20     Gen: resting in bed, NAD  CV: Regular rate  Resp:  no increased work of breathing  Abd: Soft, moderately tender, no rebound, non-distended, diminished bowel sounds  Incision: wound vac in place  Ext: 1+ lower extremity edema bilaterally     BMP  Recent Labs   Lab 19  2354   CR 0.38*     CBC  Recent Labs   Lab 19  0709 12/15/19  0821 19  0641 19  2354 19  0726   WBC  --   --  12.1* 12.2*  --    RBC  --   --  3.35* 3.42*  --    HGB  --  9.1* 9.7* 9.9* 11.0*   HCT  --   --  31.0* 31.2*  --    MCV  --   --  93 91  --    MCH  --   --  29.0 28.9  --    MCHC  --   --  31.3* 31.7  --    RDW  --   --  14.6 14.5  --      --  168 166  --      A/P: Mariela Hawkins is a 29 year old  female, now POD#3 s/p  hysterectomy. Pregnancy was complicated by h/o CS x3, h/o ventral hernia repair, vasa previa, anxiety. Stable in the postoperative period.      # Anxiety  - Continue PTA zoloft  - 1 week mood check    # Postoperative care  - Lozenges ordered for sore throat 2/2 ET tube   - Continue routine postoperative management  - Rh pos, Rubella  immune  - Pain: Currently requiring numerous pain meds to control pain, including toradol and dilaudid. Plan to transition to ibuprofen today. Discussed decreasing IV dilaudid use as well. Consider adding flexeril or repeating TAP block if unable to wean off IV medications soon.    VSS are normal, so no secondary etiology for pain such as bleeding or infection is suspected.   - ABLA: HGB 11.0> EBL 1000mL> 9.9> 9.7>9.1. Will discharge with PO Fe  - FEN/GI: regular diet, stool softeners PRN  - INC: wound vac in place, needs 1 week incision check. Remove wound vac prior to discharge.   - : s/p villa, voiding  - Contraception: s/p hysterectomy  - Breastfeeding  - Baby doing well in room  - PPX: lovenox     Dispo: anticipate discharge to home on POD#4-5.    Ally Silvestre MD  Ob/Gyn PGY-3  12/16/19 8:02 AM    /59   Pulse 94   Temp 98.2  F (36.8  C) (Oral)   Resp 18   Wt 148.3 kg (326 lb 14.4 oz)   LMP 04/12/2019   SpO2 98%   Breastfeeding Unknown   BMI 52.80 kg/m    Hemoglobin   Date Value Ref Range Status   12/15/2019 9.1 (L) 11.7 - 15.7 g/dL Final   ]    The patient was seen and examined by me separately from the team.  I have reviewed and agree with the above note.  Overall she is feeling better this afternoon, has only used IV dilaudid once overnight-trying to switch to all oral medication today.  Wound vac in place with trace output, 2+ LE edema bilaterally.  Will start PO lasix 20 mg daily until discharge.  Encouraged her to continue to try to avoid IV dilaudid.  Discussed possible discharge tomorrow if she continues to do well.      Emily Bell MD, FACOG

## 2019-12-17 ENCOUNTER — ANESTHESIA EVENT (OUTPATIENT)
Dept: OBSTETRICS | Facility: CLINIC | Age: 29
End: 2019-12-17
Payer: COMMERCIAL

## 2019-12-17 ENCOUNTER — ANESTHESIA (OUTPATIENT)
Dept: OBSTETRICS | Facility: CLINIC | Age: 29
End: 2019-12-17
Payer: COMMERCIAL

## 2019-12-17 VITALS
OXYGEN SATURATION: 94 % | WEIGHT: 293 LBS | TEMPERATURE: 98.2 F | RESPIRATION RATE: 16 BRPM | SYSTOLIC BLOOD PRESSURE: 133 MMHG | HEART RATE: 77 BPM | DIASTOLIC BLOOD PRESSURE: 82 MMHG | BODY MASS INDEX: 52.8 KG/M2

## 2019-12-17 PROCEDURE — 25000128 H RX IP 250 OP 636: Performed by: STUDENT IN AN ORGANIZED HEALTH CARE EDUCATION/TRAINING PROGRAM

## 2019-12-17 PROCEDURE — 25800030 ZZH RX IP 258 OP 636: Performed by: STUDENT IN AN ORGANIZED HEALTH CARE EDUCATION/TRAINING PROGRAM

## 2019-12-17 PROCEDURE — 25000132 ZZH RX MED GY IP 250 OP 250 PS 637: Performed by: STUDENT IN AN ORGANIZED HEALTH CARE EDUCATION/TRAINING PROGRAM

## 2019-12-17 PROCEDURE — 25000132 ZZH RX MED GY IP 250 OP 250 PS 637: Performed by: OBSTETRICS & GYNECOLOGY

## 2019-12-17 PROCEDURE — 40000671 ZZH STATISTIC ANESTHESIA CASE

## 2019-12-17 PROCEDURE — 25000125 ZZHC RX 250: Performed by: STUDENT IN AN ORGANIZED HEALTH CARE EDUCATION/TRAINING PROGRAM

## 2019-12-17 RX ORDER — AMOXICILLIN 250 MG
1 CAPSULE ORAL DAILY
Qty: 100 TABLET | Refills: 0 | Status: SHIPPED | OUTPATIENT
Start: 2019-12-17 | End: 2020-01-23

## 2019-12-17 RX ORDER — FERROUS SULFATE 325(65) MG
325 TABLET ORAL
Qty: 60 TABLET | Refills: 0 | Status: SHIPPED | OUTPATIENT
Start: 2019-12-17 | End: 2021-10-18

## 2019-12-17 RX ORDER — ACETAMINOPHEN 325 MG/1
650 TABLET ORAL EVERY 6 HOURS PRN
Qty: 100 TABLET | Refills: 0 | Status: SHIPPED | OUTPATIENT
Start: 2019-12-17 | End: 2021-10-18

## 2019-12-17 RX ORDER — FENTANYL CITRATE 50 UG/ML
25-50 INJECTION, SOLUTION INTRAMUSCULAR; INTRAVENOUS
Status: DISCONTINUED | OUTPATIENT
Start: 2019-12-17 | End: 2019-12-17 | Stop reason: HOSPADM

## 2019-12-17 RX ORDER — PREGABALIN 75 MG/1
75 CAPSULE ORAL 2 TIMES DAILY
Qty: 30 CAPSULE | Refills: 1 | Status: SHIPPED | OUTPATIENT
Start: 2019-12-17 | End: 2020-01-23

## 2019-12-17 RX ORDER — NALOXONE HYDROCHLORIDE 0.4 MG/ML
.1-.4 INJECTION, SOLUTION INTRAMUSCULAR; INTRAVENOUS; SUBCUTANEOUS
Status: DISCONTINUED | OUTPATIENT
Start: 2019-12-17 | End: 2019-12-17 | Stop reason: HOSPADM

## 2019-12-17 RX ORDER — OXYCODONE HYDROCHLORIDE 5 MG/1
5-10 TABLET ORAL
Qty: 28 TABLET | Refills: 0 | Status: SHIPPED | OUTPATIENT
Start: 2019-12-17 | End: 2019-12-17

## 2019-12-17 RX ORDER — BUPIVACAINE HYDROCHLORIDE 2.5 MG/ML
INJECTION, SOLUTION EPIDURAL; INFILTRATION; INTRACAUDAL PRN
Status: DISCONTINUED | OUTPATIENT
Start: 2019-12-17 | End: 2019-12-17

## 2019-12-17 RX ORDER — IBUPROFEN 600 MG/1
600 TABLET, FILM COATED ORAL EVERY 6 HOURS PRN
Qty: 60 TABLET | Refills: 0 | Status: SHIPPED | OUTPATIENT
Start: 2019-12-17 | End: 2020-01-09

## 2019-12-17 RX ORDER — OXYCODONE HYDROCHLORIDE 5 MG/1
5-10 TABLET ORAL
Qty: 28 TABLET | Refills: 0 | Status: SHIPPED | OUTPATIENT
Start: 2019-12-17 | End: 2019-12-20

## 2019-12-17 RX ORDER — OXYCODONE HYDROCHLORIDE 5 MG/1
5-10 TABLET ORAL
Status: DISCONTINUED | OUTPATIENT
Start: 2019-12-17 | End: 2019-12-17 | Stop reason: HOSPADM

## 2019-12-17 RX ORDER — DEXAMETHASONE SODIUM PHOSPHATE 4 MG/ML
INJECTION, SOLUTION INTRA-ARTICULAR; INTRALESIONAL; INTRAMUSCULAR; INTRAVENOUS; SOFT TISSUE PRN
Status: DISCONTINUED | OUTPATIENT
Start: 2019-12-17 | End: 2019-12-17

## 2019-12-17 RX ORDER — FLUMAZENIL 0.1 MG/ML
0.2 INJECTION, SOLUTION INTRAVENOUS
Status: DISCONTINUED | OUTPATIENT
Start: 2019-12-17 | End: 2019-12-17 | Stop reason: HOSPADM

## 2019-12-17 RX ORDER — FUROSEMIDE 10 MG/ML
20 INJECTION INTRAMUSCULAR; INTRAVENOUS ONCE
Status: COMPLETED | OUTPATIENT
Start: 2019-12-17 | End: 2019-12-17

## 2019-12-17 RX ADMIN — OXYCODONE HYDROCHLORIDE 15 MG: 5 TABLET ORAL at 00:02

## 2019-12-17 RX ADMIN — IBUPROFEN 600 MG: 600 TABLET ORAL at 05:55

## 2019-12-17 RX ADMIN — OXYCODONE HYDROCHLORIDE 10 MG: 5 TABLET ORAL at 18:02

## 2019-12-17 RX ADMIN — IBUPROFEN 600 MG: 600 TABLET ORAL at 18:04

## 2019-12-17 RX ADMIN — SERTRALINE HYDROCHLORIDE 50 MG: 50 TABLET ORAL at 07:54

## 2019-12-17 RX ADMIN — IBUPROFEN 600 MG: 600 TABLET ORAL at 11:54

## 2019-12-17 RX ADMIN — FENTANYL CITRATE 50 MCG: 50 INJECTION, SOLUTION INTRAMUSCULAR; INTRAVENOUS at 12:56

## 2019-12-17 RX ADMIN — ENOXAPARIN SODIUM 40 MG: 40 INJECTION SUBCUTANEOUS at 14:24

## 2019-12-17 RX ADMIN — OXYCODONE HYDROCHLORIDE 10 MG: 5 TABLET ORAL at 14:59

## 2019-12-17 RX ADMIN — OXYCODONE HYDROCHLORIDE 15 MG: 5 TABLET ORAL at 04:18

## 2019-12-17 RX ADMIN — DEXAMETHASONE SODIUM PHOSPHATE 2 MG: 4 INJECTION, SOLUTION INTRA-ARTICULAR; INTRALESIONAL; INTRAMUSCULAR; INTRAVENOUS; SOFT TISSUE at 13:18

## 2019-12-17 RX ADMIN — BUPIVACAINE HYDROCHLORIDE 50 ML: 2.5 INJECTION, SOLUTION EPIDURAL; INFILTRATION; INTRACAUDAL; PERINEURAL at 13:18

## 2019-12-17 RX ADMIN — ACETAMINOPHEN 650 MG: 325 TABLET, FILM COATED ORAL at 05:55

## 2019-12-17 RX ADMIN — ACETAMINOPHEN 650 MG: 325 TABLET, FILM COATED ORAL at 01:31

## 2019-12-17 RX ADMIN — OXYCODONE HYDROCHLORIDE 10 MG: 5 TABLET ORAL at 11:53

## 2019-12-17 RX ADMIN — FUROSEMIDE 20 MG: 10 INJECTION, SOLUTION INTRAMUSCULAR; INTRAVENOUS at 08:18

## 2019-12-17 RX ADMIN — SENNOSIDES AND DOCUSATE SODIUM 1 TABLET: 8.6; 5 TABLET ORAL at 07:55

## 2019-12-17 RX ADMIN — PREGABALIN 75 MG: 75 CAPSULE ORAL at 07:54

## 2019-12-17 RX ADMIN — OXYCODONE HYDROCHLORIDE 10 MG: 5 TABLET ORAL at 08:18

## 2019-12-17 RX ADMIN — ACETAMINOPHEN 650 MG: 325 TABLET, FILM COATED ORAL at 18:04

## 2019-12-17 RX ADMIN — ACETAMINOPHEN 650 MG: 325 TABLET, FILM COATED ORAL at 10:17

## 2019-12-17 RX ADMIN — DEXMEDETOMIDINE HYDROCHLORIDE 40 MCG: 100 INJECTION, SOLUTION INTRAVENOUS at 13:18

## 2019-12-17 RX ADMIN — ACETAMINOPHEN 650 MG: 325 TABLET, FILM COATED ORAL at 14:24

## 2019-12-17 RX ADMIN — SENNOSIDES AND DOCUSATE SODIUM 2 TABLET: 8.6; 5 TABLET ORAL at 19:31

## 2019-12-17 RX ADMIN — PREGABALIN 75 MG: 75 CAPSULE ORAL at 19:31

## 2019-12-17 NOTE — PLAN OF CARE
"Pt continues to request oxycodone 15mg q3h and requests to be woken up for pain medications. Pt educated with RN and Pediatrician Dr. Obrien, that due to the amount and frequency at which she is taking oxycodone, it is suggested by the pediatrician that she pump and dump overnight until pain is managed with lower dose/ frequency of oxycodone. Around 2200, the RN spoke to patient and reported that because she had been taking oxycodone at the dose of 15mg q3 hours, that total in a day is above the suggested amount for breastfeeding . Dr. Obrien plan at the time was to have patient feed 25mL donor milk, pump while feeding donor milk and dump collection. Then baby could spend up to 10 min at breast after the pumping so to ensure lesser volume consumed. Plan was to be reevaluated in morning by lactation and rounding pediatrician. Pt disagreeable with plan states, \"why is this being brought up all the sudden now, I have asked my OB and two pediatricians if the medications were safe and they all said it was, I won't be on these medications forever I can give him my milk for the short period, why does my plan of care need to change for his, my baby is fine he's been fine for the last 3 days, I already stopped taking dilaudid I'm already cutting back, I work hard for that colostrum\". RN assured pt the the providers that assured her of the safety of oxycodone were correct that oxycodone is safe in moderation for breastfeeding, though because of the dose and frequency at which she is taking it, that is when we might look at options until we get her pain more well managed. Pt disagreeable with plan, Dr. Obrien spoke to pt via phone and offered plan to breastfeed for no more than 10 minutes, feed baby no more than 10mL of pumped milk, then supplement with 25mL of donor milk. PT got off phone and said that the doctor \"doesn't think it's safe and that the nurse thinks I'm taking too much pain medication\". RN corrected patient to " say, I do not think you are taking too much, my goal is to get your pain managed with whatever is prescribed to you as you need it, though it's our job to provide education to you for the baby's safety. Pt not complying with plan over this shift. At next feed baby spent almost 50 minutes between alternate breasts followed by 25mL donor milk.     RN called resident room at 0020 and spoke with Dr. Oneill to inform her of the patient's lack of pain management and that patient has received 105mg of oxy each of the last two days. Provider stated that they will follow up with a possible repeat TAP block in AM along with anesth. RN also reported significant generalized/dependent edema that is to be assessed by providers in AM and repeat lasix dose will be considered. Called back and spoke to Dr. Silvestre to ask if I should still be giving the 15mg of oxy as needed and she said she had a conversation with patient yesterday morning about weaning, we can give it but only as requested by pt.     Vitals stable. Incision dressing clean, dry, and intact. Wound vacuum in place and on and patent. Receiving oxycodone, tylenol, and ibuprofen for pain. Significant edema. Voiding and passing gas, has had BM. Up ad amanda able to care for self and baby. Continue to monitor.

## 2019-12-17 NOTE — PROGRESS NOTES
"Post  Anesthesia Follow Up Note    Patient: Mariela Hawkins    Chief complaint: Acute postoperative pain managment    Procedure(s) Performed:   section with vertical incision, hysterectomy     Subjective:   Approached by OB team due to ongoing, significant pain post-op day #4.  Patient had TAP block with Exparel done in OR before extubation.  Pain has never been well controlled per patient and she \"wasn't even sure I got the TAP block\".  Reports pain is \"deep inside\" and is tearful when discussing this.       Objective:  Last Vitals: /45   Pulse 93   Temp 36.6  C (97.9  F) (Oral)   Resp 16   Wt 148.3 kg (326 lb 14.4 oz)   LMP 2019   SpO2 98%   Breastfeeding Unknown   BMI 52.80 kg/m        Assessment and Plan:   Mariela Hawkins is a 29 year old female POD #4 s/p   section with vertical incision and hysterectomy.  She got a TAP / QL block in the OR before extubation.  The block was suboptimal given the patient's body habitus (BMI >50) and difficult with ultrasound visualization of anatomy.  Unfortunately, she has had difficulty with pain control since operation and we offered a repeat TAP / QL block and discussed that this would help cover pain caused by the skin incision mostly.  We also discussed that the block would better cover the lower portion of the incision.  We informed the patient that it would likely help reduce her pain, but not eliminate it completley.       Plan if block is to be done is: bupivacaine 0.25% with epinephrine, dexamethasone, and dexmetodomidine to increase duration to 1-2 days (due to restrictions of Exparel)     The patient is considering her options and will inform her RN, Es, when she has made a decision.  Es will call resident anesthesia OB phone with patient decides.       Gordy \"Tahir\" Yusuf TRIPATHI MD  Anesthesia Resident  2019  10:57 AM           "

## 2019-12-17 NOTE — PROVIDER NOTIFICATION
12/17/19 1202   Provider Notification   Provider Name/Title Anesthesia   Method of Notification Phone   Request Evaluate in Person   Notification Reason Status Update   Pt now agreeing to second TAP block. Will coordinate with L&D and transfer pt to L&D PACU.

## 2019-12-17 NOTE — PROVIDER NOTIFICATION
"   12/17/19 1025   Provider Notification   Provider Name/Title Dr. Medina   Method of Notification Electronic Page   Request Evaluate in Person   Notification Reason Status Update   Pt very upset about pain medication being decreased. Requesting to speak with MD and discontinue today.     1045: Dr. Angel at bedside. Pt tearful stating \"I was told one thing and that the pain medications were safe for my baby and now I am being told the are not. The baby's doctor is making me feel like I am a bad mom for feeding my baby my breast milk.\" MD and RN provided reassurance to pt. Oxy decreased to 10mg and anesthesia aware. Plan to consult for second TAP block.   "

## 2019-12-17 NOTE — ANESTHESIA PROCEDURE NOTES
Peripheral Nerve Block Procedure Note    Staff:     Anesthesiologist:  Luther Lewis MD    Resident/CRNA:  Gordy Goldberg III, MD    Block performed by resident/CRNA in the presence of a teaching physician    Location: PACU  Procedure Start/Stop TImes:      12/17/2019 1:00 PM     12/17/2019 1:23 PM    patient identified, IV checked, site marked, risks and benefits discussed, informed consent, monitors and equipment checked, pre-op evaluation, at physician/surgeon's request and post-op pain management      Correct Patient: Yes      Correct Position: Yes      Correct Site: Yes      Correct Procedure: Yes      Correct Laterality:  Yes    Site Marked:  Yes  Procedure details:     Procedure:  Quadratus lumborum    ASA:  3    Laterality:  Bilateral    Position:  Right Lateral Decubitus and Left Lateral Decubitus    Sterile Prep: chloraprep, mask and sterile gloves      Needle:  Insulated    Needle gauge:  22    Needle length (inches):  4    Ultrasound: Yes      Ultrasound used to identify targeted nerve, plexus, or vascular structure and placed a needle adjacent to it      Permanent Image entered into patiient's record      Abnormal pain on injection: No      Blood Aspirated: No      Paresthesias:  No    Bleeding at site: No      Test dose negative for signs of intravascular injection: Yes      Bolus via:  Needle    Infusion Method:  Single Shot    Blood aspirated via catheter: No      Complications:  None  Assessment/Narrative:      Repeat QL for continued post-op pain

## 2019-12-17 NOTE — PLAN OF CARE
"RN to bedside and found patient feeding baby in chair. RN educated pt on effects of pain medications taken at 2040. RN mentioned possible sleepiness, possible SOB. Pulse ox offered for peace of mind for patient and RN, patient declined and reported \"I will not fall asleep in chair with baby and I will be fine.\" Pt denied shortness of breath and sleepiness. Call light placed right next pt and pt reminded to call right away if feeling sleepy at all and RN assistance will come right away.   "

## 2019-12-17 NOTE — PROGRESS NOTES
"[facility] Obstetrics   Postpartum Progress Note    Name: Mariela Hawkins  : 1990  MRN: [id number]    POD#4 s/p  hysterectomy    S: Mariela is doing better this morning. Did ok without IV pain meds this morning. Starting to get skin irritation from her wound vac, but is also worried that \"everything will fall apart\" when the wound vac comes off. She is trying to get up and do things around the room as well as walk so that she is simulating her home level of activity. Ate two full meals yesterday without n/v even without taking antiemetics. Passing flatus, voiding. No dizziness. Pumping, but milk hasn't come in yet.  Really uncomfortable with swelling, both in legs/feet and also in abdominal wall. Does not feel like PO lasix helped yesterday.    O:  Patient Vitals for the past 24 hrs:   BP Temp Temp src Pulse Resp   19 2000 128/61 97.6  F (36.4  C) Oral 93 16   19 1607 116/53 98.2  F (36.8  C) Oral 83 16   19 0845 107/59 98.2  F (36.8  C) Oral 94 18   19 0500 126/50 97.7  F (36.5  C) Oral 80 20     Gen: resting in bed, NAD  CV: Regular rate  Resp:  no increased work of breathing  Abd: taut subcutaneous edema, minimal tenderness, moderately tender, no rebound, non-distended  Incision: wound vac in place  Ext: 2+ lower extremity edema bilaterally    BMP  Recent Labs   Lab 19   CR 0.38*     CBC  Recent Labs   Lab 19  0709 12/15/19  0821 19  0641 19  2354 19  0726   WBC  --   --  12.1* 12.2*  --    RBC  --   --  3.35* 3.42*  --    HGB  --  9.1* 9.7* 9.9* 11.0*   HCT  --   --  31.0* 31.2*  --    MCV  --   --  93 91  --    MCH  --   --  29.0 28.9  --    MCHC  --   --  31.3* 31.7  --    RDW  --   --  14.6 14.5  --      --  168 166  --      A/P: Mariela Hawkins is a 29 year old  female, now POD#4 s/p  hysterectomy. Pregnancy was complicated by h/o CS x3, h/o ventral hernia repair, vasa previa, anxiety. Stable in the " postoperative period, working on pain control, edema.      # Anxiety  - Continue PTA zoloft  - 1 week mood check    # Postoperative care  - Lozenges ordered for sore throat 2/2 ET tube   - Continue routine postoperative management  - Rh pos, Rubella immune  - Pain: Currently requiring numerous pain meds to control pain, transitioned to ibuprofen yesterday, and decreased dilaudid use. Dilaudid discontinued this morning. Will need to work on decreasing oxycodone use now; made plan with patient to decrease to 10 mg prn from 15. Will talk to Anesthesia regarding a second TAP block today.   Consider adding flexeril.    VSS are normal, so no secondary etiology for pain such as bleeding or infection is suspected.   - ABLA: HGB 11.0> EBL 1000mL> 9.9> 9.7>9.1. Will discharge with PO Fe.  - FEN/GI: regular diet, stool softeners PRN  - INC: wound vac in place. Remove wound vac prior to discharge. Needs staple removal appointment.  - : s/p villa, voiding   - Edema: IV Lasix today;  Improving edema is likely to help with pain.  - Contraception: s/p hysterectomy  - Breastfeeding  - Baby doing well in room  - PPX: lovenox     Dispo: anticipate discharge to home this afternoon vs tomorrow.    Ally Silvestre MD  Ob/Gyn PGY-3  12/17/19 7:16 AM    Appreciate Dr. Silvestre's note above, patient also seen and examined by me.  Patient is very stressed about pain management and degree of pain she is in.  Dilaudid discontinued and on po oxycodone.  Will try to decrease oxycodone to 10mg q 3 hours.  Discussed repeat TAP block which patient is on the fence about, because she is unsure how much the first helped.  I encouraged her to try as it may help with her goal of decreasing oxycodone use.  Will reassess later this afternoon.  I agree with the note above.   Bhavya Medina MD

## 2019-12-17 NOTE — PLAN OF CARE
Pt very active in room today caring for self and baby.  Using roxicodone q3 hours, ibuprofen, and tylenol. Declines lidocaine patch. Used 0.3mg IV Dilaudid once for breakthrough pain after shower and  photo session. Would like to be evaluated for a repeat TAP block tomorrow. Vertical skin incision covered with wound-vac dressing, negative pressure maintained. Pt needing light assist with breastfeeding and supplementing pre-term infant.

## 2019-12-17 NOTE — PROVIDER NOTIFICATION
Provider notified of patients frequency and amount of oxy taken over last 2.5 days. Provider also made aware of edematous swelling. Providers comfortable with pain plan as is until morning.

## 2019-12-17 NOTE — PLAN OF CARE
VSS. Postpartum assessment WNL. Pt tearful about discussion that happened last night about Oxycodone dose. RN and Dr. Medina talked with pt in detail about how we want to make sure pt is comfortable but we also want to make sure baby is receiving safe breast milk. Wound Vac removed by Dr. Ho this afternoon. Incision, clean, dry, intact with staples in place. Voiding without difficulty and tolerating regular diet. Pt has 3+ edema in BLE with 2+ generalized edema. Pt breastfeeding infant Q3 hours and supplementing with donor milk. Pt unsure about evening discharge - wanting to wait until 1800 pain meds are given and spouse comes. Continue plan of care.

## 2019-12-17 NOTE — PLAN OF CARE
Mariela was transferred to the OB PACU at 1250 for a tap block procedure. She was given 100 mcg of fentanyl before the procedure to help prevent pain. Mariela tolerated the procedure well. Afebrile. BP 95/45 and 98/43 after the procedure. Denies dizziness or discomfort. Mariela became very sleepy and relaxed after the fentanyl but arouses with sound and touch. She was transferred to [facility] at 1400.

## 2019-12-18 NOTE — OP NOTE
[facility]  Full Operative Progress Note     Surgery Date:  2019     Surgeon:  MD Alex Wright MD    Assistants:  Nevaeh Whitman MD [facility] Fellow    Kari Bishop MD Fellow    Ally Silvestre MD, PGY-3    Pre-op Diagnosis:  1. Intrauterine pregnancy at 35w0d     2. Vasa previa     3. Placenta previa     4. Morbid Obesity     5. History of 3 previous  sections     6. History of umbilical hernia s/p repair with 4x4 cm mesh     7. History of Pfannenstiel incision dehiscence     8. Depression/anxiety    Post-op Diagnosis:  1. Same      2. Liveborn male infant      3. Morbidly adherent placent     4. Persistent umbilical hernia    Procedure:   hysterectomy, bilateral salpingectomy, cystoscopy    Anesthesia: Spinal converted to GETA    EBL:  1000 cc    IVF:  3000 mL crystalloid    UOP:  375 mL clear urine at the end of the case    Drains: Barahona Catheter     Specimens:  Uterus, cervix, and placenta, bilateral fallopian tubes    Complications: None apparent    Indications:   Mariela Hawkins is a 29 year old  at 35w0d admitted at 32w0d for vasa previa. She had a placenta previa as well. She had a 60% baseline risk of morbidly adherent placenta due to her history of 3 previous  sections and anterior placenta previa, but ultrasound findings did not confer increased suspicion of morbidly adherent placenta. She did not have an MRI as she is claustrophobic. Delivery was planned at 35 weeks. Due to concern for postpartum hemorrhage and morbidly adherent placenta, the decision was made to schedule  section in the main operating room. Operative plan included vertical midline incision with classical hysterotomy to avoid placenta. She was consented for a classical  section via vertical midline incision with possible hysterectomy, bilateral salpingectomy, and cystoscopy. Given her known umbilical hernia, we discussed the  possibility of cutting through the mesh if additional space was needed, and her increased hernia recurrence rate as a result. The risks, benefits, and alternatives were discussed with the patient, and she agreed to proceed. Risks discussed included bleeding, infection, injury to surrounding structures, wound complications, ileus venous thromboembolism, and need for future procedure or surgery. She consented to a blood transfusion. She was typed and crossed for 4U prior to the case, and 2U were available in the room.     Findings:   1. Minimal rectofascial adhesions. Omental adhesions to anterior abdominal wall. Minimal filmy intraabdominal adhesions.  2. Viable male infant, Apgars 5, 9, and 9  3. Evidence of morbidly adherent placenta with markedly increased vascularity visible at bulging lower uterine segment, no evidence of bladder involvement.  4. Upon closure, defect in fascia approximately 2.5 cm in diameter noted to left of umbilicus. Not contiguous with incision. Discussed with Dr. Doyle who recommended completing closure as planned as defect likely has been present for awhile.   5. Cystoscopy revealed brisk efflux from bilateral ureteral ostia and no evidence of injury to dome.    Procedure Details:   The patient was brought to the OR, where adequate spinal anesthesia was administered.  She was placed in the lithotomy position with a slight leftward tilt. She was prepped and draped in the usual sterile fashion. A villa catheter was placed. A surgical time out was performed. A vertical mideline skin incision was made with the scalpel, and carried down to the underlying fascia with sharp and blunt dissection. The fascia was incised, and the fascial incision was extended with cautery. The peritoneum was grasped with Traci clamps and entered sharply with Metzenbaum scissors. The final layer of peritoneum was entered bluntly. The fascial and peritoneum incision was extended in layers with cautery, with care to  avoid underlying structures. The uterus was noted to have increased vascularity with a bulging lower uterine segment. At this point, the decision was made to proceed with hysterectomy following delivery. Intraoperative ultrasound confirmed cephalic presentation and placental location. Once the fascial incision was noted to be adequate, a classical hysterotomy was made to avoid the placenta. The fetus was noted to be in cephalic position, but delivered breech due to the high uterine, almost fundal hysterotomy. The right foot was grasped and delivered. The left foot was then grasped and delivered. The infant was delivered to the level of the scapulae, and the two upper extremities were delivered with rotation. The fetal head was then flexed and delivered. The cord was doubly clamped and cut, and the infant was passed off to the awaiting NICU staff. The cord was tied off with 0 Vicryl. The uterus was exteriorized, and the hysterotomy was closed with a running locked suture of 0 Vicryl.     At this point, the Gynecology Oncology completed the hysterectomy portion of the procedure.    After the hysterectomy, cystoscopy was performed, and the above findings were noted. A 2.5 x 2.5 cm defect in the fascia was noted to the left of the previous mesh site. An intraoperative General Surgery phone consult was obtained, and they recommended closing the fascial incision as planned as the fascial defect is likely chronic and has not been problematic.     The fascia was closed with two running sutures of 0 looped PDS. The subcutaneous tissue was irrigated with warm saline. Areas of oozing were controlled with cautery. The subcutaneous tissue was closed in two layers using 3-0 Monocryl. The skin was closed with staples, and a wound vac with two foam pieces was placed over the incision.      All sponge, needle, and instrument counts were correct. The patient tolerated the procedure well, and was transferred to recovery in stable  condition. Dr. Carmona was present and scrubbed for the entirety of our procedure.     Ally Silvestre MD  OBGYN PGY-3  4:15 PM 12/13/2019    I was present for the entire procedure and agree with the above, edited note.   Joan Carmona MD 12/18/2019 8:38 AM

## 2019-12-18 NOTE — PLAN OF CARE
Pt is stable and pain managed with current pain regimen per pt. Pt discharged tonight with  with signficant other. Discharge instructions reviewed with patient and pt verbalized no further questions. Discharge medications provided to patient. PIV removed. Pt aware of need for follow up as instructed on discharge instructions.Discharged at 2030

## 2019-12-18 NOTE — LACTATION NOTE
This note was copied from a baby's chart.  Follow up visit today to review discharge plan and discuss medication safety. Mariela reports she is only getting small amounts, not enough to cover the bottom of her wide base bottles. Nursing reports she is not pumping often, but was down for repeat tap block today and working through adjustments in plan for pain control. She reports baby breastfeeds well for 10-15 minutes, takes supplements (15-25 mL today encouraged feed to satiety encouraging more) and she pumps until not getting anything out (about 10 minutes). She still prefers to use her pump from home and declines nipple shield despite review again today about benefits for LPT baby and rationale for use. Reviewed benefits of using [employer] rental pump for  baby bringing in best supply, especially in light of her history of low supply. She declines rental pump, says she did a lot of research and trusts hers more.. however states she will go through [facility] to get hospital grade rental pump if hers is not giving good results this week.     Answered many of the same questions today about formula, whether any are closer to breastmilk, how long to pump, formula preparation for different types (gave bottle feeding handout and reviewed with her) and reviewed breastfeeding tips for LPT babies with importance of frequent supplements and follow up plan printed on AVS. Encouraged her to avoid giving baby straight volumes of breastmilk until pain improving and taking safe amounts of oxycodone (40 mg or less per day). Also cautioned strongly if using other meds for pain to watch for additive sedation. Discussed Lyrica being Tubbs category L3 but also sedating, to monitor baby for sedation, decreased RR, apnea and poor feeding just as she would with oxycodone! Cautioned that other meds such as flexeril, hydroxyzine or even benadryl type medications can be sedating and have combined effect that can be unsafe  especially for  infant with concern for breathing, sedation and difficulty feeding. Urged her to discuss any medications she takes and any medication changes with baby's provider (plan follow up at [facility]) and continue feeding mainly with donor milk in hospital and formula after discharge until weaning down from her sedating medications and oxycodone <40 mg daily. Reviewed feeding log, how to tell if getting enough and when to call with concerns. Reviewed breastfeeding resource list adding in [email or web address], encouraged follow up with [facility] at [place] (Mariela worked with them with previous baby) later this week or early next week, sooner if concerns.

## 2019-12-18 NOTE — PROVIDER NOTIFICATION
12/17/19 1937   Provider Notification   Provider Name/Title Nino   Method of Notification Electronic Page   Request Evaluate-Remote   Notification Reason Other   7121 A.M pt would like to discharge tonight she changed her mind can you please place the order. Thank you Magali [id number]

## 2019-12-19 NOTE — PROGRESS NOTES
[facility] Clinic Note    Ms Hawkins is a 28yo  PPD#7 s/p c-hyst, b/l salpingectomy, cystoscopy for vasa previa & placenta previa. Pregnancy c/b: Morbid Obesity, h/o 3 previous CS, h/o umbilical hernia s/p repair with 4x4 cm mesh, h/o Pfannenstiel incision dehiscence, & depression/anxiety.    She is here for incision check/staple removal, and mood check. She reports she is having pain still and worried about her incision breaking open. She doesn't want the staples removed today. She is taking ibuprofen 600mg every 6-8hrs and tylenol 650mg every 6hrs. Gabapentin using PRN as well. The oxycodone she has tried to spread out a little to every 4-5hrs, does at times take 2 tabs. She needs a refill on oxycodone. No drainage from incision, does note brown vaginal discharge, no bleeding. No fevers, but did feel chilled last night. Eating and drinking normally.     O:  /88   Pulse 90   Wt (!) 150.6 kg (332 lb)   LMP 04/12/2019   Breastfeeding Yes   BMI 53.62 kg/m       Gen: NAD  Psych: teary when discussing incision, otherwise approopriate  Abd: obese, appropriate tenderness around incision, no rebound or guarding  Incision: VML c/d/i with staples, no erythema or fluctuance around incision - healing well    A/P: Ms Hawkins is a 28yo  PPD#7 s/p c-hyst via VML incision. History notable for morbid obesity w/ BMI 53, h/o CS incision dehiscence, and umbilical hernia repair w/ mesh in place.    As patient desires a few more days to ensure incision heals well, she does have significant obesity that may affect wound closure, and h/o incision dehiscence, will plan on staple removal at day 10 -- will schedule for Monday  Refill rx oxycodone, 20 tabs. Discussed tapering. Will NOT refill again. Discussed risk of tolerance, decreased effectiveness for pain in the long term and need for higher doses and in turn risk of dependence.    D/w Dr Sera Galvez MD   OB Gyn PGY4  12/20/19    Patient was seen by the  resident in [facility].  I reviewed the history & exam.  The patient's assessment and plan were made jointly.    Petra Zamora MD MPH

## 2019-12-20 ENCOUNTER — OFFICE VISIT (OUTPATIENT)
Dept: OBGYN | Facility: CLINIC | Age: 29
End: 2019-12-20
Attending: STUDENT IN AN ORGANIZED HEALTH CARE EDUCATION/TRAINING PROGRAM
Payer: COMMERCIAL

## 2019-12-20 VITALS
SYSTOLIC BLOOD PRESSURE: 136 MMHG | DIASTOLIC BLOOD PRESSURE: 88 MMHG | WEIGHT: 293 LBS | BODY MASS INDEX: 53.62 KG/M2 | HEART RATE: 90 BPM

## 2019-12-20 DIAGNOSIS — Z98.891 S/P C-SECTION: ICD-10-CM

## 2019-12-20 DIAGNOSIS — R30.0 DYSURIA: ICD-10-CM

## 2019-12-20 DIAGNOSIS — F11.90 OPIOID USE: Primary | ICD-10-CM

## 2019-12-20 LAB
ALBUMIN UR-MCNC: NEGATIVE MG/DL
ALBUMIN UR-MCNC: NEGATIVE MG/DL
APPEARANCE UR: CLEAR
APPEARANCE UR: CLEAR
BACTERIA #/AREA URNS HPF: ABNORMAL /HPF
BILIRUB UR QL STRIP: NEGATIVE
BILIRUB UR QL STRIP: NEGATIVE
COLOR UR AUTO: ABNORMAL
COLOR UR AUTO: YELLOW
GLUCOSE UR STRIP-MCNC: NEGATIVE MG/DL
GLUCOSE UR STRIP-MCNC: NEGATIVE MG/DL
HGB UR QL STRIP: ABNORMAL
HGB UR QL STRIP: NEGATIVE
KETONES UR STRIP-MCNC: NEGATIVE MG/DL
KETONES UR STRIP-MCNC: NEGATIVE MG/DL
LEUKOCYTE ESTERASE UR QL STRIP: ABNORMAL
LEUKOCYTE ESTERASE UR QL STRIP: NEGATIVE
MUCOUS THREADS #/AREA URNS LPF: PRESENT /LPF
NITRATE UR QL: NEGATIVE
NITRATE UR QL: NEGATIVE
PH UR STRIP: 7 PH (ref 5–7)
PH UR STRIP: 7 PH (ref 5–7)
RBC #/AREA URNS AUTO: <1 /HPF (ref 0–2)
SOURCE: ABNORMAL
SP GR UR STRIP: 1.01 (ref 1–1.03)
SP GR UR STRIP: 1.02 (ref 1–1.03)
SQUAMOUS #/AREA URNS AUTO: <1 /HPF (ref 0–1)
UROBILINOGEN UR STRIP-ACNC: 0.2 EU/DL (ref 0.2–1)
UROBILINOGEN UR STRIP-MCNC: NORMAL MG/DL (ref 0–2)
WBC #/AREA URNS AUTO: 3 /HPF (ref 0–5)

## 2019-12-20 PROCEDURE — 81001 URINALYSIS AUTO W/SCOPE: CPT | Performed by: STUDENT IN AN ORGANIZED HEALTH CARE EDUCATION/TRAINING PROGRAM

## 2019-12-20 PROCEDURE — G0463 HOSPITAL OUTPT CLINIC VISIT: HCPCS | Mod: ZF

## 2019-12-20 PROCEDURE — 87088 URINE BACTERIA CULTURE: CPT | Performed by: STUDENT IN AN ORGANIZED HEALTH CARE EDUCATION/TRAINING PROGRAM

## 2019-12-20 PROCEDURE — 87086 URINE CULTURE/COLONY COUNT: CPT | Performed by: STUDENT IN AN ORGANIZED HEALTH CARE EDUCATION/TRAINING PROGRAM

## 2019-12-20 PROCEDURE — 81003 URINALYSIS AUTO W/O SCOPE: CPT | Mod: XU

## 2019-12-20 RX ORDER — OXYCODONE HYDROCHLORIDE 5 MG/1
5 TABLET ORAL EVERY 4 HOURS PRN
Qty: 20 TABLET | Refills: 0 | Status: SHIPPED | OUTPATIENT
Start: 2019-12-20 | End: 2020-01-02

## 2019-12-20 ASSESSMENT — ANXIETY QUESTIONNAIRES
GAD7 TOTAL SCORE: 0
7. FEELING AFRAID AS IF SOMETHING AWFUL MIGHT HAPPEN: NOT AT ALL
5. BEING SO RESTLESS THAT IT IS HARD TO SIT STILL: NOT AT ALL
1. FEELING NERVOUS, ANXIOUS, OR ON EDGE: NOT AT ALL
6. BECOMING EASILY ANNOYED OR IRRITABLE: NOT AT ALL
3. WORRYING TOO MUCH ABOUT DIFFERENT THINGS: NOT AT ALL
2. NOT BEING ABLE TO STOP OR CONTROL WORRYING: NOT AT ALL

## 2019-12-20 ASSESSMENT — PATIENT HEALTH QUESTIONNAIRE - PHQ9
SUM OF ALL RESPONSES TO PHQ QUESTIONS 1-9: 0
5. POOR APPETITE OR OVEREATING: NOT AT ALL

## 2019-12-20 ASSESSMENT — PAIN SCALES - GENERAL: PAINLEVEL: EXTREME PAIN (8)

## 2019-12-20 NOTE — PATIENT INSTRUCTIONS
Increase tylenol to 3 tabs (975mg) every 6 hours and continue ibuprofen 600mg every 6 hrs scheduled.  Then as need take oxycodone 1 tabs every 4hrs for pain for the next day, then spread out to every 6 hrs on day 2 and every 8 hrs on day 3 and so on until you are no longer requiring this medication. You will have pain and and soreness, this will improve day by day but will not resolve completely for weeks.  Call our clinic if you develop fever >100.4F when you take your temperature, vomiting and unable to tolerate PO intake, thick white discharge from your incision.    Make an appointment on Monday for staple removal.

## 2019-12-20 NOTE — LETTER
12/20/2019       RE: Mariela Hawkins  [address]  [place] [address]     Dear Colleague,    Thank you for referring your patient, Mariela Hawkins, to the [facility] at [facility]. Please see a copy of my visit note below.    S Clinic Note    Ms Hawkins is a 28yo  PPD#7 s/p c-hyst, b/l salpingectomy, cystoscopy for vasa previa & placenta previa. Pregnancy c/b: Morbid Obesity , h/o 3 previous CS, h/o umbilical hernia s/p repair with 4x4 cm mesh, h/o Pfannenstiel incision dehiscence, & depression/anxiety.    She is here for incision check/staple removal, and mood check. She reports she is having pain still and worried about her incision breaking open. She doesn't want the staples removed today. She is taking ibuprofen 600mg every 6-8hrs and tylenol 650mg every 6hrs. Gabapentin using PRN as well. The oxycodone she has tried to spread out a little to every 4-5hrs, does at times take 2 tabs. She needs a refill on oxycodone. No drainage from incision, does note brown vaginal discharge, no bleeding. No fevers, but did feel chilled last night. Eating and drinking normally.     O:  /88   Pulse 90   Wt (!) 150.6 kg (332 lb)   LMP 04/12/2019   Breastfeeding Yes   BMI 53.62 kg/m        Gen: NAD  Psych: teary when discussing incision, otherwise approopriate  Abd: obese, appropriate tenderness around incision, no rebound or guarding  Incision: VML c/d/i with staples, no erythema or fluctuance around incision - healing well    A/P: Ms Hawkins is a 28yo  PPD#7 s/p c-hyst via VML incision. History notable for morbid obesity w/ BMI 53, h/o CS incision dehiscence, and umbilical hernia repair w/ mesh in place.    As patient desires a few more days to ensure incision heals well, she does have significant obesity that may affect wound closure, and h/o incision dehiscence, will plan on staple removal at day 10 -- will schedule for Monday  Refill rx  oxycodone, 20 tabs. Discussed tapering. Will NOT refill again. Discussed risk of tolerance, decreased effectiveness for pain in the long term and need for higher doses and in turn risk of dependence.    D/w Dr Sera Galvez MD   OB Gyn PGY4  12/20/19    Patient was seen by the resident in [facility] Clinic.  I reviewed the history & exam.  The patient's assessment and plan were made jointly.      Petra Zamora MD MPH

## 2019-12-21 LAB
BACTERIA SPEC CULT: ABNORMAL
BACTERIA SPEC CULT: ABNORMAL
SPECIMEN SOURCE: ABNORMAL

## 2019-12-21 ASSESSMENT — ANXIETY QUESTIONNAIRES: GAD7 TOTAL SCORE: 0

## 2019-12-23 ENCOUNTER — OFFICE VISIT (OUTPATIENT)
Dept: OBGYN | Facility: CLINIC | Age: 29
End: 2019-12-23
Attending: OBSTETRICS & GYNECOLOGY
Payer: COMMERCIAL

## 2019-12-23 ENCOUNTER — RECORDS - HEALTHEAST (OUTPATIENT)
Dept: ADMINISTRATIVE | Facility: OTHER | Age: 29
End: 2019-12-23

## 2019-12-23 VITALS
DIASTOLIC BLOOD PRESSURE: 76 MMHG | WEIGHT: 293 LBS | BODY MASS INDEX: 53.18 KG/M2 | SYSTOLIC BLOOD PRESSURE: 113 MMHG | HEART RATE: 94 BPM | TEMPERATURE: 97.6 F

## 2019-12-23 DIAGNOSIS — L76.82 INCISIONAL PAIN: Primary | ICD-10-CM

## 2019-12-23 DIAGNOSIS — L08.9 WOUND INFECTION: ICD-10-CM

## 2019-12-23 DIAGNOSIS — T14.8XXA WOUND INFECTION: ICD-10-CM

## 2019-12-23 PROCEDURE — G0463 HOSPITAL OUTPT CLINIC VISIT: HCPCS | Mod: ZF

## 2019-12-23 RX ORDER — CLINDAMYCIN HCL 300 MG
300 CAPSULE ORAL 3 TIMES DAILY
Qty: 21 CAPSULE | Refills: 0 | Status: SHIPPED | OUTPATIENT
Start: 2019-12-23 | End: 2020-01-23

## 2019-12-23 RX ORDER — LIDOCAINE 4 G/G
2 PATCH TOPICAL EVERY 24 HOURS
Qty: 12 PATCH | Refills: 1 | Status: SHIPPED | OUTPATIENT
Start: 2019-12-23 | End: 2020-01-09

## 2019-12-23 ASSESSMENT — PAIN SCALES - GENERAL: PAINLEVEL: SEVERE PAIN (6)

## 2019-12-23 NOTE — LETTER
"12/23/2019       RE: Mariela Hawkins  [address]  [place] [address]     Dear Colleague,    Thank you for referring your patient, Mariela Hawkins, to the [facility] at [facility]. Please see a copy of my visit note below.    [facility] Visit    CC: Staple removal    S: 29 year old  POD#10 s/p c-hyst for placenta acreta here for staple removal. Has been noting more focal pain and drainage past day or 2. No fevers. Feels incision is \"tugging.\" No vaginal discharge or drainage.     O: /76 (BP Location: Left arm, Patient Position: Chair)   Pulse 94   Temp 97.6  F (36.4  C) (Oral)   Wt 149.4 kg (329 lb 4.8 oz)   LMP 04/12/2019   Breastfeeding Yes   BMI 53.18 kg/m     General: No distress  Abdomen: Obese with midline incision with staples. Redness at staple sites and following staple removal- small amount of erythema mid incision with several small pinpoint openings along incision. Point tenderness mid incision. Steri strips applied.     A:29 year old here for staple removal with likely early cellulitis.     P: High risk for wound infection, will do course clindamycin x 1 week.   Warning precautions for wound infection reviewed  Follow up in 1 week for incision check.       Yvette Allen MD FACOG      "

## 2019-12-23 NOTE — NURSING NOTE
Chief Complaint   Patient presents with     Follow Up     Staple removal       See BRENNAN Otto 12/23/2019

## 2019-12-24 NOTE — PROGRESS NOTES
"[facility] Visit    CC: Staple removal    S: 29 year old  POD#10 s/p c-hyst for placenta acreta here for staple removal. Has been noting more focal pain and drainage past day or 2. No fevers. Feels incision is \"tugging.\" No vaginal discharge or drainage.     O: /76 (BP Location: Left arm, Patient Position: Chair)   Pulse 94   Temp 97.6  F (36.4  C) (Oral)   Wt 149.4 kg (329 lb 4.8 oz)   LMP 04/12/2019   Breastfeeding Yes   BMI 53.18 kg/m    General: No distress  Abdomen: Obese with midline incision with staples. Redness at staple sites and following staple removal- small amount of erythema mid incision with several small pinpoint openings along incision. Point tenderness mid incision. Steri strips applied.     A:29 year old here for staple removal with likely early cellulitis.     P: High risk for wound infection, will do course clindamycin x 1 week.   Warning precautions for wound infection reviewed  Follow up in 1 week for incision check.       Yvette Allen MD FACOG  "

## 2019-12-26 LAB — COPATH REPORT: NORMAL

## 2019-12-28 ENCOUNTER — RECORDS - HEALTHEAST (OUTPATIENT)
Dept: ADMINISTRATIVE | Facility: OTHER | Age: 29
End: 2019-12-28

## 2019-12-31 ENCOUNTER — TELEPHONE (OUTPATIENT)
Dept: OBGYN | Facility: CLINIC | Age: 29
End: 2019-12-31

## 2019-12-31 NOTE — TELEPHONE ENCOUNTER
Spoke with Mariela who had staples removed about a week ago and she is noticing her  incision starting to open up and ooze. She states the redness around her incision has not improved. Nurse directed patient directly to ED but patient refused. States that she already went to an urgent care yesterday. They swabbed it and are checking for MRSA. Nurse explained that an urgent care doctor cannot repair the incision if it is in indeed Dehiscence and she needs to be seen ASAP. Patient has history of dehiscence in past wound. Patient still refusing ED. She is adamant about waiting until clinic appt.     - patient going against Nurse advice to go to ED

## 2020-01-02 ENCOUNTER — RECORDS - HEALTHEAST (OUTPATIENT)
Dept: ADMINISTRATIVE | Facility: OTHER | Age: 30
End: 2020-01-02

## 2020-01-02 ENCOUNTER — TELEPHONE (OUTPATIENT)
Dept: OBGYN | Facility: CLINIC | Age: 30
End: 2020-01-02

## 2020-01-02 ENCOUNTER — OFFICE VISIT (OUTPATIENT)
Dept: OBGYN | Facility: CLINIC | Age: 30
End: 2020-01-02
Attending: OBSTETRICS & GYNECOLOGY
Payer: COMMERCIAL

## 2020-01-02 VITALS
DIASTOLIC BLOOD PRESSURE: 85 MMHG | WEIGHT: 293 LBS | TEMPERATURE: 98.1 F | BODY MASS INDEX: 47.09 KG/M2 | HEIGHT: 66 IN | HEART RATE: 86 BPM | SYSTOLIC BLOOD PRESSURE: 128 MMHG

## 2020-01-02 DIAGNOSIS — T14.8XXA WOUND INFECTION: Primary | ICD-10-CM

## 2020-01-02 DIAGNOSIS — L08.9 WOUND INFECTION: Primary | ICD-10-CM

## 2020-01-02 DIAGNOSIS — Z98.891 S/P C-SECTION: ICD-10-CM

## 2020-01-02 PROCEDURE — 12021 TX SUPFC WND DEHSN W/PACKING: CPT | Mod: ZF | Performed by: OBSTETRICS & GYNECOLOGY

## 2020-01-02 PROCEDURE — G0463 HOSPITAL OUTPT CLINIC VISIT: HCPCS | Mod: ZF

## 2020-01-02 RX ORDER — OXYCODONE HYDROCHLORIDE 5 MG/1
5 TABLET ORAL EVERY 4 HOURS PRN
Qty: 20 TABLET | Refills: 0 | Status: SHIPPED | OUTPATIENT
Start: 2020-01-02 | End: 2020-01-16

## 2020-01-02 ASSESSMENT — PAIN SCALES - GENERAL: PAINLEVEL: NO PAIN (0)

## 2020-01-02 ASSESSMENT — MIFFLIN-ST. JEOR: SCORE: 2199.27

## 2020-01-02 NOTE — LETTER
"2020       RE: Mariela Hawkins  [address]  [place] [address]     Dear Colleague,    Thank you for referring your patient, Mariela Hawkins, to the [facility] at [facility]. Please see a copy of my visit note below.    30 yo  POD#20 s/p c-hyst her for wound follow-up.  Staples were removed on 19 (10 days ago) and she presents with new drainage and skin separation from the incision.  She was started on clindamycin and is completing this prescription.  She denies fever or chills.  She continues to de león a URI and is coughing which makes the pain worse.  Denies vaginal discharge.  She states her  has been cleaning the wound with hydrogen peroxide and that she covers it loosely with a cloth diaper.      ROS: 10 point ROS neg other than the symptoms noted above in the HPI.    Past Medical History:   Diagnosis Date     Acanthosis nigricans      BV (bacterial vaginosis)      Migraines      Ovarian cystic mass      Pain in limb      Pelvic pain in female      Past Surgical History:   Procedure Laterality Date      SECTION        SECTION, IMMEDIATE HYSTERECTOMY, COMBINED N/A 2019    Procedure: Repeat  Section, Hysterectomy, Exploratory Laparotomy, Bilateral Salpingectomy, Cystoscopy;  Surgeon: Joan Carmona MD;  [facility]: [facility]     RECESSION RESECTION (REPAIR STRABISMUS) BILATERAL Bilateral 2014    Procedure: RECESSION RESECTION (REPAIR STRABISMUS) BILATERAL;  Surgeon: Titi Awan MD;  [facility]     RECESSION RESECTION (REPAIR STRABISMUS) BILATERAL Bilateral 2015    Procedure: RECESSION RESECTION (REPAIR STRABISMUS) BILATERAL;  Surgeon: Titi Awan MD;  Location:  [facility]       O: /85   Pulse 86   Temp 98.1  F (36.7  C) (Oral)   Ht 1.676 m (5' 5.98\")   Wt 146.8 kg (323 lb 9.6 oz)   LMP 2019   BMI 52.26 kg/m     Gen'l: no distress, " "uncomfortable with wound exam/dressing but tolerates well  Abd: obese, soft, ND, NT  Incision: vertical midline with separation of skin edges beginning 3 cm below the umbilicus and extending 3 cm, again 1 cm incision of skin separation below that.  There are very small areas where the skin is weeping but no separation at the crease of her pannus and an area slightly above.  Probing each area of skin separation it is only the upper 3 cm area where there is any disruption of the subcutaneous tissues.  The fascia is intact and only approximately 1 cm of the 3 cm area is open to the fascia. The subcutaneous tissue is beefy red granulation tissue without purulent drainage.  All other skin separations are approximated with steri strips.  The deeper area was packed with 1/4 inch nugauze after irrigation with sterile water.  It was dressed with a 4x4 and ABD dressing using paper tape.    Assessment/Plan:  30 yo  POD#20 from [facility] with wound separation and wound infection.  -complete course of clindamycin, no evidence of worsening cellulitis or infection.  -wound packed and dressed today, orders sent for home nursing:   \"Wound separation at cephalad end of incision, 3 cm opening, fascia is intact. The wound was irrigated with sterile water and packed with 1/4\" nu-guaze (6 inch piece) and dressed with 4x4 and ABD dressing with paper tape.  Other areas of skin separation are reapproximated with steri strip and this can be left in place.  Repeat daily.\"  -RTC in 1 week or with any signs of worsening infection.  -Refill of oxycodone #20 given.    Bhavya Medina MD            "

## 2020-01-02 NOTE — TELEPHONE ENCOUNTER
Referral, supporting clinical documentation, med list, and demographics faxed to [facility] at [phone].    Ph: [phone]

## 2020-01-02 NOTE — PROGRESS NOTES
"28 yo  POD#20 s/p c-hyst her for wound follow-up.  Staples were removed on 19 (10 days ago) and she presents with new drainage and skin separation from the incision.  She was started on clindamycin and is completing this prescription.  She denies fever or chills.  She continues to de león a URI and is coughing which makes the pain worse.  Denies vaginal discharge.  She states her  has been cleaning the wound with hydrogen peroxide and that she covers it loosely with a cloth diaper.      ROS: 10 point ROS neg other than the symptoms noted above in the HPI.    Past Medical History:   Diagnosis Date     Acanthosis nigricans      BV (bacterial vaginosis)      Migraines      Ovarian cystic mass      Pain in limb      Pelvic pain in female      Past Surgical History:   Procedure Laterality Date      SECTION        SECTION, IMMEDIATE HYSTERECTOMY, COMBINED N/A 2019    Procedure: Repeat  Section, Hysterectomy, Exploratory Laparotomy, Bilateral Salpingectomy, Cystoscopy;  Surgeon: Joan Carmona MD;  [facility]     RECESSION RESECTION (REPAIR STRABISMUS) BILATERAL Bilateral 2014    Procedure: RECESSION RESECTION (REPAIR STRABISMUS) BILATERAL;  Surgeon: Titi Awan MD;  [facility]     RECESSION RESECTION (REPAIR STRABISMUS) BILATERAL Bilateral 2015    Procedure: RECESSION RESECTION (REPAIR STRABISMUS) BILATERAL;  Surgeon: Titi Awan MD;  [facility]:  [facility]       O: /85   Pulse 86   Temp 98.1  F (36.7  C) (Oral)   Ht 1.676 m (5' 5.98\")   Wt 146.8 kg (323 lb 9.6 oz)   LMP 2019   BMI 52.26 kg/m    Gen'l: no distress, uncomfortable with wound exam/dressing but tolerates well  Abd: obese, soft, ND, NT  Incision: vertical midline with separation of skin edges beginning 3 cm below the umbilicus and extending 3 cm, again 1 cm incision of skin separation below that.  There are very small areas where the skin is " "weeping but no separation at the crease of her pannus and an area slightly above.  Probing each area of skin separation it is only the upper 3 cm area where there is any disruption of the subcutaneous tissues.  The fascia is intact and only approximately 1 cm of the 3 cm area is open to the fascia. The subcutaneous tissue is beefy red granulation tissue without purulent drainage.  All other skin separations are approximated with steri strips.  The deeper area was packed with 1/4 inch nugauze after irrigation with sterile water.  It was dressed with a 4x4 and ABD dressing using paper tape.    Assessment/Plan:  30 yo  POD#20 from [facility] with wound separation and wound infection.  -complete course of clindamycin, no evidence of worsening cellulitis or infection.  -wound packed and dressed today, orders sent for home nursing:   \"Wound separation at cephalad end of incision, 3 cm opening, fascia is intact. The wound was irrigated with sterile water and packed with 1/4\" nu-guaze (6 inch piece) and dressed with 4x4 and ABD dressing with paper tape.  Other areas of skin separation are reapproximated with steri strip and this can be left in place.  Repeat daily.\"  -RTC in 1 week or with any signs of worsening infection.  -Refill of oxycodone #20 given.  Bhavya Medina MD        "

## 2020-01-03 ENCOUNTER — HOME CARE/HOSPICE - HEALTHEAST (OUTPATIENT)
Dept: HOME HEALTH SERVICES | Facility: HOME HEALTH | Age: 30
End: 2020-01-03

## 2020-01-04 ENCOUNTER — HOME CARE/HOSPICE - HEALTHEAST (OUTPATIENT)
Dept: HOME HEALTH SERVICES | Facility: HOME HEALTH | Age: 30
End: 2020-01-04

## 2020-01-05 ENCOUNTER — HOME CARE/HOSPICE - HEALTHEAST (OUTPATIENT)
Dept: HOME HEALTH SERVICES | Facility: HOME HEALTH | Age: 30
End: 2020-01-05

## 2020-01-06 ENCOUNTER — HOME CARE/HOSPICE - HEALTHEAST (OUTPATIENT)
Dept: HOME HEALTH SERVICES | Facility: HOME HEALTH | Age: 30
End: 2020-01-06

## 2020-01-07 ENCOUNTER — HOME CARE/HOSPICE - HEALTHEAST (OUTPATIENT)
Dept: HOME HEALTH SERVICES | Facility: HOME HEALTH | Age: 30
End: 2020-01-07

## 2020-01-07 NOTE — TELEPHONE ENCOUNTER
Spoke with Eva with [facility] ([phone]) who confirmed patient has been and is continuing to be seen by them.

## 2020-01-08 ENCOUNTER — HOME CARE/HOSPICE - HEALTHEAST (OUTPATIENT)
Dept: HOME HEALTH SERVICES | Facility: HOME HEALTH | Age: 30
End: 2020-01-08

## 2020-01-08 DIAGNOSIS — L76.82 INCISIONAL PAIN: Primary | ICD-10-CM

## 2020-01-08 RX ORDER — OXYCODONE HYDROCHLORIDE 5 MG/1
5 TABLET ORAL EVERY 6 HOURS PRN
Qty: 5 TABLET | Refills: 0 | Status: SHIPPED | OUTPATIENT
Start: 2020-01-08 | End: 2020-01-09

## 2020-01-08 NOTE — TELEPHONE ENCOUNTER
Received call from Mariela requesting short refill of oxycodone for incision pain. Pt using 4-6 tabs per day  which is appropriate per order placed by Dr. Medina on 1/2. Has appointment tomorrow but only 2 tabs left. Requesting short term refill to last through appointment time tomorrow. Home wound care nurse at patient's home at time of visit , she informed this nurse wound site improving from first visit.    Discussed with Dr. Allen who approves refill. Will send to Dr. Cortez for signature--Dr. Allen unable to e-sign controlled meds at this time

## 2020-01-09 ENCOUNTER — TELEPHONE (OUTPATIENT)
Dept: OBGYN | Facility: CLINIC | Age: 30
End: 2020-01-09

## 2020-01-09 ENCOUNTER — OFFICE VISIT (OUTPATIENT)
Dept: OBGYN | Facility: CLINIC | Age: 30
End: 2020-01-09
Attending: OBSTETRICS & GYNECOLOGY
Payer: COMMERCIAL

## 2020-01-09 VITALS
WEIGHT: 293 LBS | DIASTOLIC BLOOD PRESSURE: 51 MMHG | SYSTOLIC BLOOD PRESSURE: 128 MMHG | BODY MASS INDEX: 52 KG/M2 | HEART RATE: 77 BPM

## 2020-01-09 DIAGNOSIS — L08.9 WOUND INFECTION: Primary | ICD-10-CM

## 2020-01-09 DIAGNOSIS — Z98.891 S/P C-SECTION: ICD-10-CM

## 2020-01-09 DIAGNOSIS — L76.82 INCISIONAL PAIN: ICD-10-CM

## 2020-01-09 DIAGNOSIS — L08.9 WOUND INFECTION: ICD-10-CM

## 2020-01-09 DIAGNOSIS — T14.8XXA WOUND INFECTION: ICD-10-CM

## 2020-01-09 DIAGNOSIS — T14.8XXA WOUND INFECTION: Primary | ICD-10-CM

## 2020-01-09 PROCEDURE — 12021 TX SUPFC WND DEHSN W/PACKING: CPT | Mod: ZF | Performed by: OBSTETRICS & GYNECOLOGY

## 2020-01-09 RX ORDER — SULFAMETHOXAZOLE/TRIMETHOPRIM 800-160 MG
1 TABLET ORAL 2 TIMES DAILY
Qty: 20 TABLET | Refills: 0 | Status: SHIPPED | OUTPATIENT
Start: 2020-01-09 | End: 2020-01-23

## 2020-01-09 RX ORDER — OXYCODONE HYDROCHLORIDE 5 MG/1
5 TABLET ORAL EVERY 6 HOURS PRN
Qty: 15 TABLET | Refills: 0 | Status: SHIPPED | OUTPATIENT
Start: 2020-01-09 | End: 2021-10-18

## 2020-01-09 RX ORDER — IBUPROFEN 600 MG/1
600 TABLET, FILM COATED ORAL EVERY 6 HOURS PRN
Qty: 60 TABLET | Refills: 0 | Status: SHIPPED | OUTPATIENT
Start: 2020-01-09 | End: 2021-10-18

## 2020-01-09 RX ORDER — OXYCODONE HYDROCHLORIDE 5 MG/1
5 TABLET ORAL EVERY 6 HOURS PRN
Qty: 15 TABLET | Refills: 0 | Status: CANCELLED | OUTPATIENT
Start: 2020-01-09

## 2020-01-09 RX ORDER — OXYCODONE HYDROCHLORIDE 5 MG/1
5 TABLET ORAL EVERY 6 HOURS PRN
Qty: 15 TABLET | Refills: 0 | Status: SHIPPED | OUTPATIENT
Start: 2020-01-09 | End: 2020-01-09

## 2020-01-09 ASSESSMENT — PAIN SCALES - GENERAL: PAINLEVEL: NO PAIN (0)

## 2020-01-09 NOTE — TELEPHONE ENCOUNTER
Prior authorization for oxycodone completed via phone. Reorder of oxycodone to patient's local clinic, as she left [facility] due to PA needed.     Reorder routed to Dr. Medina for approval.

## 2020-01-09 NOTE — LETTER
2020       RE: Mariela Hawkins  [address]  [place] [address]     Dear Colleague,    Thank you for referring your patient, Mariela Hawkins, to the [facility] at [facility]. Please see a copy of my visit note below.    [facility]ne[facility]   Brief Progress Note    2020    CC: wound check     S: Mariela Hawkins is a 29 year old  s/p c-hyst for placenta accreta, placenta previa on 2019 complicated by superficial wound separation. Has completed a course of clindamycin on Tuesday. Reports 5/10 pain today- has not taken any narcotics. Taking oxycodone approximately 3 times daily in addition to scheduled ibuprofen and acetaminophen. Pain controlled with this regimen. Denies fevers, chills, changes in bowel habits. Possibly slighlty increased serosanguinous drainage from wound. Increased erythema noted on Monday extending to right of the incision which has now improved and resolved. No changes in vaginal discharge, denies purulent vaginal discharge.     O:  /51   Pulse 77   Wt 146.1 kg (322 lb)   LMP 2019   Breastfeeding Yes   BMI 52.00 kg/m       Gen: NAD, alert  HEENT: AT/NC  Resp: non-labored breathing on room air  Abd: soft, non-distended, mildly tender around incision   Inc: WML with superficial separation of skin edges approximately 3 cm below the umbilicus and extending 2 cm - probed to be 3.5 cm deep at maximal depth which extends to right of midline with intact fascia, there is a smaller shallow superficial separation approximately 1 cm below the larger opening. There is a new area of induration extending 1.75 cm at right margin of incision without overlying erythema. A single 1/4 inch strip of packing was placed in the superficial opening after irrigation with normal saline. No communication between the larger superficial separation and smaller distal area of separation noted with  irrigation. Small overlying dressing placed.   Psych: normal mood and affect     A/P:  29 year old  s/p c-hyst via VML on 2019 with persistent but improving superficial wound separation and new induration.    # superficial wound separation   - prescribed Bactrim BID for 10 day course given new induration with resolving evidence of cellulitis   - continue home health nursing visits for daily dressing changes   - represcribed oxycodone 15 tabs and ibuprofen refilled as well    RTC in 1 week for wound check, PRN if concern for worsening infection       Patient seen and discussed with Dr. Adam Jameson MD  Ob/Gyn Resident, PGY-1  20 8:06 AM    I was present and assisted throughout the wound packing procedure above,  I agree with the note above.    Bhavya Medina MD

## 2020-01-09 NOTE — PROGRESS NOTES
[facility]   Brief Progress Note    2020    CC: wound check     S: Mariela Hawkins is a 29 year old  s/p c-hyst for placenta accreta, placenta previa on 2019 complicated by superficial wound separation. Has completed a course of clindamycin on Tuesday. Reports 5/10 pain today- has not taken any narcotics. Taking oxycodone approximately 3 times daily in addition to scheduled ibuprofen and acetaminophen. Pain controlled with this regimen. Denies fevers, chills, changes in bowel habits. Possibly slighlty increased serosanguinous drainage from wound. Increased erythema noted on Monday extending to right of the incision which has now improved and resolved. No changes in vaginal discharge, denies purulent vaginal discharge.     O:  /51   Pulse 77   Wt 146.1 kg (322 lb)   LMP 2019   Breastfeeding Yes   BMI 52.00 kg/m      Gen: NAD, alert  HEENT: AT/NC  Resp: non-labored breathing on room air  Abd: soft, non-distended, mildly tender around incision   Inc: WML with superficial separation of skin edges approximately 3 cm below the umbilicus and extending 2 cm - probed to be 3.5 cm deep at maximal depth which extends to right of midline with intact fascia, there is a smaller shallow superficial separation approximately 1 cm below the larger opening. There is a new area of induration extending 1.75 cm at right margin of incision without overlying erythema. A single 1/4 inch strip of packing was placed in the superficial opening after irrigation with normal saline. No communication between the larger superficial separation and smaller distal area of separation noted with irrigation. Small overlying dressing placed.   Psych: normal mood and affect     A/P:  29 year old  s/p c-hyst via VML on 2019 with persistent but improving superficial wound separation and new induration.    # superficial wound separation   - prescribed Bactrim BID for 10 day course  given new induration with resolving evidence of cellulitis   - continue home health nursing visits for daily dressing changes   - represcribed oxycodone 15 tabs and ibuprofen refilled as well    RTC in 1 week for wound check, PRN if concern for worsening infection       Patient seen and discussed with Dr. Adam Jameson MD  Ob/Gyn Resident, PGY-1  01/09/20 8:06 AM    I was present and assisted throughout the wound packing procedure above,  I agree with the note above.  Bhavya Medina MD

## 2020-01-10 ENCOUNTER — HOME CARE/HOSPICE - HEALTHEAST (OUTPATIENT)
Dept: HOME HEALTH SERVICES | Facility: HOME HEALTH | Age: 30
End: 2020-01-10

## 2020-01-11 ENCOUNTER — HOME CARE/HOSPICE - HEALTHEAST (OUTPATIENT)
Dept: HOME HEALTH SERVICES | Facility: HOME HEALTH | Age: 30
End: 2020-01-11

## 2020-01-12 ENCOUNTER — HOME CARE/HOSPICE - HEALTHEAST (OUTPATIENT)
Dept: HOME HEALTH SERVICES | Facility: HOME HEALTH | Age: 30
End: 2020-01-12

## 2020-01-13 ENCOUNTER — HOME CARE/HOSPICE - HEALTHEAST (OUTPATIENT)
Dept: HOME HEALTH SERVICES | Facility: HOME HEALTH | Age: 30
End: 2020-01-13

## 2020-01-14 ENCOUNTER — HOME CARE/HOSPICE - HEALTHEAST (OUTPATIENT)
Dept: HOME HEALTH SERVICES | Facility: HOME HEALTH | Age: 30
End: 2020-01-14

## 2020-01-15 ENCOUNTER — HOME CARE/HOSPICE - HEALTHEAST (OUTPATIENT)
Dept: HOME HEALTH SERVICES | Facility: HOME HEALTH | Age: 30
End: 2020-01-15

## 2020-01-16 ENCOUNTER — OFFICE VISIT (OUTPATIENT)
Dept: OBGYN | Facility: CLINIC | Age: 30
End: 2020-01-16
Attending: OBSTETRICS & GYNECOLOGY
Payer: COMMERCIAL

## 2020-01-16 VITALS
HEIGHT: 65 IN | SYSTOLIC BLOOD PRESSURE: 119 MMHG | DIASTOLIC BLOOD PRESSURE: 79 MMHG | HEART RATE: 87 BPM | BODY MASS INDEX: 48.82 KG/M2 | WEIGHT: 293 LBS

## 2020-01-16 DIAGNOSIS — L08.9 WOUND INFECTION: Primary | ICD-10-CM

## 2020-01-16 DIAGNOSIS — T14.8XXA WOUND INFECTION: Primary | ICD-10-CM

## 2020-01-16 DIAGNOSIS — Z91.89 BREASTFEEDING PROBLEM: ICD-10-CM

## 2020-01-16 DIAGNOSIS — B37.31 YEAST INFECTION OF THE VAGINA: ICD-10-CM

## 2020-01-16 DIAGNOSIS — Z98.891 S/P C-SECTION: ICD-10-CM

## 2020-01-16 DIAGNOSIS — F41.9 ANXIETY: ICD-10-CM

## 2020-01-16 PROCEDURE — G0463 HOSPITAL OUTPT CLINIC VISIT: HCPCS | Mod: ZF

## 2020-01-16 PROCEDURE — 12021 TX SUPFC WND DEHSN W/PACKING: CPT | Mod: ZF | Performed by: OBSTETRICS & GYNECOLOGY

## 2020-01-16 RX ORDER — FLUCONAZOLE 150 MG/1
150 TABLET ORAL ONCE
Qty: 1 TABLET | Refills: 1 | Status: SHIPPED | OUTPATIENT
Start: 2020-01-16 | End: 2020-01-23

## 2020-01-16 RX ORDER — OXYCODONE HYDROCHLORIDE 5 MG/1
5 TABLET ORAL EVERY 4 HOURS PRN
Qty: 15 TABLET | Refills: 0 | Status: SHIPPED | OUTPATIENT
Start: 2020-01-16 | End: 2021-10-18

## 2020-01-16 RX ORDER — METOCLOPRAMIDE 10 MG/1
10 TABLET ORAL 3 TIMES DAILY
Qty: 60 TABLET | Refills: 3 | Status: SHIPPED | OUTPATIENT
Start: 2020-01-16 | End: 2021-10-18

## 2020-01-16 ASSESSMENT — MIFFLIN-ST. JEOR: SCORE: 2207.76

## 2020-01-16 NOTE — PROGRESS NOTES
"Wound Check    28 yo  now 5 weeks s/p c-hyst for placenta accreta on 19.  She has been followed recently for a wound infection and seen weekly.  Last week induration was noted around incision and patient prescribed Bactrim.  She thinks this has caused a yeast infection.  She continues to have pain, taking ibuprofen \"around the clock\".  She takes 1 tablet of oxycodone 3 times a day and is out of medication.  Describes pain as a deep tugging pain.  Also has pain with dressing changes.  Has noted that the wound is much less deep.  Denies fever, chills, vaginal discharge.    She continues to struggle with her mood and feeling irritable.  It she finds it difficult to get enough rest with caring for her infant and toddler.  She is struggling with milk supply and would like to try reglan for increase in supply.     ROS: 10 point ROS neg other than the symptoms noted above in the HPI.    Past Medical History:   Diagnosis Date     Acanthosis nigricans      BV (bacterial vaginosis)      Migraines      Ovarian cystic mass      Pain in limb      Pelvic pain in female      Past Surgical History:   Procedure Laterality Date      SECTION        SECTION, IMMEDIATE HYSTERECTOMY, COMBINED N/A 2019    Procedure: Repeat  Section, Hysterectomy, Exploratory Laparotomy, Bilateral Salpingectomy, Cystoscopy;  Surgeon: Joan Carmona MD;  [facility]     RECESSION RESECTION (REPAIR STRABISMUS) BILATERAL Bilateral 2014    Procedure: RECESSION RESECTION (REPAIR STRABISMUS) BILATERAL;  Surgeon: Titi Awan MD;  [facility]     RECESSION RESECTION (REPAIR STRABISMUS) BILATERAL Bilateral 2015    Procedure: RECESSION RESECTION (REPAIR STRABISMUS) BILATERAL;  Surgeon: Titi Awan MD;  [facility]     Physical exam:  /79 (BP Location: Right arm, Patient Position: Chair)   Pulse 87   Ht 1.651 m (5' 5\")   Wt 149.2 kg (328 lb 14.4 oz)   LMP " "04/12/2019   BMI 54.73 kg/m    Gen'l:appears well  Abd: <1cm opening of skin which is 3cm below the umbilicus.  Probe with q-tip is <1cm deep with small amount of bright red blood.  The other areas of the wound which were previously open are now well healed.  Open area is packed with 1/4\" nu-guaze and dressed with 4x4 and tegaderm.    Assessment/Plan  Wound separation with now resolved cellulitis  Vaginal yeast infection  Peripartum mood changes    - continue home health daily dressing changes, likely will be able to reapproximate remaining open area of wound with steri-strip next week, did not do today as area continues to ooze blood.  - diflucan sent to pharmacy  - discussed reglan use to increase milk supply.  Cautioned patient that it would only help while she was taking.  She understands.  - reviewed typical pain as she recovers from surgery.  Pulling pain is typical especially after vertical midline incision.  Reviewed risks of long term use of narcotics, will not prescribe once dressing changes no longer needed.  - increase zoloft to 100mg daily.    - RTC 1 week.  Bhavya Medina MD   "

## 2020-01-16 NOTE — LETTER
"2020       RE: Mariela Hawkins  [address]  [place] [address]     Dear Colleague,    Thank you for referring your patient, Mariela Hawkins, to the [facility] at [facility]. Please see a copy of my visit note below.    Wound Check    30 yo  now 5 weeks s/p c-hyst for placenta accreta on 19.  She has been followed recently for a wound infection and seen weekly.  Last week induration was noted around incision and patient prescribed Bactrim.  She thinks this has caused a yeast infection.  She continues to have pain, taking ibuprofen \"around the clock\".  She takes 1 tablet of oxycodone 3 times a day and is out of medication.  Describes pain as a deep tugging pain.  Also has pain with dressing changes.  Has noted that the wound is much less deep.  Denies fever, chills, vaginal discharge.    She continues to struggle with her mood and feeling irritable.  It she finds it difficult to get enough rest with caring for her infant and toddler.  She is struggling with milk supply and would like to try reglan for increase in supply.     ROS: 10 point ROS neg other than the symptoms noted above in the HPI.    Past Medical History:   Diagnosis Date     Acanthosis nigricans      BV (bacterial vaginosis)      Migraines      Ovarian cystic mass      Pain in limb      Pelvic pain in female      Past Surgical History:   Procedure Laterality Date      SECTION        SECTION, IMMEDIATE HYSTERECTOMY, COMBINED N/A 2019    Procedure: Repeat  Section, Hysterectomy, Exploratory Laparotomy, Bilateral Salpingectomy, Cystoscopy;  Surgeon: Joan Carmona MD;  [facility]     RECESSION RESECTION (REPAIR STRABISMUS) BILATERAL Bilateral 2014    Procedure: RECESSION RESECTION (REPAIR STRABISMUS) BILATERAL;  Surgeon: Titi Awan MD;  [facility]     RECESSION RESECTION (REPAIR STRABISMUS) BILATERAL " "Bilateral 4/2/2015    Procedure: RECESSION RESECTION (REPAIR STRABISMUS) BILATERAL;  Surgeon: Titi Awan MD;  Location: [facility]     Physical exam:  /79 (BP Location: Right arm, Patient Position: Chair)   Pulse 87   Ht 1.651 m (5' 5\")   Wt 149.2 kg (328 lb 14.4 oz)   LMP 04/12/2019   BMI 54.73 kg/m     Gen'l:appears well  Abd: <1cm opening of skin which is 3cm below the umbilicus.  Probe with q-tip is <1cm deep with small amount of bright red blood.  The other areas of the wound which were previously open are now well healed.  Open area is packed with 1/4\" nu-guaze and dressed with 4x4 and tegaderm.    Assessment/Plan  Wound separation with now resolved cellulitis  Vaginal yeast infection  Peripartum mood changes    - continue home health daily dressing changes, likely will be able to reapproximate remaining open area of wound with steri-strip next week, did not do today as area continues to ooze blood.  - diflucan sent to pharmacy  - discussed reglan use to increase milk supply.  Cautioned patient that it would only help while she was taking.  She understands.  - reviewed typical pain as she recovers from surgery.  Pulling pain is typical especially after vertical midline incision.  Reviewed risks of long term use of narcotics, will not prescribe once dressing changes no longer needed.  - increase zoloft to 100mg daily.    - RTC 1 week.    Bhavya Medina MD         "

## 2020-01-17 ENCOUNTER — HOME CARE/HOSPICE - HEALTHEAST (OUTPATIENT)
Dept: HOME HEALTH SERVICES | Facility: HOME HEALTH | Age: 30
End: 2020-01-17

## 2020-01-18 ENCOUNTER — HOME CARE/HOSPICE - HEALTHEAST (OUTPATIENT)
Dept: HOME HEALTH SERVICES | Facility: HOME HEALTH | Age: 30
End: 2020-01-18

## 2020-01-19 ENCOUNTER — HOME CARE/HOSPICE - HEALTHEAST (OUTPATIENT)
Dept: HOME HEALTH SERVICES | Facility: HOME HEALTH | Age: 30
End: 2020-01-19

## 2020-01-20 ENCOUNTER — HOME CARE/HOSPICE - HEALTHEAST (OUTPATIENT)
Dept: HOME HEALTH SERVICES | Facility: HOME HEALTH | Age: 30
End: 2020-01-20

## 2020-01-21 ENCOUNTER — HOME CARE/HOSPICE - HEALTHEAST (OUTPATIENT)
Dept: HOME HEALTH SERVICES | Facility: HOME HEALTH | Age: 30
End: 2020-01-21

## 2020-01-22 ENCOUNTER — HOME CARE/HOSPICE - HEALTHEAST (OUTPATIENT)
Dept: HOME HEALTH SERVICES | Facility: HOME HEALTH | Age: 30
End: 2020-01-22

## 2020-01-23 ENCOUNTER — OFFICE VISIT (OUTPATIENT)
Dept: OBGYN | Facility: CLINIC | Age: 30
End: 2020-01-23
Attending: OBSTETRICS & GYNECOLOGY
Payer: COMMERCIAL

## 2020-01-23 VITALS
SYSTOLIC BLOOD PRESSURE: 114 MMHG | DIASTOLIC BLOOD PRESSURE: 74 MMHG | WEIGHT: 293 LBS | HEART RATE: 91 BPM | BODY MASS INDEX: 54.58 KG/M2

## 2020-01-23 DIAGNOSIS — B37.31 YEAST INFECTION OF THE VAGINA: ICD-10-CM

## 2020-01-23 PROCEDURE — G0463 HOSPITAL OUTPT CLINIC VISIT: HCPCS | Mod: ZF

## 2020-01-23 RX ORDER — FLUCONAZOLE 150 MG/1
150 TABLET ORAL ONCE
Qty: 1 TABLET | Refills: 0 | Status: SHIPPED | OUTPATIENT
Start: 2020-01-23 | End: 2020-01-23

## 2020-01-23 ASSESSMENT — PAIN SCALES - GENERAL: PAINLEVEL: SEVERE PAIN (6)

## 2020-01-23 NOTE — PROGRESS NOTES
28 yo  now 6 weeks s/p c-hyst for placenta accreta on 19.  She has been followed recently for a wound infection and seen weekly.  Last week only a 1 cm area remained open.  She has been treated with 2 courses of antibiotics and then with Diflucan for a subsequent yeast infection.  She received a refill of oxycodone last week and this was to be her last to cover pain related to dressing changes.  Today she denies fever, chills, vaginal discharge.  She is very tired as her  was out plowing all night and she was up with the kids and had to run around a lot this morning.     She has had postpartum mood changes and difficulty with milk supply.  Last week her dose of sertraline was increased to 100mg daily and she started Reglan.  She hasn't noted significant changes on those medications, but she feels her mood is stable.       ROS: 10 point ROS neg other than the symptoms noted above in the HPI.    Past Medical History:   Diagnosis Date     Acanthosis nigricans      BV (bacterial vaginosis)      Migraines      Ovarian cystic mass      Pain in limb      Pelvic pain in female      Past Surgical History:   Procedure Laterality Date      SECTION        SECTION, IMMEDIATE HYSTERECTOMY, COMBINED N/A 2019    Procedure: Repeat  Section, Hysterectomy, Exploratory Laparotomy, Bilateral Salpingectomy, Cystoscopy;  Surgeon: Joan Carmona MD;  [facility]     RECESSION RESECTION (REPAIR STRABISMUS) BILATERAL Bilateral 2014    Procedure: RECESSION RESECTION (REPAIR STRABISMUS) BILATERAL;  Surgeon: Titi Awan MD;  [facility]     RECESSION RESECTION (REPAIR STRABISMUS) BILATERAL Bilateral 2015    Procedure: RECESSION RESECTION (REPAIR STRABISMUS) BILATERAL;  Surgeon: Titi Awan MD;  Location: [facility]     Physical exam:  LMP 2019   Gen'l: appears well, affect brighter than last week, declines PHQ-9 today  Abdomen: soft, NT, <1  cm opening, approximately 3mm deep, packed with nu-guaze which is removed.  No bleeding.  Skin edges approximated with steri-strip.    Assessment/Plan  Wound separation with now resolved cellulitis  Peripartum mood changes    - no longer needs home health for wound dressing changes  - refill of diflucan in case recurrent yeast infection   - patient to send short term disability paperwork, plan to recommend 12 weeks off since delivery  - RTC with any concerns    Bhavya Medina MD

## 2020-01-23 NOTE — LETTER
2020       RE: Mariela Hawkins  [address]  [place] [address]     Dear Colleague,    Thank you for referring your patient, Mariela Hawkins, to the [facility] at [facility]. Please see a copy of my visit note below.    30 yo  now  6 weeks s/p c-hyst for placenta accreta on 19.  She has been followed recently for a wound infection and seen weekly.  Last week only a 1 cm area remained open.  She has been treated with 2 courses of antibiotics and then with Diflucan for a subsequent yeast infection.  She received a refill of oxycodone last week and this was to be her last to cover pain related to dressing changes.   Today she denies fever, chills, vaginal discharge.  She is very tired as her  was out plowing all night and she was up with the kids and had to run around a lot this morning.     She has had postpartum mood changes and difficulty with milk supply.  Last week her dose of sertraline was increased to 100mg daily and she started Reglan.  She hasn't noted significant changes on those medications, but she feels her mood is stable.       ROS: 10 point ROS neg other than the symptoms noted above in the HPI.    Past Medical History:   Diagnosis Date     Acanthosis nigricans      BV (bacterial vaginosis)      Migraines      Ovarian cystic mass      Pain in limb      Pelvic pain in female      Past Surgical History:   Procedure Laterality Date      SECTION        SECTION, IMMEDIATE HYSTERECTOMY, COMBINED N/A 2019    Procedure: Repeat  Section, Hysterectomy, Exploratory Laparotomy, Bilateral Salpingectomy, Cystoscopy;  Surgeon: Joan Carmona MD;  [facility]     RECESSION RESECTION (REPAIR STRABISMUS) BILATERAL Bilateral 2014    Procedure: RECESSION RESECTION (REPAIR STRABISMUS) BILATERAL;  Surgeon: Titi Awan MD;  [facility]     RECESSION RESECTION (REPAIR  STRABISMUS) BILATERAL Bilateral 4/2/2015    Procedure: RECESSION RESECTION (REPAIR STRABISMUS) BILATERAL;  Surgeon: Titi Awan MD;  Location: [facility]     Physical exam:  LMP 04/12/2019   Gen'l: appears well, affect brighter than last week, declines PHQ-9 today  Abdomen: soft, NT, <1 cm opening, approximately 3mm deep, packed with nu-guaze which is removed.  No bleeding.  Skin edges approximated with steri-strip.    Assessment/Plan  Wound separation with now resolved cellulitis  Peripartum mood changes    - no longer needs home health for wound dressing changes  - refill of diflucan in case recurrent yeast infection   - patient to send short term disability paperwork, plan to recommend 12 weeks off since delivery  - RTC with any concerns    Bhavya Medina MD

## 2020-01-25 ENCOUNTER — MEDICAL CORRESPONDENCE (OUTPATIENT)
Dept: HEALTH INFORMATION MANAGEMENT | Facility: CLINIC | Age: 30
End: 2020-01-25

## 2020-01-25 ENCOUNTER — HOME CARE/HOSPICE - HEALTHEAST (OUTPATIENT)
Dept: HOME HEALTH SERVICES | Facility: HOME HEALTH | Age: 30
End: 2020-01-25

## 2020-01-27 ENCOUNTER — MEDICAL CORRESPONDENCE (OUTPATIENT)
Dept: HEALTH INFORMATION MANAGEMENT | Facility: CLINIC | Age: 30
End: 2020-01-27

## 2020-01-27 ENCOUNTER — HOME CARE/HOSPICE - HEALTHEAST (OUTPATIENT)
Dept: HOME HEALTH SERVICES | Facility: HOME HEALTH | Age: 30
End: 2020-01-27

## 2020-01-27 ENCOUNTER — TELEPHONE (OUTPATIENT)
Dept: OBGYN | Facility: CLINIC | Age: 30
End: 2020-01-27

## 2020-01-27 NOTE — TELEPHONE ENCOUNTER
Received call from [facility] ([phone]) stating they received notice that Mariela no longer needs homecare. They were unable to do her discharge visit last week and are seeking verbal order to do that this week. Verbal order given.

## 2020-02-07 ENCOUNTER — TELEPHONE (OUTPATIENT)
Dept: OBGYN | Facility: CLINIC | Age: 30
End: 2020-02-07

## 2020-02-07 NOTE — TELEPHONE ENCOUNTER
Formes signed kkfaxed and copied        FMLA forms were dropped off today. She is a pt of Dr. Medina's is needing paperwork filled on 2/13 when she is in clinic and faxed.      Forms completed and placed on Dr. Medina's desk for signature,

## 2020-02-20 ENCOUNTER — MEDICAL CORRESPONDENCE (OUTPATIENT)
Dept: HEALTH INFORMATION MANAGEMENT | Facility: CLINIC | Age: 30
End: 2020-02-20

## 2020-03-10 NOTE — PROGRESS NOTES
Antepartum Progress Note    Subjective:   Doing well today.  Took melatonin, but still has had difficulty sleeping.  She denies any contractions, leakage of fluid, vaginal bleeding, or other systemic symptoms. Confirms active fetal movement.    Objective:   Patient Vitals for the past 24 hrs:   BP Temp Temp src Resp   19 2335 111/57 98.7  F (37.1  C) Oral 16   19 1335 124/54 97.9  F (36.6  C) Oral 18     Gen: Appears comfortable, NAD  Pulm: Normal respiratory effort  CV: Regular rate  Abd: obese, gravid  Ext: Negative    FHT: baseline 130s, moderate variability, accels absent, decels absent  Eustace: 0 contractions in 10 minutes     Imaging:     Assessment/Plan:   Mariela Hawkins is a 29 year old  female at 34w3d by LMP c/w 7w5d US, HD#18 here for a planned admission for vasa previa and placenta previa. Pregnancy notable for obesity, anxiety, h/o prior CS x3, h/o ventral hernia repair, anixety.    # Vasa previa  # Complete anterior placenta previa  - Planned admission until delivery. There is a 60% risk of accreta due to h/o 3 C-sections and complete anterior previa. Thus far, US has not demonstrated any evidence of accreta. Due to lack of ultrasound findings and claustrophobia, MRI was not obtained.  - Plan for RLTCS on 730 or sooner with bleeding or concerns for accreta. Consented for  and possible hysterectomy. S/p Anesthesia consult.  - S/p Betamethasone (-) and rescue course (-).  - Continue T&S q72h until surgery.  - Continue weekly cervical lengths, next scheduled for 12/10.  - She has remained stable and is approved for time off the floor within the hospital for short durations <1 hour    # Obesity  - Continue aspirin 81 mg daily   - Thromboprophylaxis not used in the antepartum period given activity not restricted, risk for urgent delivery and bleeding risk with placenta previa  - SCDs while in bed, lovenox in postpartum period      # Anxiety  - Continue  Detail Level: Detailed PTA sertraline   - Social work antepartum assessment ordered   - Monitor mood closely. S/p  psych.  -  visit during the week.     # IUGR/lagging AC  - : EFW 11%ile 1445g  - Weekly BPPs with Dopplers; last normal (12/3). Next scheduled for 12/10.    # Fetal well being  - FHR reactive. Continue TID monitoring  - S/p NICU, BMZ (-, -)      # Prenatal care  - PNL:    - Rh positive, Antibody negative. Rubella immune, Hep B SAg NR, RPR neg, HIV NR, GC/CT NR.   - GCT 85   - GBS Positive  - Genetics: Low risk NIPT   - Immunizations: s/p influenza and TDap  - Other: Melatonin and vistaril for sleep     # Delivery Plan  - History of LTCS x 3. Had a prior wound dehiscence with prolonged healing with prior Pfannenstiel incision. Plan for RLTCS through vertical midline incision on  at 7:30 am or sooner with bleeding or concerns for accreta.   - S/p ventral hernia repair with mesh ()- Records in care everywhere- small supraumbilical hernia with fat strangulation, small amt of mesh used.  - S/p extensive surgical counseling, please see previous notes for details. Surgical consent was signed .     # Ppx  - SCDs while in bed    Seen and staffed with Dr. Master Whitman MD  Maternal Fetal Medicine Fellow  2019 8:50 AM      Physician Attestation   I, Arti Thao MD, saw this patient with the resident and agree with the resident/fellow's findings and plan of care as documented in the note.      I personally reviewed vital signs, medications, labs and imaging.    Key findings: No bleeding, LOF or contractions. Anxiously awaiting delivery on Friday and was happy to hear the change in time of surgery (now 7:30 am). Will continue inpatient management until delivery given risk for bleeding.    Arti Thao MD  Date of Service (when I saw the patient): 19       Quality 431: Preventive Care And Screening: Unhealthy Alcohol Use - Screening: Patient screened for unhealthy alcohol use using a single question and scores less than 2 times per year Quality 130: Documentation Of Current Medications In The Medical Record: Current Medications Documented Quality 110: Preventive Care And Screening: Influenza Immunization: Influenza Immunization Administered during Influenza season Quality 402: Tobacco Use And Help With Quitting Among Adolescents: Patient screened for tobacco and never smoked

## 2020-03-11 ENCOUNTER — TELEPHONE (OUTPATIENT)
Dept: OBGYN | Facility: CLINIC | Age: 30
End: 2020-03-11

## 2020-03-11 NOTE — TELEPHONE ENCOUNTER
----- Message from Yaritza Brasher sent at 3/11/2020 11:12 AM CDT -----  Regarding: Message for Adam;  Contact: [phone]  Hi,    Pt called and requested a work letter stating pt is able to go back to work on 3/16 without any restrictions. Pt would like this letter to be faxed to [phone], attention monet Hill.. Please call back pt for any further questions and updates.    Thanks,  Yaritza Brasher  Intake Representative   Call Center

## 2020-06-04 ENCOUNTER — MEDICAL CORRESPONDENCE (OUTPATIENT)
Dept: HEALTH INFORMATION MANAGEMENT | Facility: CLINIC | Age: 30
End: 2020-06-04

## 2021-05-26 VITALS
HEART RATE: 83 BPM | DIASTOLIC BLOOD PRESSURE: 76 MMHG | SYSTOLIC BLOOD PRESSURE: 110 MMHG | RESPIRATION RATE: 16 BRPM | OXYGEN SATURATION: 98 % | TEMPERATURE: 97.5 F

## 2021-05-26 VITALS
RESPIRATION RATE: 18 BRPM | HEART RATE: 83 BPM | DIASTOLIC BLOOD PRESSURE: 78 MMHG | TEMPERATURE: 97.3 F | OXYGEN SATURATION: 97 % | SYSTOLIC BLOOD PRESSURE: 122 MMHG

## 2021-05-26 VITALS
HEART RATE: 74 BPM | DIASTOLIC BLOOD PRESSURE: 70 MMHG | TEMPERATURE: 97.6 F | OXYGEN SATURATION: 98 % | SYSTOLIC BLOOD PRESSURE: 132 MMHG

## 2021-05-26 VITALS
OXYGEN SATURATION: 98 % | SYSTOLIC BLOOD PRESSURE: 118 MMHG | TEMPERATURE: 97.3 F | DIASTOLIC BLOOD PRESSURE: 82 MMHG | HEART RATE: 72 BPM | RESPIRATION RATE: 18 BRPM

## 2021-05-27 VITALS
OXYGEN SATURATION: 99 % | SYSTOLIC BLOOD PRESSURE: 126 MMHG | HEART RATE: 80 BPM | DIASTOLIC BLOOD PRESSURE: 82 MMHG | TEMPERATURE: 97.9 F

## 2021-05-27 VITALS
SYSTOLIC BLOOD PRESSURE: 130 MMHG | RESPIRATION RATE: 18 BRPM | OXYGEN SATURATION: 97 % | HEART RATE: 82 BPM | OXYGEN SATURATION: 98 % | HEART RATE: 96 BPM | SYSTOLIC BLOOD PRESSURE: 130 MMHG | DIASTOLIC BLOOD PRESSURE: 62 MMHG | TEMPERATURE: 97.4 F | TEMPERATURE: 97.9 F | DIASTOLIC BLOOD PRESSURE: 78 MMHG

## 2021-05-27 VITALS — TEMPERATURE: 97 F

## 2021-05-27 VITALS
OXYGEN SATURATION: 98 % | SYSTOLIC BLOOD PRESSURE: 110 MMHG | HEART RATE: 108 BPM | TEMPERATURE: 96.6 F | DIASTOLIC BLOOD PRESSURE: 80 MMHG | RESPIRATION RATE: 16 BRPM

## 2021-05-27 VITALS
HEART RATE: 75 BPM | DIASTOLIC BLOOD PRESSURE: 78 MMHG | SYSTOLIC BLOOD PRESSURE: 124 MMHG | OXYGEN SATURATION: 98 % | TEMPERATURE: 98.2 F

## 2021-05-27 VITALS
DIASTOLIC BLOOD PRESSURE: 88 MMHG | SYSTOLIC BLOOD PRESSURE: 122 MMHG | OXYGEN SATURATION: 97 % | TEMPERATURE: 97.4 F | HEART RATE: 72 BPM | RESPIRATION RATE: 18 BRPM

## 2021-05-27 VITALS
DIASTOLIC BLOOD PRESSURE: 80 MMHG | OXYGEN SATURATION: 98 % | SYSTOLIC BLOOD PRESSURE: 122 MMHG | TEMPERATURE: 97.8 F | HEART RATE: 77 BPM

## 2021-05-27 VITALS
TEMPERATURE: 97.9 F | DIASTOLIC BLOOD PRESSURE: 72 MMHG | OXYGEN SATURATION: 98 % | HEART RATE: 70 BPM | SYSTOLIC BLOOD PRESSURE: 120 MMHG

## 2021-05-27 VITALS
DIASTOLIC BLOOD PRESSURE: 70 MMHG | RESPIRATION RATE: 18 BRPM | TEMPERATURE: 97.4 F | SYSTOLIC BLOOD PRESSURE: 114 MMHG | OXYGEN SATURATION: 98 % | HEART RATE: 78 BPM

## 2021-05-27 VITALS
HEART RATE: 72 BPM | OXYGEN SATURATION: 97 % | SYSTOLIC BLOOD PRESSURE: 120 MMHG | TEMPERATURE: 97.4 F | DIASTOLIC BLOOD PRESSURE: 78 MMHG | RESPIRATION RATE: 18 BRPM

## 2021-05-27 VITALS
HEART RATE: 100 BPM | SYSTOLIC BLOOD PRESSURE: 120 MMHG | OXYGEN SATURATION: 98 % | DIASTOLIC BLOOD PRESSURE: 70 MMHG | TEMPERATURE: 97.9 F

## 2021-05-30 ENCOUNTER — RECORDS - HEALTHEAST (OUTPATIENT)
Dept: ADMINISTRATIVE | Facility: CLINIC | Age: 31
End: 2021-05-30

## 2021-05-31 ENCOUNTER — RECORDS - HEALTHEAST (OUTPATIENT)
Dept: ADMINISTRATIVE | Facility: CLINIC | Age: 31
End: 2021-05-31

## 2021-05-31 VITALS — WEIGHT: 293 LBS | HEIGHT: 66 IN | BODY MASS INDEX: 47.09 KG/M2

## 2021-06-01 VITALS — WEIGHT: 293 LBS | HEIGHT: 66 IN | BODY MASS INDEX: 47.09 KG/M2

## 2021-06-01 VITALS — WEIGHT: 293 LBS | BODY MASS INDEX: 47.09 KG/M2 | HEIGHT: 66 IN

## 2021-06-01 VITALS — HEIGHT: 66 IN | WEIGHT: 293 LBS | BODY MASS INDEX: 47.09 KG/M2

## 2021-06-01 NOTE — PROGRESS NOTES
"Please see \"Imaging\" tab under Chart Review for full report.  This ultrasound was performed in the [facility], and may be located under Care Everywhere.    Noemi Clarke MD  Maternal Fetal Medicine    "

## 2021-06-02 ENCOUNTER — RECORDS - HEALTHEAST (OUTPATIENT)
Dept: ADMINISTRATIVE | Facility: CLINIC | Age: 31
End: 2021-06-02

## 2021-06-03 NOTE — NURSING NOTE
Pt presented for ultrasound today, Dr. Clarke recommending inpatient admission to begin at 32w0d (nov 22) at [facility]. RN called L&D charge RN Bhavya to schedule admission, writer notified patient of plan to admit to antepartum unit on Friday Nov 22. Directed pt to call unit on Thursday to verify time of admission. Pt VU. Dr. Clarke to notify referring OB of updated plan.

## 2021-06-03 NOTE — PROGRESS NOTES
"Please see \"Imaging\" tab under Chart Review for full report.  This ultrasound was performed in the [facility], and may be located under Care Everywhere.    Noemi Clarke MD  Maternal Fetal Medicine    "

## 2021-06-03 NOTE — PROGRESS NOTES
"Please see \"Imaging\" tab under \"Chart Review\" for details of today's visit.    Varun Rachel        "

## 2021-06-13 NOTE — ANESTHESIA PROCEDURE NOTES
CSE Block    Patient location during procedure: OR    Start time: 9/19/2017 8:08 AM  End time: 9/19/2017 8:14 AM  Reason for block: primary anesthetic    Staffing:  Performing  Anesthesiologist: AMANDA JOE  Preanesthetic Checklist  Completed: patient identified, risks, benefits, and alternatives discussed, timeout performed, consent obtained, airway assessed, oxygen available, suction available, emergency drugs available and hand hygiene performed    CSE  Patient position: sitting  Prep: ChloraPrep  Patient monitoring: heart rate, continuous pulse ox and blood pressure  Approach: midline  Location: L3-L4  Injection technique: LINDA saline  Number of Attempts:1  Spinal Needle  Needle type: Quincke   Needle gauge: 24 G  Epidural Needle and Catheter:   Needle type: Other   Epidural needle gauge: 17 G.    : LINDA at 8 cm. Catheter at 19 cm at skin.  Assessment  Sensory level: T4  Additional Notes:      SAB with marcaine 0.75% 1.6 cc, fentanyl 10 mcg, PF morphine 150 mcg.    No complications. Patient tolerated procedure well.    Amanda Joe MD  Staff Anesthesiologist  [facility]

## 2021-06-13 NOTE — ANESTHESIA POSTPROCEDURE EVALUATION
Patient: Mariela Adair  REPEAT  SECTION  Anesthesia type: spinal    Patient location: Labor and Delivery  Last vitals:   Vitals:    17 0800   BP: 123/62   Pulse: 80   Resp: 18   Temp: 36.7  C (98.1  F)   SpO2:      Post vital signs: stable  Level of consciousness: awake and responds to simple questions  Post-anesthesia pain: pain controlled  Post-anesthesia nausea and vomiting: no  Pulmonary: unassisted, return to baseline  Cardiovascular: stable and blood pressure at baseline  Hydration: adequate  Anesthetic events: no    QCDR Measures:  ASA# 11 - Renita-op Cardiac Arrest: ASA11B - Patient did NOT experience unanticipated cardiac arrest  ASA# 12 - Renita-op Mortality Rate: ASA12B - Patient did NOT die  ASA# 13 - PACU Re-Intubation Rate: NA - No ETT / LMA used for case  ASA# 10 - Composite Anes Safety: ASA10A - No serious adverse event    Additional Notes:  Still some numbness & tingling in right toes which is resolving. Has baseline numbness in left foot (unchanged). Bladder & bowel function normal. Tolerating PO intake. Pain controlled. Denies headache or back pain, no tenderness to palpation at spinal site.

## 2021-06-13 NOTE — ANESTHESIA CARE TRANSFER NOTE
Last vitals:   Vitals:    09/19/17 0924   BP: 130/71   Pulse: 98   Resp: 12   Temp: 36.8  C (98.2  F)   SpO2: 100%     Patient's level of consciousness is drowsy  Spontaneous respirations: yes  Maintains airway independently: yes  Dentition unchanged: yes  Oropharynx: oropharynx clear of all foreign objects    QCDR Measures:  ASA# 20 - Surgical Safety Checklist: WHO surgical safety checklist completed prior to induction  PQRS# 430 - Adult PONV Prevention: 4558F - Pt received => 2 anti-emetic agents (different classes) preop & intraop  ASA# 8 - Peds PONV Prevention: NA - Not pediatric patient, not GA or 2 or more risk factors NOT present  PQRS# 424 - Renita-op Temp Management: 4559F - At least one body temp DOCUMENTED => 35.5C or 95.9F within required timeframe  PQRS# 426 - PACU Transfer Protocol: - Transfer of care checklist used  ASA# 14 - Acute Post-op Pain: ASA14B - Patient did NOT experience pain >= 7 out of 10

## 2021-06-13 NOTE — PROGRESS NOTES
Date of Service:10/25/2017    Date Last Seen:  2017    Chief Complaint:   Chief Complaint   Patient presents with     Wound Check     c section       History:   Patient returns to vascular clinic with regards to her abdominal ulcer following a .  She denies any pain in the ulcer.  Currently the wound is being covered with Endoform and Allevyn Adhesive.      Current Outpatient Prescriptions   Medication Sig Dispense Refill     fluticasone (FLONASE) 50 mcg/actuation nasal spray 1 spray into each nostril daily as needed for rhinitis.       ibuprofen (ADVIL,MOTRIN) 600 MG tablet Take 1 tablet (600 mg total) by mouth every 6 (six) hours as needed for pain. 30 tablet 0     loratadine (CLARITIN) 10 mg tablet Take 10 mg by mouth daily as needed for allergies.       oxyCODONE (ROXICODONE) 5 MG immediate release tablet 0.5-1 tabs PO q4hr PRN pain. Do not drive.  Do not mix wih alcohol. 10 tablet 0     PRENATAL VIT #91/FE FUM/FA/DHA (PRENATAL + DHA ORAL) Take 1 tablet by mouth daily.       senna-docusate (PERICOLACE) 8.6-50 mg tablet Take 1 tablet by mouth daily. 30 tablet 0     sertraline (ZOLOFT) 50 MG tablet Take 50 mg by mouth daily.       No current facility-administered medications for this visit.        Allergies   Allergen Reactions     Penicillins Hives     All cillins     Amoxicillin Hives and Rash       Physical Exam:    Vitals:    10/25/17 0854   BP: 118/66   Pulse: 95   Resp: 18   Temp: 98.2  F (36.8  C)    There is no height or weight on file to calculate BMI.    General:  27 y.o. female in no apparent distress.    Head:  Normocephalic atraumatic  Psychiatric:  Cooperative.   Respiratory: unlabored breathing.  Integumentary:  Skin is uniformly dry and warm       Ulcerations:  There is no anthony wound erythema, induration, maceration, denudement, fluctuance or warmth surrounding the ulcer(s). Wound base: 100% granular;  Wound margins: intact with rash surrounding it, Exudate Serosanguinous    See wound  flow sheet for wound measurements:    Wound 10/18/17 c cection (Active)   Pre Size Length 0.1 10/25/2017  8:00 AM   Pre Size Width 0.8 10/25/2017  8:00 AM   Pre Size Depth 0.4 10/18/2017 10:00 AM   Pre Total Sq cm 0.08 10/25/2017  8:00 AM       Incision 17 Abdomen Bilateral (Active)       Assessment:    Images:  none pertinent    Labs:    Lab Results   Component Value Date    WBC 8.9 2017    HGB 9.0 (L) 2017    HCT 27.1 (L) 2017    MCV 88 2017     2017               Invalid input(s): EOSPC    Lab Results   Component Value Date    CREATININE 0.61 2017         Lab Results   Component Value Date    BUN 9 2017     No results found for: PREALBUMINESUFAST(LABPROT,LABALBU,albumin)@  Invalid input(s): LABALBU  No results found for: PREALBUMIN    Lab Results   Component Value Date    CRP 7.5 (H) 2017     No results found for: SEDRATE  No results found for this or any previous visit.    No results found for: HGBA1C  No results found for: TSH        No results found for: [id number]  No results found for: BNP     1. Abdominal wall skin ulcer, limited to breakdown of skin     2. H/O  section         A new wound was identified today: no.    Plan:  1.  Debridement of the abdominal ulcer was recommended.  After consent was obtained and topical 2% Xylocaine was applied under clean conditions, and using a #15 blade,the devitalized dermal tissue was debrided for a total square centimeters of 0.08. Following debridement, there was a decrease in the nonviable tissue. The patient tolerated the procedure without any difficulty.     2.  Abdominal ulcer, improving     3.  Fungal infection, she will apply Miconazole cream over the area daily.    4.  Treatment:   Will discontinue the Tegaderm Foam Adhesive and tuck Tegaderm AG mesh in the area and cover with gauze.  This can be changed every few days.      5.  Patient will follow up with me in 2 weeks  for  reevaluation.          Bhavya Reynoso, APRN, CNP,  [facility]  [phone]

## 2021-06-13 NOTE — PROGRESS NOTES
[facility] Consult Note    Date of Service:  10/18/2017    Requesting Provider: Dr. Parikh    History of Present Illness:     Mariela Adair is a 27 y.o. female with a pertinent past medical history for morbid obesity, anxiety, migraines, anesthesia complications, claustrophobia, mental disorder, threatened miscarriage in early pregnancy, and anemia, S/P 2  sections and dilation and curettage of uterus,  Patient reports on 17 she underwent a  section. Approximately a week later, a seroma was opened.  Currently moist gauze dressing is being applied daily by home care nurses and a second time in the day by her .  She reports tenderness in the ulcer area, especially with movement.  Her drainage has decreased.    Constitutional:  Denies fever, chills or night sweats    Integumentary:   See above. Skin is uniformly warm, dry and pink.    Psych:  No depression/anxiety      ENTM:  good  sight.  Hard of Hearing: is not significant     GI:  Nutritional intake:  Appetite is good,   Taking Nutritional supplements: No  Weight:  no change    MSK:   Patient is ambulatory;     Neurological:  No weakness, numbness, or tingling    Cardiac:  Denies new chest pain or tightness.    Respiratory:  Denies any unusual SOB    Endocrine:   negative diabetes.    Past Medical History:    Past Medical History:   Diagnosis Date     Anemia      Anxiety      Claustrophobia      History of anesthesia complications      Mental disorder      Migraines      Missed ab      Morbid obesity      PONV (postoperative nausea and vomiting)         Surgical History:   Past Surgical History:   Procedure Laterality Date      SECTION      X2      SECTION N/A 2017    Procedure: REPEAT  SECTION;  Surgeon: Shayy Aviles MD;  Location: [facility] OR;  Service:      DILATION AND CURETTAGE OF UTERUS N/A 2016    Procedure: DILATION AND CURETTAGE SUCTION;  Surgeon: Ant RIVERA  MD Jl;  Location: [facility] OR;  Service:      EYE MUSCLE SURGERY Bilateral     strabismus       Allergies:   Allergies   Allergen Reactions     Penicillins Hives     All cillins     Amoxicillin Hives and Rash          Medications:    Current Outpatient Prescriptions:      fluticasone (FLONASE) 50 mcg/actuation nasal spray, 1 spray into each nostril daily as needed for rhinitis., Disp: , Rfl:      ibuprofen (ADVIL,MOTRIN) 600 MG tablet, Take 1 tablet (600 mg total) by mouth every 6 (six) hours as needed for pain., Disp: 30 tablet, Rfl: 0     loratadine (CLARITIN) 10 mg tablet, Take 10 mg by mouth daily as needed for allergies., Disp: , Rfl:      oxyCODONE (ROXICODONE) 5 MG immediate release tablet, 0.5-1 tabs PO q4hr PRN pain. Do not drive.  Do not mix wih alcohol., Disp: 10 tablet, Rfl: 0     PRENATAL VIT #91/FE FUM/FA/DHA (PRENATAL + DHA ORAL), Take 1 tablet by mouth daily., Disp: , Rfl:      senna-docusate (PERICOLACE) 8.6-50 mg tablet, Take 1 tablet by mouth daily., Disp: 30 tablet, Rfl: 0     sertraline (ZOLOFT) 50 MG tablet, Take 50 mg by mouth daily., Disp: , Rfl:     Family history:   Family History   Problem Relation Age of Onset     No Medical Problems Mother      No Medical Problems Father      No Medical Problems Son      Stroke Maternal Grandmother      Cancer Paternal Grandmother      No Medical Problems Son      No Medical Problems Son          Social History:   Social History     Social History     Marital status: Single     Spouse name: N/A     Number of children: N/A     Years of education: N/A     Occupational History     Not on file.     Social History Main Topics     Smoking status: Never Smoker     Smokeless tobacco: Never Used     Alcohol use No     Drug use: No     Sexual activity: Yes     Partners: Male     Birth control/ protection: None      Comment: engaged     Other Topics Concern     Not on file     Social History Narrative        Physical Exam    General: This is a 27 y.o.  female who appears their reported age, not in acute distress    Constitution:  Vital Signs: /72 (Patient Site: Right Arm, Patient Position: Sitting, Cuff Size: Adult Large)  Pulse 91  Temp 98.2  F (36.8  C) (Oral)   Resp 20  LMP 12/18/2016 (Approximate)  Breastfeeding? Yes There is no height or weight on file to calculate BMI.    Psychiatric: Alert and oriented X 3, in no apparent distress. Calm and cooperative throughout exam    Head/Neck:  Normocephalic, atraumatic    Cardiovascular:  Rate and Rhythm is regular    Respiratory:  Lungs are clear to auscultation in all fields bilaterally.     Abdomen: Normal bowel sounds. Soft, symmetric, no guarding or rigidity, non tender with palpation.  No organomegaly or masses palpated.     Integumentary/Hair/Nails:  Turgor and texture are normal.    Neurological:  Grossly intact.     Ulceration(s)/Wound(s): Renita wound skin is without denudement, erythema, maceration, warmth, induration and or fluctuance  Wound Base: 100 % granular    Wound 10/18/17 c cection (Active)   Pre Size Length 1.8 10/18/2017 10:00 AM   Pre Size Width 0.3 10/18/2017 10:00 AM   Pre Total Sq cm 0.54 10/18/2017 10:00 AM       Incision 09/19/17 Abdomen Bilateral (Active)       Laboratory studies:     Lab Results   Component Value Date    WBC 8.9 09/27/2017    HGB 9.0 (L) 09/27/2017    HCT 27.1 (L) 09/27/2017    MCV 88 09/27/2017     09/27/2017     Lab Results   Component Value Date    CREATININE 0.61 09/27/2017     Lab Results   Component Value Date    BUN 9 09/27/2017     Lab Results   Component Value Date    CRP 7.5 (H) 09/27/2017     No results found for: SEDRATE  Lab Results   Component Value Date    ALBUMIN 3.7 06/20/2011     No results found for: HGBA1C    No results found for: TSH      No results found for: BNP  Results for orders placed or performed during the hospital encounter of 09/27/17   Basic Metabolic Panel   Result Value Ref Range    Sodium 139 136 - 145 mmol/L    Potassium  3.9 3.5 - 5.0 mmol/L    Chloride 109 (H) 98 - 107 mmol/L    CO2 21 (L) 22 - 31 mmol/L    Anion Gap, Calculation 9 5 - 18 mmol/L    Glucose 86 70 - 125 mg/dL    Calcium 9.2 8.5 - 10.5 mg/dL    BUN 9 8 - 22 mg/dL    Creatinine 0.61 0.60 - 1.10 mg/dL    GFR MDRD Af Amer >60 >60 mL/min/1.73m2    GFR MDRD Non Af Amer >60 >60 mL/min/1.73m2      No results found for this or any previous visit.  No results found for: [id number]   No results found for: [id number]    Imaging:  None pertinent     Assessment:  1. Abdominal wall skin ulcer, limited to breakdown of skin  Change dressing       Assessment/Plan:  1.  Debridement of the abdominal ulcer was recommended.  After consent was obtained and topical 2% Xylocaine was applied under clean conditions, and using a #15 blade,the devitalized dermal tissue was debrided for a total square centimeters of 0.54. Following debridement, there was a decrease in the nonviable tissue. The patient tolerated the procedure without any difficulty.     2. Nutrition: Encouraged high protein foods and/or nutritional supplements    3.  Abdominal ulcer, evidence of improvement.  No signs of infection.    4.  Treatment Will discontinue BID moist gauze dressing changes.  Will start Endoform and cover with Allevyn Adhesive.  Her  will change this twice per week.  Home care can be discontinued.    5.  Patient to return to clinic in one week(s) for reevaluation and anticipation of a change in treatment plan. They were instructed to call the clinic sooner with any further questions or concerns. Answered all questions.      Bhavya Reynoso, APRN, CNP, [facility] [place]  [phone]

## 2021-06-13 NOTE — ANESTHESIA POST-OP FOLLOW-UP NOTE
Patient: Mariela Adair  REPEAT  SECTION  Anesthesia type: spinal     Follow-up numbness/ tingling right great toe and right second toe, essentially unchanged from yesterday. The patient has experienced left leg numbness and tingling during her pregnancy. The motor strength in her right foot is completely intact. No other neurologic issues. I do not feel that the patient's right toe numbness/ tingling is related to her spinal anesthetic. Most likely pregnancy related secondary edema in her feet and should resolve slowly in the coming weeks. The patient will be seen for follow-up with Dr. Aviles and can be referred to a neurologist if not showing improvement.    Jamil Bills MD

## 2021-06-13 NOTE — ANESTHESIA PREPROCEDURE EVALUATION
Anesthesia Evaluation      Patient summary reviewed   History of anesthetic complications (PONV)     Airway   Mallampati: II  Neck ROM: full  Comment: feasible airway   Pulmonary - negative ROS    breath sounds clear to auscultation                         Cardiovascular - negative ROS  Rhythm: regular        Neuro/Psych    (+) anxiety/panic attacks,     Comments: Claustrophobia  Migraines  LLE paresthesias - anterior, for the last several months    Endo/Other    (+) obesity (Morbid obesity, BMI 55),      GI/Hepatic/Renal    (+) GERD (during pregnancy. prn meds),        Other findings:      at 37w presents for repeat  (two previous).                  Anemia      Anxiety     Claustrophobia     Missed ab     Migraines          Results for JESUS ALLEN (MRN [id number]) as of 2017 07:52    2017 07:04  INR: 1.01  PTT: 33  WBC: 11.5 (H)  RBC: 3.83  Hemoglobin: 11.2 (L)  Hematocrit: 33.9 (L)  MCV: 89  MCH: 29.2  MCHC: 33.0  RDW: 15.3 (H)  Platelets: 216  MPV: 12.1  Neutrophils %: 68  Lymphocytes %: 27  Monocytes %: 5  Eosinophils %: 0  Basophils %: 0  Neutrophils Absolute: 7.7  Lymphocytes Absolute: 3.1  Monocytes Absolute: 0.5  Eosinophils Absolute: 0.0  Basophils Absolute: 0.0    Results for JESUS ALLEN (MRN [id number]) as of 2017 08:51    2017 07:04  ABORh: O POS  Antibody Screen: Negative    2017 07:53  Crossmatch: Compatible  Status: Ready  Component: Red Blood Cells  Unit Number: [id number]  PRODUCT CODE: [id number]  CODING SYSTEM: [id number]    2017 07:53  Crossmatch: Compatible  Status: Ready  Component: Red Blood Cells  Unit Number: [id number]  PRODUCT CODE: [id number]  CODING SYSTEM: [id number]            Dental - normal exam                        Anesthesia Plan  Planned anesthetic: epidural and spinal      CSE  2 IVs  2 PRBC on HOLD          ASA 3     Anesthetic plan and risks discussed with: patient and spouse  Anesthesia plan special considerations:  increased risk of difficult airway, IV therapy two IVs,   Post-op plan: routine recovery        Amanda Joe MD  Staff Anesthesiologist  [facility]  9/19/17

## 2021-06-19 NOTE — PROGRESS NOTES
"HPI: Pt is here for follow up of an infection following an ventral hernia repair.  She continues to pack the wound, and is doing about the same. Her appetite is good, and bowel function regular.  No fevers or chills. Ambulating without problems.     Allergies, Medications, Social History, Past Medical History and Past Surgical History were reviewed and are noted in the chart.    /78 (Patient Site: Right Arm, Patient Position: Sitting, Cuff Size: Adult Large)  Pulse 81  Ht 5' 6\" (1.676 m)  Wt (!) 296 lb (134.3 kg)  SpO2 99%  Breastfeeding? No  BMI 47.78 kg/m2      EXAM: This is a  28 y.o. female in no distress  GENERAL: Appears well  CHEST clear  CVS S1S2 NSR  ABDOMEN: Soft, non-tender.  There is no evidence of infection around the wound.  I repacked the wound.  The cavity appears to be smaller.      Assessment/Plan: Mariela Adair is following up for her wound infection following ventral hernia repair. Doing well.  She will continue to pack the wound daily, and will return to us in 6 weeks or sooner if she has any problems.  If this heals up within the 6 week period, she need not return to the clinic.    Alfredo Boland MD  [facility] Department of Surgery  "

## 2021-06-19 NOTE — PROGRESS NOTES
"//HPI: Pt is here for follow up of a umbilical hernia repair with significant post op pain. Umbilical hernia repair done by Dr Boland. ER visit 7/12/18 with Ct showing hematoma/seroma. Has been seen here for pain and then subsequently seen again with pain and swelling. Aspiration was attempted but not drained. She felt things were worsening and went to her primary. Culture prelim is staph and she was started on doxycycline. She continues to have pain. Feels burning and things ripping. [place] feverish yesterday. Not taking anything for pain now.   /    BP (!) 136/92 (Patient Site: Right Arm, Patient Position: Sitting, Cuff Size: Adult Large)  Pulse 91  Temp 98.4  F (36.9  C)  Ht 5' 6\" (1.676 m)  Wt (!) 296 lb (134.3 kg)  LMP 07/06/2018  SpO2 98%  Breastfeeding? No  BMI 47.78 kg/m2    EXAM:  GENERAL:Appears well  ABDOMEN:  Soft, +BS  SURGICAL WOUNDS:  1/2 cm opening distal wound and slight opening caudal. Probed wound. Firm. Fascia. Thick fibrin debris along wound. Packed with 1/2 inch nu gauze.         Assessment/Plan: . Low suspicion for mesh infection but with significant pain and report of fever will check CT and make sure no fluid under facia and that mesh is ok. Continue doxycycline at this time. If no infected mesh then max doxy for 5 days. If infected will need to have her see DR Boland. Daily packing. If cant do it at home with  can come to clinic. See us Friday for a follow up .   Eder Alvarez PA-C  [facility] Department of Surgery    "

## 2021-06-19 NOTE — PROGRESS NOTES
"HPI: Pt is here for follow up of a umbilical hernia repair with Dr. Boland on 6/29/2018.  She was doing well post op however on 7/12/18 she went to the ER for concerns of increased pain she described as a deep burning ripping pain, a CT scan was done at that time which was read as a seroma and she was discharged and instructed to follow up with surgery.  She was then seen in clinic on 7/17/18 with concerns of drainage coming from the distal portion of her incision.  Attempts at aspirating this area were made without success.  She returned again on 7/24 to clinic after being seen by her primary MD who did a wound culture and started her on doxycycline.  She continued to have pain and feels like something isn't right with \"this constant burning ripping feeling\".  A CT scan was ordered at this time again to ensure that there was not a deeper fluid pocket/infection penetrating under the fascia into the mesh.  She is here is clinic now to follow up on the results of her CT scan from 7/24/18.    She denies fevers or chills, denies nausea.  Taking ibuprofen for pain.      LMP 07/06/2018    EXAM:  GENERAL:Appears well  ABDOMEN: obese, Soft, +BS  SURGICAL WOUNDS:  1 cm opening noted on the distal wound.  Wound probed and appears to be 6 cm deep, fascia appeared to be intact.  Drainage serosanguinous/yellow tinged. No odor noted.  Repacked with nugauze.       Assessment/Plan: .   S/p open umbilical hernia repair with mesh, complicated by post op fluid collection that is not improving and patient continues to have pain and feels that something is not right.  I reviewed the CT scans and asked Dr. Tuttle to view with me as I had concern that the fluid collection does appear to go beneath the fascial plane on both the CT scans, more so on the CT from 7/24.  I discussed my concern for possible mesh infection with the patient and have her scheduled to see Dr. Boland on Tuesday to further review images and discuss plan moving " forward.    Continue dressing changes and ABX.        Mike Gorman, [facility] Department of Surgery

## 2021-06-19 NOTE — PROGRESS NOTES
"HPI: Pt is here for follow up of an incarcerated ventral hernia repair.  She developed drainage from the wound, and this is being packed but she continues to have pain.  No fevers or chills.  This was cultured, and grew Staph.    Allergies, Medications, Social History, Past Medical History and Past Surgical History were reviewed and are noted in the chart.    /64 (Patient Site: Right Arm, Patient Position: Sitting, Cuff Size: Adult Large)  Pulse 72  Ht 5' 6\" (1.676 m)  Wt (!) 296 lb (134.3 kg)  LMP 07/06/2018  Breastfeeding? No  BMI 47.78 kg/m2      EXAM: This is a  28 y.o. female in no distress  GENERAL: Appears well  CHEST clear  CVS S1S2 NSR  ABDOMEN: Soft, non-tender.  Open sinus over incision, with 1 cm defect.  This was packed, but the packing was superficial and did not extend to the base of the wound.  I repacked it.  No erythema around the incision.  The sinus is granulating cleanly.      Assessment/Plan: Mariela Adair is following up after incarcerated ventral hernia repair.  She appears to have developed an infected seroma, and the wound is now packed.  I reviewed the CT scan, and it does not look particularly worrisome but I discussed with her that it is impossible to tell whether the mesh has been infected or not.  Hopefully the antibiotic and the packing will have been sufficient, as the mesh is  from the wound by the abdominal wall closure.  She will continue packing it, and I emphasized that it needs to be packed to the base of the wound.  She will return in 2 weeks time for further evaluation, or sooner if things worsen.    Alfredo Boland MD  [facility] Department of Surgery  "

## 2021-06-19 NOTE — PROGRESS NOTES
"HPI: Pt is here for follow up ventral hernia repair with a subsequent superficial hematoma formation under the incision.  She presents to the clinic today for increased drainage from the bottom of the incision. She was seen in clinic on 7/14/18 after an ED visit with a CT showing fluid collection at the hernia site. She reports that the fluid collection/hardness under the incision has considerably decreased in size since it began draining. The fluid draining has ranged from clear yellow to brown in color and she has had to change the gauze dressing 4 times today. She denies fever, chills, nausea, vomiting, malaise, and redness at the incision site.    Patient requesting to have the fluid collection drained.      /82 (Patient Site: Right Arm, Patient Position: Sitting, Cuff Size: Adult Large)  Pulse (!) 101  Ht 5' 6\" (1.676 m)  Wt (!) 296 lb 3.2 oz (134.4 kg)  LMP 07/06/2018  SpO2 97%  Breastfeeding? No  BMI 47.81 kg/m2    EXAM:  GENERAL:NAD  ABDOMEN:  Soft, +BS  SURGICAL WOUNDS:  Incision healing well but tender to palpation; moderate sized firm mass under the incision with no fluctuance noted; small amount of serosanguineous drainage on the gauze dressing; Small unapproximated area in distal portion of the incision with no tracking to deeper tissues; no erythema or hyperthermia in surrounding tissue      Assessment/Plan: Patient is s/p ventral hernia repair with likely a hematoma formation in the tissues beneath the incision. She has no signs/symptoms of infection and the hematoma appears to be breaking down and the fluid is draining from a small opening at the incisional base. It is explained to the patient that this drainage is a natural way for her body to get rid of the fluid and nothing to worry about at the point. Also explained to the patient that the area is unlikely to be amendable to aspiration. She continues to insist on wanting this to be drained. The risks and limited chances of an " aspiration helping were again explained and she repeated her desire to have it attempted. I injected 5 mL of lidocaine 1% with epi into the surrounding tissue and allowed this to become numb and inserted an 18G needle into the hematoma with no return, the needle was redrawn and reinserted 2 more times in different areas without successful drainage.     Patient was advised to allow her body to breakdown and absorb this hematoma naturally. It was explained that this could take up to a couple months but likely only a few weeks. Drainage is not unexpected with this process, but she should return to the clinic if there are any signs of infection or the fluid collection appears bigger.    Traci Cohen , [facility] Surgery

## 2021-06-19 NOTE — ANESTHESIA CARE TRANSFER NOTE
Last vitals:   Vitals:    06/29/18 1430   BP: 106/58   Pulse: 87   Resp: 24   Temp: 37.1  C (98.7  F)   SpO2: 100%     Pt brought to PACU on 10L facemask. Monitors applied. VSS upon arrival.    Patient's level of consciousness is drowsy  Spontaneous respirations: yes  Maintains airway independently: yes  Dentition unchanged: yes  Oropharynx: oropharynx clear of all foreign objects    QCDR Measures:  ASA# 20 - Surgical Safety Checklist: WHO surgical safety checklist completed prior to induction  PQRS# 430 - Adult PONV Prevention: 4558F - Pt received => 2 anti-emetic agents (different classes) preop & intraop  ASA# 8 - Peds PONV Prevention: NA - Not pediatric patient, not GA or 2 or more risk factors NOT present  PQRS# 424 - Renita-op Temp Management: 4559F - At least one body temp DOCUMENTED => 35.5C or 95.9F within required timeframe  PQRS# 426 - PACU Transfer Protocol: - Transfer of care checklist used  ASA# 14 - Acute Post-op Pain: ASA14B - Patient did NOT experience pain >= 7 out of 10

## 2021-06-19 NOTE — ANESTHESIA POSTPROCEDURE EVALUATION
Patient: Mariela Adair  REPAIR, HERNIA, VENTRAL, OPEN  Anesthesia type: general    Patient location: PACU  Last vitals:   Vitals:    06/29/18 1500   BP: 138/70   Pulse: 82   Resp: 17   Temp:    SpO2: 99%     Post vital signs: stable  Level of consciousness: awake, alert and oriented  Post-anesthesia pain: pain controlled  Post-anesthesia nausea and vomiting: no  Pulmonary: unassisted, return to baseline  Cardiovascular: stable and blood pressure at baseline  Hydration: adequate  Anesthetic events: no    QCDR Measures:  ASA# 11 - Renita-op Cardiac Arrest: ASA11B - Patient did NOT experience unanticipated cardiac arrest  ASA# 12 - Renita-op Mortality Rate: ASA12B - Patient did NOT die  ASA# 13 - PACU Re-Intubation Rate: ASA13B - Patient did NOT require a new airway mgmt  ASA# 10 - Composite Anes Safety: ASA10A - No serious adverse event    Additional Notes:

## 2021-06-19 NOTE — ANESTHESIA PREPROCEDURE EVALUATION
Anesthesia Evaluation      Patient summary reviewed   History of anesthetic complications (PONV)     Airway   Mallampati: I  Neck ROM: full   Pulmonary - normal exam                          Cardiovascular   Exercise tolerance: > or = 4 METS  Rhythm: regular  Rate: normal,         Neuro/Psych - negative ROS     Endo/Other    (+) obesity,      GI/Hepatic/Renal - negative ROS           Dental                         Anesthesia Plan  Planned anesthetic: general endotracheal    ASA 3     Anesthetic plan and risks discussed with: patient    Post-op plan: routine recovery

## 2021-06-19 NOTE — PROGRESS NOTES
HPI: Pt is here for follow up of a incarcerated hernia repair done 6/29/18 by Dr Boland.   Has not been doing well. Having severe pain. Felt like a tear in her abdomin. Some bruising. Had a friend bring her to ER while at work yesterday. CT showing fluid at hernia site possible seroma. Denies fever, chills. Ongoing nausea. Sleeping all the time with combo of benadryl and compazine. Zofran not helpful. Two norco prn helping.     /69 (Patient Site: Right Arm, Patient Position: Sitting, Cuff Size: Adult Large)  Pulse (!) 106  Temp 96.8  F (36  C) (Tympanic)   LMP 07/06/2018  SpO2 97%  Breastfeeding? No    EXAM:  GENERAL:Appears well  ABDOMEN:  Soft, +BS- tenderness under wound. Firm. No fluctuant seroma to be drained.   SURGICAL WOUNDS:  Incisions healing well, no enduration or drainage.  Reviewed labs from ER and CT images reviewed myself. -no other significant findings.     Assessment/Plan: . Post op pain with hematoma   Plan- will have her get Toradol for three days only. Then switch to ibuprofen alternate with tylenol. Tramadol alternate with norco then slowly wean off after three days. Phenergan for nausea. Follow up if not doing well  Eder Alvarez PA-C  [facility] Department of Surgery

## 2021-07-03 NOTE — ADDENDUM NOTE
Addendum Note by Eder Hudson PA-C at 7/24/2018  4:12 PM     Author: Eder Hudson PA-C Service: -- Author Type: Physician Assistant    Filed: 7/24/2018  4:12 PM Encounter Date: 7/24/2018 Status: Signed    : Eder Hudson PA-C (Physician Assistant)    Addended by: EDER HUDSON on: 7/24/2018 04:12 PM        Modules accepted: Orders

## 2021-07-03 NOTE — ADDENDUM NOTE
Addendum Note by Lona Whiting LPN at 10/25/2017  1:16 PM     Author: Lona Whiting LPN Service: -- Author Type: Licensed Nurse    Filed: 10/25/2017  1:16 PM Encounter Date: 10/25/2017 Status: Signed    : Lona Whiting LPN (Licensed Nurse)    Addended by: LONA WHITING on: 10/25/2017 01:16 PM        Modules accepted: Orders

## 2021-10-18 ENCOUNTER — APPOINTMENT (OUTPATIENT)
Dept: CT IMAGING | Facility: HOSPITAL | Age: 31
DRG: 394 | End: 2021-10-18
Attending: EMERGENCY MEDICINE
Payer: COMMERCIAL

## 2021-10-18 ENCOUNTER — HOSPITAL ENCOUNTER (INPATIENT)
Facility: HOSPITAL | Age: 31
LOS: 3 days | Discharge: HOME | DRG: 394 | End: 2021-10-21
Attending: EMERGENCY MEDICINE | Admitting: INTERNAL MEDICINE
Payer: COMMERCIAL

## 2021-10-18 DIAGNOSIS — K43.9 VENTRAL HERNIA WITHOUT OBSTRUCTION OR GANGRENE: ICD-10-CM

## 2021-10-18 DIAGNOSIS — R10.9 LEFT SIDED ABDOMINAL PAIN: ICD-10-CM

## 2021-10-18 LAB
ALBUMIN SERPL-MCNC: 4.3 G/DL (ref 3.5–5)
ALBUMIN UR-MCNC: NEGATIVE MG/DL
ALP SERPL-CCNC: 59 U/L (ref 45–120)
ALT SERPL W P-5'-P-CCNC: 26 U/L (ref 0–45)
ANION GAP SERPL CALCULATED.3IONS-SCNC: 7 MMOL/L (ref 5–18)
APPEARANCE UR: CLEAR
AST SERPL W P-5'-P-CCNC: 13 U/L (ref 0–40)
BACTERIA #/AREA URNS HPF: ABNORMAL /HPF
BASOPHILS # BLD AUTO: 0.1 10E3/UL (ref 0–0.2)
BASOPHILS NFR BLD AUTO: 1 %
BILIRUB SERPL-MCNC: 0.3 MG/DL (ref 0–1)
BILIRUB UR QL STRIP: NEGATIVE
BUN SERPL-MCNC: 8 MG/DL (ref 8–22)
CALCIUM SERPL-MCNC: 9.9 MG/DL (ref 8.5–10.5)
CHLORIDE BLD-SCNC: 107 MMOL/L (ref 98–107)
CO2 SERPL-SCNC: 25 MMOL/L (ref 22–31)
COLOR UR AUTO: COLORLESS
CREAT SERPL-MCNC: 0.66 MG/DL (ref 0.6–1.1)
EOSINOPHIL # BLD AUTO: 0.2 10E3/UL (ref 0–0.7)
EOSINOPHIL NFR BLD AUTO: 2 %
ERYTHROCYTE [DISTWIDTH] IN BLOOD BY AUTOMATED COUNT: 13.2 % (ref 10–15)
GFR SERPL CREATININE-BSD FRML MDRD: >90 ML/MIN/1.73M2
GLUCOSE BLD-MCNC: 83 MG/DL (ref 70–125)
GLUCOSE UR STRIP-MCNC: NEGATIVE MG/DL
HCT VFR BLD AUTO: 38.6 % (ref 35–47)
HGB BLD-MCNC: 12.6 G/DL (ref 11.7–15.7)
HGB UR QL STRIP: NEGATIVE
IMM GRANULOCYTES # BLD: 0 10E3/UL
IMM GRANULOCYTES NFR BLD: 0 %
KETONES UR STRIP-MCNC: NEGATIVE MG/DL
LACTATE SERPL-SCNC: 0.9 MMOL/L (ref 0.7–2)
LEUKOCYTE ESTERASE UR QL STRIP: NEGATIVE
LYMPHOCYTES # BLD AUTO: 3.2 10E3/UL (ref 0.8–5.3)
LYMPHOCYTES NFR BLD AUTO: 32 %
MAGNESIUM SERPL-MCNC: 2 MG/DL (ref 1.8–2.6)
MCH RBC QN AUTO: 31.1 PG (ref 26.5–33)
MCHC RBC AUTO-ENTMCNC: 32.6 G/DL (ref 31.5–36.5)
MCV RBC AUTO: 95 FL (ref 78–100)
MONOCYTES # BLD AUTO: 0.5 10E3/UL (ref 0–1.3)
MONOCYTES NFR BLD AUTO: 5 %
NEUTROPHILS # BLD AUTO: 6.2 10E3/UL (ref 1.6–8.3)
NEUTROPHILS NFR BLD AUTO: 60 %
NITRATE UR QL: NEGATIVE
NRBC # BLD AUTO: 0 10E3/UL
NRBC BLD AUTO-RTO: 0 /100
PH UR STRIP: 7 [PH] (ref 5–7)
PLATELET # BLD AUTO: 238 10E3/UL (ref 150–450)
POTASSIUM BLD-SCNC: 4.1 MMOL/L (ref 3.5–5)
PROT SERPL-MCNC: 7.9 G/DL (ref 6–8)
RBC # BLD AUTO: 4.05 10E6/UL (ref 3.8–5.2)
RBC URINE: <1 /HPF
SARS-COV-2 RNA RESP QL NAA+PROBE: NEGATIVE
SODIUM SERPL-SCNC: 139 MMOL/L (ref 136–145)
SP GR UR STRIP: 1.01 (ref 1–1.03)
SQUAMOUS EPITHELIAL: 1 /HPF
TRANSITIONAL EPI: <1 /HPF
UROBILINOGEN UR STRIP-MCNC: <2 MG/DL
WBC # BLD AUTO: 10.2 10E3/UL (ref 4–11)
WBC URINE: <1 /HPF

## 2021-10-18 PROCEDURE — 250N000011 HC RX IP 250 OP 636: Performed by: INTERNAL MEDICINE

## 2021-10-18 PROCEDURE — 96365 THER/PROPH/DIAG IV INF INIT: CPT

## 2021-10-18 PROCEDURE — 96376 TX/PRO/DX INJ SAME DRUG ADON: CPT

## 2021-10-18 PROCEDURE — 258N000003 HC RX IP 258 OP 636: Performed by: EMERGENCY MEDICINE

## 2021-10-18 PROCEDURE — 83605 ASSAY OF LACTIC ACID: CPT | Performed by: EMERGENCY MEDICINE

## 2021-10-18 PROCEDURE — 99285 EMERGENCY DEPT VISIT HI MDM: CPT | Mod: 25

## 2021-10-18 PROCEDURE — 81001 URINALYSIS AUTO W/SCOPE: CPT | Performed by: EMERGENCY MEDICINE

## 2021-10-18 PROCEDURE — 96366 THER/PROPH/DIAG IV INF ADDON: CPT

## 2021-10-18 PROCEDURE — 81001 URINALYSIS AUTO W/SCOPE: CPT | Performed by: STUDENT IN AN ORGANIZED HEALTH CARE EDUCATION/TRAINING PROGRAM

## 2021-10-18 PROCEDURE — 120N000001 HC R&B MED SURG/OB

## 2021-10-18 PROCEDURE — 74176 CT ABD & PELVIS W/O CONTRAST: CPT

## 2021-10-18 PROCEDURE — C9803 HOPD COVID-19 SPEC COLLECT: HCPCS

## 2021-10-18 PROCEDURE — 83735 ASSAY OF MAGNESIUM: CPT | Performed by: EMERGENCY MEDICINE

## 2021-10-18 PROCEDURE — 250N000011 HC RX IP 250 OP 636: Performed by: EMERGENCY MEDICINE

## 2021-10-18 PROCEDURE — 87635 SARS-COV-2 COVID-19 AMP PRB: CPT | Performed by: EMERGENCY MEDICINE

## 2021-10-18 PROCEDURE — 36415 COLL VENOUS BLD VENIPUNCTURE: CPT | Performed by: EMERGENCY MEDICINE

## 2021-10-18 PROCEDURE — 250N000011 HC RX IP 250 OP 636

## 2021-10-18 PROCEDURE — 85025 COMPLETE CBC W/AUTO DIFF WBC: CPT | Performed by: EMERGENCY MEDICINE

## 2021-10-18 PROCEDURE — 99207 PR NO CHARGE LOS: CPT | Performed by: SURGERY

## 2021-10-18 PROCEDURE — 258N000003 HC RX IP 258 OP 636: Performed by: INTERNAL MEDICINE

## 2021-10-18 PROCEDURE — 96375 TX/PRO/DX INJ NEW DRUG ADDON: CPT

## 2021-10-18 PROCEDURE — G0378 HOSPITAL OBSERVATION PER HR: HCPCS

## 2021-10-18 PROCEDURE — 96368 THER/DIAG CONCURRENT INF: CPT

## 2021-10-18 PROCEDURE — 99221 1ST HOSP IP/OBS SF/LOW 40: CPT | Performed by: INTERNAL MEDICINE

## 2021-10-18 PROCEDURE — 84295 ASSAY OF SERUM SODIUM: CPT | Performed by: EMERGENCY MEDICINE

## 2021-10-18 RX ORDER — SERTRALINE HYDROCHLORIDE 100 MG/1
150 TABLET, FILM COATED ORAL DAILY
COMMUNITY
Start: 2021-04-05

## 2021-10-18 RX ORDER — NALOXONE HYDROCHLORIDE 0.4 MG/ML
0.2 INJECTION, SOLUTION INTRAMUSCULAR; INTRAVENOUS; SUBCUTANEOUS
Status: DISCONTINUED | OUTPATIENT
Start: 2021-10-18 | End: 2021-10-21 | Stop reason: HOSPADM

## 2021-10-18 RX ORDER — MORPHINE SULFATE 4 MG/ML
4 INJECTION, SOLUTION INTRAMUSCULAR; INTRAVENOUS ONCE
Status: COMPLETED | OUTPATIENT
Start: 2021-10-18 | End: 2021-10-18

## 2021-10-18 RX ORDER — NALOXONE HYDROCHLORIDE 0.4 MG/ML
0.4 INJECTION, SOLUTION INTRAMUSCULAR; INTRAVENOUS; SUBCUTANEOUS
Status: DISCONTINUED | OUTPATIENT
Start: 2021-10-18 | End: 2021-10-21 | Stop reason: HOSPADM

## 2021-10-18 RX ORDER — SODIUM CHLORIDE 9 MG/ML
INJECTION, SOLUTION INTRAVENOUS ONCE
Status: COMPLETED | OUTPATIENT
Start: 2021-10-18 | End: 2021-10-18

## 2021-10-18 RX ORDER — LEVOFLOXACIN 5 MG/ML
500 INJECTION, SOLUTION INTRAVENOUS ONCE
Status: COMPLETED | OUTPATIENT
Start: 2021-10-18 | End: 2021-10-18

## 2021-10-18 RX ORDER — SODIUM CHLORIDE 9 MG/ML
INJECTION, SOLUTION INTRAVENOUS CONTINUOUS
Status: DISCONTINUED | OUTPATIENT
Start: 2021-10-18 | End: 2021-10-20

## 2021-10-18 RX ORDER — HYDROMORPHONE HYDROCHLORIDE 1 MG/ML
.5-1 INJECTION, SOLUTION INTRAMUSCULAR; INTRAVENOUS; SUBCUTANEOUS
Status: DISCONTINUED | OUTPATIENT
Start: 2021-10-18 | End: 2021-10-19

## 2021-10-18 RX ORDER — LEVOFLOXACIN 5 MG/ML
750 INJECTION, SOLUTION INTRAVENOUS EVERY 24 HOURS
Status: DISCONTINUED | OUTPATIENT
Start: 2021-10-19 | End: 2021-10-21 | Stop reason: HOSPADM

## 2021-10-18 RX ORDER — ONDANSETRON 2 MG/ML
4 INJECTION INTRAMUSCULAR; INTRAVENOUS ONCE
Status: COMPLETED | OUTPATIENT
Start: 2021-10-18 | End: 2021-10-18

## 2021-10-18 RX ORDER — ONDANSETRON 2 MG/ML
4 INJECTION INTRAMUSCULAR; INTRAVENOUS EVERY 6 HOURS PRN
Status: DISCONTINUED | OUTPATIENT
Start: 2021-10-18 | End: 2021-10-21 | Stop reason: HOSPADM

## 2021-10-18 RX ADMIN — MORPHINE SULFATE 4 MG: 4 INJECTION, SOLUTION INTRAMUSCULAR; INTRAVENOUS at 19:56

## 2021-10-18 RX ADMIN — HYDROMORPHONE HYDROCHLORIDE 1 MG: 1 INJECTION, SOLUTION INTRAMUSCULAR; INTRAVENOUS; SUBCUTANEOUS at 23:06

## 2021-10-18 RX ADMIN — MORPHINE SULFATE 4 MG: 4 INJECTION, SOLUTION INTRAMUSCULAR; INTRAVENOUS at 22:10

## 2021-10-18 RX ADMIN — METRONIDAZOLE 500 MG: 500 INJECTION, SOLUTION INTRAVENOUS at 19:59

## 2021-10-18 RX ADMIN — LEVOFLOXACIN 500 MG: 5 INJECTION, SOLUTION INTRAVENOUS at 21:30

## 2021-10-18 RX ADMIN — SODIUM CHLORIDE 1000 ML: 9 INJECTION, SOLUTION INTRAVENOUS at 19:50

## 2021-10-18 RX ADMIN — ONDANSETRON 4 MG: 2 INJECTION INTRAMUSCULAR; INTRAVENOUS at 19:54

## 2021-10-18 RX ADMIN — SODIUM CHLORIDE: 9 INJECTION, SOLUTION INTRAVENOUS at 21:30

## 2021-10-18 RX ADMIN — SODIUM CHLORIDE: 9 INJECTION, SOLUTION INTRAVENOUS at 23:06

## 2021-10-18 ASSESSMENT — ENCOUNTER SYMPTOMS
DIARRHEA: 0
SHORTNESS OF BREATH: 0
VOMITING: 0
NAUSEA: 1
TROUBLE SWALLOWING: 0
FEVER: 0
DYSURIA: 0
ABDOMINAL PAIN: 1
CONSTIPATION: 1
COUGH: 0

## 2021-10-18 ASSESSMENT — ACTIVITIES OF DAILY LIVING (ADL): ADLS_ACUITY_SCORE: 4

## 2021-10-18 ASSESSMENT — MIFFLIN-ST. JEOR
SCORE: 2136.97
SCORE: 2082.54

## 2021-10-18 NOTE — ED TRIAGE NOTES
Pt reports being diagnosed with an abdominal hernia 2 years ago after last pregnancy. Pt reports increased pain the last 3 days, inability to have typical BM, nausea (no emesis). Pt endorses taking tylenol for pain without relief.     Medical hx: anxiety

## 2021-10-18 NOTE — ED PROVIDER NOTES
EMERGENCY DEPARTMENT ENCOUNTER      NAME: Mariela Hawkins  AGE: 31 year old female  YOB: 1990  MRN: [id number]  EVALUATION DATE & TIME: No admission date for patient encounter.    PCP: Kenzie Mccoy    ED PROVIDER: Lona George M.D.        Chief Complaint   Patient presents with     Abdominal Pain         FINAL IMPRESSION:    1. Left sided abdominal pain    2. Ventral hernia without obstruction or gangrene            MEDICAL DECISION MAKIN year old female with history of obesity and prior umbilical hernia who presents emergency department with worsening abdominal pain and nausea without vomiting.  Seen by physician in triage and CT scan shows findings consistent with hernia containing bowel though no obvious signs for obstruction.  There is a fluid collection which could be early phlegmon versus abscess.  Patient hemodynamically stable.  Spoke with surgery who would like her admitted to the hospital with IV antibiotics.  Patient accepted to the hospital with a hospitalist.  Lactic acid ordered per hospitalist request.  She will go to a [facility] bed.      ED COURSE:  6:39 PM I met with the patient to gather history and perform my exam. ED course and treatment discussed.  I did try to reduce the reported hernia but was unable to palpate it due to body habitus.  She has really no tenderness in the midline of her abdomen or umbilicus area, pain is more left lateral.  I did apply constant pressure to this area of greatest pain without being able to palpate an actual hernia and did not provide any improvement.    7:14 PM  Discussed with Dr. Peterson of surgery who would like her admitted to the hospital and start her on Levaquin and Flagyl given her penicillin allergy.  Also discussed with the hospitalist, Dr. Fred Newton, who agrees to except her to [facility] under inpatient status.  She would like to add on a lactic acid and this has been ordered.  This patient has been admitted under  inpatient status as I believe that they will require hospitalization and medical care spanning TWO midnights based on evaluation while in the Emergency Department.    8:40 PM  Still waiting for nursing to send lactic acid. Update nurse.    9:47 PM  Lactic was just collected by nursing.      I do not think that this represents ACS, PE, ruptured AAA, aortic dissection,  cholecystitis, pancreatitis, appendicitis, diverticulitis, kidney stone, pyelonephritis, ovarian torsion, PID, ectopic pregnancy, tubo-ovarian abscess, viscus perforation, perforated GI ulcer, or other such etiologies at this time.      COVID-19 PPE worn during patient evaluation:  Mask: n95 and homemade masks   Eye Protection: goggles   Gown: none  Hair cover: yes  Face shield: none  Patient wearing a mask: yes    At the conclusion of the encounter I discussed the results of all of the tests and thedisposition. Their questions were answered. The patient (and any family present) acknowledged understanding and were agreeable with the care plan.        CONSULTANTS:  Surgery - Dr. Peterson        MEDICATIONS GIVEN IN THE EMERGENCY:  Medications   levofloxacin (LEVAQUIN) infusion 500 mg (500 mg Intravenous New Bag 10/18/21 2130)   morphine (PF) injection 4 mg (has no administration in time range)   metroNIDAZOLE (FLAGYL) infusion 500 mg (has no administration in time range)   levofloxacin (LEVAQUIN) infusion 750 mg (has no administration in time range)   0.9% sodium chloride BOLUS (1,000 mLs Intravenous New Bag 10/18/21 1950)   sodium chloride 0.9% infusion ( Intravenous New Bag 10/18/21 2130)   morphine (PF) injection 4 mg (4 mg Intravenous Given 10/18/21 1956)   ondansetron (ZOFRAN) injection 4 mg (4 mg Intravenous Given 10/18/21 1954)   metroNIDAZOLE (FLAGYL) infusion 500 mg (500 mg Intravenous New Bag 10/18/21 1959)             NEW PRESCRIPTIONS STARTED AT TODAY'S ER VISIT     Medication List      ASK your doctor about these medications    sertraline  100 MG tablet  Commonly known as: ZOLOFT  Ask about: Which instructions should I use?                CONDITION:  stable        DISPOSITION:  *Med surg ip as accepted by Dr. Russell [facility]         =================================================================  =================================================================    HPI    Patient information was obtained from: patient    Use of Intrepreter: N/A     Mariela Hawkins is a 31 year old female with history of obesity, h/o umbilical hernia repaired with mesh who presents to the ER with complaints of worsening abdominal pain for the past 3 days.  Has a history of hernia and states this feels similar to when she had a possible incarcerated hernia.  She thinks that when she had her baby 4 years ago to remove the mesh from her prior hernia.  Pain is in the left lateral aspect of her abdomen.  She has not been able to have any bowel movements and now getting nauseated without vomiting.  Feels similar to when she had that prior hernia issue.    Nuys any fevers, cough, chest pain, shortness of breath, vomiting, diarrhea, dysuria, hematuria.    REVIEW OF SYSTEMS  Review of Systems   Constitutional: Negative for fever.   HENT: Negative for trouble swallowing.    Respiratory: Negative for cough and shortness of breath.    Cardiovascular: Negative for chest pain.   Gastrointestinal: Positive for abdominal pain, constipation and nausea. Negative for diarrhea and vomiting.   Genitourinary: Negative for dysuria.   Allergic/Immunologic: Negative for immunocompromised state.   All other systems reviewed and are negative.          PAST MEDICAL HISTORY:  Past Medical History:   Diagnosis Date     Acanthosis nigricans      Anemia      Anxiety      BV (bacterial vaginosis)      Claustrophobia      History of anesthesia complications      Mental disorder      Migraines      Migraines      Missed ab      Morbid obesity (H)      Ovarian cystic mass      Pain in limb       Pelvic pain in female      PONV (postoperative nausea and vomiting)          PAST SURGICAL HISTORY:  Past Surgical History:   Procedure Laterality Date      SECTION        SECTION      X2      SECTION N/A 2017    Procedure: REPEAT  SECTION;  Surgeon: Shayy Aviles MD;  Location: [facility] L[facility]D OR;  Service:       SECTION, IMMEDIATE HYSTERECTOMY, COMBINED N/A 2019    Procedure: Repeat  Section, Hysterectomy, Exploratory Laparotomy, Bilateral Salpingectomy, Cystoscopy;  Surgeon: Joan Carmona MD;  Location:  OR     DILATION AND CURETTAGE N/A 2016    Procedure: DILATION AND CURETTAGE SUCTION;  Surgeon: Ant Parikh MD;  Location: [facility] OR;  Service:      EYE MUSCLE SURGERY Bilateral     strabismus     HERNIORRHAPHY VENTRAL N/A 2018    Procedure: REPAIR, HERNIA, VENTRAL, OPEN;  Surgeon: Alfredo Boland MD;  Location: [facility] OR;  Service:      RECESSION RESECTION (REPAIR STRABISMUS) BILATERAL Bilateral 2014    Procedure: RECESSION RESECTION (REPAIR STRABISMUS) BILATERAL;  Surgeon: Titi Awan MD;  Location: [facility]     RECESSION RESECTION (REPAIR STRABISMUS) BILATERAL Bilateral 2015    Procedure: RECESSION RESECTION (REPAIR STRABISMUS) BILATERAL;  Surgeon: Titi Awan MD;  Location: [facility]         CURRENT MEDICATIONS:    Prior to Admission medications    Medication Sig Start Date End Date Taking? Authorizing Provider   acetaminophen (TYLENOL) 325 MG tablet Take 2 tablets (650 mg) by mouth every 6 hours as needed for mild pain Start after Delivery. 19   Bhavya Medina MD   ferrous sulfate (FEROSUL) 325 (65 Fe) MG tablet Take 1 tablet (325 mg) by mouth daily (with breakfast) 19   Mindi Ho MD   ibuprofen (ADVIL/MOTRIN) 600 MG tablet Take 1 tablet (600 mg) by mouth every 6 hours as needed for moderate pain Start after delivery 20   Bhavya Medina  MD Camryn   metoclopramide (REGLAN) 10 MG tablet Take 1 tablet (10 mg) by mouth 3 times daily 1/16/20   Bhavya Medina MD   oxyCODONE (ROXICODONE) 5 MG tablet Take 1 tablet (5 mg) by mouth every 4 hours as needed for moderate to severe pain 1/16/20   Bhavya Medina MD   oxyCODONE (ROXICODONE) 5 MG tablet Take 1 tablet (5 mg) by mouth every 6 hours as needed for moderate to severe pain 1/9/20   Bhavya Medina MD   Prenatal Multivit-Min-Fe-FA (PRENATAL VITAMINS PO) Take 1 tablet by mouth daily    Reported, Patient   sertraline (ZOLOFT) 50 MG tablet Take 2 tablets (100 mg) by mouth daily 1/16/20   Bhavya Medina MD         ALLERGIES:  Allergies   Allergen Reactions     Amoxicillin Rash         FAMILY HISTORY:  Family History   Problem Relation Age of Onset     No Known Problems Mother      No Known Problems Father      No Known Problems Son      Cerebrovascular Disease Maternal Grandmother      Cancer Paternal Grandmother      No Known Problems Son      No Known Problems Son          SOCIAL HISTORY:  Social History     Socioeconomic History     Marital status:      Spouse name: Not on file     Number of children: Not on file     Years of education: Not on file     Highest education level: Not on file   Occupational History     Not on file   Tobacco Use     Smoking status: Never Smoker     Smokeless tobacco: Never Used   Substance and Sexual Activity     Alcohol use: No     Drug use: No     Sexual activity: Yes     Partners: Male     Birth control/protection: None     Comment: engaged   Other Topics Concern     Not on file   Social History Narrative     Not on file     Social Determinants of Health     Financial Resource Strain:      Difficulty of Paying Living Expenses:    Food Insecurity:      Worried About Running Out of Food in the Last Year:      Ran Out of Food in the Last Year:    Transportation Needs:      Lack of Transportation (Medical):      Lack of Transportation (Non-Medical):    Physical  "Activity:      Days of Exercise per Week:      Minutes of Exercise per Session:    Stress:      Feeling of Stress :    Social Connections:      Frequency of Communication with Friends and Family:      Frequency of Social Gatherings with Friends and Family:      Attends Yazidism Services:      Active Member of Clubs or Organizations:      Attends Club or Organization Meetings:      Marital Status:    Intimate Partner Violence:      Fear of Current or Ex-Partner:      Emotionally Abused:      Physically Abused:      Sexually Abused:          VITALS:  Patient Vitals for the past 24 hrs:   BP Temp Temp src Pulse Resp SpO2 Height Weight   10/18/21 2000 119/76 -- -- -- -- -- -- --   10/18/21 1602 138/88 98.5  F (36.9  C) Oral 71 16 100 % 1.676 m (5' 6\") 136.1 kg (300 lb)       Wt Readings from Last 3 Encounters:   10/18/21 136.1 kg (300 lb)   01/23/20 148.8 kg (328 lb)   01/16/20 149.2 kg (328 lb 14.4 oz)         PHYSICAL EXAM    Constitutional:  Well developed, Well nourished, NAD, GCS 15  HENT:  Normocephalic, Atraumatic, Bilateral external ears normal, Nose normal. Neck-  Normal range of motion, No tenderness, Supple, No stridor.   Eyes:  PERRL, EOMI, Conjunctiva normal, No discharge.  Respiratory:  Normal breath sounds, No respiratory distress, No wheezing, Speaks full sentences easily. No cough.   Cardiovascular:  Normal heart rate, Regular rhythm, No murmurs, No rubs, No gallops.   GI:  + obesity.  Bowel sounds normal, Soft, no midline tenderness but she does have moderate tenderness to left of midline though no hernia or bowel appreciated by palpation. Continuous pressure to this area to try to reduce possible hernia was not successful.  No masses, No flank tenderness. No rebound or guarding.   : deferred  Musculoskeletal:  No cyanosis, No clubbing. Good range of motion in all major joints. No major deformities noted.   Integument:  Warm, Dry, No erythema, No rash.  No petechiae.  Neurologic:  Alert & oriented x " 3, No focal deficits noted. Normal gait.   Psychiatric:  Affect normal, Cooperative          LAB:  All pertinent labs reviewed and interpreted.  Recent Results (from the past 24 hour(s))   UA with Microscopic reflex to Culture    Collection Time: 10/18/21  4:25 PM    Specimen: Urine, Clean Catch   Result Value Ref Range    Color Urine Colorless Colorless, Straw, Light Yellow, Yellow    Appearance Urine Clear Clear    Glucose Urine Negative Negative mg/dL    Bilirubin Urine Negative Negative    Ketones Urine Negative Negative mg/dL    Specific Gravity Urine 1.012 1.001 - 1.030    Blood Urine Negative Negative    pH Urine 7.0 5.0 - 7.0    Protein Albumin Urine Negative Negative mg/dL    Urobilinogen Urine <2.0 <2.0 mg/dL    Nitrite Urine Negative Negative    Leukocyte Esterase Urine Negative Negative    Bacteria Urine Few (A) None Seen /HPF    RBC Urine <1 <=2 /HPF    WBC Urine <1 <=5 /HPF    Squamous Epithelials Urine 1 <=1 /HPF    Transitional Epithelials Urine <1 <=1 /HPF   Comprehensive metabolic panel    Collection Time: 10/18/21  5:44 PM   Result Value Ref Range    Sodium 139 136 - 145 mmol/L    Potassium 4.1 3.5 - 5.0 mmol/L    Chloride 107 98 - 107 mmol/L    Carbon Dioxide (CO2) 25 22 - 31 mmol/L    Anion Gap 7 5 - 18 mmol/L    Urea Nitrogen 8 8 - 22 mg/dL    Creatinine 0.66 0.60 - 1.10 mg/dL    Calcium 9.9 8.5 - 10.5 mg/dL    Glucose 83 70 - 125 mg/dL    Alkaline Phosphatase 59 45 - 120 U/L    AST 13 0 - 40 U/L    ALT 26 0 - 45 U/L    Protein Total 7.9 6.0 - 8.0 g/dL    Albumin 4.3 3.5 - 5.0 g/dL    Bilirubin Total 0.3 0.0 - 1.0 mg/dL    GFR Estimate >90 >60 mL/min/1.73m2   CBC with platelets and differential    Collection Time: 10/18/21  5:44 PM   Result Value Ref Range    WBC Count 10.2 4.0 - 11.0 10e3/uL    RBC Count 4.05 3.80 - 5.20 10e6/uL    Hemoglobin 12.6 11.7 - 15.7 g/dL    Hematocrit 38.6 35.0 - 47.0 %    MCV 95 78 - 100 fL    MCH 31.1 26.5 - 33.0 pg    MCHC 32.6 31.5 - 36.5 g/dL    RDW 13.2 10.0 -  15.0 %    Platelet Count 238 150 - 450 10e3/uL    % Neutrophils 60 %    % Lymphocytes 32 %    % Monocytes 5 %    % Eosinophils 2 %    % Basophils 1 %    % Immature Granulocytes 0 %    NRBCs per 100 WBC 0 <1 /100    Absolute Neutrophils 6.2 1.6 - 8.3 10e3/uL    Absolute Lymphocytes 3.2 0.8 - 5.3 10e3/uL    Absolute Monocytes 0.5 0.0 - 1.3 10e3/uL    Absolute Eosinophils 0.2 0.0 - 0.7 10e3/uL    Absolute Basophils 0.1 0.0 - 0.2 10e3/uL    Absolute Immature Granulocytes 0.0 <=0.0 10e3/uL    Absolute NRBCs 0.0 10e3/uL   Magnesium    Collection Time: 10/18/21  5:44 PM   Result Value Ref Range    Magnesium 2.0 1.8 - 2.6 mg/dL   Asymptomatic COVID-19 Virus (Coronavirus) by PCR Nasopharyngeal    Collection Time: 10/18/21  7:07 PM    Specimen: Nasopharyngeal; Swab   Result Value Ref Range    SARS CoV2 PCR Negative Negative   Lactic acid whole blood    Collection Time: 10/18/21  9:35 PM   Result Value Ref Range    Lactic Acid 0.9 0.7 - 2.0 mmol/L       Lab Results   Component Value Date    ABORH O POS 06/03/2019           RADIOLOGY:  Reviewed all pertinent imaging. Please see official radiology report.    CT Abdomen Pelvis w/o Contrast   Final Result   IMPRESSION:    1.  Larger ventral midline abdominal wall hernia containing a herniated small bowel loop. No bowel obstruction at this time. However, along the right lateral margin of the hernia is a rounded hypodense nodule that could be a small complex fluid    collection such as phlegmon versus developing small abscess. It is approximately 2.4 cm. This is technically nonspecific.   2.  No other acute abnormality.   3.  Tiny nonobstructing stone lower right kidney.               EKG:    none      PROCEDURES:  None      Lona George M.D. [facility]  Emergency Medicine and Medical Toxicology  Formerly [facility] EMERGENCY DEPARTMENT  [address] [facility] [id number] [phone]  Dept: [phone]           Ariel  Lona Whitaker MD  10/18/21 2118       Lona George MD  10/18/21 2148       Lona George MD  10/18/21 2203

## 2021-10-19 DIAGNOSIS — E66.01 MORBID OBESITY (H): Primary | ICD-10-CM

## 2021-10-19 PROBLEM — R10.9 LEFT SIDED ABDOMINAL PAIN: Status: ACTIVE | Noted: 2021-10-19

## 2021-10-19 LAB
ANION GAP SERPL CALCULATED.3IONS-SCNC: 5 MMOL/L (ref 5–18)
BUN SERPL-MCNC: 7 MG/DL (ref 8–22)
CALCIUM SERPL-MCNC: 8.7 MG/DL (ref 8.5–10.5)
CHLORIDE BLD-SCNC: 109 MMOL/L (ref 98–107)
CO2 SERPL-SCNC: 24 MMOL/L (ref 22–31)
CREAT SERPL-MCNC: 0.63 MG/DL (ref 0.6–1.1)
ERYTHROCYTE [DISTWIDTH] IN BLOOD BY AUTOMATED COUNT: 13.2 % (ref 10–15)
GFR SERPL CREATININE-BSD FRML MDRD: >90 ML/MIN/1.73M2
GLUCOSE BLD-MCNC: 98 MG/DL (ref 70–125)
HCT VFR BLD AUTO: 37.8 % (ref 35–47)
HGB BLD-MCNC: 12.2 G/DL (ref 11.7–15.7)
HOLD SPECIMEN: NORMAL
LACTATE SERPL-SCNC: 0.7 MMOL/L (ref 0.7–2)
MAGNESIUM SERPL-MCNC: 1.8 MG/DL (ref 1.8–2.6)
MCH RBC QN AUTO: 31.3 PG (ref 26.5–33)
MCHC RBC AUTO-ENTMCNC: 32.3 G/DL (ref 31.5–36.5)
MCV RBC AUTO: 97 FL (ref 78–100)
PLATELET # BLD AUTO: 210 10E3/UL (ref 150–450)
POTASSIUM BLD-SCNC: 4.1 MMOL/L (ref 3.5–5)
PROCALCITONIN SERPL-MCNC: <0.02 NG/ML (ref 0–0.49)
RBC # BLD AUTO: 3.9 10E6/UL (ref 3.8–5.2)
SODIUM SERPL-SCNC: 138 MMOL/L (ref 136–145)
WBC # BLD AUTO: 8.3 10E3/UL (ref 4–11)

## 2021-10-19 PROCEDURE — 250N000011 HC RX IP 250 OP 636: Performed by: INTERNAL MEDICINE

## 2021-10-19 PROCEDURE — 85027 COMPLETE CBC AUTOMATED: CPT | Performed by: INTERNAL MEDICINE

## 2021-10-19 PROCEDURE — 36415 COLL VENOUS BLD VENIPUNCTURE: CPT | Performed by: INTERNAL MEDICINE

## 2021-10-19 PROCEDURE — G0378 HOSPITAL OBSERVATION PER HR: HCPCS

## 2021-10-19 PROCEDURE — 84145 PROCALCITONIN (PCT): CPT | Performed by: INTERNAL MEDICINE

## 2021-10-19 PROCEDURE — 99231 SBSQ HOSP IP/OBS SF/LOW 25: CPT | Performed by: SURGERY

## 2021-10-19 PROCEDURE — 80048 BASIC METABOLIC PNL TOTAL CA: CPT | Performed by: INTERNAL MEDICINE

## 2021-10-19 PROCEDURE — 250N000013 HC RX MED GY IP 250 OP 250 PS 637: Performed by: INTERNAL MEDICINE

## 2021-10-19 PROCEDURE — 83605 ASSAY OF LACTIC ACID: CPT | Performed by: INTERNAL MEDICINE

## 2021-10-19 PROCEDURE — 96372 THER/PROPH/DIAG INJ SC/IM: CPT | Performed by: INTERNAL MEDICINE

## 2021-10-19 PROCEDURE — 258N000003 HC RX IP 258 OP 636: Performed by: INTERNAL MEDICINE

## 2021-10-19 PROCEDURE — 99233 SBSQ HOSP IP/OBS HIGH 50: CPT | Performed by: INTERNAL MEDICINE

## 2021-10-19 PROCEDURE — 120N000001 HC R&B MED SURG/OB

## 2021-10-19 PROCEDURE — 83735 ASSAY OF MAGNESIUM: CPT | Performed by: INTERNAL MEDICINE

## 2021-10-19 PROCEDURE — C9113 INJ PANTOPRAZOLE SODIUM, VIA: HCPCS | Performed by: INTERNAL MEDICINE

## 2021-10-19 RX ORDER — HYDROMORPHONE HYDROCHLORIDE 1 MG/ML
.3-.5 INJECTION, SOLUTION INTRAMUSCULAR; INTRAVENOUS; SUBCUTANEOUS
Status: DISCONTINUED | OUTPATIENT
Start: 2021-10-19 | End: 2021-10-21 | Stop reason: HOSPADM

## 2021-10-19 RX ORDER — HEPARIN SODIUM 5000 [USP'U]/.5ML
5000 INJECTION, SOLUTION INTRAVENOUS; SUBCUTANEOUS EVERY 8 HOURS
Status: DISCONTINUED | OUTPATIENT
Start: 2021-10-19 | End: 2021-10-21 | Stop reason: HOSPADM

## 2021-10-19 RX ADMIN — HEPARIN SODIUM 5000 UNITS: 10000 INJECTION, SOLUTION INTRAVENOUS; SUBCUTANEOUS at 14:57

## 2021-10-19 RX ADMIN — ONDANSETRON 4 MG: 2 INJECTION INTRAMUSCULAR; INTRAVENOUS at 13:46

## 2021-10-19 RX ADMIN — HYDROMORPHONE HYDROCHLORIDE 0.5 MG: 1 INJECTION, SOLUTION INTRAMUSCULAR; INTRAVENOUS; SUBCUTANEOUS at 05:16

## 2021-10-19 RX ADMIN — HYDROMORPHONE HYDROCHLORIDE 0.5 MG: 1 INJECTION, SOLUTION INTRAMUSCULAR; INTRAVENOUS; SUBCUTANEOUS at 18:00

## 2021-10-19 RX ADMIN — PROCHLORPERAZINE EDISYLATE 10 MG: 5 INJECTION INTRAMUSCULAR; INTRAVENOUS at 18:00

## 2021-10-19 RX ADMIN — HYDROMORPHONE HYDROCHLORIDE 0.5 MG: 1 INJECTION, SOLUTION INTRAMUSCULAR; INTRAVENOUS; SUBCUTANEOUS at 09:27

## 2021-10-19 RX ADMIN — PROCHLORPERAZINE EDISYLATE 10 MG: 5 INJECTION INTRAMUSCULAR; INTRAVENOUS at 09:41

## 2021-10-19 RX ADMIN — LEVOFLOXACIN 750 MG: 5 INJECTION, SOLUTION INTRAVENOUS at 09:27

## 2021-10-19 RX ADMIN — HEPARIN SODIUM 5000 UNITS: 10000 INJECTION, SOLUTION INTRAVENOUS; SUBCUTANEOUS at 22:47

## 2021-10-19 RX ADMIN — ONDANSETRON 4 MG: 2 INJECTION INTRAMUSCULAR; INTRAVENOUS at 02:34

## 2021-10-19 RX ADMIN — PANTOPRAZOLE SODIUM 40 MG: 40 INJECTION, POWDER, FOR SOLUTION INTRAVENOUS at 09:28

## 2021-10-19 RX ADMIN — SERTRALINE HYDROCHLORIDE 150 MG: 50 TABLET ORAL at 17:39

## 2021-10-19 RX ADMIN — HYDROMORPHONE HYDROCHLORIDE 0.5 MG: 1 INJECTION, SOLUTION INTRAMUSCULAR; INTRAVENOUS; SUBCUTANEOUS at 02:33

## 2021-10-19 RX ADMIN — SODIUM CHLORIDE: 9 INJECTION, SOLUTION INTRAVENOUS at 09:30

## 2021-10-19 RX ADMIN — METRONIDAZOLE 500 MG: 500 INJECTION, SOLUTION INTRAVENOUS at 09:27

## 2021-10-19 RX ADMIN — SODIUM CHLORIDE: 9 INJECTION, SOLUTION INTRAVENOUS at 20:53

## 2021-10-19 ASSESSMENT — ACTIVITIES OF DAILY LIVING (ADL)
ADLS_ACUITY_SCORE: 4

## 2021-10-19 NOTE — PROGRESS NOTES
[facility]  Hospitalist Progress Note    Admit Date:  10/18/2021  Date of Service (when I saw the patient): 10/19/2021   Provider:  Melisa Avilez, DO    Assessment & Plan   Mariela Hawkins is a 31 year old female with a PMH of open hysterectomy and umbilical hernia repair several years ago who was admitted on 10/18/2021 with abd pain.  CT imaging in the ED revealed ventral hernia with loops of small intestine but no evidence of incarceration.    Problem List:    1. Ventral hernia, recurrent    CT shows Larger ventral midline abdominal wall hernia containing a herniated small bowel loop. No bowel obstruction at this time. However, along the right lateral margin of the hernia is a rounded hypodense nodule that could be a small complex fluid   collection such as phlegmon versus developing small abscess. It is approximately 2.4 cm. This is technically nonspecific.  - Surgery consulted (appreciated) - have requested bariatric surgery team to assess for possible robotic repair of the hernia    It is unclear if there is an area of associated infection.  No fevers or leukocytosis, which is encouraging  She is remaining on IV levaquin/ IV flagyl for now  Will check procalcitonin level today    She will remain NPO and on IVF until bariatric surgery consult    2.  Abd pain  Nausea    She c/o of nausea for several days  abd pain is felt, by general surgery, to be d/t adipose tissue    If no surgery is planned, then will transition off IV dilaudid  Narcotics would not be a long term plan if it is determined that pain is related to adipose tissue and not an infected intra-abdominal area    IVF while NPO  I have encouraged her to get up out of bed    COVID STATUS: negative Date:10/837914      VTE prophylaxis:  Pneumatic Compression Devices and up out of bed; may need to start heparin subcutaneous   DIET: NPO until seen by bariatric surgery      [place] Catheter: Not present  Code Status: Full Code       Disposition Plan   Expected discharge:recommended to prior living arrangement once cleared by surgery service.  Entered: Melisa RIVERARozina NielsenMicheline,  10/19/2021, 7:30 AM       The patient's care was discussed with the Bedside Nurse and Patient.    Interval History   Still having intermittent nausea but no emesis.  Feels that compazine works better.  Still with abd pain around umbilical area.  Up to bathroom independently.  No HA, CP, SOB or light-headedness/dizziness.      -Data reviewed today: I reviewed all new labs and imaging results over the last 24 hours. I personally reviewed no images or EKG's today.    Physical Exam   Temp: 97.7  F (36.5  C) Temp src: Oral BP: 99/54 Pulse: 63   Resp: 16 SpO2: 95 % O2 Device: None (Room air)    Vitals:    10/18/21 1602 10/18/21 2223   Weight: 136.1 kg (300 lb) 141.5 kg (312 lb)     Vital Signs with Ranges  Temp:  [97.7  F (36.5  C)-98.5  F (36.9  C)] 97.7  F (36.5  C)  Pulse:  [63-87] 63  Resp:  [16-20] 16  BP: ()/(54-88) 99/54  SpO2:  [95 %-100 %] 95 %  I/O last 3 completed shifts:  In: -   Out: 200 [Urine:200]    GEN:  Alert, oriented x 3, comfortable, no overt respiratory distress.  HEENT:  Normocephalic/atraumatic, no scleral icterus, no nasal discharge, mouth and membrane appears fairly moist  NECK:  No clear thyromegaly or clear JVD to somewhat limited exam today  CV:  Regular rate and rhythm, no loud murmur to ausc.  S1 + S2 noted, no S3 or S4.  LUNGS:  Clear to auscultation bilaterally.  No rales/rhonchi/wheezing auscultated bilaterally.  No costal retractions bilaterally.  Symmetric chest rise on inhalation noted.  ABD:  Active bowel sounds, soft, mild tenderness to exam to the left side of the umbilical area,  No clear fluctuance to the area,  Not really distended. Well healed mid-line abd scar without erythema. No clear rebound/guarding/rigidity.  No masses palpated.  No obvious HSM to exam although exam limited due to body habitus.  EXT:  No pretibial  edema or cyanosis bilaterally. No joint synovitis noted.  No calf-tenderness or asymmetry noted.  SKIN:  Dry to touch, no rashes or jaundice noted.  PSYCH:  Mood appropriate, Not tearful or depressed.  Maintains direct eye contact. cooperative  NEURO:  No tremors at rest, speech is clear and appropriate.  CN 2-12 intact to gross testing bilaterally    Data   Labs:  No results for input(s): CULT in the last 168 hours.  Recent Labs   Lab 10/19/21  0629 10/18/21  1744    139   POTASSIUM 4.1 4.1   CHLORIDE 109* 107   CO2 24 25   ANIONGAP 5 7   GLC 98 83   BUN 7* 8   CR 0.63 0.66   GFRESTIMATED >90 >90   NURIS 8.7 9.9     Recent Labs   Lab 10/19/21  0629 10/18/21  1744   WBC 8.3 10.2   HGB 12.2 12.6   HCT 37.8 38.6   MCV 97 95    238      Recent Imaging:   Recent Results (from the past 24 hour(s))   CT Abdomen Pelvis w/o Contrast    Narrative    EXAM: CT ABDOMEN PELVIS W/O CONTRAST  LOCATION: [facility]  DATE/TIME: 10/18/2021 5:57 PM    INDICATION: Abdominal pain, acute, nonlocalized.  COMPARISON: CT abdomen and pelvis 06/28/2018.  TECHNIQUE: CT scan of the abdomen and pelvis was performed without IV contrast. Multiplanar reformats were obtained. Dose reduction techniques were used.  CONTRAST: None.    FINDINGS:   LOWER CHEST: Normal.    HEPATOBILIARY: No acute abnormality. Stable tiny hepatic cysts.    PANCREAS: Normal.    SPLEEN: Normal.    ADRENAL GLANDS: Normal.    KIDNEYS/BLADDER: No hydronephrosis. Tiny nonobstructing stone lower right kidney is 0.4 cm coronal image 100.    BOWEL: No bowel obstruction. No acute inflammatory change of the bowel. Appendix is not seen. No secondary signs of appendicitis. Ventral midline abdominal wall hernia superior to the umbilicus is larger. It is 7.8 x 4.1 cm, previously 3.3 x 3.2 cm,   series 3 image 85. Internal opening is 2.8 cm. It contains a nonobstructed herniated small bowel loop. On the right lateral margin of the hernia is a focal  hypodense collection that is mildly heterogeneous measuring 2.4 cm image 87.    LYMPH NODES: Normal.    VASCULATURE: No acute abnormality.    PELVIC ORGANS: Uterus not seen. No suspicious adnexal abnormality. Left ovary limited in view. Right ovary is grossly unremarkable.    MUSCULOSKELETAL: Normal.      Impression    IMPRESSION:   1.  Larger ventral midline abdominal wall hernia containing a herniated small bowel loop. No bowel obstruction at this time. However, along the right lateral margin of the hernia is a rounded hypodense nodule that could be a small complex fluid   collection such as phlegmon versus developing small abscess. It is approximately 2.4 cm. This is technically nonspecific.  2.  No other acute abnormality.  3.  Tiny nonobstructing stone lower right kidney.         Medications     sodium chloride 100 mL/hr at 10/19/21 0632       levofloxacin  750 mg Intravenous Q24H     metroNIDAZOLE  500 mg Intravenous Q12H     pantoprazole (PROTONIX) IV  40 mg Intravenous Daily with breakfast

## 2021-10-19 NOTE — UTILIZATION REVIEW
"Inpatient to Observation note:    Admission Status; Secondary Review Determination     Under the authority of the Utilization Management Committee, the utilization review process indicated a secondary review on the above patient.  The review outcome is based on review of the medical records, discussions with staff, and applying clinical experience noted on the date of the review.        (x) Observation Status Appropriate - This patient does not meet hospital inpatient criteria and is placed in observation status. If this patient's primary payer is Medicare and was admitted as an inpatient, Condition Code 44 should be used and patient status changed to \"observation\".     RATIONALE FOR DETERMINATION   31 years old female evaluated in the ED on October 18, 2021 for increasing abdominal pain the last 3 days prior to arrival, nausea, inability to have typical bowel movement.  Past medical history significant for morbid obesity, umbilical hernia.  Vital signs stable upon arrival.  Laboratory work-up unremarkable.  CT scan abdominal pelvis showed large ventral midline abdominal wall hernia containing a herniated small bowel loop, no bowel obstruction at this time, a rounded hypodense nodule along the right lateral margin of the hernia could be a small complex fluid collection such as phlegmon versus developing small abscess, approximately 2.4 cm, technically nonspecific.  General surgery recommended IV antibiotics until formal consultation.  Today, general surgeon's impression was most likely chronic pain secondary to adipose tissue, not a good candidate for robotic repair due to body habitus.  Recommended pain control, regular diet and outpatient follow-up unless pain becomes a factor.  Bariatric surgery team consult recommended for evaluation and further management.    The severity of illness, intensity of service provided, expected LOS and risk for adverse outcome make the care appropriate for further observation; " however, doesn't meet criteria for hospital inpatient admission. Dr Avilez notified of this determination.    This document was produced using voice recognition software.      The information on this document is developed by the utilization review team in order for the business office to ensure compliance.  This only denotes the appropriateness of proper admission status and does not reflect the quality of care rendered.         The definitions of Inpatient Status and Observation Status used in making the determination above are those provided in the CMS Coverage Manual, Chapter 1 and Chapter 6, section 70.4.      Sincerely,     Miguel La MD  [facility]  Utilization Review Physician Advisor  Pager: [phone]

## 2021-10-19 NOTE — H&P
Admission History and Physical   Mariela Hawkins, 1990, [id number]  [facility]  Ventral hernia without obstruction or gangrene [K43.9]  PCP:Kenzie Mccoy, [phone]   Admitting provider: Tracy Russell MD.    Code status:  Full Code          Extended Emergency Contact Information  Primary Emergency Contact: MERVAT HAWKINS   [facility]  Home Phone: [phone]  Mobile Phone: [phone]  Relation: Spouse  Secondary Emergency Contact: STAN MORALES  Home Phone: [phone]  Mobile Phone: [phone]  Relation: Mother       Assessment and Plan  Active Problems:    Ventral hernia without obstruction or gangrene    Ventral hernia    Morbid obesity (H)    Mariela Hawkins is a 31 year old old female presenting with abdominal pain     Ventral hernia CT shows Larger ventral midline abdominal wall hernia containing a herniated small bowel loop. No bowel obstruction at this time. However, along the right lateral margin of the hernia is a rounded hypodense nodule that could be a small complex fluid   collection such as phlegmon versus developing small abscess. It is approximately 2.4 cm. This is technically nonspecific.  - Surgery consulted no surgical intervention at this time and will do formal consult in am   - NPO  - IVF  - IV levaquin and flagyl for now per surgery  - IV dilaudid for pain   - no NG or SBFT st this time until surgery sees patient       COVID STATUS: negative Date:10/166589     VTE prophylaxis:  Pneumatic Compression Devices  DIET: Orders Placed This Encounter      NPO for Medical/Clinical Reasons Except for: No Exceptions    Drains/Lines: none  Weight bearing status: WBAT  Disposition/Barriers to discharge: pending surgery recommendations   Code Status:Full Code    HPI: Mariela Hawkins is a 31 year old old female with h/o obesity, h/o umbilical hernia repaired with mesh who presents to the ER with complaints of worsening abdominal pain for the past 3 days.  Has a  history of hernia and states this feels similar to when she had a possible incarcerated hernia.  She thinks that when she had her baby 2 years ago to remove the mesh from her prior hernia.  Pain is in the left lateral aspect of her abdomen.  She has not been able to have any bowel movements and now getting nauseated without vomiting.  Feels similar to when she had that prior hernia issue. Last normal BM Friday no blood and not black or tarry.      denies any fevers, cough, chest pain, shortness of breath, vomiting, diarrhea, dysuria, hematuria.    Past Medical History:   Diagnosis Date     Acanthosis nigricans      Anemia      Anxiety      BV (bacterial vaginosis)      Claustrophobia      History of anesthesia complications      Mental disorder      Migraines      Migraines      Missed ab      Morbid obesity (H)      Ovarian cystic mass      Pain in limb      Pelvic pain in female      PONV (postoperative nausea and vomiting)      Past Surgical History:   Procedure Laterality Date      SECTION        SECTION      X2      SECTION N/A 2017    Procedure: REPEAT  SECTION;  Surgeon: Shayy Aviles MD;  Location: [facility] L+D OR;  Service:       SECTION, IMMEDIATE HYSTERECTOMY, COMBINED N/A 2019    Procedure: Repeat  Section, Hysterectomy, Exploratory Laparotomy, Bilateral Salpingectomy, Cystoscopy;  Surgeon: Joan Carmona MD;  Location:  OR     DILATION AND CURETTAGE N/A 2016    Procedure: DILATION AND CURETTAGE SUCTION;  Surgeon: Ant Parikh MD;  Location: [facility] OR;  Service:      EYE MUSCLE SURGERY Bilateral     strabismus     HERNIORRHAPHY VENTRAL N/A 2018    Procedure: REPAIR, HERNIA, VENTRAL, OPEN;  Surgeon: Alfredo Boland MD;  Location: [facility] OR;  Service:      RECESSION RESECTION (REPAIR STRABISMUS) BILATERAL Bilateral 2014    Procedure: RECESSION RESECTION (REPAIR STRABISMUS) BILATERAL;   Surgeon: Titi Awan MD;  Location: [facility]     RECESSION RESECTION (REPAIR STRABISMUS) BILATERAL Bilateral 4/2/2015    Procedure: RECESSION RESECTION (REPAIR STRABISMUS) BILATERAL;  Surgeon: Titi Awan MD;  Location: [facility]     Family History   Problem Relation Age of Onset     No Known Problems Mother      No Known Problems Father      No Known Problems Son      Cerebrovascular Disease Maternal Grandmother      Cancer Paternal Grandmother      No Known Problems Son      No Known Problems Son      Social History     Socioeconomic History     Marital status:      Spouse name: Not on file     Number of children: Not on file     Years of education: Not on file     Highest education level: Not on file   Occupational History     Not on file   Tobacco Use     Smoking status: Never Smoker     Smokeless tobacco: Never Used   Substance and Sexual Activity     Alcohol use: No     Drug use: No     Sexual activity: Yes     Partners: Male     Birth control/protection: None     Comment: engaged   Other Topics Concern     Not on file   Social History Narrative     Not on file     Social Determinants of Health     Financial Resource Strain:      Difficulty of Paying Living Expenses:    Food Insecurity:      Worried About Running Out of Food in the Last Year:      Ran Out of Food in the Last Year:    Transportation Needs:      Lack of Transportation (Medical):      Lack of Transportation (Non-Medical):    Physical Activity:      Days of Exercise per Week:      Minutes of Exercise per Session:    Stress:      Feeling of Stress :    Social Connections:      Frequency of Communication with Friends and Family:      Frequency of Social Gatherings with Friends and Family:      Attends Sabianism Services:      Active Member of Clubs or Organizations:      Attends Club or Organization Meetings:      Marital Status:    Intimate Partner Violence:      Fear of Current or Ex-Partner:      Emotionally Abused:       Physically Abused:      Sexually Abused:      Allergies   Allergen Reactions     Amoxicillin Rash       PRIOR TO ADMISSION MEDICATIONS   Medications Prior to Admission   Medication Sig Dispense Refill Last Dose     sertraline (ZOLOFT) 100 MG tablet Take 150 mg by mouth daily   10/18/2021 at AM        REVIEW OF SYSTEMS:  12 point reviewed pertinent negatives and positives in HPI all others negative     PHYSICAL EXAM  Temp:  [97.9  F (36.6  C)-98.5  F (36.9  C)] 97.9  F (36.6  C)  Pulse:  [71-87] 87  Resp:  [16-20] 20  BP: (119-138)/(71-88) 138/71  SpO2:  [96 %-100 %] 96 %  Wt Readings from Last 1 Encounters:   10/18/21 141.5 kg (312 lb)     Body mass index is 50.36 kg/m .  Physical Exam  Constitutional:       General: She is not in acute distress.     Appearance: She is obese. She is not ill-appearing.   HENT:      Head: Normocephalic and atraumatic.   Cardiovascular:      Rate and Rhythm: Normal rate and regular rhythm.      Pulses: Normal pulses.      Heart sounds: Normal heart sounds.   Pulmonary:      Effort: Pulmonary effort is normal.      Breath sounds: Normal breath sounds.   Abdominal:      General: A surgical scar is present. Bowel sounds are decreased.      Palpations: Abdomen is soft.      Tenderness: There is abdominal tenderness in the periumbilical area.      Hernia: A hernia is present.   Musculoskeletal:         General: No swelling or tenderness. Normal range of motion.      Right lower leg: No edema.      Left lower leg: No edema.   Skin:     General: Skin is warm and dry.      Capillary Refill: Capillary refill takes less than 2 seconds.   Neurological:      General: No focal deficit present.      Mental Status: She is alert and oriented to person, place, and time. Mental status is at baseline.   Psychiatric:         Mood and Affect: Mood normal.         Behavior: Behavior normal.         Thought Content: Thought content normal.           PERTINENT LABS and RADIOLOGY   Results for orders  placed or performed during the hospital encounter of 10/18/21   CT Abdomen Pelvis w/o Contrast    Impression    IMPRESSION:   1.  Larger ventral midline abdominal wall hernia containing a herniated small bowel loop. No bowel obstruction at this time. However, along the right lateral margin of the hernia is a rounded hypodense nodule that could be a small complex fluid   collection such as phlegmon versus developing small abscess. It is approximately 2.4 cm. This is technically nonspecific.  2.  No other acute abnormality.  3.  Tiny nonobstructing stone lower right kidney.       Most Recent 3 CBC's:  Recent Labs   Lab Test 10/18/21  1744 12/16/19  0709 12/15/19  0821 12/14/19  0641 12/13/19  2354 12/13/19  2354   WBC 10.2  --   --  12.1*  --  12.2*   HGB 12.6  --  9.1* 9.7*   < > 9.9*   MCV 95  --   --  93  --  91    192  --  168   < > 166    < > = values in this interval not displayed.     Most Recent 3 BMP's:  Recent Labs   Lab Test 10/18/21  1744 12/13/19  2354 07/28/19  1145 06/03/19  1203 06/03/19  1203     --  135*  --  134*   POTASSIUM 4.1  --  4.1  --  4.1   CHLORIDE 107  --  106  --  106   CO2 25  --  21*  --  22   BUN 8  --  6*  --  11   CR 0.66 0.38* 0.59*   < > 0.73   ANIONGAP 7  --  8  --  6   NURIS 9.9  --  9.3  --  9.2   GLC 83  --  82  --  86    < > = values in this interval not displayed.           Tracy Russell MD  [facility] Medicine Service  [phone]

## 2021-10-19 NOTE — PLAN OF CARE
Problem: Pain Acute  Goal: Acceptable Pain Control and Functional Ability  Outcome: Improving   Pt c/o pain on her abdomen requested and had prn dilaudid 0.5mg IV x2 with ice packs, which was effective, c/o nausea with no emesis and had prn zofran IV  x1,  which was effective, NPO from midnight with IVF running at 100ml/hr, was awake most of the night. Will continue to monitor.

## 2021-10-19 NOTE — CONSULTS
HPI  31 year old year old female who I have been consulted by No ref. provider found for evaluation of Abdominal Pain  28-year-old female with a history of open hysterectomy in the distant past with umbilical hernia repair several years ago who has had a several month history of abdominal pain at the umbilical hernia site.  She denies any nausea vomiting but states that the pain has been fairly significant and eventually prompted her to present to the emergency room.  Denies any fevers, chills, nausea or vomiting.      Allergies:Amoxicillin    Past Medical History:   Diagnosis Date     Acanthosis nigricans      Anemia      Anxiety      BV (bacterial vaginosis)      Claustrophobia      History of anesthesia complications      Mental disorder      Migraines      Migraines      Missed ab      Morbid obesity (H)      Ovarian cystic mass      Pain in limb      Pelvic pain in female      PONV (postoperative nausea and vomiting)        Past Surgical History:   Procedure Laterality Date      SECTION        SECTION      X2      SECTION N/A 2017    Procedure: REPEAT  SECTION;  Surgeon: Shayy Aviles MD;  Location: [facility] OR;  Service:       SECTION, IMMEDIATE HYSTERECTOMY, COMBINED N/A 2019    Procedure: Repeat  Section, Hysterectomy, Exploratory Laparotomy, Bilateral Salpingectomy, Cystoscopy;  Surgeon: Joan Carmona MD;  Location:  OR     DILATION AND CURETTAGE N/A 2016    Procedure: DILATION AND CURETTAGE SUCTION;  Surgeon: Ant Parikh MD;  [place] [facility] [place]  Service:      EYE MUSCLE SURGERY Bilateral     strabismus     HERNIORRHAPHY VENTRAL N/A 2018    Procedure: REPAIR, HERNIA, VENTRAL, OPEN;  Surgeon: Alfredo Boland MD;  [place] [facility] [place]  Service:      RECESSION RESECTION (REPAIR STRABISMUS) BILATERAL Bilateral 2014    Procedure: RECESSION RESECTION (REPAIR STRABISMUS) BILATERAL;  Surgeon:  Titi Awan MD;  Location: [facility]     RECESSION RESECTION (REPAIR STRABISMUS) BILATERAL Bilateral 4/2/2015    Procedure: RECESSION RESECTION (REPAIR STRABISMUS) BILATERAL;  Surgeon: Titi Awan MD;  Location: [facility]         CURRENT MEDS:    Current Facility-Administered Medications:      HYDROmorphone (PF) (DILAUDID) injection 0.5-1 mg, 0.5-1 mg, Intravenous, Q2H PRN, Tracy Russell MD, 0.5 mg at 10/19/21 0927     levofloxacin (LEVAQUIN) infusion 750 mg, 750 mg, Intravenous, Q24H, Tracy Russell MD, Last Rate: 100 mL/hr at 10/19/21 0927, 750 mg at 10/19/21 0927     metroNIDAZOLE (FLAGYL) infusion 500 mg, 500 mg, Intravenous, Q12H, Tracy Russell MD, 500 mg at 10/19/21 0927     naloxone (NARCAN) injection 0.2 mg, 0.2 mg, Intravenous, Q2 Min PRN **OR** naloxone (NARCAN) injection 0.4 mg, 0.4 mg, Intravenous, Q2 Min PRN **OR** naloxone (NARCAN) injection 0.2 mg, 0.2 mg, Intramuscular, Q2 Min PRN **OR** naloxone (NARCAN) injection 0.4 mg, 0.4 mg, Intramuscular, Q2 Min PRN, Tracy Russell MD     ondansetron (ZOFRAN) injection 4 mg, 4 mg, Intravenous, Q6H PRN, Tracy Russell MD, 4 mg at 10/19/21 0234     pantoprazole (PROTONIX) IV push injection 40 mg, 40 mg, Intravenous, Daily with breakfast, Tracy Russell MD, 40 mg at 10/19/21 0928     prochlorperazine (COMPAZINE) injection 10 mg, 10 mg, Intravenous, Q6H PRN, Tracy Russell MD, 10 mg at 10/19/21 0941     sodium chloride 0.9% infusion, , Intravenous, Continuous, Tracy Russell MD, Last Rate: 100 mL/hr at 10/19/21 0930, New Bag at 10/19/21 0930    FAMHX-reviewed and noncontributory     reports that she has never smoked. She has never used smokeless tobacco. She reports that she does not drink alcohol and does not use drugs.    Review of Systems:  The 12 point review of systems  is within normal limits except for as mentioned above in the HPI.  General ROS: No complaints or constitutional  "symptoms  Ophthalmic ROS: No complaints of visual changes  Skin: No complaints or symptoms   Endocrine: No complaints or symptoms  Hematologic/Lymphatic: No symptoms or complaints  Psychiatric: No symptoms or complaints  Respiratory ROS: no cough, shortness of breath, or wheezing  Cardiovascular ROS: no chest pain or dyspnea on exertion  Gastrointestinal ROS: As per HPI  Genito-Urinary ROS: no dysuria, trouble voiding, or hematuria  Musculoskeletal ROS: no joint or muscle pain  Neurological ROS: no TIA or stroke symptoms      EXAM:  /51 (BP Location: Right arm)   Pulse 62   Temp 98  F (36.7  C) (Oral)   Resp 18   Ht 1.676 m (5' 6\")   Wt 141.5 kg (312 lb)   LMP 04/12/2019   SpO2 96%   BMI 50.36 kg/m    GENERAL: Well developed female, No acute distress, pleasant and conversant   EYES: Pupils equal, round and reactive, no scleral icterus  ABDOMEN: Obese, well-healed midline incision with palpable ventral hernia no evidence of incarceration or strangulation  SKIN: Pink, warm and dry, no obvious rashes or lesions   NEURO:No focal deficits, ambulatory  MUSCULOSKELETAL:No obvious deformities, no swelling, normal appearing      LABS:  Lab Results   Component Value Date    WBC 8.3 10/19/2021    WBC 12.1 12/14/2019    HGB 12.2 10/19/2021    HGB 9.1 12/15/2019    HCT 37.8 10/19/2021    HCT 31.0 12/14/2019    MCV 97 10/19/2021    MCV 93 12/14/2019     10/19/2021     12/16/2019     INR/Prothrombin Time  Recent Labs   Lab 10/19/21  0629      CO2 24   BUN 7*     Lab Results   Component Value Date    ALT 26 10/18/2021    AST 13 10/18/2021    ALKPHOS 59 10/18/2021       IMAGES:   Relevant images were reviewed and discussed with the patient.  Notable findings were: CT scan images demonstrate a ventral hernia with loops of small intestine but no evidence of any incarceration or retinoid bowel    Assessment/Plan:   Mariela Hawkins is a 31 year old female with a recurrent incisional hernia in the " midline.  CT scan does show loops of small intestine but these are nondilated and should not be the cause of her discomfort at this time.  She most likely has chronic pain secondary to adipose tissue.  Unfortunately she is not a good candidate for robotic repair at this time given her body habitus and a BMI of over 50.  I have explained this to her at length and will place an inpatient consultation of the bariatric team for evaluation and additional management.  If we can get her pain under control tolerating a regular diet we can likely address this as an outpatient.  If pain becomes a factor then we may be forced to address the hernia during this hospitalization.  We will continue to follow.      Inderjit Tuttle D.O. FACS  [phone]

## 2021-10-19 NOTE — ED NOTES
"[facility] ED Handoff Report    ED Chief Complaint: abdominal pain    ED Diagnosis:  (R10.9) Left sided abdominal pain  Comment:   Plan:     (K43.9) Ventral hernia without obstruction or gangrene  Comment:   Plan:        PMH:    Past Medical History:   Diagnosis Date     Acanthosis nigricans      Anemia      Anxiety      BV (bacterial vaginosis)      Claustrophobia      History of anesthesia complications      Mental disorder      Migraines      Migraines      Missed ab      Morbid obesity (H)      Ovarian cystic mass      Pain in limb      Pelvic pain in female      PONV (postoperative nausea and vomiting)         Code Status:  Prior     Current Living Situation/Residence: lives with a significant other     Elimination Status: Continent: Yes     Activity Level: Independent    Patients Preferred Language:  English     Needed: No    Vital Signs:  /76   Pulse 71   Temp 98.5  F (36.9  C) (Oral)   Resp 16   Ht 1.676 m (5' 6\")   Wt 136.1 kg (300 lb)   LMP 04/12/2019   SpO2 100%   BMI 48.42 kg/m       Cardiac Rhythm: n/a    Pain Score: 3/10    Is the Patient Confused:  No    Last Food or Drink: 10/18/21     Focused Assessment:  Abdominal pain, knew she had a hernia, hernia is now about twice its previous size    Tests Performed: Done: Labs and Imaging    Treatments Provided:  Iv antibiotics, pain meds    Family Dynamics/Concerns: No    Family Updated On Visitor Policy: Yes    Plan of Care Communicated to Family: No    Who Was Updated about Plan of Care: patient    Belongings Checklist Done and Signed by Patient: Yes    Covid: asymptomatic  Additional Information: patient has a possible abcess to the right side of her  Umbilical hernia        "

## 2021-10-19 NOTE — PLAN OF CARE
Problem: Pain Acute  Goal: Acceptable Pain Control and Functional Ability  Outcome: Improving  Dilaudid given at 0927 for abd pain. Relief noted, but pt states she was loopy. MD decreased order. Pt would like to try lower dose when able to have again.  Problem: Nausea and Vomiting  Goal: Fluid and Electrolyte Balance  Outcome: Improving  Intervention: Prevent and Manage Nausea and Vomiting  Recent Flowsheet Documentation  Taken 10/19/2021 1000 by Gwyn De La Rosa RN  Nausea/Vomiting Interventions: antiemetic   Compazine given at 0941 with some relief. Zofran given at 1346 with great relief.     Abd tender. Pt has been in bed, able to ambulate independently to bathroom.

## 2021-10-19 NOTE — CONSULTS
31-year-old patient presents with abdominal pain.    This is a morbidly obese patient with a recurrent ventral hernia that has a loop of bowel up inside of the hernia defect.  By my read of the CT scan there does not appear to be infection or obstruction involved with this ventral hernia.  From the CT scan there is concern of this fluid collection associated with the ventral hernia and for that I would recommend covering with antibiotics.  General surgery will do a full evaluation in the morning.    [facility]  [phone]

## 2021-10-19 NOTE — PHARMACY-ADMISSION MEDICATION HISTORY
Pharmacy Note - Admission Medication History    Pertinent Provider Information: N/A     ______________________________________________________________________    Prior To Admission (PTA) med list completed and updated in EMR.       PTA Med List   Medication Sig Last Dose     sertraline (ZOLOFT) 100 MG tablet Take 150 mg by mouth daily 10/18/2021 at AM       Information source(s): Patient and CareEverywhere/SureScripts  Method of interview communication: in-person    Summary of Changes to PTA Med List  New: N/A  Discontinued: iron, Advil, Reglan, oxycodone, prenatal  Changed: Zoloft dose    Patient was asked about OTC/herbal products specifically.  PTA med list reflects this.    In the past week, patient estimated taking medication this percent of the time:  greater than 90%.    Allergies were reviewed, assessed, and updated with the patient.      Patient does not use any multi-dose medications prior to admission.    The information provided in this note is only as accurate as the sources available at the time of the update(s).    Thank you for the opportunity to participate in the care of this patient.    Remi Krueger [facility]  10/18/2021 7:07 PM

## 2021-10-20 LAB
BASOPHILS # BLD AUTO: 0 10E3/UL (ref 0–0.2)
BASOPHILS NFR BLD AUTO: 0 %
EOSINOPHIL # BLD AUTO: 0.1 10E3/UL (ref 0–0.7)
EOSINOPHIL NFR BLD AUTO: 1 %
HOLD SPECIMEN: NORMAL
IMM GRANULOCYTES # BLD: 0 10E3/UL
IMM GRANULOCYTES NFR BLD: 0 %
LYMPHOCYTES # BLD AUTO: 2.1 10E3/UL (ref 0.8–5.3)
LYMPHOCYTES NFR BLD AUTO: 28 %
MONOCYTES # BLD AUTO: 0.3 10E3/UL (ref 0–1.3)
MONOCYTES NFR BLD AUTO: 5 %
NEUTROPHILS # BLD AUTO: 4.8 10E3/UL (ref 1.6–8.3)
NEUTROPHILS NFR BLD AUTO: 66 %
NRBC # BLD AUTO: 0 10E3/UL
NRBC BLD AUTO-RTO: 0 /100
WBC # BLD AUTO: 7.2 10E3/UL (ref 4–11)

## 2021-10-20 PROCEDURE — 250N000011 HC RX IP 250 OP 636: Performed by: INTERNAL MEDICINE

## 2021-10-20 PROCEDURE — 85048 AUTOMATED LEUKOCYTE COUNT: CPT | Performed by: INTERNAL MEDICINE

## 2021-10-20 PROCEDURE — 36415 COLL VENOUS BLD VENIPUNCTURE: CPT | Performed by: INTERNAL MEDICINE

## 2021-10-20 PROCEDURE — 96372 THER/PROPH/DIAG INJ SC/IM: CPT | Performed by: INTERNAL MEDICINE

## 2021-10-20 PROCEDURE — C9113 INJ PANTOPRAZOLE SODIUM, VIA: HCPCS | Performed by: INTERNAL MEDICINE

## 2021-10-20 PROCEDURE — 250N000013 HC RX MED GY IP 250 OP 250 PS 637: Performed by: INTERNAL MEDICINE

## 2021-10-20 PROCEDURE — 258N000003 HC RX IP 258 OP 636: Performed by: INTERNAL MEDICINE

## 2021-10-20 PROCEDURE — 99231 SBSQ HOSP IP/OBS SF/LOW 25: CPT | Performed by: PHYSICIAN ASSISTANT

## 2021-10-20 PROCEDURE — 99225 PR SUBSEQUENT OBSERVATION CARE,LEVEL II: CPT | Performed by: INTERNAL MEDICINE

## 2021-10-20 PROCEDURE — 99207 PR CDG-CODE CATEGORY CHANGED: CPT | Performed by: INTERNAL MEDICINE

## 2021-10-20 PROCEDURE — 120N000001 HC R&B MED SURG/OB

## 2021-10-20 RX ORDER — ACETAMINOPHEN 325 MG/1
650 TABLET ORAL EVERY 4 HOURS PRN
Status: DISCONTINUED | OUTPATIENT
Start: 2021-10-20 | End: 2021-10-21 | Stop reason: HOSPADM

## 2021-10-20 RX ORDER — IBUPROFEN 600 MG/1
600 TABLET, FILM COATED ORAL 3 TIMES DAILY
Status: DISCONTINUED | OUTPATIENT
Start: 2021-10-20 | End: 2021-10-21 | Stop reason: HOSPADM

## 2021-10-20 RX ORDER — OXYCODONE HYDROCHLORIDE 5 MG/1
5 TABLET ORAL EVERY 4 HOURS PRN
Status: DISCONTINUED | OUTPATIENT
Start: 2021-10-20 | End: 2021-10-21 | Stop reason: HOSPADM

## 2021-10-20 RX ORDER — PANTOPRAZOLE SODIUM 20 MG/1
40 TABLET, DELAYED RELEASE ORAL
Status: DISCONTINUED | OUTPATIENT
Start: 2021-10-21 | End: 2021-10-21 | Stop reason: HOSPADM

## 2021-10-20 RX ADMIN — HYDROMORPHONE HYDROCHLORIDE 0.5 MG: 1 INJECTION, SOLUTION INTRAMUSCULAR; INTRAVENOUS; SUBCUTANEOUS at 17:15

## 2021-10-20 RX ADMIN — HEPARIN SODIUM 5000 UNITS: 10000 INJECTION, SOLUTION INTRAVENOUS; SUBCUTANEOUS at 14:52

## 2021-10-20 RX ADMIN — SERTRALINE HYDROCHLORIDE 150 MG: 50 TABLET ORAL at 08:59

## 2021-10-20 RX ADMIN — ONDANSETRON 4 MG: 2 INJECTION INTRAMUSCULAR; INTRAVENOUS at 17:15

## 2021-10-20 RX ADMIN — HEPARIN SODIUM 5000 UNITS: 10000 INJECTION, SOLUTION INTRAVENOUS; SUBCUTANEOUS at 21:06

## 2021-10-20 RX ADMIN — HEPARIN SODIUM 5000 UNITS: 10000 INJECTION, SOLUTION INTRAVENOUS; SUBCUTANEOUS at 06:20

## 2021-10-20 RX ADMIN — HYDROMORPHONE HYDROCHLORIDE 0.5 MG: 1 INJECTION, SOLUTION INTRAMUSCULAR; INTRAVENOUS; SUBCUTANEOUS at 00:33

## 2021-10-20 RX ADMIN — SODIUM CHLORIDE: 9 INJECTION, SOLUTION INTRAVENOUS at 06:19

## 2021-10-20 RX ADMIN — HYDROMORPHONE HYDROCHLORIDE 0.3 MG: 1 INJECTION, SOLUTION INTRAMUSCULAR; INTRAVENOUS; SUBCUTANEOUS at 08:55

## 2021-10-20 RX ADMIN — OXYCODONE HYDROCHLORIDE 5 MG: 5 TABLET ORAL at 19:45

## 2021-10-20 RX ADMIN — OXYCODONE HYDROCHLORIDE 5 MG: 5 TABLET ORAL at 12:28

## 2021-10-20 RX ADMIN — PANTOPRAZOLE SODIUM 40 MG: 40 INJECTION, POWDER, FOR SOLUTION INTRAVENOUS at 08:55

## 2021-10-20 RX ADMIN — PROCHLORPERAZINE EDISYLATE 10 MG: 5 INJECTION INTRAMUSCULAR; INTRAVENOUS at 12:28

## 2021-10-20 RX ADMIN — IBUPROFEN 600 MG: 600 TABLET, FILM COATED ORAL at 21:06

## 2021-10-20 ASSESSMENT — ACTIVITIES OF DAILY LIVING (ADL)
ADLS_ACUITY_SCORE: 4

## 2021-10-20 NOTE — PROVIDER NOTIFICATION
PROVIDER NOTIFIED: Dr. Adler    METHOD:text page at [id number]    REASON: change NPO status to allow po meds    OUTCOME: new orders

## 2021-10-20 NOTE — PLAN OF CARE
PRIMARY DIAGNOSIS: ACUTE PAIN  OUTPATIENT/OBSERVATION GOALS TO BE MET BEFORE DISCHARGE:  1. Pain Status: Improved but still requiring IV narcotics.    2. Return to near baseline physical activity: No    3. Cleared for discharge by consultants (if involved): No    Discharge Planner Nurse   Safe discharge environment identified: Yes  Barriers to discharge: Yes       Entered by: Corine Menendez 10/19/2021 11:53 PM     Please review provider order for any additional goals.   Nurse to notify provider when observation goals have been met and patient is ready for discharge.

## 2021-10-20 NOTE — PLAN OF CARE
Problem: Pain Acute  Goal: Acceptable Pain Control and Functional Ability  Intervention: Develop Pain Management Plan  Recent Flowsheet Documentation  Taken 10/20/2021 0033 by Corine Menendez RN  Pain Management Interventions: medication (see MAR)   Mariela requested pain med at 0030 & had dilaudid 0.5mg for her left sided abdominal pain.  She denied needing any more thru the night/morning.

## 2021-10-20 NOTE — PROGRESS NOTES
General Surgery Progress Note:    Hospital Day # 1    ASSESSMENT:   1. Left sided abdominal pain    2. Ventral hernia without obstruction or gangrene        Mariela Hawkins is a 31 year old female with a recurrent incisional hernia in the midline    PLAN:   -Clears and adat  -Pain control  -No surgical indication at this time and could discharge if pain tolerable and tolerates diet and see her has outpatient to discuss repair of hernia  -Dr. Flores saw yesterday and put in referral for outpatient bariatric program  -No need for abx from our standpoint    Alexander Smallwood PA-C  Pager - [phone]  Phone - [phone]   General Surgery    SUBJECTIVE:   Mariela Hawkins is doing ok, pain a little better, passing some gas, no n/v    Patient Vitals for the past 24 hrs:   BP Temp Temp src Pulse Resp SpO2   10/20/21 0731 113/59 98.3  F (36.8  C) Oral 70 18 96 %   10/19/21 2356 120/71 98.3  F (36.8  C) Oral 69 17 96 %   10/19/21 1909 124/60 98.3  F (36.8  C) Oral 60 18 96 %   10/19/21 1800 -- -- -- -- 18 --   10/19/21 1516 108/59 98  F (36.7  C) Oral 65 19 94 %   10/19/21 1125 102/51 98  F (36.7  C) Oral 62 18 96 %       Physical Exam:  General: NAD, pleasant  CV:RRR  LUNGS:CTA bilaterally  ABD: soft, obese, midline hernia soft, mild ttp  EXT:no CCE     Lab Results   Component Value Date    WBC 7.2 10/20/2021    WBC 12.1 12/14/2019    HGB 12.2 10/19/2021    HGB 9.1 12/15/2019    HCT 37.8 10/19/2021    HCT 31.0 12/14/2019    MCV 97 10/19/2021    MCV 93 12/14/2019     10/19/2021     12/16/2019     INR/Prothrombin Time  Recent Labs   Lab 10/19/21  0629      CO2 24   BUN 7*     Lab Results   Component Value Date    ALT 26 10/18/2021    AST 13 10/18/2021    ALKPHOS 59 10/18/2021

## 2021-10-20 NOTE — PROGRESS NOTES
[facility]    Medicine Progress Note - Hospitalist Service    Date of Admission:  10/18/2021     Assessment & Plan   SUMMARY:  31 year old female with a PMH of open hysterectomy and umbilical hernia repair several years ago who was admitted on 10/18/2021 with abd pain.  CT imaging in the ED revealed ventral hernia with loops of small intestine but no evidence of incarceration.    Problem List:  1. Ventral hernia, recurrent  CT shows large ventral midline abdominal wall hernia containing a herniated small bowel loop. No bowel obstruction at this time. However, along the right lateral margin of the hernia is a rounded hypodense nodule that could be a small complex fluid   collection such as phlegmon versus developing small abscess. It is approximately 2.4 cm. This is technically nonspecific.  - Surgery consulted (appreciated) - have requested bariatric surgery team to assess for possible robotic repair of the hernia  White count normal, procalcitonin normal, no fever  Evidence for infection is low  Not currently on antibiotics  Surgery has assessed and advises advance of diet, plan for outpatient consultation if patient tolerates diet and pain is controlled.    2.  Abd pain  Nausea  She c/o of nausea for several days  abd pain is felt, by general surgery, to be d/t adipose tissue    transition off IV dilaudid  Scheduled ibuprofen, as needed Tylenol  Oxycodone oral available if needed    COVID STATUS: negative Date:10/294669      VTE prophylaxis:  Pneumatic Compression Devices and up out of bed; start heparin subcutaneous     Class III Obesity w BMI > 40: Body mass index is 50.36 kg/m .     DVT prophylaxis:    Medical:  subcutaneous heparin    Mechanical:  PCD's    Disposition Plan: Expected discharge: 10/20/2021   recommended to Home once adequate pain management/ tolerating PO medications.    Diet: Orders Placed This Encounter      Advance Diet as Tolerated: Full Liquid Diet; Regular Diet  "Adult    [place] Catheter: Not present  Central Lines/Port-a-cath: Not present  Drains: Not present     --------------------------------------------    The patient's care was discussed with the Patient.    Active Problems:    Ventral hernia without obstruction or gangrene    Ventral hernia    Morbid obesity (H)    Left sided abdominal pain      Gerry Adler MD  Hospitalist Service  [facility]  Securely message with the Vocera Web Console (learn more here)  Text page via [employer] Paging/Directory    Clinically Significant Risk Factors Present on Admission                ______________________________________________________________________    Interval History   Patient continues to feel some midline abdominal pain.    ROS: Denies chest pain, denies shortness of breath, passing urine well and good appetite    Data personally reviewed from the last 24 hours:   Reviewed Laboratory results, Consultant recommendations and medications.    Physical Exam   /53 (BP Location: Right arm)   Pulse 67   Temp 98.2  F (36.8  C) (Oral)   Resp 18   Ht 1.676 m (5' 6\")   Wt 141.5 kg (312 lb)   LMP 04/12/2019   SpO2 93%   BMI 50.36 kg/m      Physical Exam    General Appearance:  HEENT:  Awake, Alert, Cooperative, in no distress and appears stated age   Normocephalic, atraumatic, conjunctiva clear without icterus and ears without discharge   Lungs:   Clear to auscultation bilaterally, no wheezing, good air exchange, normal work of breathing   Cardiovascular:  Regular Rate and Rythm, normal apical impulse, normal S1 and S2, no lower extremity edema bilaterally   Abdomen: Soft, Non-distended and tender around mid-umbilical area, reduced bowel sounds   Skin:  Skin color, texture normal and bruising or bleeding. No rashes or lesions over face, neck, arms and legs, turgor normal.   Neurologic:    Neuropsychiatric: Alert & Oriented X 3, Facial symmetry preserved and upper & lower extremities moving well with " symmetry  Calm, normal eye contact, Affect normal     Data   Recent Labs   Lab 10/20/21  0702 10/19/21  0629 10/18/21  1744   WBC 7.2 8.3 10.2   HGB  --  12.2 12.6   MCV  --  97 95   PLT  --  210 238   NA  --  138 139   POTASSIUM  --  4.1 4.1   CHLORIDE  --  109* 107   CO2  --  24 25   BUN  --  7* 8   CR  --  0.63 0.66   ANIONGAP  --  5 7   NURIS  --  8.7 9.9   GLC  --  98 83   ALBUMIN  --   --  4.3   PROTTOTAL  --   --  7.9   BILITOTAL  --   --  0.3   ALKPHOS  --   --  59   ALT  --   --  26   AST  --   --  13

## 2021-10-20 NOTE — UTILIZATION REVIEW
Admission Status; Secondary Review Determination   Under the authority of the Utilization Management Committee, the utilization review process indicated a secondary review on Mariela Hawkins. The review outcome is based on review of the medical records, discussions with staff, and applying clinical experience noted on the date of the review.   (x) Inpatient Status Appropriate - This patient's medical care is consistent with medical management for inpatient care and reasonable inpatient medical practice.     RATIONALE FOR DETERMINATION   31 year old female evaluated in the ED on October 18, 2021 for increasing abdominal pain the last 3 days prior to arrival, nausea, inability to have typical bowel movement.  Past medical history significant for morbid obesity, umbilical hernia.  Vital signs stable upon arrival.  Laboratory work-up unremarkable.  CT scan abdominal pelvis showed large ventral midline abdominal wall hernia containing a herniated small bowel loop, no bowel obstruction at this time, a rounded hypodense nodule along the right lateral margin of the hernia could be a small complex fluid collection such as phlegmon versus developing small abscess, approximately 2.4 cm, technically nonspecific.  General surgery recommended IV antibiotics given.  Since admission having ongoing pain with several doses IV meds needed as well as several doses antiemetics even last evening.  Surgery monitoring oral intake with nausea/pain and pain control one more night.  Crossing 3rd night.  At the time of admission with the information available to the attending physician more than 2 nights Hospital complex care was anticipated, based on patient risk of adverse outcome if treated as outpatient and complex care required. Inpatient admission is appropriate based on the Medicare guidelines.   The information on this document is developed by the utilization review team in order for the business office to ensure compliance. This  only denotes the appropriateness of proper admission status and does not reflect the quality of care rendered.   The definitions of Inpatient Status and Observation Status used in making the determination above are those provided in the CMS Coverage Manual, Chapter 1 and Chapter 6, section 70.4.   Sincerely,   Carlie Tolbert MD  Utilization Review  Physician Advisor  [facility]

## 2021-10-20 NOTE — PLAN OF CARE
Problem: Pain Acute  Goal: Acceptable Pain Control and Functional Ability  Outcome: Improving   Dilaudid given at 0855 for abd pain. Helpful. Oxycodone given at 1228, more relief noted.   Problem: Nausea and Vomiting  Goal: Fluid and Electrolyte Balance  Outcome: Improving   Compazine given at 1228 with relief.

## 2021-10-20 NOTE — PLAN OF CARE
Problem: Pain Acute  Goal: Acceptable Pain Control and Functional Ability  Outcome: No Change  Intervention: Develop Pain Management Plan  Recent Flowsheet Documentation  Taken 10/19/2021 1800 by Fabiola Last, RN  Pain Management Interventions: medication (see MAR)     Problem: Nausea and Vomiting  Goal: Fluid and Electrolyte Balance  Outcome: No Change  Intervention: Prevent and Manage Nausea and Vomiting  Recent Flowsheet Documentation  Taken 10/19/2021 1740 by Fabiola Last, RN  Nausea/Vomiting Interventions: antiemetic   A/Ox4. VSS on RA. C/o 7/10 pain to abdomen, PRN Dilaudid given x1. C/o nausea, PRN IV compazine given x1. Up ind in room. NPO. Contact precautions maintained. Bariatric surgery to consult. Nursing to continue to monitor.

## 2021-10-21 VITALS
TEMPERATURE: 98.3 F | HEIGHT: 66 IN | SYSTOLIC BLOOD PRESSURE: 119 MMHG | OXYGEN SATURATION: 96 % | HEART RATE: 70 BPM | BODY MASS INDEX: 47.09 KG/M2 | DIASTOLIC BLOOD PRESSURE: 62 MMHG | RESPIRATION RATE: 18 BRPM | WEIGHT: 293 LBS

## 2021-10-21 PROCEDURE — 99231 SBSQ HOSP IP/OBS SF/LOW 25: CPT | Performed by: PHYSICIAN ASSISTANT

## 2021-10-21 PROCEDURE — 99217 PR OBSERVATION CARE DISCHARGE: CPT | Performed by: INTERNAL MEDICINE

## 2021-10-21 PROCEDURE — 250N000013 HC RX MED GY IP 250 OP 250 PS 637: Performed by: INTERNAL MEDICINE

## 2021-10-21 PROCEDURE — 99207 PR CDG-CODE CATEGORY CHANGED: CPT | Performed by: INTERNAL MEDICINE

## 2021-10-21 PROCEDURE — 250N000011 HC RX IP 250 OP 636: Performed by: INTERNAL MEDICINE

## 2021-10-21 RX ORDER — IBUPROFEN 600 MG/1
600 TABLET, FILM COATED ORAL EVERY 8 HOURS PRN
COMMUNITY
Start: 2021-10-21

## 2021-10-21 RX ORDER — HYDROCODONE BITARTRATE AND ACETAMINOPHEN 5; 325 MG/1; MG/1
1 TABLET ORAL EVERY 6 HOURS PRN
Qty: 18 TABLET | Refills: 0 | Status: SHIPPED | OUTPATIENT
Start: 2021-10-21 | End: 2021-10-24

## 2021-10-21 RX ORDER — PANTOPRAZOLE SODIUM 40 MG/1
40 TABLET, DELAYED RELEASE ORAL
Qty: 30 TABLET | Refills: 0 | Status: SHIPPED | OUTPATIENT
Start: 2021-10-22

## 2021-10-21 RX ORDER — ACETAMINOPHEN 325 MG/1
650 TABLET ORAL EVERY 4 HOURS PRN
COMMUNITY
Start: 2021-10-21

## 2021-10-21 RX ADMIN — OXYCODONE HYDROCHLORIDE 5 MG: 5 TABLET ORAL at 07:29

## 2021-10-21 RX ADMIN — ACETAMINOPHEN 650 MG: 325 TABLET ORAL at 03:11

## 2021-10-21 RX ADMIN — IBUPROFEN 600 MG: 600 TABLET, FILM COATED ORAL at 07:48

## 2021-10-21 RX ADMIN — OXYCODONE HYDROCHLORIDE 5 MG: 5 TABLET ORAL at 03:11

## 2021-10-21 RX ADMIN — HEPARIN SODIUM 5000 UNITS: 10000 INJECTION, SOLUTION INTRAVENOUS; SUBCUTANEOUS at 06:56

## 2021-10-21 RX ADMIN — SERTRALINE HYDROCHLORIDE 150 MG: 50 TABLET ORAL at 07:47

## 2021-10-21 RX ADMIN — ACETAMINOPHEN 650 MG: 325 TABLET ORAL at 07:47

## 2021-10-21 RX ADMIN — PANTOPRAZOLE SODIUM 40 MG: 20 TABLET, DELAYED RELEASE ORAL at 06:55

## 2021-10-21 ASSESSMENT — ACTIVITIES OF DAILY LIVING (ADL)
ADLS_ACUITY_SCORE: 4

## 2021-10-21 NOTE — PLAN OF CARE
Problem: Pain Acute  Goal: Acceptable Pain Control and Functional Ability  Outcome: Improving     Problem: Nausea and Vomiting  Goal: Fluid and Electrolyte Balance  Outcome: Improving     Patient denies nausea at this time. Reports 6/10 pain once overnight. Gave 5mg oxycodone with 650mg APAP and patient returned to sleep for remainder of shift. Reports she wishes to discharge home this AM.    Derrick Eduardo RN

## 2021-10-21 NOTE — PLAN OF CARE
Problem: Pain Acute  Goal: Acceptable Pain Control and Functional Ability  Outcome: Improving  Intervention: Develop Pain Management Plan  Recent Flowsheet Documentation  Taken 10/20/2021 1945 by Fbaiola Last, RN  Pain Management Interventions: medication (see MAR)     Problem: Nausea and Vomiting  Goal: Fluid and Electrolyte Balance  Outcome: Improving  Intervention: Prevent and Manage Nausea and Vomiting  Recent Flowsheet Documentation  Taken 10/20/2021 1945 by Fabiola Last, RN  Nausea/Vomiting Interventions: antiemetic    A/Ox4. VSS on RA. C/o 7/10 pain to abdomen, PRN Dilaudid given x1, PRN oxy given x1. C/o nausea, PRN IV zofran given x1. Up ind in room. Full liquid diet, tolerating. Passing flatus, no BM. Contact precautions maintained. Possible discharge tomorrow morning if tolerating pain and diet and will follow up with surgery outpatient. Nursing to continue to monitor.

## 2021-10-21 NOTE — DISCHARGE SUMMARY
[facility]  Hospitalist Discharge Summary      Date of Admission:  10/18/2021  Date of Discharge:  10/21/2021 10:52 AM  Discharging Provider: Gerry Adler MD     SUMMARY:  31 year old female with a PMH of open hysterectomy and umbilical hernia repair several years ago who was admitted on 10/18/2021 with abd pain.  CT imaging in the ED revealed ventral hernia with loops of small intestine but no evidence of incarceration.    Discharge Diagnoses   1. Ventral hernia, recurrent  CT shows large ventral midline abdominal wall hernia containing a herniated small bowel loop. No bowel obstruction at this time. However, along the right lateral margin of the hernia is a rounded hypodense nodule that could be a small complex fluid   collection such as phlegmon versus developing small abscess. It is approximately 2.4 cm. This is technically nonspecific.  - Surgery consulted (appreciated)  White count normal, procalcitonin normal, no fever  Evidence for infection is low  Not currently on antibiotics  Surgery has assessed and advises advance of diet, plan for outpatient bariatric surgery clinic consultation     2.  Abd pain  Nausea  She c/o of nausea for several days  We will discharge on PPI  Abdominal pain is felt to be likely strain and stretch on the abdominal wall tissues and possible continuing increase of abdominal wall hernia size     Scheduled ibuprofen, as needed Tylenol  Vicodin oral available if needed     COVID STATUS: negative Date:10/816009      Follow-ups Needed After Discharge   Patient to call bariatric surgery clinic for appointment    Discharge Disposition   Discharged to home  Condition at discharge: Stable    Consultations This Hospital Stay   SURGERY GENERAL IP CONSULT  BARIATRIC SURGERY IP CONSULT    Code Status   Full Code    Time Spent on this Encounter   I, Gerry Adler MD, personally saw the patient today and spent greater than 30 minutes discharging this patient.       Gerry  DOMENICO Adler MD  [place] [address]  [address] [facility] [address]  Phone: [phone]  Fax: [phone]  ______________________________________________________________________    Physical Exam   Vital Signs: Temp: 98.3  F (36.8  C) Temp src: Oral BP: 119/62 Pulse: 70   Resp: 18 SpO2: 96 % O2 Device: None (Room air)    Weight: 312 lbs 0 oz  Constitutional: awake, alert, cooperative, no apparent distress, and appears stated age  ENT: normocepalic, without obvious abnormality, atramatic  Respiratory: No increased work of breathing, good air exchange, clear to auscultation bilaterally, no crackles or wheezing  Cardiovascular: Normal apical impulse, regular rate and rhythm, normal S1 and S2, no S3 or S4, and no murmur noted  GI: normal bowel sounds, soft and non-distended  Neurologic: Mental Status Exam:  Level of Alertness:   awake  Orientation:   person, place, time  Memory:   normal  Motor Exam:  moves all extremities well and symmetrically  Sensory:  Sensory intact       Primary Care Physician   Kenzie Mccoy    Discharge Orders   No discharge procedures on file.    Significant Results and Procedures   Most Recent 3 CBC's:Recent Labs   Lab Test 10/20/21  0702 10/19/21  0629 10/18/21  1744 12/16/19  0709 12/15/19  0821 12/14/19  0641 12/14/19  0641   WBC 7.2 8.3 10.2  --   --   --  12.1*   HGB  --  12.2 12.6  --  9.1*   < > 9.7*   MCV  --  97 95  --   --   --  93   PLT  --  210 238 192  --    < > 168    < > = values in this interval not displayed.     Most Recent 3 BMP's:Recent Labs   Lab Test 10/19/21  0629 10/18/21  1744 12/13/19  2354 07/28/19  1145    139  --  135*   POTASSIUM 4.1 4.1  --  4.1   CHLORIDE 109* 107  --  106   CO2 24 25  --  21*   BUN 7* 8  --  6*   CR 0.63 0.66 0.38* 0.59*   ANIONGAP 5 7  --  8   NURIS 8.7 9.9  --  9.3   GLC 98 83  --  82     Most Recent 2 LFT's:Recent Labs   Lab Test 10/18/21  1744 07/28/19  1145   AST 13 7   ALT 26 11   ALKPHOS 59 52   BILITOTAL  0.3 0.2   ,   Results for orders placed or performed during the hospital encounter of 10/18/21   CT Abdomen Pelvis w/o Contrast    Narrative    EXAM: CT ABDOMEN PELVIS W/O CONTRAST  LOCATION: [facility]  DATE/TIME: 10/18/2021 5:57 PM    INDICATION: Abdominal pain, acute, nonlocalized.  COMPARISON: CT abdomen and pelvis 06/28/2018.  TECHNIQUE: CT scan of the abdomen and pelvis was performed without IV contrast. Multiplanar reformats were obtained. Dose reduction techniques were used.  CONTRAST: None.    FINDINGS:   LOWER CHEST: Normal.    HEPATOBILIARY: No acute abnormality. Stable tiny hepatic cysts.    PANCREAS: Normal.    SPLEEN: Normal.    ADRENAL GLANDS: Normal.    KIDNEYS/BLADDER: No hydronephrosis. Tiny nonobstructing stone lower right kidney is 0.4 cm coronal image 100.    BOWEL: No bowel obstruction. No acute inflammatory change of the bowel. Appendix is not seen. No secondary signs of appendicitis. Ventral midline abdominal wall hernia superior to the umbilicus is larger. It is 7.8 x 4.1 cm, previously 3.3 x 3.2 cm,   series 3 image 85. Internal opening is 2.8 cm. It contains a nonobstructed herniated small bowel loop. On the right lateral margin of the hernia is a focal hypodense collection that is mildly heterogeneous measuring 2.4 cm image 87.    LYMPH NODES: Normal.    VASCULATURE: No acute abnormality.    PELVIC ORGANS: Uterus not seen. No suspicious adnexal abnormality. Left ovary limited in view. Right ovary is grossly unremarkable.    MUSCULOSKELETAL: Normal.      Impression    IMPRESSION:   1.  Larger ventral midline abdominal wall hernia containing a herniated small bowel loop. No bowel obstruction at this time. However, along the right lateral margin of the hernia is a rounded hypodense nodule that could be a small complex fluid   collection such as phlegmon versus developing small abscess. It is approximately 2.4 cm. This is technically nonspecific.  2.  No other acute  abnormality.  3.  Tiny nonobstructing stone lower right kidney.           Discharge Medications   Current Discharge Medication List      CONTINUE these medications which have NOT CHANGED    Details   sertraline (ZOLOFT) 100 MG tablet Take 150 mg by mouth daily           Allergies   Allergies   Allergen Reactions     Amoxicillin Rash

## 2021-10-21 NOTE — PLAN OF CARE
Problem: Adult Inpatient Plan of Care  Goal: Plan of Care Review  Outcome: Completed  Goal: Patient-Specific Goal (Individualized)  Outcome: Completed  Goal: Absence of Hospital-Acquired Illness or Injury  Outcome: Completed  Intervention: Identify and Manage Fall Risk  Recent Flowsheet Documentation  Taken 10/21/2021 0747 by Kimberly Burnham RN  Safety Promotion/Fall Prevention: safety round/check completed  Intervention: Prevent Skin Injury  Recent Flowsheet Documentation  Taken 10/21/2021 0747 by Kimberly Burnham RN  Body Position: position changed independently  Goal: Optimal Comfort and Wellbeing  Outcome: Completed  Goal: Readiness for Transition of Care  Outcome: Completed     Problem: Pain Acute  Goal: Acceptable Pain Control and Functional Ability  Outcome: Completed  Intervention: Develop Pain Management Plan  Recent Flowsheet Documentation  Taken 10/21/2021 0747 by Kimberly Burnham RN  Pain Management Interventions:   medication (see MAR)   emotional support   repositioned     Problem: Nausea and Vomiting  Goal: Fluid and Electrolyte Balance  Outcome: Completed   Care plans complete. Patient ready for discharge. Patient verbalizes understanding of the discharge, meds and follow up

## 2021-10-21 NOTE — PROGRESS NOTES
General Surgery Progress Note:    Hospital Day # 3    ASSESSMENT:   1. Left sided abdominal pain    2. Ventral hernia without obstruction or gangrene        Mariela Hawkins is a 31 year old female with a recurrent incisional hernia in the midline    PLAN:   -ADAT  -Pain control  -Ok to discharge from our standpoint and will follow up with Bariatrics program first and then hernia repair down the road    Alexander Smallwood PA-C  Pager - [phone]  Phone - [phone]   General Surgery    SUBJECTIVE:   Mariela Hawkins is doing better, tolerated clears yesterday, passing gas, hopeful to go home    Patient Vitals for the past 24 hrs:   BP Temp Temp src Pulse Resp SpO2   10/21/21 0721 119/62 98.3  F (36.8  C) Oral 70 18 96 %   10/21/21 0002 92/55 98.1  F (36.7  C) Oral 67 17 95 %   10/20/21 1945 -- -- -- -- 18 --   10/20/21 1549 104/53 98.2  F (36.8  C) Oral 67 18 93 %   10/20/21 1124 116/66 98  F (36.7  C) Oral 67 18 98 %       Physical Exam:  General: NAD, pleasant  CV:RRR  LUNGS:CTA bilaterally  ABD: soft, obese, midline hernia soft, mild ttp - seems less than yesterday  EXT:no CCE     Lab Results   Component Value Date    WBC 7.2 10/20/2021    WBC 12.1 12/14/2019    HGB 12.2 10/19/2021    HGB 9.1 12/15/2019    HCT 37.8 10/19/2021    HCT 31.0 12/14/2019    MCV 97 10/19/2021    MCV 93 12/14/2019     10/19/2021     12/16/2019     INR/Prothrombin Time  Recent Labs   Lab 10/19/21  0629      CO2 24   BUN 7*     Lab Results   Component Value Date    ALT 26 10/18/2021    AST 13 10/18/2021    ALKPHOS 59 10/18/2021

## 2022-01-14 NOTE — PROVIDER NOTIFICATION
11/25/19 2341   Provider Notification   Provider Name/Title Dr. María Nunez   Method of Notification In Department   Notification Reason Lab/Diagnostic Study   Notified Dr. María Nunez of new lab urine cultural.  New order for macrobid.    Jeff Val    : 1964  Acct #: [de-identified]  MRN: [id number]              PM&R Progress Note      Admitting diagnosis: Right distal fibula fracture ( [address])     Comorbid diagnoses impacting rehabilitation: Uncontrolled pain, generalized weakness, gait disturbance, myoclonus, chronic pain syndrome, near syncope event, COPD exacerbation, moderate protein calorie malnutrition    Chief complaint: Mild right lower leg pain as expected. She is worried about her outpatient pain clinic follow-up appointment after the weekend. Prior (baseline) level of function: Independent.     Current level of function:         Current  IRF-MARIAELENA and Goals:   Occupational Therapy:    Short term goals  Time Frame for Short term goals: STGs=LTGs :   Long term goals  Time Frame for Long term goals : ~1 week or until d/c  Long term goal 1: Pt will complete grooming tasks Ind  Long term goal 2: Pt will complete total body bathing mod I  Long term goal 3: Pt will complete UB dressing Ind  Long term goal 4: Pt will complete LB dressing Ind  Long term goal 5: Pt will doff/don footwear including walking boot Ind  Long term goals 6: Pt will complete toileting mod I  Long term goal 7: Pt will complete functional transfers (bed, chair, toilet, shower) c DME PRN and mod I  Long term goal 8: Pt will perform therex/therax to facilitate increased strength/endurance/ax tolerance (c emphasis on dynamic standing balance/tolerance >5 mins, BUE endurance) c SBA  Long term goal 9: Pt will complete simple homemaking tasks c DME PRN and mod I :                                       Eating: Eating  Assistance Needed: Independent  CARE Score: 6  Discharge Goal: Independent       Oral Hygiene: Oral Hygiene  Assistance Needed: Setup or clean-up assistance  Comment: seated  CARE Score: 5  Discharge Goal: Independent    UB/LB Bathing: Shower/Bathe Self  Assistance Needed: Supervision or touching assistance  Comment: CGA while briefly attempting perineal hygiene in stance however educated pt since boot is not on she should not put weight on R foot; pt then redirected to complete task seated  CARE Score: 4    UB Dressing: Upper Body Dressing  Assistance Needed: Supervision or touching assistance  Comment: pt used W/C for setup with cue to lock brakes, able to don shirt  CARE Score: 4         LB Dressing: Lower Body Dressing  Assistance Needed: Partial/moderate assistance  Comment: able to thread BLEs in underwear and pants, ranged from CGA-min A for balance during pants management + encouragement to perform at best effort  CARE Score: 3  Discharge Goal: Independent    Donning and [place] Footwear: Putting On/Taking Off Footwear  Assistance Needed: Partial/moderate assistance  Comment: able to doff/don L sock, required partial assist to doff/don R walking boot but pt engaged in cueing/education  CARE Score: 3  Discharge Goal: Independent      Toileting: Toileting Hygiene  Assistance Needed: Supervision or touching assistance  Comment: CGA-SBA  CARE Score: 4  Discharge Goal: Independent      Toilet Transfers: Toilet Transfer  Assistance Needed: Supervision or touching assistance  Comment: CGA c RW, grab bars, walking boot  CARE Score: 4  Discharge Goal: Independent    Physical Therapy:   Short term goals  Time Frame for Short term goals: 7-10 tx days:  Short term goal 1: Pt will complete sit->sup Ind. Short term goal 2: Pt will complete OOB transfers using 2WW, R boot, and O2 line management Mod Ind. Short term goal 3: Pt will ambulate 10 ft over level and uneven surfaces and 50 ft with turns using 2WW and R boot with SBA-Sup. Short term goal 4: Pt will ascend/descend 2\"/4\" curb step and 4-5 steps (4\" step height) using railings following appropriate step-to pattern with Min A. Short term goal 5: Pt will propel manual w/c >/=150 ft Mod Ind.   Short term goal 6: Pt will complete object retrieval in standing with 1UE support on 2WW and using reacher Mod Ind. Score: 88  Discharge Goal: Not Attempted     Walking 10 Feet on Uneven Surfaces  Reason if not Attempted: Not attempted due to medical condition or safety concerns  CARE Score: 88  Discharge Goal: Supervision or touching assistance     1 Step (Curb)  Comment: pt was not performing this at baseline but has potential to be trained in a controlled environment  Reason if not Attempted: Not attempted due to medical condition or safety concerns  CARE Score: 88  Discharge Goal: Partial/moderate assistance     4 Steps  Comment: pt was not usually performing this at baseline but has potential to be trained in a controlled environment  Reason if not Attempted: Not attempted due to medical condition or safety concerns  CARE Score: 88  Discharge Goal: Partial/moderate assistance     12 Steps  Comment: pt was not performing this at baseline; limited potential to progressing to this mobility task due to Hx of COPD  Reason if not Attempted: Not attempted due to medical condition or safety concerns  CARE Score: 88  Discharge Goal: Not Attempted       Wheelchair:  w/c Ability: Wheelchair Ability  Uses a Wheelchair and/or Scooter?: Yes  Wheel 50 Feet with Two Turns  Assistance Needed: Independent  Comment: Mod Ind using BUE  CARE Score: 6  Discharge Goal: Independent  Wheel 150 Feet  Assistance Needed: Independent  Comment: Mod Ind using BUE  CARE Score: 6  Discharge Goal: Independent          Balance:        Object: Picking Up Object  Assistance Needed: Supervision or touching assistance  Comment: CGA with LUE support on 2WW, R boot in place, and using reacher  CARE Score: 4  Discharge Goal: Independent    I      Exam:    Blood pressure 133/78, pulse 90, temperature 98.1 °F (36.7 °C), temperature source Oral, resp. rate 18, height 5' 4\" (1.626 m), weight 203 lb 9.6 oz (92.4 kg), SpO2 97 %. General: Observed sitting up in wheelchair. Talkative. Impulsive with conversation and movement.   Little regard to nonweightbearing restriction of the right leg. HEENT: MMM. Neck supple. Facial expression is symmetric. Pulmonary: Unlabored without wheezes or rales. Cardiac: RRR. Abdomen: Patient's abdomen is soft and nondistended. Bowel sounds were present throughout. There was no rebound, guarding or masses noted. Upper extremities: Moves both upper limbs abruptly with fair coordination. Normal tone. Mature bruising. Lower extremities: Right lower leg is immobilized in a gutter splint. Upper trimline is none constricting. Left calf is soft. Sitting balance was good. Standing balance was poor. Lab Results   Component Value Date    WBC 11.7 (H) 01/06/2022    HGB 13.2 01/06/2022    HCT 46.2 01/06/2022    MCV 95.1 01/06/2022     01/06/2022     Lab Results   Component Value Date    INR 1.03 05/24/2021    INR 0.93 03/30/2021    INR 1.02 11/19/2020    PROTIME 12.5 05/24/2021    PROTIME 11.2 (L) 03/30/2021    PROTIME 12.3 11/19/2020     Lab Results   Component Value Date    CREATININE 0.4 (L) 01/06/2022    BUN 8 01/06/2022     01/06/2022    K 4.7 01/06/2022    CL 95 (L) 01/06/2022    CO2 38 (H) 01/06/2022     Lab Results   Component Value Date    ALT 18 01/06/2022    AST 21 01/06/2022    ALKPHOS 85 01/06/2022    BILITOT 0.3 01/06/2022       Expected length of stay  prior to a supervised level of function for discharge home with a walker and Penny Corral OT/PT is January 17, 2022. Recommendations:    1. Right distal fibula fracture with gait disturbance:  She has demonstrated a significant disregard for the protecting her right lower leg during activities. Poor insight and problem solving demonstrated as well. Generally she is engaging in the daily occupational and physical therapy.  Weight bearing allowed as long as she uses a walking boot on the right lower limb. She generally perseverates on the pain management.   Benefiting from aggressive pulmonary measures and adaptive equipment training.    Planning on caregiver education, DVT prophylaxis and bowel and bladder retraining.  Orthopedic follow-up as an outpatient. Verbal cues and minimum physical assistance for transfers. 2. DVT prophylaxis: Lovenox 40 mg subcu daily.  I must monitor her hemoglobin and platelet count periodically while on this medication.  Weightbearing activities are allowed within restrictions and pursued daily.  GI prophylaxis is available. No new bruising or swelling. 3. Chronic pain syndrome: Continuing Suboxone 3 times a day. She is benefiting from acetaminophen, Lyrica and tramadol for breakthrough pain.  Cryotherapy and outpatient follow-up with her pain management physician this coming Monday. 4. COPD exacerbation with hypercapnia: Albuterol, Atrovent and Symbicort inhalers.  Aggressive pulmonary hygiene measures.  Monitoring O2 saturations at rest and with activity.  Outpatient follow-up with pulmonary medicine. 5. Cardiac arrhythmia: Evidently her pacer is failing and she needs outpatient follow-up with cardiology to address this issue.  Vital signs are checked at rest and with activity. No perceived palpitations. 6. Protein calorie malnutrition: Encouraging consistent oral intake of solids and liquids.  Oral supplements.  Working to establish bowel regularity with oral agents.  Follow-up with her PCP.      Counseling and Coordination of Care: In care conference today I met with the patient's OT, PT, RN and . We discussed the patient's problems, progress and prognosis. Disposition issues were clarified and plans were established for ongoing rehabilitation efforts beyond the ARU stay. I reviewed this information with the patient during a second distinct visit with the patient. More than half of the total time of 32 minutes spent with the patient involved counseling and coordination of care.     Saúl Lebron was evaluated today and a DME order was entered for a standard wheelchair because she requires this to successfully complete daily living tasks of ambulating. A standard manual wheelchair is necessary due to patient's impaired ambulation and mobility restrictions and would be unable to resolve these daily living tasks using a cane or walker. The patient is capable of using a standard wheelchair safely in their home and can maneuver within their home with adequate access. There is a caregiver available to provide necessary assistance.   The need for this equipment was discussed with the patient and she understands, is in agreement, and has not expressed an unwillingness to use the wheelchair.       Jim Jennings can self propel a wheelchair and needs anti-rollback device because of ramps.

## 2022-03-12 ENCOUNTER — HEALTH MAINTENANCE LETTER (OUTPATIENT)
Age: 32
End: 2022-03-12

## 2022-05-25 ENCOUNTER — HOSPITAL ENCOUNTER (EMERGENCY)
Facility: CLINIC | Age: 32
Discharge: HOME | End: 2022-05-26
Attending: EMERGENCY MEDICINE | Admitting: EMERGENCY MEDICINE
Payer: COMMERCIAL

## 2022-05-25 DIAGNOSIS — J18.9 PNEUMONIA OF BOTH LUNGS DUE TO INFECTIOUS ORGANISM, UNSPECIFIED PART OF LUNG: ICD-10-CM

## 2022-05-25 LAB
ANION GAP SERPL CALCULATED.3IONS-SCNC: 11 MMOL/L (ref 5–18)
BASOPHILS # BLD AUTO: 0 10E3/UL (ref 0–0.2)
BASOPHILS NFR BLD AUTO: 0 %
BNP SERPL-MCNC: <10 PG/ML (ref 0–64)
BUN SERPL-MCNC: 8 MG/DL (ref 8–22)
CALCIUM SERPL-MCNC: 8.8 MG/DL (ref 8.5–10.5)
CHLORIDE BLD-SCNC: 104 MMOL/L (ref 98–107)
CO2 SERPL-SCNC: 25 MMOL/L (ref 22–31)
CREAT SERPL-MCNC: 0.65 MG/DL (ref 0.6–1.1)
D DIMER PPP FEU-MCNC: 0.69 UG/ML FEU (ref 0–0.5)
EOSINOPHIL # BLD AUTO: 0 10E3/UL (ref 0–0.7)
EOSINOPHIL NFR BLD AUTO: 0 %
ERYTHROCYTE [DISTWIDTH] IN BLOOD BY AUTOMATED COUNT: 13.2 % (ref 10–15)
FLUAV RNA SPEC QL NAA+PROBE: NEGATIVE
FLUBV RNA RESP QL NAA+PROBE: NEGATIVE
GFR SERPL CREATININE-BSD FRML MDRD: >90 ML/MIN/1.73M2
GLUCOSE BLD-MCNC: 105 MG/DL (ref 70–125)
HCT VFR BLD AUTO: 36.1 % (ref 35–47)
HGB BLD-MCNC: 11.8 G/DL (ref 11.7–15.7)
IMM GRANULOCYTES # BLD: 0 10E3/UL
IMM GRANULOCYTES NFR BLD: 0 %
LYMPHOCYTES # BLD AUTO: 1.9 10E3/UL (ref 0.8–5.3)
LYMPHOCYTES NFR BLD AUTO: 21 %
MCH RBC QN AUTO: 29.9 PG (ref 26.5–33)
MCHC RBC AUTO-ENTMCNC: 32.7 G/DL (ref 31.5–36.5)
MCV RBC AUTO: 92 FL (ref 78–100)
MONOCYTES # BLD AUTO: 0.5 10E3/UL (ref 0–1.3)
MONOCYTES NFR BLD AUTO: 5 %
NEUTROPHILS # BLD AUTO: 6.4 10E3/UL (ref 1.6–8.3)
NEUTROPHILS NFR BLD AUTO: 74 %
NRBC # BLD AUTO: 0 10E3/UL
NRBC BLD AUTO-RTO: 0 /100
PLATELET # BLD AUTO: 195 10E3/UL (ref 150–450)
POTASSIUM BLD-SCNC: 4 MMOL/L (ref 3.5–5)
RBC # BLD AUTO: 3.94 10E6/UL (ref 3.8–5.2)
RSV RNA SPEC NAA+PROBE: NEGATIVE
SARS-COV-2 RNA RESP QL NAA+PROBE: NEGATIVE
SODIUM SERPL-SCNC: 140 MMOL/L (ref 136–145)
TROPONIN I SERPL-MCNC: <0.01 NG/ML (ref 0–0.29)
WBC # BLD AUTO: 8.8 10E3/UL (ref 4–11)

## 2022-05-25 PROCEDURE — 84484 ASSAY OF TROPONIN QUANT: CPT | Performed by: EMERGENCY MEDICINE

## 2022-05-25 PROCEDURE — 83880 ASSAY OF NATRIURETIC PEPTIDE: CPT | Performed by: EMERGENCY MEDICINE

## 2022-05-25 PROCEDURE — 80048 BASIC METABOLIC PNL TOTAL CA: CPT | Performed by: EMERGENCY MEDICINE

## 2022-05-25 PROCEDURE — 87637 SARSCOV2&INF A&B&RSV AMP PRB: CPT | Performed by: EMERGENCY MEDICINE

## 2022-05-25 PROCEDURE — 85025 COMPLETE CBC W/AUTO DIFF WBC: CPT | Performed by: EMERGENCY MEDICINE

## 2022-05-25 PROCEDURE — 85379 FIBRIN DEGRADATION QUANT: CPT | Performed by: EMERGENCY MEDICINE

## 2022-05-25 PROCEDURE — 96374 THER/PROPH/DIAG INJ IV PUSH: CPT | Mod: 59

## 2022-05-25 PROCEDURE — C9803 HOPD COVID-19 SPEC COLLECT: HCPCS

## 2022-05-25 PROCEDURE — 99285 EMERGENCY DEPT VISIT HI MDM: CPT | Mod: 25

## 2022-05-25 PROCEDURE — 93005 ELECTROCARDIOGRAM TRACING: CPT | Performed by: EMERGENCY MEDICINE

## 2022-05-25 PROCEDURE — 36415 COLL VENOUS BLD VENIPUNCTURE: CPT | Performed by: EMERGENCY MEDICINE

## 2022-05-25 PROCEDURE — 250N000011 HC RX IP 250 OP 636: Performed by: EMERGENCY MEDICINE

## 2022-05-25 RX ORDER — KETOROLAC TROMETHAMINE 15 MG/ML
15 INJECTION, SOLUTION INTRAMUSCULAR; INTRAVENOUS ONCE
Status: COMPLETED | OUTPATIENT
Start: 2022-05-25 | End: 2022-05-25

## 2022-05-25 RX ADMIN — KETOROLAC TROMETHAMINE 15 MG: 15 INJECTION, SOLUTION INTRAMUSCULAR; INTRAVENOUS at 23:27

## 2022-05-25 ASSESSMENT — ENCOUNTER SYMPTOMS
SHORTNESS OF BREATH: 1
COUGH: 1
RHINORRHEA: 1
NAUSEA: 0
VOMITING: 1

## 2022-05-26 ENCOUNTER — APPOINTMENT (OUTPATIENT)
Dept: CT IMAGING | Facility: CLINIC | Age: 32
End: 2022-05-26
Attending: EMERGENCY MEDICINE
Payer: COMMERCIAL

## 2022-05-26 VITALS
BODY MASS INDEX: 47.09 KG/M2 | HEIGHT: 66 IN | RESPIRATION RATE: 20 BRPM | SYSTOLIC BLOOD PRESSURE: 141 MMHG | HEART RATE: 94 BPM | DIASTOLIC BLOOD PRESSURE: 78 MMHG | OXYGEN SATURATION: 95 % | TEMPERATURE: 99.6 F | WEIGHT: 293 LBS

## 2022-05-26 LAB
ATRIAL RATE - MUSE: 88 BPM
DIASTOLIC BLOOD PRESSURE - MUSE: NORMAL MMHG
INTERPRETATION ECG - MUSE: NORMAL
P AXIS - MUSE: 42 DEGREES
PR INTERVAL - MUSE: 128 MS
QRS DURATION - MUSE: 80 MS
QT - MUSE: 390 MS
QTC - MUSE: 471 MS
R AXIS - MUSE: 49 DEGREES
SYSTOLIC BLOOD PRESSURE - MUSE: NORMAL MMHG
T AXIS - MUSE: 51 DEGREES
VENTRICULAR RATE- MUSE: 88 BPM

## 2022-05-26 PROCEDURE — 250N000011 HC RX IP 250 OP 636: Performed by: EMERGENCY MEDICINE

## 2022-05-26 PROCEDURE — 71275 CT ANGIOGRAPHY CHEST: CPT

## 2022-05-26 PROCEDURE — 96375 TX/PRO/DX INJ NEW DRUG ADDON: CPT | Mod: 59

## 2022-05-26 PROCEDURE — 96376 TX/PRO/DX INJ SAME DRUG ADON: CPT

## 2022-05-26 RX ORDER — IOPAMIDOL 755 MG/ML
100 INJECTION, SOLUTION INTRAVASCULAR ONCE
Status: COMPLETED | OUTPATIENT
Start: 2022-05-26 | End: 2022-05-26

## 2022-05-26 RX ORDER — AZITHROMYCIN 250 MG/1
TABLET, FILM COATED ORAL
Qty: 6 TABLET | Refills: 0 | Status: SHIPPED | OUTPATIENT
Start: 2022-05-26 | End: 2022-05-31

## 2022-05-26 RX ORDER — ONDANSETRON 2 MG/ML
4 INJECTION INTRAMUSCULAR; INTRAVENOUS ONCE
Status: DISCONTINUED | OUTPATIENT
Start: 2022-05-26 | End: 2022-05-26 | Stop reason: HOSPADM

## 2022-05-26 RX ORDER — MORPHINE SULFATE 4 MG/ML
4 INJECTION, SOLUTION INTRAMUSCULAR; INTRAVENOUS EVERY 30 MIN PRN
Status: DISCONTINUED | OUTPATIENT
Start: 2022-05-26 | End: 2022-05-26 | Stop reason: HOSPADM

## 2022-05-26 RX ORDER — OXYCODONE AND ACETAMINOPHEN 5; 325 MG/1; MG/1
1 TABLET ORAL EVERY 6 HOURS PRN
Qty: 12 TABLET | Refills: 0 | Status: SHIPPED | OUTPATIENT
Start: 2022-05-26 | End: 2022-05-29

## 2022-05-26 RX ADMIN — IOPAMIDOL 75 ML: 755 INJECTION, SOLUTION INTRAVENOUS at 00:53

## 2022-05-26 RX ADMIN — MORPHINE SULFATE 4 MG: 4 INJECTION, SOLUTION INTRAMUSCULAR; INTRAVENOUS at 00:15

## 2022-05-26 RX ADMIN — MORPHINE SULFATE 4 MG: 4 INJECTION, SOLUTION INTRAMUSCULAR; INTRAVENOUS at 01:06

## 2022-05-26 NOTE — ED TRIAGE NOTES
"  Patient presents to the ER with two weeks of \"not feeling well\". Started off with cold symptoms. But over the last couple days developed worsening shortness of breath and chest pain that is sharp in nature. EKG ordered in triage. 98% oxygen on room air. Patient has a cough and states green phlegm gets coughed up.      Triage Assessment     Row Name 05/25/22 6024       Triage Assessment (Adult)    Airway WDL X  c/o shortness of breath, 97% oxygen on RA       Respiratory WDL    Respiratory WDL X  tachypneic       Skin Circulation/Temperature WDL    Skin Circulation/Temperature WDL WDL       Cardiac WDL    Cardiac WDL X  patient c/o shortness of breath and chest pain       Peripheral/Neurovascular WDL    Peripheral Neurovascular WDL WDL       Cognitive/Neuro/Behavioral WDL    Cognitive/Neuro/Behavioral WDL WDL              "

## 2022-05-26 NOTE — DISCHARGE INSTRUCTIONS
Encourage rest and fluids and limit activity.  Tylenol or Motrin every 6 hours as needed for fever, aches or pains.  Ice or heat off-and-on to sore areas may help with pain.  1 Percocet every 6 hours instead only if needed for stronger pain.  Do not drive with this.  Have someone drive you home today.  Azithromycin (Z-Giorgio) as prescribed: 2 pills on day 1, then 1 pill a day until gone.  You must see your doctor or your clinic for follow-up in the next week.  See them sooner or return if worse or problems.

## 2022-05-26 NOTE — ED PROVIDER NOTES
EMERGENCY DEPARTMENT ENCOUNTER      NAME: Mariela Hawkins  AGE: 31 year old female  YOB: 1990  MRN: [id number]  EVALUATION DATE & TIME: 5/25/2022 10:10 PM    PCP: Kenzie Mccoy    ED PROVIDER: Cricket Toney M.D.      Chief Complaint   Patient presents with     Shortness of Breath     Chest Pain         FINAL IMPRESSION:  1.  Acute pneumonia.      ED COURSE & MEDICAL DECISION MAKING:    10:36 PM I met with the patient to gather history and to perform my initial exam. We discussed plans for the ED course, including diagnostic testing and treatment. PPE worn: cloth mask.  Patient with 2 weeks of cold symptoms which in the last week have progressed to feeling shortness of breath, productive cough of yellowish phlegm and sharp chest pain over the anterior chest reproducible with palpation, inspiration, and movement.  Patient notes vaccinations to influenza and COVID-19.  1:35 AM.  Results communicated to the patient.  CBC unremarkable.  D-dimer mildly elevated.  Troponin and BNP negative.  Chemistries negative.  hCG canceled because of patient hysterectomy.  Influenza and COVID testing were negative.  EKG unremarkable.  Chest CTA without evidence for PE performed because of elevated D-dimer.  Bilateral infiltrates are noted consistent with pneumonia.  Patient will be discharged to home.  We will start the patient on azithromycin and short-term pain medicine.  Patient in agreement with the plan.    Pertinent Labs & Imaging studies reviewed. (See chart for details)      31 year old female presents to the Emergency Department for evaluation of shortness of breath.    At the conclusion of the encounter I discussed the results of all of the tests and the disposition. The questions were answered. The patient or family acknowledged understanding and was agreeable with the care plan.          MEDICATIONS GIVEN IN THE EMERGENCY:  Medications - No data to display    NEW PRESCRIPTIONS STARTED AT TODAY'S ER  "VISIT  New Prescriptions    No medications on file          =================================================================    HPI    Patient information was obtained from: Patient    Use of : N/A       Mariela Hawkins is a 31 year old female with a pertinent history of morbid obesity who presents to this ED by walk in for evaluation of shortness of breath.    Patient reports that she has been experiencing \"cold symptoms\" for about 2 weeks, patient mentions nasal congestion and rhinorrhea. Patient says that her symptoms have worsened in the past week and notes that she has become very short of breath and has developed a sharp mid chest pain. Patient reports that moving, breathing, and palpating make her chest pain worse. Patient also complains of a productive cough and reports that she has coughed up primarily green/yellow phlegm but says that she also had some brown phlegm at one point. Patient endorses vomiting brought on by coughing. Patient denies nausea, any known sick contacts, or smoking tobacco products.    Patient is vaccinated against COVID and Influenza.     She does not identify any waxing or waning symptoms otherwise, exacerbating or alleviating features,associated symptoms except as mentioned. She denies any pain related complaints.    REVIEW OF SYSTEMS   Review of Systems   HENT: Positive for congestion and rhinorrhea.    Respiratory: Positive for cough (Productive) and shortness of breath.    Cardiovascular: Positive for chest pain.   Gastrointestinal: Positive for vomiting. Negative for nausea.   All other systems reviewed and are negative.       PAST MEDICAL HISTORY:  Past Medical History:   Diagnosis Date     Acanthosis nigricans      Anemia      Anxiety      BV (bacterial vaginosis)      Claustrophobia      History of anesthesia complications      Mental disorder      Migraines      Migraines      Missed ab      Morbid obesity (H)      Ovarian cystic mass      Pain in limb      " Pelvic pain in female      PONV (postoperative nausea and vomiting)        PAST SURGICAL HISTORY:  Past Surgical History:   Procedure Laterality Date      SECTION        SECTION      X2      SECTION N/A 2017    Procedure: REPEAT  SECTION;  Surgeon: Shayy Aviles MD;  Location: [facility];  Service:       SECTION, IMMEDIATE HYSTERECTOMY, COMBINED N/A 2019    Procedure: Repeat  Section, Hysterectomy, Exploratory Laparotomy, Bilateral Salpingectomy, Cystoscopy;  Surgeon: Joan Carmona MD;  Location:  OR     DILATION AND CURETTAGE N/A 2016    Procedure: DILATION AND CURETTAGE SUCTION;  Surgeon: Ant Parikh MD;  Location: [facility] OR;  Service:      EYE MUSCLE SURGERY Bilateral     strabismus     HERNIORRHAPHY VENTRAL N/A 2018    Procedure: REPAIR, HERNIA, VENTRAL, OPEN;  Surgeon: Alfredo Boland MD;  Location: [facility] OR;  Service:      RECESSION RESECTION (REPAIR STRABISMUS) BILATERAL Bilateral 2014    Procedure: RECESSION RESECTION (REPAIR STRABISMUS) BILATERAL;  Surgeon: Titi Awan MD;  Location: [facility]     RECESSION RESECTION (REPAIR STRABISMUS) BILATERAL Bilateral 2015    Procedure: RECESSION RESECTION (REPAIR STRABISMUS) BILATERAL;  Surgeon: Titi Awan MD;  Location: [facility]           CURRENT MEDICATIONS:    acetaminophen (TYLENOL) 325 MG tablet  ibuprofen (ADVIL/MOTRIN) 600 MG tablet  pantoprazole (PROTONIX) 40 MG EC tablet  sertraline (ZOLOFT) 100 MG tablet        ALLERGIES:  Allergies   Allergen Reactions     Amoxicillin Rash       FAMILY HISTORY:  Family History   Problem Relation Age of Onset     No Known Problems Mother      No Known Problems Father      No Known Problems Son      Cerebrovascular Disease Maternal Grandmother      Cancer Paternal Grandmother      No Known Problems Son      No Known Problems Son        SOCIAL HISTORY:   Social History  "    Socioeconomic History     Marital status:    Tobacco Use     Smoking status: Never Smoker     Smokeless tobacco: Never Used   Substance and Sexual Activity     Alcohol use: No     Drug use: No     Sexual activity: Yes     Partners: Male     Birth control/protection: None     Comment: engaged       VITALS:  BP (!) 173/97 (BP Location: Right arm, Patient Position: Semi-Ocampo's, Cuff Size: Adult Regular)   Pulse 101   Temp 98.9  F (37.2  C) (Oral)   Resp 21   Ht 1.676 m (5' 6\")   Wt (!) 150.6 kg (332 lb 1.6 oz)   LMP 04/12/2019   SpO2 97%   BMI 53.60 kg/m      PHYSICAL EXAM    Vital Signs:  BP (!) 173/97 (BP Location: Right arm, Patient Position: Semi-Ocampo's, Cuff Size: Adult Regular)   Pulse 101   Temp 98.9  F (37.2  C) (Oral)   Resp 21   Ht 1.676 m (5' 6\")   Wt (!) 150.6 kg (332 lb 1.6 oz)   LMP 04/12/2019   SpO2 97%   BMI 53.60 kg/m  S  General:  On entering the room she is in no apparent distress.  Morbid obesity is noted with BMI over 50.  Phlegm is noted to be yellowish-green.  Neck:  Neck supple with full range of motion and nontender.    Back:  Back and spine are nontender.  No costovertebral angle tenderness.    HEENT:  Oropharynx clear with moist mucous membranes.  HEENT unremarkable.    Pulmonary:  Chest clear to auscultation without rhonchi rales or wheezing.    Cardiovascular:  Cardiac regular rate and rhythm without murmurs rubs or gallops.  Earlier tachycardia on EKG, now 95 bpm.  Abdomen:  Abdomen soft nontender.  There is no rebound or guarding.    Muskuloskeletal:  she moves all 4 without any difficulty and has normal neurovascular exams.  Extremities without clubbing, cyanosis, or edema.  Legs and calves are nontender.    Neuro:  she is alert and oriented ×3 and moves all extremities symmetrically.    Psych:  Normal affect.    Skin:  Unremarkable and warm and dry.       LAB:  All pertinent labs reviewed and interpreted.  Labs Ordered and Resulted from Time of ED Arrival " to Time of ED Departure   D DIMER QUANTITATIVE - Abnormal       Result Value    D-Dimer Quantitative 0.69 (*)    INFLUENZA A/B & SARS-COV2 PCR MULTIPLEX - Normal    Influenza A PCR Negative      Influenza B PCR Negative      RSV PCR Negative      SARS CoV2 PCR Negative     BASIC METABOLIC PANEL - Normal    Sodium 140      Potassium 4.0      Chloride 104      Carbon Dioxide (CO2) 25      Anion Gap 11      Urea Nitrogen 8      Creatinine 0.65      Calcium 8.8      Glucose 105      GFR Estimate >90     TROPONIN I - Normal    Troponin I <0.01     B-TYPE NATRIURETIC PEPTIDE ([facility] ONLY) - Normal    BNP <10     CBC WITH PLATELETS AND DIFFERENTIAL    WBC Count 8.8      RBC Count 3.94      Hemoglobin 11.8      Hematocrit 36.1      MCV 92      MCH 29.9      MCHC 32.7      RDW 13.2      Platelet Count 195      % Neutrophils 74      % Lymphocytes 21      % Monocytes 5      % Eosinophils 0      % Basophils 0      % Immature Granulocytes 0      NRBCs per 100 WBC 0      Absolute Neutrophils 6.4      Absolute Lymphocytes 1.9      Absolute Monocytes 0.5      Absolute Eosinophils 0.0      Absolute Basophils 0.0      Absolute Immature Granulocytes 0.0      Absolute NRBCs 0.0         RADIOLOGY:  Reviewed all pertinent imaging. Please see official radiology report.  CT Chest Pulmonary Embolism w Contrast   Final Result   IMPRESSION:   1.  Bilateral lung infiltrates.      2.  No PE, dissection, or aneurysm.                 EKG:    No previous for comparison.  Normal sinus rhythm at 88, negative EKG.    I have independently reviewed and interpreted the EKG(s) documented above.    PROCEDURES:         I, Paul Edwards, am serving as a scribe to document services personally performed by Dr. Toney based on my observation and the provider's statements to me. I, Cricket Toney MD attest that Paul Edwards is acting in a scribe capacity, has observed my performance of the services and has documented them in accordance with my  direction.    Cricket Toney M.D.  Emergency Medicine  [facility] EMERGENCY ROOM  [facility] [facility] [id number] [phone]  Dept: [phone]      Cricket Toney MD  05/26/22 0136

## 2022-05-26 NOTE — ED NOTES
AVS reviewed with pt. Pt to follow up with PCP in the next week. Take medications as prescribed. No driving tonight or when taking percocet. All questions answered. Pt stated understanding. Pt left in stable condition.

## 2022-05-29 ENCOUNTER — HOSPITAL ENCOUNTER (EMERGENCY)
Facility: CLINIC | Age: 32
Discharge: HOME | End: 2022-05-30
Attending: EMERGENCY MEDICINE
Payer: COMMERCIAL

## 2022-05-29 DIAGNOSIS — J18.9 PNEUMONIA DUE TO INFECTIOUS ORGANISM, UNSPECIFIED LATERALITY, UNSPECIFIED PART OF LUNG: ICD-10-CM

## 2022-05-29 PROCEDURE — 99284 EMERGENCY DEPT VISIT MOD MDM: CPT | Mod: 25

## 2022-05-29 PROCEDURE — 94640 AIRWAY INHALATION TREATMENT: CPT

## 2022-05-29 PROCEDURE — 250N000009 HC RX 250: Performed by: EMERGENCY MEDICINE

## 2022-05-29 RX ORDER — IPRATROPIUM BROMIDE AND ALBUTEROL SULFATE 2.5; .5 MG/3ML; MG/3ML
3 SOLUTION RESPIRATORY (INHALATION) ONCE
Status: COMPLETED | OUTPATIENT
Start: 2022-05-29 | End: 2022-05-29

## 2022-05-29 RX ADMIN — IPRATROPIUM BROMIDE AND ALBUTEROL SULFATE 3 ML: 2.5; .5 SOLUTION RESPIRATORY (INHALATION) at 23:37

## 2022-05-29 ASSESSMENT — ENCOUNTER SYMPTOMS: COUGH: 1

## 2022-05-30 ENCOUNTER — APPOINTMENT (OUTPATIENT)
Dept: RADIOLOGY | Facility: CLINIC | Age: 32
End: 2022-05-30
Attending: EMERGENCY MEDICINE
Payer: COMMERCIAL

## 2022-05-30 VITALS
BODY MASS INDEX: 47.09 KG/M2 | SYSTOLIC BLOOD PRESSURE: 130 MMHG | HEIGHT: 66 IN | WEIGHT: 293 LBS | TEMPERATURE: 97.6 F | OXYGEN SATURATION: 96 % | RESPIRATION RATE: 18 BRPM | HEART RATE: 97 BPM | DIASTOLIC BLOOD PRESSURE: 85 MMHG

## 2022-05-30 PROCEDURE — 71046 X-RAY EXAM CHEST 2 VIEWS: CPT

## 2022-05-30 RX ORDER — ALBUTEROL SULFATE 90 UG/1
2 AEROSOL, METERED RESPIRATORY (INHALATION) EVERY 6 HOURS PRN
Qty: 18 G | Refills: 0 | Status: SHIPPED | OUTPATIENT
Start: 2022-05-30

## 2022-05-30 RX ORDER — CEFDINIR 300 MG/1
300 CAPSULE ORAL 2 TIMES DAILY
Qty: 14 CAPSULE | Refills: 0 | Status: SHIPPED | OUTPATIENT
Start: 2022-05-30 | End: 2022-06-06

## 2022-05-30 RX ORDER — AZITHROMYCIN 250 MG/1
TABLET, FILM COATED ORAL
Qty: 6 TABLET | Refills: 0 | Status: SHIPPED | OUTPATIENT
Start: 2022-05-30 | End: 2022-06-04

## 2022-05-30 RX ORDER — PREDNISONE 20 MG/1
TABLET ORAL
Qty: 10 TABLET | Refills: 0 | Status: SHIPPED | OUTPATIENT
Start: 2022-05-30

## 2022-05-30 NOTE — ED NOTES
Patient reports she was seen here in ER on 5/25/22. Diagnosed with pneumonia and discharged with antibiotic and pain medication. Does not feel any better. Continues to have coughing and chest congestion.

## 2022-05-30 NOTE — ED PROVIDER NOTES
EMERGENCY DEPARTMENT ENCOUNTER      NAME: Mariela Hawkins  AGE: 31 year old female  YOB: 1990  MRN: [id number]  EVALUATION DATE & TIME: 5/29/2022 11:22 PM    PCP: Kenzie Mccoy    ED PROVIDER: Brent Coy M.D.      Chief Complaint   Patient presents with     Cough     Pneumonia         FINAL IMPRESSION:  1. Pneumonia due to infectious organism, unspecified laterality, unspecified part of lung          ED COURSE & MEDICAL DECISION MAKING:    Pertinent Labs & Imaging studies reviewed. (See chart for details)  31 year old female presents to the Emergency Department for evaluation of cough shortness of breath and chest pain.  Seen here recently.  Diagnosed with a pneumonia.  Only took 3 days of azithromycin and apparently ran out.  Somewhat confusing as to why this is.  We will give her an extended course and also add Omnicef.  Chest x-ray is actually clear here.  Given a nebulizer treatment which did seem to help.  We will discharge her home with prednisone and albuterol in addition to antibiotics as above.  Oxygen saturations normal.  No signs of cardiac disease.  I think she safe for discharge home.  We will follow-up with primary    11:29 PM I met with the patient to gather history and to perform my initial exam. I discussed the plan for care while in the Emergency Department. PPE: Mask and eye protection.    At the conclusion of the encounter I discussed the results of all of the tests and the disposition. The questions were answered. The patient or family acknowledged understanding and was agreeable with the care plan.       0 minutes of critical care time     MEDICATIONS GIVEN IN THE EMERGENCY:  Medications   ipratropium - albuterol 0.5 mg/2.5 mg/3 mL (DUONEB) neb solution 3 mL (3 mLs Nebulization Given 5/29/22 2098)       NEW PRESCRIPTIONS STARTED AT TODAY'S ER VISIT  Discharge Medication List as of 5/30/2022 12:23 AM      START taking these medications    Details   albuterol (PROAIR  HFA/PROVENTIL HFA/VENTOLIN HFA) 108 (90 Base) MCG/ACT inhaler Inhale 2 puffs into the lungs every 6 hours as needed for shortness of breath / dyspnea or wheezing, Disp-18 g, R-0, Local PrintPharmacy may dispense brand covered by insurance (Proair, or proventil or ventolin or generic albuterol inhaler)      !! azithromycin (ZITHROMAX Z-JODY) 250 MG tablet Two tablets on the first day, then one tablet daily for the next 4 days, Disp-6 tablet, R-0, Local Print      cefdinir (OMNICEF) 300 MG capsule Take 1 capsule (300 mg) by mouth 2 times daily for 7 days, Disp-14 capsule, R-0, Local Print      predniSONE (DELTASONE) 20 MG tablet Take two tablets (= 40mg) each day for 5 (five) days, Disp-10 tablet, R-0, Local Print       !! - Potential duplicate medications found. Please discuss with provider.             =================================================================    HPI    Patient information was obtained from: Patient    Use of : N/A       Mariela Hawkins is a 31 year old female with a pertinent history of pneumonia who presents to this ED via walk in for evaluation of cough and pneumonia.    Per chart review, the patient was seen in the evening of 5/25/22 in this ED for a cough.  She was found to have an elevated D-dimer and chest CTA showed CTA without evidence for PE performed because of elevated D-dimer.  Bilateral infiltrates are noted consistent with pneumonia. She was discharged on five day course of Azithromycin.     The patient reports she finished her course of antibiotics today.  She reports continuing chest pain and cough.  She denies any history of asthma.  No other complaints at this time.     REVIEW OF SYSTEMS   Review of Systems   Respiratory: Positive for cough.    Cardiovascular: Positive for chest pain.   All other systems reviewed and are negative.     PAST MEDICAL HISTORY:  Past Medical History:   Diagnosis Date     Acanthosis nigricans      Anemia      Anxiety      BV  (bacterial vaginosis)      Claustrophobia      History of anesthesia complications      Mental disorder      Migraines      Migraines      Missed ab      Morbid obesity (H)      Ovarian cystic mass      Pain in limb      Pelvic pain in female      PONV (postoperative nausea and vomiting)        PAST SURGICAL HISTORY:  Past Surgical History:   Procedure Laterality Date      SECTION        SECTION      X2      SECTION N/A 2017    Procedure: REPEAT  SECTION;  Surgeon: Shayy Aviles MD;  Location: [facility] OR;  Service:       SECTION, IMMEDIATE HYSTERECTOMY, COMBINED N/A 2019    Procedure: Repeat  Section, Hysterectomy, Exploratory Laparotomy, Bilateral Salpingectomy, Cystoscopy;  Surgeon: Joan Carmona MD;  Location:  OR     DILATION AND CURETTAGE N/A 2016    Procedure: DILATION AND CURETTAGE SUCTION;  Surgeon: Ant Parikh MD;  Location: [facility];  Service:      EYE MUSCLE SURGERY Bilateral     strabismus     HERNIORRHAPHY VENTRAL N/A 2018    Procedure: REPAIR, HERNIA, VENTRAL, OPEN;  Surgeon: Alfredo Boland MD;  Location: [facility];  Service:      RECESSION RESECTION (REPAIR STRABISMUS) BILATERAL Bilateral 2014    Procedure: RECESSION RESECTION (REPAIR STRABISMUS) BILATERAL;  Surgeon: Titi Awan MD;  Location: [facility]     RECESSION RESECTION (REPAIR STRABISMUS) BILATERAL Bilateral 2015    Procedure: RECESSION RESECTION (REPAIR STRABISMUS) BILATERAL;  Surgeon: Ttii Awan MD;  Location: [facility]       CURRENT MEDICATIONS:    No current facility-administered medications for this encounter.     Current Outpatient Medications   Medication     albuterol (PROAIR HFA/PROVENTIL HFA/VENTOLIN HFA) 108 (90 Base) MCG/ACT inhaler     azithromycin (ZITHROMAX Z-JODY) 250 MG tablet     cefdinir (OMNICEF) 300 MG capsule     predniSONE (DELTASONE) 20 MG tablet     acetaminophen (TYLENOL)  "325 MG tablet     azithromycin (ZITHROMAX Z-JODY) 250 MG tablet     ibuprofen (ADVIL/MOTRIN) 600 MG tablet     pantoprazole (PROTONIX) 40 MG EC tablet     sertraline (ZOLOFT) 100 MG tablet         ALLERGIES:  Allergies   Allergen Reactions     Amoxicillin Rash       FAMILY HISTORY:  Family History   Problem Relation Age of Onset     No Known Problems Mother      No Known Problems Father      No Known Problems Son      Cerebrovascular Disease Maternal Grandmother      Cancer Paternal Grandmother      No Known Problems Son      No Known Problems Son        SOCIAL HISTORY:   Social History     Socioeconomic History     Marital status:    Tobacco Use     Smoking status: Never Smoker     Smokeless tobacco: Never Used   Substance and Sexual Activity     Alcohol use: No     Drug use: No     Sexual activity: Yes     Partners: Male     Birth control/protection: None     Comment: engaged       VITALS:  /85   Pulse 97   Temp 97.6  F (36.4  C) (Temporal)   Resp 18   Ht 1.676 m (5' 6\")   Wt 149.7 kg (330 lb)   LMP 04/12/2019   SpO2 96%   BMI 53.26 kg/m      PHYSICAL EXAM    Physical Exam  Constitutional:       General: She is not in acute distress.     Appearance: She is not diaphoretic.   HENT:      Head: Atraumatic.      Mouth/Throat:      Pharynx: No oropharyngeal exudate.   Eyes:      General: No scleral icterus.     Pupils: Pupils are equal, round, and reactive to light.   Cardiovascular:      Heart sounds: Normal heart sounds.   Pulmonary:      Effort: No respiratory distress.      Breath sounds: Normal breath sounds.   Abdominal:      Palpations: Abdomen is soft.      Tenderness: There is no abdominal tenderness. There is no guarding or rebound.   Musculoskeletal:         General: No tenderness.   Skin:     General: Skin is warm.      Findings: No rash.   Neurological:      General: No focal deficit present.      Mental Status: She is alert.           LAB:  All pertinent labs reviewed and " interpreted.  Labs Ordered and Resulted from Time of ED Arrival to Time of ED Departure - No data to display    RADIOLOGY:  Reviewed all pertinent imaging. Please see official radiology report.  Chest XR,  PA & LAT   Final Result   IMPRESSION: Negative chest.          I have independently reviewed and interpreted the EKG(s) documented above.    I, Aurelio Vilchis, am serving as a scribe to document services personally performed by Dr. Brent Coy, based on my observation and the provider's statements to me. I, Brent Coy MD attest that Aurelio Vilchis is acting in a scribe capacity, has observed my performance of the services and has documented them in accordance with my direction.    Brent Coy M.D.  Emergency Medicine  [facility] EMERGENCY ROOM  [address] [facility] [id number] [phone]  Dept: [phone]      Brent Coy MD  05/30/22 0500

## 2022-05-30 NOTE — ED TRIAGE NOTES
Pt arrives for cough and c/o of unresolved pneumonia. Pt states that she was dx with pneumonia on 5/26/2022 and finished the azithromycin today without relief.      Triage Assessment     Row Name 05/29/22 6283       Triage Assessment (Adult)    Airway WDL WDL       Respiratory WDL    Respiratory WDL cough    Cough Frequency frequent    Cough Type productive       Skin Circulation/Temperature WDL    Skin Circulation/Temperature WDL WDL       Cardiac WDL    Cardiac WDL WDL       Peripheral/Neurovascular WDL    Peripheral Neurovascular WDL WDL       Cognitive/Neuro/Behavioral WDL    Cognitive/Neuro/Behavioral WDL WDL

## 2022-06-05 ENCOUNTER — HOSPITAL ENCOUNTER (EMERGENCY)
Facility: CLINIC | Age: 32
Discharge: HOME | End: 2022-06-06
Attending: EMERGENCY MEDICINE | Admitting: EMERGENCY MEDICINE
Payer: COMMERCIAL

## 2022-06-05 DIAGNOSIS — K04.8 PERIAPICAL CYST: ICD-10-CM

## 2022-06-05 DIAGNOSIS — J01.90 ACUTE SINUSITIS WITH SYMPTOMS > 10 DAYS: ICD-10-CM

## 2022-06-05 PROCEDURE — 99284 EMERGENCY DEPT VISIT MOD MDM: CPT | Mod: 25

## 2022-06-06 ENCOUNTER — APPOINTMENT (OUTPATIENT)
Dept: CT IMAGING | Facility: CLINIC | Age: 32
End: 2022-06-06
Attending: EMERGENCY MEDICINE
Payer: COMMERCIAL

## 2022-06-06 ENCOUNTER — HOSPITAL ENCOUNTER (EMERGENCY)
Facility: CLINIC | Age: 32
Discharge: LEFT BEFORE DISCHARGE | End: 2022-06-06
Payer: COMMERCIAL

## 2022-06-06 VITALS
TEMPERATURE: 98.2 F | RESPIRATION RATE: 16 BRPM | BODY MASS INDEX: 47.09 KG/M2 | HEART RATE: 86 BPM | SYSTOLIC BLOOD PRESSURE: 130 MMHG | OXYGEN SATURATION: 99 % | WEIGHT: 293 LBS | HEIGHT: 66 IN | DIASTOLIC BLOOD PRESSURE: 87 MMHG

## 2022-06-06 VITALS
WEIGHT: 293 LBS | TEMPERATURE: 98.5 F | SYSTOLIC BLOOD PRESSURE: 161 MMHG | BODY MASS INDEX: 51.65 KG/M2 | RESPIRATION RATE: 20 BRPM | OXYGEN SATURATION: 94 % | HEART RATE: 125 BPM | DIASTOLIC BLOOD PRESSURE: 127 MMHG

## 2022-06-06 PROCEDURE — 70486 CT MAXILLOFACIAL W/O DYE: CPT

## 2022-06-06 PROCEDURE — 250N000013 HC RX MED GY IP 250 OP 250 PS 637: Performed by: EMERGENCY MEDICINE

## 2022-06-06 RX ORDER — FLUTICASONE PROPIONATE 50 MCG
1 SPRAY, SUSPENSION (ML) NASAL ONCE
Status: COMPLETED | OUTPATIENT
Start: 2022-06-06 | End: 2022-06-06

## 2022-06-06 RX ORDER — SULFAMETHOXAZOLE/TRIMETHOPRIM 800-160 MG
1 TABLET ORAL ONCE
Status: COMPLETED | OUTPATIENT
Start: 2022-06-06 | End: 2022-06-06

## 2022-06-06 RX ORDER — OXYCODONE AND ACETAMINOPHEN 5; 325 MG/1; MG/1
2 TABLET ORAL ONCE
Status: COMPLETED | OUTPATIENT
Start: 2022-06-06 | End: 2022-06-06

## 2022-06-06 RX ORDER — SULFAMETHOXAZOLE/TRIMETHOPRIM 800-160 MG
1 TABLET ORAL 2 TIMES DAILY
Qty: 20 TABLET | Refills: 0 | Status: SHIPPED | OUTPATIENT
Start: 2022-06-06 | End: 2022-06-16

## 2022-06-06 RX ORDER — OXYCODONE AND ACETAMINOPHEN 5; 325 MG/1; MG/1
1-2 TABLET ORAL EVERY 4 HOURS PRN
Qty: 12 TABLET | Refills: 0 | Status: SHIPPED | OUTPATIENT
Start: 2022-06-06 | End: 2022-06-09

## 2022-06-06 RX ADMIN — FLUTICASONE PROPIONATE 1 SPRAY: 50 SPRAY, METERED NASAL at 02:17

## 2022-06-06 RX ADMIN — OXYCODONE HYDROCHLORIDE AND ACETAMINOPHEN 2 TABLET: 5; 325 TABLET ORAL at 00:21

## 2022-06-06 RX ADMIN — SULFAMETHOXAZOLE AND TRIMETHOPRIM 1 TABLET: 800; 160 TABLET ORAL at 02:18

## 2022-06-06 NOTE — ED TRIAGE NOTES
Patient reports being sick x 3 weeks. Was on antibiotics for pneumonia, but right sided ear, jaw and teeth pain has remained      Triage Assessment     Row Name 06/05/22 0175       Triage Assessment (Adult)    Airway WDL WDL       Respiratory WDL    Respiratory WDL WDL       Skin Circulation/Temperature WDL    Skin Circulation/Temperature WDL WDL       Cardiac WDL    Cardiac WDL WDL       Peripheral/Neurovascular WDL    Peripheral Neurovascular WDL WDL       Cognitive/Neuro/Behavioral WDL    Cognitive/Neuro/Behavioral WDL WDL

## 2022-06-06 NOTE — ED PROVIDER NOTES
EMERGENCY DEPARTMENT ENCOUnter      NAME: Mariela Hawkins  AGE: 31 year old female  YOB: 1990  MRN: [id number]  EVALUATION DATE & TIME: 6/5/2022 11:47 PM    PCP: Kenzie Mccoy    ED PROVIDER: Jackie Trammell MD      Chief Complaint   Patient presents with     Otalgia         FINAL IMPRESSION:  1. Acute sinusitis with symptoms > 10 days    2. Periapical cyst          ED COURSE & MEDICAL DECISION MAKING:      In summary, the patient is a 31-year-old female that presents to the emergency department for evaluation of right sided facial pain thought secondary to sinusitis and possible periapical abscess.  We will treat with antibiotics, pain medicine and close follow-up with ENT dentist, and primary care.      12:10 AM I met with the patient, obtained history, performed an initial exam, and discussed options and plan for diagnostics and treatment here in the ED. Percocet 2 tablets p.o. was administered for pain  1:11 AM I spoke to Dr. Ramachandran ENT.  He recommended treatment with Bactrim DS, Flonase and decongestions.  They are happy to recheck the patient in clinic.  Bactrim double strength 1 tablet p.o. was administered for initiation of antibiotic therapy for treatment of her sinusitis.  1:55 AM I rechecked and updated patient on results. Patient's pain has improved but has not completely resolved.     At the conclusion of the encounter I discussed the results of all of the tests and the disposition. The questions were answered. The patient or family acknowledged understanding and was agreeable with the care plan.         MEDICATIONS GIVEN IN THE EMERGENCY:  Medications   oxyCODONE-acetaminophen (PERCOCET) 5-325 MG per tablet 2 tablet (2 tablets Oral Given 6/6/22 0021)   sulfamethoxazole-trimethoprim (BACTRIM DS) 800-160 MG per tablet 1 tablet (1 tablet Oral Given 6/6/22 0218)   fluticasone (FLONASE) 50 MCG/ACT spray 1 spray (1 spray Both Nostrils Given 6/6/22 0217)       NEW PRESCRIPTIONS STARTED AT  "TODAY'S ER VISIT  Discharge Medication List as of 6/6/2022  2:12 AM      START taking these medications    Details   oxyCODONE-acetaminophen (PERCOCET) 5-325 MG tablet Take 1-2 tablets by mouth every 4 hours as needed for moderate to severe pain, Disp-12 tablet, R-0, Local Print      sulfamethoxazole-trimethoprim (BACTRIM DS) 800-160 MG tablet Take 1 tablet by mouth 2 times daily for 10 days, Disp-20 tablet, R-0, Local Print                =================================================================    HPI  Mariela Hawkins is a 31 year old female with a pertinent history of migraines, morbid obesity, who presents to this ED via walk-in for evaluation of otalgia.    Patient reports right-sided facial pain that started three weeks ago. She states that pain starts around her right cheek and right-side of forehead and radiates to her right jaw and ear and teeth on right-side. She describes dental pain as if her \"teeth are going to fall out.\" Patient reports she was diagnosed with pneumonia three weeks ago and states that initially she believed symptoms were due to this and would clear with antibiotics. However, patient reports that she has been taking two antibiotics, including her second round of Z-pack, without relief of symptoms. Patient mentions she also has been taking Tylenol and Ibuprofen and used Sudafed and Neti pot without relief. She states her last dose of Ibuprofen was at 2000, of which she took 800 mg. Patient presents to the ED as her symptoms have been persisting. At present, she describes pain as 12/10. Otherwise, she denies any fever, recent sick contacts, and other medical complaints. She notes allergies to Amoxicillin. Patient does not smoke or drink. 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 No other complaints at this time.    Per chart review, patient was seen at [facility] on 5/29-5/30 for pneumonia due to infectious organism. Recently diagnosed with pneumonia. Only took 3 days of " azithromycin and apparently ran out. Patient given an extended course and also add Omnicef.  Chest x-ray is actually clear here.  Given a nebulizer treatment which did seem to help. Patient discharged home with prednisone and albuterol in addition to antibiotics.    Per chart review, patient was seen at [facility] on - for pneumonia of both lungs. CBC unremarkable.  D-dimer mildly elevated.  Troponin and BNP negative.  Chemistries negative.  hCG canceled because of patient hysterectomy.  Influenza and COVID testing were negative.  EKG unremarkable.  Chest CTA without evidence for PE performed because of elevated D-dimer.  Bilateral infiltrates are noted consistent with pneumonia.  Patient discharged to home.  Started the patient on azithromycin and short-term pain medicine.     REVIEW OF SYSTEMS     Constitutional:  Denies fever or chills  HENT:  Denies sore throat. Positive for right-sided facial pain (cheek, forehead, dental) and right ear pain.  Respiratory:  Denies cough or shortness of breath   Cardiovascular:  Denies chest pain or palpitations  GI:  Denies abdominal pain, nausea, or vomiting  Musculoskeletal:  Denies any new extremity pain   Skin:  Denies rash   Neurologic:  Denies headache, focal weakness or sensory changes    All other systems reviewed and are negative      PAST MEDICAL HISTORY:  Past Medical History:   Diagnosis Date     Acanthosis nigricans      Anemia      Anxiety      BV (bacterial vaginosis)      Claustrophobia      History of anesthesia complications      Mental disorder      Migraines      Migraines      Missed ab      Morbid obesity (H)      Ovarian cystic mass      Pain in limb      Pelvic pain in female      PONV (postoperative nausea and vomiting)        PAST SURGICAL HISTORY:  Past Surgical History:   Procedure Laterality Date      SECTION        SECTION      X2      SECTION N/A 2017    Procedure: REPEAT  SECTION;   Surgeon: Shayy Aviles MD;  Location: [facility] OR;  Service:       SECTION, IMMEDIATE HYSTERECTOMY, COMBINED N/A 2019    Procedure: Repeat  Section, Hysterectomy, Exploratory Laparotomy, Bilateral Salpingectomy, Cystoscopy;  Surgeon: Joan Carmona MD;  Location:  OR     DILATION AND CURETTAGE N/A 2016    Procedure: DILATION AND CURETTAGE SUCTION;  Surgeon: Ant Parikh MD;  Location: [facility] OR;  Service:      EYE MUSCLE SURGERY Bilateral     strabismus     HERNIORRHAPHY VENTRAL N/A 2018    Procedure: REPAIR, HERNIA, VENTRAL, OPEN;  Surgeon: Alfredo Boland MD;  Location: [facility] OR;  Service:      RECESSION RESECTION (REPAIR STRABISMUS) BILATERAL Bilateral 2014    Procedure: RECESSION RESECTION (REPAIR STRABISMUS) BILATERAL;  Surgeon: Titi Awan MD;  Location:  [facility]     RECESSION RESECTION (REPAIR STRABISMUS) BILATERAL Bilateral 2015    Procedure: RECESSION RESECTION (REPAIR STRABISMUS) BILATERAL;  Surgeon: Titi Awan MD;  Location: [facility]           CURRENT MEDICATIONS:    oxyCODONE-acetaminophen (PERCOCET) 5-325 MG tablet  sulfamethoxazole-trimethoprim (BACTRIM DS) 800-160 MG tablet  acetaminophen (TYLENOL) 325 MG tablet  albuterol (PROAIR HFA/PROVENTIL HFA/VENTOLIN HFA) 108 (90 Base) MCG/ACT inhaler  cefdinir (OMNICEF) 300 MG capsule  ibuprofen (ADVIL/MOTRIN) 600 MG tablet  pantoprazole (PROTONIX) 40 MG EC tablet  predniSONE (DELTASONE) 20 MG tablet  sertraline (ZOLOFT) 100 MG tablet        ALLERGIES:  Allergies   Allergen Reactions     Amoxicillin Rash       FAMILY HISTORY:  Family History   Problem Relation Age of Onset     No Known Problems Mother      No Known Problems Father      No Known Problems Son      Cerebrovascular Disease Maternal Grandmother      Cancer Paternal Grandmother      No Known Problems Son      No Known Problems Son        SOCIAL HISTORY:   Social History     Socioeconomic  "History     Marital status:      Spouse name: None     Number of children: None     Years of education: None     Highest education level: None   Tobacco Use     Smoking status: Never Smoker     Smokeless tobacco: Never Used   Substance and Sexual Activity     Alcohol use: No     Drug use: No     Sexual activity: Yes     Partners: Male     Birth control/protection: None     Comment: engaged       VITALS:  Patient Vitals for the past 24 hrs:   BP Temp Temp src Pulse Resp SpO2 Height Weight   06/06/22 0224 130/87 -- -- 86 16 99 % -- --   06/05/22 2344 (!) 129/90 98.2  F (36.8  C) Oral 88 16 98 % 1.676 m (5' 6\") 145.2 kg (320 lb)       PHYSICAL EXAM    Constitutional:  Well developed, obese,  HENT:  Normocephalic, Atraumatic, Bilateral external ears normal, Oropharynx moist, Nose normal.   Neck:  Normal range of motion, No meningismus, No stridor.   Eyes:  EOMI, Conjunctiva normal, No discharge.   Respiratory:  Normal breath sounds, No respiratory distress, No wheezing, No chest tenderness.   Cardiovascular:  Normal heart rate, Normal rhythm, No murmurs  GI:  Soft, No tenderness, No guarding,   Musculoskeletal:  Neurovascularly intact distally, No edema, No tenderness, No cyanosis, Good range of motion in all major joints.   Integument:  Warm, Dry, No erythema, No rash.   Lymphatic:  No lymphadenopathy noted.   Neurologic:  Alert & oriented x 3, Normal motor function, Normal sensory function, No focal deficits noted.   Psychiatric:  Affect normal, Judgment normal, Mood normal.      LAB:  All pertinent labs reviewed and interpreted.  Results for orders placed or performed during the hospital encounter of 06/05/22   CT Sinus w/o Contrast    Impression    IMPRESSION:  1.  There is nonspecific complete opacification of the right maxillary sinus. Of note, there is evidence of a 7 mm periapical cyst associated with tooth #30 which projects into the right maxillary sinus. This could potentially reflect odontogenic "   sinusitis.  2.  Scattered mucosal thickening in the left maxillary sinus and the anterior right ethmoid air cells without aggressive features.       RADIOLOGY:  I have independently reviewed and interpreted the above imaging, pending the final radiology read.  CT Sinus w/o Contrast   Final Result   IMPRESSION:   1.  There is nonspecific complete opacification of the right maxillary sinus. Of note, there is evidence of a 7 mm periapical cyst associated with tooth #30 which projects into the right maxillary sinus. This could potentially reflect odontogenic    sinusitis.   2.  Scattered mucosal thickening in the left maxillary sinus and the anterior right ethmoid air cells without aggressive features.                  I, Arti Romero, am serving as a scribe to document services personally performed by Dr. Trammell based on my observation and the provider's statements to me. I, Jackie Trammell MD attest that Arti Romero is acting in a scribe capacity, has observed my performance of the services and has documented them in accordance with my direction.    Jackie Trammell MD  Emergency Medicine  [facility] EMERGENCY ROOM  [address] [id number] [phone]  Dept: [phone]     Jackie Trammell MD  06/06/22 7238

## 2022-06-06 NOTE — DISCHARGE INSTRUCTIONS
Ibuprofen 600 mg every 6 hours as needed for pain  Flonase 1 spray per nostril twice daily for 10 days  Discontinue your current antibiotic  Follow-up with your dentist within the next couple days for further evaluation of tooth #30 and a periapical cyst noted to enter into your sinus.  Continue the decongestants like Sudafed and Afrin.  Return to the emergency department for worsening problems or concerns

## 2022-06-06 NOTE — ED TRIAGE NOTES
Pt arrives to ED with ongoing right sided facial pain that is now radiating into her jaw and teeth. Pt was seen early this morning for the same thing had a CT and given pain medications. Pt states pain medications are not working. Pt tearful and rocking back in forth in triage. Pt took two percocet's at 1230 and no relief.      Triage Assessment     Row Name 06/06/22 144       Triage Assessment (Adult)    Airway WDL WDL       Respiratory WDL    Respiratory WDL WDL       Skin Circulation/Temperature WDL    Skin Circulation/Temperature WDL WDL       Cardiac WDL    Cardiac WDL WDL       Peripheral/Neurovascular WDL    Peripheral Neurovascular WDL WDL       Cognitive/Neuro/Behavioral WDL    Cognitive/Neuro/Behavioral WDL WDL

## 2023-04-03 VITALS
OXYGEN SATURATION: 94 % | TEMPERATURE: 96.3 F | DIASTOLIC BLOOD PRESSURE: 84 MMHG | HEART RATE: 90 BPM | RESPIRATION RATE: 18 BRPM | SYSTOLIC BLOOD PRESSURE: 145 MMHG

## 2023-04-03 PROCEDURE — 99283 EMERGENCY DEPT VISIT LOW MDM: CPT

## 2023-04-04 ENCOUNTER — HOSPITAL ENCOUNTER (EMERGENCY)
Facility: CLINIC | Age: 33
Discharge: HOME | End: 2023-04-04
Attending: FAMILY MEDICINE | Admitting: FAMILY MEDICINE
Payer: COMMERCIAL

## 2023-04-04 DIAGNOSIS — N64.4 BREAST PAIN, LEFT: ICD-10-CM

## 2023-04-04 RX ORDER — HYDROCODONE BITARTRATE AND ACETAMINOPHEN 5; 325 MG/1; MG/1
1 TABLET ORAL EVERY 6 HOURS PRN
Qty: 6 TABLET | Refills: 0 | Status: SHIPPED | OUTPATIENT
Start: 2023-04-04 | End: 2023-04-04

## 2023-04-04 RX ORDER — HYDROCODONE BITARTRATE AND ACETAMINOPHEN 5; 325 MG/1; MG/1
1 TABLET ORAL EVERY 6 HOURS PRN
Qty: 6 TABLET | Refills: 0 | Status: SHIPPED | OUTPATIENT
Start: 2023-04-04

## 2023-04-04 ASSESSMENT — ACTIVITIES OF DAILY LIVING (ADL): ADLS_ACUITY_SCORE: 35

## 2023-04-04 NOTE — ED PROVIDER NOTES
EMERGENCY DEPARTMENT ENCOUNTER      NAME: Mariela Hawkins  AGE: 32 year old female  YOB: 1990  MRN: [id number]  EVALUATION DATE & TIME: 4/4/2023 12:37 AM    PCP: Kenzie Mccoy    ED PROVIDER: Vic Black M.D.    Chief Complaint   Patient presents with     Chest Wall Pain     Breast Pain     L pain          FINAL IMPRESSION:  1. Breast pain, left        ED COURSE & MEDICAL DECISION MAKING:    Pertinent Labs & Imaging studies independently interpreted by me. (See chart for details)  12:42 AM Patient seen and examined, review of prior records shows patient was seen in clinic on February 2 with the same pain, felt to be possibly consistent with shingles and was started on Valtrex, also was given Ultram 10 tablets at that time.  Subsequently followed up in urgent care on March 23 for this concern which she said at that time has been present for 1 day.  Denies fever chills, denies trauma.  Says she has been taking Tylenol and ibuprofen for this with minimal improvement.  On exam, tenderness of the upper outer quadrant of the left breast with no definite mass although there does seem to be a 1 cm freely movable mass just adjacent to the areola of the upper inner quadrant although this seems more consistent with fibrocystic change.  No overlying erythema or induration, no fluid collection to suggest cellulitis, abscess, peau d'orange.  Patient will be referred to breast care clinic, likely will need mammogram for advanced imaging with ultrasound or MRI.  Norco on discharge to help with pain.  At the conclusion of the encounter I discussed the results of all of the tests and the disposition. The questions were answered. The patient or family acknowledged understanding and was agreeable with the care plan.     Medical Decision Making    History:    Supplemental history from: Documented in chart, if applicable    External Record(s) reviewed: Documented in chart, if applicable.    Work Up:    Chart  documentation includes differential considered and any EKGs or imaging independently interpreted by provider, where specified.    In additional to work up documented, I considered the following work up: Documented in chart, if applicable.    External consultation:    Discussion of management with another provider: Documented in chart, if applicable    Complicating factors:    Care impacted by chronic illness: Mental Health    Care affected by social determinants of health: N/A    Disposition considerations: Discharge. I prescribed additional prescription strength medication(s) as charted. I considered admission, but discharged the patient after share decision making conversation.    MEDICATIONS GIVEN IN THE EMERGENCY:  Medications - No data to display    NEW PRESCRIPTIONS STARTED AT TODAY'S ER VISIT  New Prescriptions    HYDROCODONE-ACETAMINOPHEN (NORCO) 5-325 MG TABLET    Take 1 tablet by mouth every 6 hours as needed for severe pain       =================================================================    HPI    Patient information was obtained from: Patient      Mariela Hawkins is a 32 year old female with a pertinent history of hysterectomy who presents to this ED for evaluation of left breast pain.  This has been going on for several weeks.  She reports constant pain  Is worse when she presses on it feels like there is a mass.  She says it is a dull aching pain that is gotten progressively worse.  She has been seen a couple of times for this by her report.  She is taking Tylenol and ibuprofen for this.  Reports that she has not had any imaging for this.    REVIEW OF SYSTEMS   Review of Systems   All other systems reviewed and negative    PAST MEDICAL HISTORY:  Past Medical History:   Diagnosis Date     Acanthosis nigricans      Anemia      Anxiety      BV (bacterial vaginosis)      Claustrophobia      History of anesthesia complications      Mental disorder      Migraines      Migraines      Missed ab       Morbid obesity (H)      Ovarian cystic mass      Pain in limb      Pelvic pain in female      PONV (postoperative nausea and vomiting)        PAST SURGICAL HISTORY:  Past Surgical History:   Procedure Laterality Date      SECTION        SECTION      X2      SECTION N/A 2017    Procedure: REPEAT  SECTION;  Surgeon: Shayy Aviles MD;  Location: [facility] OR;  Service:       SECTION, IMMEDIATE HYSTERECTOMY, COMBINED N/A 2019    Procedure: Repeat  Section, Hysterectomy, Exploratory Laparotomy, Bilateral Salpingectomy, Cystoscopy;  Surgeon: Joan Carmona MD;  Location:  OR     DILATION AND CURETTAGE N/A 2016    Procedure: DILATION AND CURETTAGE SUCTION;  Surgeon: Ant Parikh MD;  Location: [facility];  Service:      EYE MUSCLE SURGERY Bilateral     strabismus     HERNIORRHAPHY VENTRAL N/A 2018    Procedure: REPAIR, HERNIA, VENTRAL, OPEN;  Surgeon: Alfredo Boland MD;  Location: [facility] OR;  Service:      RECESSION RESECTION (REPAIR STRABISMUS) BILATERAL Bilateral 2014    Procedure: RECESSION RESECTION (REPAIR STRABISMUS) BILATERAL;  Surgeon: Titi Awan MD;  Location: [facility]     RECESSION RESECTION (REPAIR STRABISMUS) BILATERAL Bilateral 2015    Procedure: RECESSION RESECTION (REPAIR STRABISMUS) BILATERAL;  Surgeon: Titi Awan MD;  Location: [facility]       CURRENT MEDICATIONS:    No current facility-administered medications for this encounter.     Current Outpatient Medications   Medication     HYDROcodone-acetaminophen (NORCO) 5-325 MG tablet     acetaminophen (TYLENOL) 325 MG tablet     albuterol (PROAIR HFA/PROVENTIL HFA/VENTOLIN HFA) 108 (90 Base) MCG/ACT inhaler     ibuprofen (ADVIL/MOTRIN) 600 MG tablet     pantoprazole (PROTONIX) 40 MG EC tablet     predniSONE (DELTASONE) 20 MG tablet     sertraline (ZOLOFT) 100 MG tablet       ALLERGIES:  Allergies   Allergen  Reactions     Amoxicillin Rash       FAMILY HISTORY:  Family History   Problem Relation Age of Onset     No Known Problems Mother      No Known Problems Father      No Known Problems Son      Cerebrovascular Disease Maternal Grandmother      Cancer Paternal Grandmother      No Known Problems Son      No Known Problems Son        SOCIAL HISTORY:   Social History     Socioeconomic History     Marital status:      Spouse name: None     Number of children: None     Years of education: None     Highest education level: None   Tobacco Use     Smoking status: Never     Smokeless tobacco: Never   Substance and Sexual Activity     Alcohol use: No     Drug use: No     Sexual activity: Yes     Partners: Male     Birth control/protection: None     Comment: engaged       VITALS:  BP (!) 145/84   Pulse 90   Temp (!) 96.3  F (35.7  C) (Temporal)   Resp 18   LMP 04/12/2019   SpO2 94%   Breastfeeding No     PHYSICAL EXAM:  Physical Exam  Vitals and nursing note reviewed.   Constitutional:       Appearance: Normal appearance.   HENT:      Head: Normocephalic and atraumatic.      Right Ear: External ear normal.      Left Ear: External ear normal.      Nose: Nose normal.      Mouth/Throat:      Mouth: Mucous membranes are moist.   Eyes:      Extraocular Movements: Extraocular movements intact.      Conjunctiva/sclera: Conjunctivae normal.      Pupils: Pupils are equal, round, and reactive to light.   Cardiovascular:      Rate and Rhythm: Normal rate and regular rhythm.   Pulmonary:      Effort: Pulmonary effort is normal.      Breath sounds: Normal breath sounds. No wheezing or rales.   Chest:      Comments: Tenderness of the left upper outer quadrant of the breast with no masses, induration, or abscess.  Abdominal:      General: Abdomen is flat. There is no distension.      Palpations: Abdomen is soft.      Tenderness: There is no abdominal tenderness. There is no guarding.   Musculoskeletal:         General: Normal range  of motion.      Cervical back: Normal range of motion and neck supple.      Right lower leg: No edema.      Left lower leg: No edema.   Lymphadenopathy:      Cervical: No cervical adenopathy.   Skin:     General: Skin is warm and dry.   Neurological:      General: No focal deficit present.      Mental Status: She is alert and oriented to person, place, and time. Mental status is at baseline.      Comments: No gross focal neurologic deficits   Psychiatric:         Mood and Affect: Mood normal.         Behavior: Behavior normal.         Thought Content: Thought content normal.     Vic Black M.D.  Emergency Medicine  [facility] EMERGENCY ROOM  [address] [address]  [phone]  Dept: [phone]     Vic Black MD  04/04/23 0059

## 2023-04-04 NOTE — DISCHARGE INSTRUCTIONS
The breast care clinic will call you in the morning to schedule appointment.  If you do not hear from them by early afternoon, call the phone number listed on the paperwork

## 2023-04-04 NOTE — ED TRIAGE NOTES
"Pt presents with L breast that radiates to L chest wall. Pt reports pain has been intermitted for last month. Pt reports she \"can fill a ball in breast tissue\". Pt denies fevers. ABCs intact.      Triage Assessment     Row Name 04/03/23 3218       Triage Assessment (Adult)    Airway WDL WDL       Respiratory WDL    Respiratory WDL WDL       Skin Circulation/Temperature WDL    Skin Circulation/Temperature WDL WDL       Cardiac WDL    Cardiac WDL WDL       Peripheral/Neurovascular WDL    Peripheral Neurovascular WDL WDL       Cognitive/Neuro/Behavioral WDL    Cognitive/Neuro/Behavioral WDL WDL              "

## 2023-04-22 ENCOUNTER — HEALTH MAINTENANCE LETTER (OUTPATIENT)
Age: 33
End: 2023-04-22

## 2023-04-27 ENCOUNTER — PATIENT OUTREACH (OUTPATIENT)
Dept: ONCOLOGY | Facility: CLINIC | Age: 33
End: 2023-04-27
Payer: COMMERCIAL

## 2023-04-27 NOTE — PROGRESS NOTES
New Patient Oncology Nurse Navigator Note     Referring provider: Vic Black MD     Referring Clinic/Organization: [facility] Emergency [facility]     Referred to (specialty:) Breast Provider Referral     Date Referral Received: April 27, 2023  Order was written on 4/4 but written in such a way that it fell into [facility] REFERRAL REQUESTS [[id number]] , an unmonitored work que.     Evaluation for:  Left breast pain      Clinical History (per Nurse review of records provided):     3/23/23 - Mariela presented to [facility] complaining of lump in left breast present for one day and aching at 9/10. Reported similar pain one month before at time of shingles flare up. No imaging ordered. Provider recommended warm packs and OTCs for pain. Patient was to contact PCP for US if the pain did not improve.     4/3/23 - Patient presented to [facility] ED on 4/3/23 and provider noted tenderness of the upper outer quadrant of the left breast with no definite mass although there does seem to be a 1 cm freely movable mass just adjacent to the areola of the upper inner quadrant although this seems more consistent with fibrocystic change.  No overlying erythema or induration, no fluid collection to suggest cellulitis, abscess, peau d'orange.     No breast imaging appears on chart. At age 32 she has likely not started mammography yet.     Records Location: Care Everywhere, Media and See Bookmarked material     Patient resides in [place], [place].    5/3 9:08 - Telephoned and left voice message for patient requesting call back.    5/4 10:01 - Telephoned and left voice message for patient requesting call back.     5/9 - Writer received referral, reviewed for appropriate plan, and transferred to New Patient Scheduling for completion.

## 2023-05-01 ENCOUNTER — TRANSCRIBE ORDERS (OUTPATIENT)
Dept: OTHER | Age: 33
End: 2023-05-01

## 2023-05-01 DIAGNOSIS — N64.4 BREAST PAIN, LEFT: Primary | ICD-10-CM

## 2023-09-26 NOTE — DISCHARGE INSTRUCTIONS
Postop  Birth Instructions    Activity       Do not lift more than 10 pounds for 6 weeks after surgery.  Ask family and friends for help when you need it.    No driving until you have stopped taking your pain medications (usually two weeks after surgery).    No heavy exercise or activity for 6 weeks.  Don't do anything that will put a strain on your surgery site.    Don't strain when using the toilet.  Your care team may prescribe a stool softener if you have problems with your bowel movements.     To care for your incision:       Keep the incision clean and dry.    Do not soak your incision in water. No swimming or hot tubs until it has fully healed. You may soak in the bathtub if the water level is below your incision.    Do not use peroxide, gel, cream, lotion, or ointment on your incision.    Adjust your clothes to avoid pressure on your surgery site (check the elastic in your underwear for example).     You may see a small amount of clear or pink drainage and this is normal.  Check with your health care provider:       If the drainage increases or has an odor.    If the incision reddens, you have swelling, or develop a rash.    If you have increased pain and the medicine we prescribed doesn't help.    If you have a fever above 100.4 F (38 C) with or without chills when placing thermometer under your tongue.   The area around your incision (surgery wound), will feel numb.  This is normal. The numbness should go away in less than a year.     Keep your hands clean:  Always wash your hands before touching your incision (surgery wound). This helps reduce your risk of infection. If your hands aren't dirty, you may use an alcohol hand-rub to clean your hands. Keep your nails clean and short.    Call your healthcare provider if you have any of these symptoms:       You soak a sanitary pad with blood within 1 hour, or you see blood clots larger than a golf ball.    Bleeding that lasts more than 6  weeks.    Vaginal discharge that smells bad.    Severe pain, cramping or tenderness in your lower belly area.    A need to urinate more frequently (use the toilet more often), more urgently (use the toilet very quickly), or it burns when you urinate.    Nausea and vomiting.    Redness, swelling or pain around a vein in your leg.    Problems breastfeeding or a red or painful area on your breast.    Chest pain and cough or are gasping for air.    Problems with coping with sadness, anxiety or depression. If you have concerns about hurting yourself or the baby, call your provider immediately.      You have questions or concerns after you return home.                 No Repair - Repaired With Adjacent Surgical Defect Text (Leave Blank If You Do Not Want): After the excision the defect was repaired concurrently with another surgical defect which was in close approximation.

## 2024-02-27 NOTE — PROVIDER NOTIFICATION
11/30/19 0741   Provider Notification   Provider Name/Title Jacobs, Myhre   Method of Notification In Department   Request Evaluate - Remote   Notification Reason Status Update   Providers notified of IV removal due to infiltration following 400ml infusion. FHR returned to normal baseline 145-150 category 1. IV replacement assistance requested from RN flyer. Will proceed with ongoing assessment.    Edgardo

## 2024-06-29 ENCOUNTER — HEALTH MAINTENANCE LETTER (OUTPATIENT)
Age: 34
End: 2024-06-29

## 2024-10-30 ASSESSMENT — SLEEP AND FATIGUE QUESTIONNAIRES
HOW LIKELY ARE YOU TO NOD OFF OR FALL ASLEEP WHEN YOU ARE A PASSENGER IN A CAR FOR AN HOUR WITHOUT A BREAK: WOULD NEVER DOZE
HOW LIKELY ARE YOU TO NOD OFF OR FALL ASLEEP WHILE SITTING INACTIVE IN A PUBLIC PLACE: WOULD NEVER DOZE
HOW LIKELY ARE YOU TO NOD OFF OR FALL ASLEEP WHILE SITTING AND READING: SLIGHT CHANCE OF DOZING
HOW LIKELY ARE YOU TO NOD OFF OR FALL ASLEEP IN A CAR, WHILE STOPPED FOR A FEW MINUTES IN TRAFFIC: WOULD NEVER DOZE
HOW LIKELY ARE YOU TO NOD OFF OR FALL ASLEEP WHILE LYING DOWN TO REST IN THE AFTERNOON WHEN CIRCUMSTANCES PERMIT: MODERATE CHANCE OF DOZING
HOW LIKELY ARE YOU TO NOD OFF OR FALL ASLEEP WHILE WATCHING TV: SLIGHT CHANCE OF DOZING
HOW LIKELY ARE YOU TO NOD OFF OR FALL ASLEEP WHILE SITTING AND TALKING TO SOMEONE: WOULD NEVER DOZE
HOW LIKELY ARE YOU TO NOD OFF OR FALL ASLEEP WHILE SITTING QUIETLY AFTER LUNCH WITHOUT ALCOHOL: WOULD NEVER DOZE

## 2024-10-30 NOTE — PROGRESS NOTES
"New Bariatric Surgery Consultation Note    2024    RE: Mariela Hawkins  MR#: [id number]  : 1990      Referring provider:       Chief Complaint/Reason for visit: evaluation for possible weight loss surgery    Dear Kenzie Mccoy MD (General),    I had the pleasure of seeing your patient, Mariela Hawkins, to evaluate her obesity and consider her for possible weight loss surgery. As you know, Mariela Hawkins is 34 year old.  She has a height of 5' 6\", a weight of 333 lbs 8 oz, and calculated Body mass index is 53.83 kg/m .    She is not sure that she wants surgery.    HISTORY OF PRESENT ILLNESS:      10/30/2024     1:03 AM   Weight Loss History Reviewed with Patient   How long have you been overweight? Since early childhood   What is the most that you have ever weighed 330   What is the most weight you have lost? 50   I have tried the following methods to lose weight Watching portions or calories    Exercise    Prescription Medications   I have tried the following weight loss medications? (Check all that apply) Phentermine/Adipex-p/Suprenza     Patient did well on phentermine and intensive exercise in the past. She then had a pregnancy and stopped exercising and gained the weight back.     CO-MORBIDITIES OF OBESITY INCLUDE:      10/30/2024     1:03 AM   --   I have the following health issues associated with obesity None of the above       PAST MEDICAL HISTORY:  Past Medical History:   Diagnosis Date    Acanthosis nigricans     Anemia     Anxiety     BV (bacterial vaginosis)     Claustrophobia     History of anesthesia complications     Mental disorder     Migraines     Migraines     Missed ab     Morbid obesity (H)     Ovarian cystic mass     Pain in limb     Pelvic pain in female     PONV (postoperative nausea and vomiting)          PAST SURGICAL HISTORY:  Past Surgical History:   Procedure Laterality Date     SECTION       SECTION      X2     SECTION N/A 2017    " Procedure: REPEAT  SECTION;  Surgeon: Shayy Aviles MD;  Location: [facility] L[facility] OR;  Service:      SECTION, IMMEDIATE HYSTERECTOMY, COMBINED N/A 2019    Procedure: Repeat  Section, Hysterectomy, Exploratory Laparotomy, Bilateral Salpingectomy, Cystoscopy;  Surgeon: Joan Carmona MD;  Location:  OR    DILATION AND CURETTAGE N/A 2016    Procedure: DILATION AND CURETTAGE SUCTION;  Surgeon: Ant Parikh MD;  Location: [facility] OR;  Service:     EYE MUSCLE SURGERY Bilateral     strabismus    HERNIORRHAPHY VENTRAL N/A 2018    Procedure: REPAIR, HERNIA, VENTRAL, OPEN;  Surgeon: Alfredo Boland MD;  Location: [facility] OR;  Service:     RECESSION RESECTION (REPAIR STRABISMUS) BILATERAL Bilateral 2014    Procedure: RECESSION RESECTION (REPAIR STRABISMUS) BILATERAL;  Surgeon: Titi Awan MD;  Location:  [facility]    RECESSION RESECTION (REPAIR STRABISMUS) BILATERAL Bilateral 2015    Procedure: RECESSION RESECTION (REPAIR STRABISMUS) BILATERAL;  Surgeon: Titi Awan MD;  Location: [facility]       FAMILY HISTORY:   Family History   Problem Relation Age of Onset    No Known Problems Mother     No Known Problems Father     No Known Problems Son     Cerebrovascular Disease Maternal Grandmother     Cancer Paternal Grandmother     No Known Problems Son     No Known Problems Son        SOCIAL HISTORY:       10/30/2024     1:03 AM   Social History Questions Reviewed With Patient   Which best describes your employment status (select all that apply) 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 My    Who can you count on for support throughout your weight loss surgery journey? Yes       HABITS:      10/30/2024     1:03 AM   --   How often do you drink alcohol? Never   Do  you currently use any of the following Nicotine products? No   Have you ever used any of the following nicotine products? E-cigarettes   If you previously used any of these products, what year did you quit? June 2024   Have you or are you currently using street drugs or prescription strength medication for which you do not have a prescription for? No   Do you have a history of chemical dependency (alcohol or drug abuse)? No       PSYCHOLOGICAL HISTORY:       10/30/2024     1:03 AM   Psychological History Reviewed With Patient   Have you ever attempted suicide? Never.   Have you had thoughts of suicide in the past year? No   Have you ever been hospitalized for mental illness or a suicide attempt? Never.   Do you have a history of chronic pain? No   Have you ever been diagnosed with fibromyalgia? No   Are you currently being treated for any of the following? (select all that apply) I do not have a mental illness     She has struggled with anxiety and depression in the past but is doing well currently without medication.    ROS:      10/30/2024     1:03 AM   --   Skin Skin fold rashes (groin or other folds)   HEENT Headaches   Musculoskeletal Back pain   Cardiovascular None of the above   Pulmonary Experience morning headaches   Gastrointestinal None of the above   Genitourinary Stress incontinence (losing urine when coughing, sneezing, etc.)   Hematological None of the above   Neurological None of the above   Female only None of the above     EW: 4  SB: 4    EATING BEHAVIORS:      10/30/2024     1:03 AM   --   Have you or anyone else thought that you had an eating disorder? No   Do you currently binge eat (eat a large amount of food in a short time)? No   Are you an emotional eater? No   Do you get up to eat after falling asleep? No   Can you afford 3 meals a day? Yes   Can you afford 50-60 dollars a month for vitamins? Yes     Meals:  B: often skips or eggs with toast and coffee (black)  L: sometimes skips and eats  "with the kids at 3:00- vegetables and fruit   D: protein and starch and black beans, sometimes chili  Evenings: fruit     Water: sparkling 2-3 cans per day, a couple bottles of plain water  Sugared beverages: none    EXERCISE:      10/30/2024     1:03 AM   --   How often do you exercise? Never   What keeps you from being more active? I am as active as I can possbily be    I should be more active but I just have not gotten around to it    Too tired     She has exercised in the past. She is out of the habit. 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     MEDICATIONS:  No current outpatient medications on file.       ALLERGIES:  Allergies   Allergen Reactions    Amoxicillin Rash     ROS  General  Fatigue: yes  HEENT  Hx of glaucoma: no  Cardiovascular  Hx of heart disease: no  Palpitations: no  Pulmonary  Shortness of breath at rest: no  Shortness of breath with exertion: no  Stop-bang score: 4  Axton Score: 4  Gastrointestinal  Pancreatitis: no  Psychiatric  Moods Stable: yes  Endocrine  Polydipsia: no  No personal or family history of medullary thyroid cancer: no  No personal or family history of MEN2   Neurologic  Hx of seizures: no  Migraine headaches: history of    Birth control: hysterectomy  Kidney stones: not sure     LABS/IMAGING/MEDICAL RECORDS REVIEW: no recent    PHYSICAL EXAM:  /74   Ht 1.676 m (5' 6\")   Wt (!) 151.3 kg (333 lb 8 oz)   LMP 04/12/2019   BMI 53.83 kg/m    Wt Readings from Last 4 Encounters:   10/31/24 (!) 151.3 kg (333 lb 8 oz)   06/05/22 145.2 kg (320 lb)   05/29/22 149.7 kg (330 lb)   05/25/22 (!) 150.6 kg (332 lb 1.6 oz)      BP Readings from Last 3 Encounters:   10/31/24 128/74   04/03/23 (!) 145/84   06/06/22 130/87      GEN: Alert and oriented in no acute distress.   HEENT: mucous membranes moist  NECK: supple with LAD or thyromegaly  LUNGS: CTA without wheezes or crackles, good air movement throughout  CV: RRR no MRG  ABDOMEN: moderate protuberance  SKIN: erythema " under pannus, no skin tags, positive acanthosis nigricans     ASSESSMENT/PLAN:  1. Morbid obesity (H) (Primary)  She is not sure she wants bariatric surgery. She would be a candidate for SG or RNY. Task list completed. She wants to work on nonsurgical weight loss in the meantime. We will hold off on psychologist for now. Labs will be drawn today. We discussed healthy habits to assist with weight loss. She is going to check into getting a membership at the [employer].  We discussed medication that may assist with weight loss. Wegovy was prescribed. Risks/ benefits and possible side effects were discussed and questions were answered. Written information was given.  If she does not tolerate it she wants to try phentermine again.  - CBC with platelets; Future  - Comprehensive metabolic panel; Future  - Ferritin; Future  - Folate; Future  - Hemoglobin A1c; Future  - Parathyroid Hormone Intact; Future  - TSH; Future  - Vitamin A; Future  - Vitamin B1 whole blood; Future  - Vitamin B12; Future  - Vitamin D Deficiency; Future  - Zinc; Future  - nystatin (MYCOSTATIN) 092770 UNIT/GM external ointment; Use 2 x daily prn  Dispense: 15 g; Refill: 1  - Semaglutide-Weight Management (WEGOVY) 0.25 MG/0.5ML pen; Inject 0.25 mg subcutaneously once a week for 28 days.  Dispense: 2 mL; Refill: 0  - Semaglutide-Weight Management (WEGOVY) 0.5 MG/0.5ML pen; Inject 0.5 mg subcutaneously once a week for 28 days.  Dispense: 2 mL; Refill: 0  - Semaglutide-Weight Management (WEGOVY) 1 MG/0.5ML pen; Inject 1 mg subcutaneously once a week.  Dispense: 2 mL; Refill: 0     Sincerely,     KATE Lowery MD    Total time spent on the date of this encounter doing: chart review, review of test results, patient visit, physical exam, education, counseling, developing plan of care and documenting = 61 minutes.

## 2024-10-31 ENCOUNTER — OFFICE VISIT (OUTPATIENT)
Dept: SURGERY | Facility: CLINIC | Age: 34
End: 2024-10-31
Payer: COMMERCIAL

## 2024-10-31 ENCOUNTER — TELEPHONE (OUTPATIENT)
Dept: SURGERY | Facility: CLINIC | Age: 34
End: 2024-10-31

## 2024-10-31 ENCOUNTER — LAB (OUTPATIENT)
Dept: LAB | Facility: CLINIC | Age: 34
End: 2024-10-31
Payer: COMMERCIAL

## 2024-10-31 VITALS
WEIGHT: 293 LBS | SYSTOLIC BLOOD PRESSURE: 128 MMHG | HEIGHT: 66 IN | BODY MASS INDEX: 47.09 KG/M2 | DIASTOLIC BLOOD PRESSURE: 74 MMHG

## 2024-10-31 DIAGNOSIS — E66.01 MORBID OBESITY (H): Primary | ICD-10-CM

## 2024-10-31 DIAGNOSIS — E66.01 MORBID OBESITY (H): ICD-10-CM

## 2024-10-31 LAB
ERYTHROCYTE [DISTWIDTH] IN BLOOD BY AUTOMATED COUNT: 12.7 % (ref 10–15)
EST. AVERAGE GLUCOSE BLD GHB EST-MCNC: 117 MG/DL
FOLATE SERPL-MCNC: 10.1 NG/ML (ref 4.6–34.8)
HBA1C MFR BLD: 5.7 % (ref 0–5.6)
HCT VFR BLD AUTO: 37.8 % (ref 35–47)
HGB BLD-MCNC: 12.6 G/DL (ref 11.7–15.7)
MCH RBC QN AUTO: 30.9 PG (ref 26.5–33)
MCHC RBC AUTO-ENTMCNC: 33.3 G/DL (ref 31.5–36.5)
MCV RBC AUTO: 93 FL (ref 78–100)
PLATELET # BLD AUTO: 196 10E3/UL (ref 150–450)
RBC # BLD AUTO: 4.08 10E6/UL (ref 3.8–5.2)
WBC # BLD AUTO: 7.5 10E3/UL (ref 4–11)

## 2024-10-31 PROCEDURE — 84630 ASSAY OF ZINC: CPT | Mod: 90

## 2024-10-31 PROCEDURE — 85027 COMPLETE CBC AUTOMATED: CPT

## 2024-10-31 PROCEDURE — 99000 SPECIMEN HANDLING OFFICE-LAB: CPT

## 2024-10-31 PROCEDURE — 82728 ASSAY OF FERRITIN: CPT

## 2024-10-31 PROCEDURE — 82306 VITAMIN D 25 HYDROXY: CPT

## 2024-10-31 PROCEDURE — 84425 ASSAY OF VITAMIN B-1: CPT | Mod: 90

## 2024-10-31 PROCEDURE — 82746 ASSAY OF FOLIC ACID SERUM: CPT

## 2024-10-31 PROCEDURE — 99205 OFFICE O/P NEW HI 60 MIN: CPT | Performed by: FAMILY MEDICINE

## 2024-10-31 PROCEDURE — 84590 ASSAY OF VITAMIN A: CPT | Mod: 90

## 2024-10-31 PROCEDURE — 83036 HEMOGLOBIN GLYCOSYLATED A1C: CPT

## 2024-10-31 PROCEDURE — 84443 ASSAY THYROID STIM HORMONE: CPT

## 2024-10-31 PROCEDURE — 83970 ASSAY OF PARATHORMONE: CPT

## 2024-10-31 PROCEDURE — 80053 COMPREHEN METABOLIC PANEL: CPT

## 2024-10-31 PROCEDURE — 82607 VITAMIN B-12: CPT

## 2024-10-31 PROCEDURE — 36415 COLL VENOUS BLD VENIPUNCTURE: CPT

## 2024-10-31 RX ORDER — SEMAGLUTIDE 0.25 MG/.5ML
0.25 INJECTION, SOLUTION SUBCUTANEOUS WEEKLY
Qty: 2 ML | Refills: 0 | Status: SHIPPED | OUTPATIENT
Start: 2024-10-31 | End: 2024-11-28

## 2024-10-31 RX ORDER — SEMAGLUTIDE 1 MG/.5ML
1 INJECTION, SOLUTION SUBCUTANEOUS WEEKLY
Qty: 2 ML | Refills: 0 | Status: SHIPPED | OUTPATIENT
Start: 2024-10-31

## 2024-10-31 RX ORDER — NYSTATIN 100000 U/G
OINTMENT TOPICAL
Qty: 15 G | Refills: 1 | Status: SHIPPED | OUTPATIENT
Start: 2024-10-31

## 2024-10-31 RX ORDER — SEMAGLUTIDE 0.5 MG/.5ML
0.5 INJECTION, SOLUTION SUBCUTANEOUS WEEKLY
Qty: 2 ML | Refills: 0 | Status: SHIPPED | OUTPATIENT
Start: 2024-10-31 | End: 2024-11-28

## 2024-10-31 NOTE — LETTER
"10/31/2024      Mariela Hawkins  [address] [address]      Dear Colleague,    Thank you for referring your patient, Mariela Hawkins, to the [facility] [place]. Please see a copy of my visit note below.    New Bariatric Surgery Consultation Note    2024    RE: Mariela Hawkins  MR#: [id number]  : 1990      Referring provider:       Chief Complaint/Reason for visit: evaluation for possible weight loss surgery    Dear Kenzie Mccoy MD (General),    I had the pleasure of seeing your patient, Mariela Hawkins, to evaluate her obesity and consider her for possible weight loss surgery. As you know, Mariela Hawkins is 34 year old.  She has a height of 5' 6\", a weight of 333 lbs 8 oz, and calculated Body mass index is 53.83 kg/m .    She is not sure that she wants surgery.    HISTORY OF PRESENT ILLNESS:      10/30/2024     1:03 AM   Weight Loss History Reviewed with Patient   How long have you been overweight? Since early childhood   What is the most that you have ever weighed 330   What is the most weight you have lost? 50   I have tried the following methods to lose weight Watching portions or calories    Exercise    Prescription Medications   I have tried the following weight loss medications? (Check all that apply) Phentermine/Adipex-p/Suprenza     Patient did well on phentermine and intensive exercise in the past. She then had a pregnancy and stopped exercising and gained the weight back.     CO-MORBIDITIES OF OBESITY INCLUDE:      10/30/2024     1:03 AM   --   I have the following health issues associated with obesity None of the above       PAST MEDICAL HISTORY:  Past Medical History:   Diagnosis Date     Acanthosis nigricans      Anemia      Anxiety      BV (bacterial vaginosis)      Claustrophobia      History of anesthesia complications      Mental disorder      Migraines      Migraines      Missed ab      Morbid obesity (H)      " Ovarian cystic mass      Pain in limb      Pelvic pain in female      PONV (postoperative nausea and vomiting)          PAST SURGICAL HISTORY:  Past Surgical History:   Procedure Laterality Date      SECTION        SECTION      X2      SECTION N/A 2017    Procedure: REPEAT  SECTION;  Surgeon: Shayy Aviles MD;  Location: [facility] OR;  Service:       SECTION, IMMEDIATE HYSTERECTOMY, COMBINED N/A 2019    Procedure: Repeat  Section, Hysterectomy, Exploratory Laparotomy, Bilateral Salpingectomy, Cystoscopy;  Surgeon: Joan Carmona MD;  Location:  OR     DILATION AND CURETTAGE N/A 2016    Procedure: DILATION AND CURETTAGE SUCTION;  Surgeon: Ant Parikh MD;  Location: [facility] OR;  Service:      EYE MUSCLE SURGERY Bilateral     strabismus     HERNIORRHAPHY VENTRAL N/A 2018    Procedure: REPAIR, HERNIA, VENTRAL, OPEN;  Surgeon: Alfredo Boland MD;  Location: [facility] OR;  Service:      RECESSION RESECTION (REPAIR STRABISMUS) BILATERAL Bilateral 2014    Procedure: RECESSION RESECTION (REPAIR STRABISMUS) BILATERAL;  Surgeon: Titi Awan MD;  Location: [facility]     RECESSION RESECTION (REPAIR STRABISMUS) BILATERAL Bilateral 2015    Procedure: RECESSION RESECTION (REPAIR STRABISMUS) BILATERAL;  Surgeon: Titi Awan MD;  Location: 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 My    Who can you count on for support throughout your weight loss surgery journey? Yes       HABITS:      10/30/2024     1:03 AM   --   How often do you drink alcohol? Never   Do you currently use any of the following Nicotine products? No   Have you ever used any of the following nicotine products? E-cigarettes   If you previously used any of these products, what year did you quit? June 2024   Have you or are you currently using street drugs or prescription strength medication for which you do not have a prescription for? No   Do you have a history of chemical dependency (alcohol or drug abuse)? No       PSYCHOLOGICAL HISTORY:       10/30/2024     1:03 AM   Psychological History Reviewed With Patient   Have you ever attempted suicide? Never.   Have you had thoughts of suicide in the past year? No   Have you ever been hospitalized for mental illness or a suicide attempt? Never.   Do you have a history of chronic pain? No   Have you ever been diagnosed with fibromyalgia? No   Are you currently being treated for any of the following? (select all that apply) I do not have a mental illness     She has struggled with anxiety and depression in the past but is doing well currently without medication.    ROS:      10/30/2024     1:03 AM   --   Skin Skin fold rashes (groin or other folds)   HEENT Headaches   Musculoskeletal Back pain   Cardiovascular None of the above   Pulmonary Experience morning headaches   Gastrointestinal None of the above   Genitourinary Stress incontinence (losing urine when coughing, sneezing, etc.)   Hematological None of the above   Neurological None of the above   Female only None of the above     EW: 4  SB: 4    EATING BEHAVIORS:      10/30/2024     1:03 AM   --   Have you or anyone else thought that you had an eating disorder? No   Do you currently binge eat (eat a large  "amount of food in a short time)? No   Are you an emotional eater? No   Do you get up to eat after falling asleep? No   Can you afford 3 meals a day? Yes   Can you afford 50-60 dollars a month for vitamins? Yes     Meals:  B: often skips or eggs with toast and coffee (black)  L: sometimes skips and eats with the kids at 3:00- vegetables and fruit   D: protein and starch and black beans, sometimes chili  Evenings: fruit     Water: sparkling 2-3 cans per day, a couple bottles of plain water  Sugared beverages: none    EXERCISE:      10/30/2024     1:03 AM   --   How often do you exercise? Never   What keeps you from being more active? I am as active as I can possbily be    I should be more active but I just have not gotten around to it    Too tired     She has exercised in the past. She is out of the habit. 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     MEDICATIONS:  No current outpatient medications on file.       ALLERGIES:  Allergies   Allergen Reactions     Amoxicillin Rash     ROS  General  Fatigue: yes  HEENT  Hx of glaucoma: no  Cardiovascular  Hx of heart disease: no  Palpitations: no  Pulmonary  Shortness of breath at rest: no  Shortness of breath with exertion: no  Stop-bang score: 4  Leonard Score: 4  Gastrointestinal  Pancreatitis: no  Psychiatric  Moods Stable: yes  Endocrine  Polydipsia: no  No personal or family history of medullary thyroid cancer: no  No personal or family history of MEN2   Neurologic  Hx of seizures: no  Migraine headaches: history of    Birth control: hysterectomy  Kidney stones: not sure     LABS/IMAGING/MEDICAL RECORDS REVIEW: no recent    PHYSICAL EXAM:  /74   Ht 1.676 m (5' 6\")   Wt (!) 151.3 kg (333 lb 8 oz)   LMP 04/12/2019   BMI 53.83 kg/m    Wt Readings from Last 4 Encounters:   10/31/24 (!) 151.3 kg (333 lb 8 oz)   06/05/22 145.2 kg (320 lb)   05/29/22 149.7 kg (330 lb)   05/25/22 (!) 150.6 kg (332 lb 1.6 oz)      BP Readings from Last 3 Encounters:   10/31/24 " 128/74   04/03/23 (!) 145/84   06/06/22 130/87      GEN: Alert and oriented in no acute distress.   HEENT: mucous membranes moist  NECK: supple with LAD or thyromegaly  LUNGS: CTA without wheezes or crackles, good air movement throughout  CV: RRR no MRG  ABDOMEN: moderate protuberance  SKIN: erythema under pannus, no skin tags, positive acanthosis nigricans     ASSESSMENT/PLAN:  1. Morbid obesity (H) (Primary)  She is not sure she wants bariatric surgery. She would be a candidate for SG or RNY. Task list completed. She wants to work on nonsurgical weight loss in the meantime. We will hold off on psychologist for now. Labs will be drawn today. We discussed healthy habits to assist with weight loss. She is going to check into getting a membership at the [employer].  We discussed medication that may assist with weight loss. Wegovy was prescribed. Risks/ benefits and possible side effects were discussed and questions were answered. Written information was given.  If she does not tolerate it she wants to try phentermine again.  - CBC with platelets; Future  - Comprehensive metabolic panel; Future  - Ferritin; Future  - Folate; Future  - Hemoglobin A1c; Future  - Parathyroid Hormone Intact; Future  - TSH; Future  - Vitamin A; Future  - Vitamin B1 whole blood; Future  - Vitamin B12; Future  - Vitamin D Deficiency; Future  - Zinc; Future  - nystatin (MYCOSTATIN) 303679 UNIT/GM external ointment; Use 2 x daily prn  Dispense: 15 g; Refill: 1  - Semaglutide-Weight Management (WEGOVY) 0.25 MG/0.5ML pen; Inject 0.25 mg subcutaneously once a week for 28 days.  Dispense: 2 mL; Refill: 0  - Semaglutide-Weight Management (WEGOVY) 0.5 MG/0.5ML pen; Inject 0.5 mg subcutaneously once a week for 28 days.  Dispense: 2 mL; Refill: 0  - Semaglutide-Weight Management (WEGOVY) 1 MG/0.5ML pen; Inject 1 mg subcutaneously once a week.  Dispense: 2 mL; Refill: 0     Sincerely,     KATE Lowery MD    Total time spent on the date of this encounter  doing: chart review, review of test results, patient visit, physical exam, education, counseling, developing plan of care and documenting = 61 minutes.     Again, thank you for allowing me to participate in the care of your patient.        Sincerely,        KATE Lowery MD

## 2024-10-31 NOTE — TELEPHONE ENCOUNTER
Retail Pharmacy Prior Authorization Team   Phone: [phone]    PRIOR AUTHORIZATION DENIED    Medication: WEGOVY 0.25 MG/0.5ML SC SOAJ  Insurance Company: [employer] - Phone [phone] Fax [phone]  Denial Date: 10/31/2024  Denial Reason(s): MUST PROVIDE DOCUMENTATION OF TWO OR MORE CLINIC APPTS DISCUSSING WEIGHT LOSS WITHIN PAST 6 MONTHS      Appeal Information: IF THE PROVIDER WOULD LIKE TO APPEAL THIS DECISION PLEASE PROVIDE THE PA TEAM WITH A LETTER OF MEDICAL NECESSITY      Patient Notified: NO

## 2024-10-31 NOTE — PATIENT INSTRUCTIONS
Before being submitted for insurance approval, you will need the following:    -Clearance by the Psychologist  -Clearance by the dietitian  -Routine Health Care Maintenance should be up to date (mammograms yearly after age 50, paps as recommended by your primary provider,  colonoscopy after age 50, earlier if high risk.  -Establish a Primary Care Provider if you do not already have one  -Pre Operative Lab work-ordered today  -Achieve weight loss goals prior to surgery if given  -Structured weight loss visits IF mandated by your insurance carrier  -Surgeon consult  -You will need to be using CPAP for at least one month before surgery if you have sleep apnea. Make sure to bring your CPAP or BiPAP to the hospital at the time of surgery.  -You will need to be tobacco free for 2 months before surgery and remain a non-smoker thereafter. If you are currently smoking or have recently quit, your urine will be evaluated for tobacco metabolites pre-operatively.  -If you are on insulin, you might be referred to an endocrinologist who will manage your insulin during the liquid diet and around the time of surgery. This endocrinologist does not replace your primary provider or your endocrinologist.   -You will need an exercise plan which includes MOVE, ie., walking and MUSCLE, ie.,calisthenics, bands, weight, machines, etc...  ______________________________________________________________________    Remember that after your bariatric surgery, vitamin supplementation is a lifelong need.    You will take:    B-12 1000mcg or higher sublingual (under the tongue) daily or by injection 1-2X/month  D3 5000U daily   Multivitamin containing 18mg of iron twice a day  Calcium citrate 1 or 2 daily  Iron with vitamin C if you are a menstruating female  B Complex 50 mg every day or thiamine 100 mg once a week may be indicated    To keep your weight off and your vitamin levels up, follow-up is important.    Your labs will be monitored every 6  months for the first two years and yearly thereafter.    To avoid ulcers in your stomach avoid tobacco forever, alcohol in excess, caffeine in excess and anti-inflammatories (NSAIDS)  (Aspirin, Ibuprofen, Naproxen and similar medications). Tylenol is fine.  If you are told by your physician take Aspirin to protect your heart or for another reason, make sure to take omeprazole or similar medication (protonix, nexium, prevacid) to protect your stomach.    Remember that alcohol affects you differently after bariatric surgery. If you have even ONE drink DO NOT DRIVE.      Here are the [employer] exercise sites:    Yoga-https://www.Streamix.Guides.co/user/yogawithadriene    Body strength activities, dance, high intensity interval training- https://www.Streamix.Guides.co/user/popsugartvfit    Strength training- https://www.Streamix.Guides.co/user/KozakSportsPerform    Walking- https://www.Streamix.Guides.co/user/walkathomemedia    Sit and Be Fit- https://www.Streamix.Guides.co/channel/UCLgvL3aGzMByecNYtMcyK_g    Low impact cardio- Jody Alberts- https://www.Streamix.Guides.co/channel/XUdvEIwQoxrDaufY7qfvnedX    Cardio Latricia Norman- https://www.Streamix.Guides.co/channel/QJIrTlgi3JVtFIFOP8wHlGEh    30 Min low impact cardio- https://www.Torando Labsube.com/watch?v=gC_L9qAHVJ8    Body Project- https://www.Streamix.Guides.co/channel/WJRtx2I46-DiOgOZtKjiB6HJ         Bariatric Task List    Fax:  Please fax all paperwork to: [phone] -     Status:  Is patient a candidate for bariatric surgery?:  patient is a candidate for bariatric surgery -     Cleared to schedule surgeon consult?:  not cleared to schedule surgeon consult -     Status:  surgery evaluation in process -     Surgeon: Any -        Insurance: Insurance:  [employer] -      Contact insurance to discuss coverage: Needed -       Referral:  Needed -  Jeanine sent in referral 10/31/2024    Other:  Please call Jeanine David at [phone] to discuss insurance requirements -        Patient Info: Initial Weight:  333.5 -     Date of  "Initial Weight/Height:  10/31/2024 -     Required Weight Loss:  no weight gain -     Surgery Type:  undecided -        Dietician Visits: Structured weight loss required by insurance?:  no structured weight loss required -     Clearance from dietician to see surgeon?:  Needed -  Margie   Dietician Notes:    -        Psychological Evaluation: Psych eval:  Needed -  Gerry--when sure you want surgery.   Other:    -        Lab Work: Complete Blood Count:  Completed -     Comprehensive Metabolic Panel:  Completed -     Vitamin D:  Completed -     PTH:  Completed -     Hgb A1c:  Completed -      Lipids: Completed -      TSH ([facility], [facility], [place] [facility]): Completed -       Ferritin: Completed -       Folate: Completed -     Thiamine: Completed -     Vitamin A: Completed -     Vitamin B12: Completed -     Zinc: Completed -        PCP: Establish care with PCP:  Completed -        Health Maintenance: Colonoscopy(> 50 yrs or family hx):  NOT Needed -     Mammogram (> 40 yrs or family hx):  NOT Needed -     Pap Smear (women): NOT Needed - hysterectomy      Stopping Smoking/ Alcohol Use/Cannabis Use: Quit tobacco use (3 months smoke free)?:  Completed -     Quit date:    - June 2024      Patient Education:  Information Session:  Completed -     Given \"Making your decision\" handout?:  Yes -     Given \"A Roadmap to you Weight Loss Surgery\" handout?: Yes -     Given \"Epic Care Companion\" information?: No -     Attended support group?:  Needed - must attend at least once prior to surgery      Additional Surgery Requirements: Review Coag plan:  Completed - standard   Final nicotine screen:  Needed -     Gallstone prevention plan (Actigall for 6 months postop): Needed -     Other:   -        Final Tasks:  Before surgery online preop class:  Needed -     After surgery online class:  Needed -     History and Physical: Primary Care Provider -   Needed -     Final labs per clinic: Needed -     Chest xray per clinic: Needed -     Electrocardiogram " (ECG) per clinic: Needed -     Schedule postop appointments: Needed -     Other:   -   -        Notes: Final tasks will be ordered and scheduled once surgery is scheduled   -

## 2024-11-01 LAB
ALBUMIN SERPL BCG-MCNC: 4.3 G/DL (ref 3.5–5.2)
ALP SERPL-CCNC: 65 U/L (ref 40–150)
ALT SERPL W P-5'-P-CCNC: 76 U/L (ref 0–50)
ANION GAP SERPL CALCULATED.3IONS-SCNC: 9 MMOL/L (ref 7–15)
AST SERPL W P-5'-P-CCNC: 36 U/L (ref 0–45)
BILIRUB SERPL-MCNC: 0.2 MG/DL
BUN SERPL-MCNC: 16.2 MG/DL (ref 6–20)
CALCIUM SERPL-MCNC: 9.4 MG/DL (ref 8.8–10.4)
CHLORIDE SERPL-SCNC: 105 MMOL/L (ref 98–107)
CREAT SERPL-MCNC: 0.55 MG/DL (ref 0.51–0.95)
EGFRCR SERPLBLD CKD-EPI 2021: >90 ML/MIN/1.73M2
FERRITIN SERPL-MCNC: 199 NG/ML (ref 6–175)
GLUCOSE SERPL-MCNC: 108 MG/DL (ref 70–99)
HCO3 SERPL-SCNC: 23 MMOL/L (ref 22–29)
POTASSIUM SERPL-SCNC: 3.9 MMOL/L (ref 3.4–5.3)
PROT SERPL-MCNC: 7.4 G/DL (ref 6.4–8.3)
PTH-INTACT SERPL-MCNC: 69 PG/ML (ref 15–65)
SODIUM SERPL-SCNC: 137 MMOL/L (ref 135–145)
TSH SERPL DL<=0.005 MIU/L-ACNC: 1.81 UIU/ML (ref 0.3–4.2)
VIT B12 SERPL-MCNC: 433 PG/ML (ref 232–1245)
VIT D+METAB SERPL-MCNC: 16 NG/ML (ref 20–50)

## 2024-11-02 DIAGNOSIS — E66.01 MORBID OBESITY (H): Primary | ICD-10-CM

## 2024-11-02 LAB — ZINC SERPL-MCNC: 79 UG/DL

## 2024-11-02 RX ORDER — PHENTERMINE HYDROCHLORIDE 37.5 MG/1
TABLET ORAL
Qty: 90 TABLET | Refills: 0 | Status: SHIPPED | OUTPATIENT
Start: 2024-11-02

## 2024-11-03 LAB
ANNOTATION COMMENT IMP: NORMAL
RETINYL PALMITATE SERPL-MCNC: <0.02 MG/L
VIT A SERPL-MCNC: 0.47 MG/L

## 2024-11-05 LAB — VIT B1 PYROPHOSHATE BLD-SCNC: 101 NMOL/L

## 2024-11-10 ENCOUNTER — APPOINTMENT (OUTPATIENT)
Dept: RADIOLOGY | Facility: CLINIC | Age: 34
End: 2024-11-10
Payer: COMMERCIAL

## 2024-11-10 ENCOUNTER — HOSPITAL ENCOUNTER (EMERGENCY)
Facility: CLINIC | Age: 34
Discharge: HOME | End: 2024-11-10
Payer: COMMERCIAL

## 2024-11-10 VITALS
HEART RATE: 95 BPM | HEIGHT: 66 IN | WEIGHT: 293 LBS | BODY MASS INDEX: 47.09 KG/M2 | TEMPERATURE: 97.4 F | DIASTOLIC BLOOD PRESSURE: 60 MMHG | SYSTOLIC BLOOD PRESSURE: 121 MMHG | OXYGEN SATURATION: 94 % | RESPIRATION RATE: 24 BRPM

## 2024-11-10 DIAGNOSIS — R07.81 PLEURITIC CHEST PAIN: ICD-10-CM

## 2024-11-10 DIAGNOSIS — R07.9 CHEST PAIN, UNSPECIFIED TYPE: ICD-10-CM

## 2024-11-10 LAB
ANION GAP SERPL CALCULATED.3IONS-SCNC: 11 MMOL/L (ref 7–15)
BASOPHILS # BLD AUTO: 0 10E3/UL (ref 0–0.2)
BASOPHILS NFR BLD AUTO: 0 %
BUN SERPL-MCNC: 7.6 MG/DL (ref 6–20)
CALCIUM SERPL-MCNC: 9.3 MG/DL (ref 8.8–10.4)
CHLORIDE SERPL-SCNC: 104 MMOL/L (ref 98–107)
CREAT SERPL-MCNC: 0.66 MG/DL (ref 0.51–0.95)
D DIMER PPP FEU-MCNC: <=0.27 UG/ML FEU (ref 0–0.5)
EGFRCR SERPLBLD CKD-EPI 2021: >90 ML/MIN/1.73M2
EOSINOPHIL # BLD AUTO: 0 10E3/UL (ref 0–0.7)
EOSINOPHIL NFR BLD AUTO: 0 %
ERYTHROCYTE [DISTWIDTH] IN BLOOD BY AUTOMATED COUNT: 12.9 % (ref 10–15)
GLUCOSE SERPL-MCNC: 113 MG/DL (ref 70–99)
HCO3 SERPL-SCNC: 23 MMOL/L (ref 22–29)
HCT VFR BLD AUTO: 36.8 % (ref 35–47)
HGB BLD-MCNC: 12.3 G/DL (ref 11.7–15.7)
IMM GRANULOCYTES # BLD: 0 10E3/UL
IMM GRANULOCYTES NFR BLD: 0 %
LYMPHOCYTES # BLD AUTO: 1.8 10E3/UL (ref 0.8–5.3)
LYMPHOCYTES NFR BLD AUTO: 22 %
MCH RBC QN AUTO: 30.7 PG (ref 26.5–33)
MCHC RBC AUTO-ENTMCNC: 33.4 G/DL (ref 31.5–36.5)
MCV RBC AUTO: 92 FL (ref 78–100)
MONOCYTES # BLD AUTO: 0.5 10E3/UL (ref 0–1.3)
MONOCYTES NFR BLD AUTO: 6 %
NEUTROPHILS # BLD AUTO: 5.7 10E3/UL (ref 1.6–8.3)
NEUTROPHILS NFR BLD AUTO: 71 %
NRBC # BLD AUTO: 0 10E3/UL
NRBC BLD AUTO-RTO: 0 /100
PLATELET # BLD AUTO: 204 10E3/UL (ref 150–450)
POTASSIUM SERPL-SCNC: 3.9 MMOL/L (ref 3.4–5.3)
RBC # BLD AUTO: 4.01 10E6/UL (ref 3.8–5.2)
SODIUM SERPL-SCNC: 138 MMOL/L (ref 135–145)
TROPONIN T SERPL HS-MCNC: <6 NG/L
WBC # BLD AUTO: 8 10E3/UL (ref 4–11)

## 2024-11-10 PROCEDURE — 93005 ELECTROCARDIOGRAM TRACING: CPT

## 2024-11-10 PROCEDURE — 36415 COLL VENOUS BLD VENIPUNCTURE: CPT

## 2024-11-10 PROCEDURE — 85004 AUTOMATED DIFF WBC COUNT: CPT

## 2024-11-10 PROCEDURE — 96376 TX/PRO/DX INJ SAME DRUG ADON: CPT

## 2024-11-10 PROCEDURE — 250N000013 HC RX MED GY IP 250 OP 250 PS 637

## 2024-11-10 PROCEDURE — 93005 ELECTROCARDIOGRAM TRACING: CPT | Performed by: EMERGENCY MEDICINE

## 2024-11-10 PROCEDURE — 85379 FIBRIN DEGRADATION QUANT: CPT

## 2024-11-10 PROCEDURE — 80048 BASIC METABOLIC PNL TOTAL CA: CPT

## 2024-11-10 PROCEDURE — 84484 ASSAY OF TROPONIN QUANT: CPT

## 2024-11-10 PROCEDURE — 99285 EMERGENCY DEPT VISIT HI MDM: CPT | Mod: 25

## 2024-11-10 PROCEDURE — 96374 THER/PROPH/DIAG INJ IV PUSH: CPT

## 2024-11-10 PROCEDURE — 71046 X-RAY EXAM CHEST 2 VIEWS: CPT

## 2024-11-10 PROCEDURE — 250N000011 HC RX IP 250 OP 636

## 2024-11-10 RX ORDER — KETOROLAC TROMETHAMINE 15 MG/ML
15 INJECTION, SOLUTION INTRAMUSCULAR; INTRAVENOUS ONCE
Status: COMPLETED | OUTPATIENT
Start: 2024-11-10 | End: 2024-11-10

## 2024-11-10 RX ORDER — ACETAMINOPHEN 325 MG/1
975 TABLET ORAL ONCE
Status: COMPLETED | OUTPATIENT
Start: 2024-11-10 | End: 2024-11-10

## 2024-11-10 RX ADMIN — KETOROLAC TROMETHAMINE 15 MG: 15 INJECTION, SOLUTION INTRAMUSCULAR; INTRAVENOUS at 23:11

## 2024-11-10 RX ADMIN — ACETAMINOPHEN 975 MG: 325 TABLET ORAL at 23:12

## 2024-11-10 RX ADMIN — KETOROLAC TROMETHAMINE 15 MG: 15 INJECTION, SOLUTION INTRAMUSCULAR; INTRAVENOUS at 21:55

## 2024-11-10 ASSESSMENT — COLUMBIA-SUICIDE SEVERITY RATING SCALE - C-SSRS
2. HAVE YOU ACTUALLY HAD ANY THOUGHTS OF KILLING YOURSELF IN THE PAST MONTH?: NO
6. HAVE YOU EVER DONE ANYTHING, STARTED TO DO ANYTHING, OR PREPARED TO DO ANYTHING TO END YOUR LIFE?: NO
1. IN THE PAST MONTH, HAVE YOU WISHED YOU WERE DEAD OR WISHED YOU COULD GO TO SLEEP AND NOT WAKE UP?: NO

## 2024-11-10 ASSESSMENT — ACTIVITIES OF DAILY LIVING (ADL)
ADLS_ACUITY_SCORE: 0
ADLS_ACUITY_SCORE: 0

## 2024-11-11 LAB
ATRIAL RATE - MUSE: 107 BPM
DIASTOLIC BLOOD PRESSURE - MUSE: 69 MMHG
INTERPRETATION ECG - MUSE: NORMAL
P AXIS - MUSE: 50 DEGREES
PR INTERVAL - MUSE: 120 MS
QRS DURATION - MUSE: 84 MS
QT - MUSE: 354 MS
QTC - MUSE: 472 MS
R AXIS - MUSE: 17 DEGREES
SYSTOLIC BLOOD PRESSURE - MUSE: 120 MMHG
T AXIS - MUSE: 18 DEGREES
VENTRICULAR RATE- MUSE: 107 BPM

## 2024-11-11 NOTE — ED TRIAGE NOTES
Patient presents to the ED complaining of stabbing medial and right anterior chest pain when she breaths.  She states she believes she had a sinus infection two weeks ago and has been doing at home remedies and felt better but yesterday she started having the chest pain with breathing.  She did not take any medication prior to arrival.     Triage Assessment (Adult)       Row Name 11/10/24 2041          Triage Assessment    Airway WDL WDL        Respiratory WDL    Respiratory WDL WDL        Skin Circulation/Temperature WDL    Skin Circulation/Temperature WDL WDL        Cardiac WDL    Cardiac WDL X;chest pain        Chest Pain Assessment    Chest Pain Location midsternal;anterior chest, right     Character stabbing     Precipitating Factors other (see comments)  breathing     Chest Pain Intervention 12-lead ECG obtained        Peripheral/Neurovascular WDL    Peripheral Neurovascular WDL WDL        Cognitive/Neuro/Behavioral WDL    Cognitive/Neuro/Behavioral WDL WDL

## 2024-11-11 NOTE — DISCHARGE INSTRUCTIONS
You were evaluated in the Emergency Department today for chest pain. Your evaluation has shown no signs of medical conditions requiring emergent intervention at this time, however we recommend that you follow up with your primary care physician or for further testing as an outpatient if symptoms continue.    As discussed take Tylenol 1000 mg and ibuprofen 600 mg 3 times a day.  Alternate taking pain medications.     Please schedule an appointment for follow up with your primary care physician as directed.        Return to the Emergency Department if you experience worsening or uncontrolled chest pain, shortness of breath, lightheadedness, feeling faint, nausea, vomiting, or any other concerning symptoms.    Thank you for choosing us for your care.

## 2024-11-11 NOTE — ED PROVIDER NOTES
EMERGENCY DEPARTMENT ENCOUNTER      NAME: Mariela Hawkins  AGE: 34 year old female  YOB: 1990  MRN: [id number]  EVALUATION DATE & TIME: 11/10/2024  9:09 PM    PCP: Kenzie Mccoy    ED PROVIDER: Fredy Lebron MD      Chief Complaint   Patient presents with    Chest Pain         FINAL IMPRESSION:  1. Chest pain, unspecified type    2. Pleuritic chest pain          ED COURSE & MEDICAL DECISION MAKING:    Pertinent Labs & Imaging studies reviewed. (See chart for details)  34 year old female presents to the Emergency Department for evaluation of chest pain.  Differential diagnosis considered Myocardial infarction, acute coronary syndrome, congestive heart failure, aortic dissection, esophageal rupture, pulmonary embolism, pneumothorax, cardiac tamponade, cocaine mediated chest pain, endocarditis, GERD, pleurisy, rib fracture, costochondritis, pericarditis, herpes zoster.     Triage Note: Patient presents to the ED complaining of stabbing medial and right anterior chest pain when she breaths.  She states she believes she had a sinus infection two weeks ago and has been doing at home remedies and felt better but yesterday she started having the chest pain with breathing.  She did not take any medication prior to arrival.      ED Course as of 11/10/24 2309   Sun Nov 10, 2024   2136 I met with the patient, obtained history, performed an initial exam, and discussed options and plan for diagnostics and treatment here in the ED.    2202 34-year-old female otherwise healthy presenting with pleuritic type chest pain.  She did have recent illness that is described as a sinus infection and kids were also sick with a viral type illness and has developed chest pain.   She does not have history of DVT or PE and is not anticoagulated.  Denies calf pain or swelling.  She is tachycardic and cannot PERC the patient out, will obtain a D-dimer given description of pleuritic pain and tachycardia.  This could be pleurisy or  costochondritis however will risk stratify with D-dimer.  Also obtain other labs.  EKG does not show signs of STEMI.  EKG is not suggestive of pericarditis   2244 D dimer quantitative   2253 Troponin T, High Sensitivity  Trope undetectably low, given persistent pain greater than 6 hours believe this is rule out for ACS   Or myocarditis.   2254 Basic metabolic panel(!)  Normal   2255 CBC with platelets differential  No leukocytosis.   2255 XR Chest 2 Views  Narrative & Impression  EXAM: XR CHEST 2 VIEWS  LOCATION: [facility]  DATE: 11/10/2024     INDICATION: chest pain, eval pneumonia, pneumothorax, pleural effusions  COMPARISON: 5/30/2022.                                                                      IMPRESSION: Negative chest.     2256 The patient's presentation is likely secondary to costochondritis or pleurisy given her recent viral illness, reassuring workup and chest x-ray that was unremarkable.  I will suspicion for a neck pathology given description of pain.  Toradol did not significantly improve symptoms.  Will order additional Toradol and Tylenol.   2301 Reevaluate patient at bedside.  Updated on results and disposition.  She is agreeable with plan.  Encouraged her to take Tylenol and ibuprofen as advised question for pleurisy or costochondritis.       Not Applicable    I discussed the plan for discharge with the patient, and patient is agreeable. We discussed supportive cares at home and reasons for return to the ER including new or worsening symptoms - all questions and concerns addressed to the best of my ability. Strict return precautions discussed. Patient to be discharged by RN.    At the conclusion of the encounter I discussed the results of all of the tests and the disposition. The questions were answered. The patient or family acknowledged understanding and was agreeable with the care plan.     MEDICATIONS GIVEN IN THE EMERGENCY:  Medications   acetaminophen  "(TYLENOL) tablet 975 mg (has no administration in time range)   ketorolac (TORADOL) injection 15 mg (has no administration in time range)   ketorolac (TORADOL) injection 15 mg (15 mg Intravenous $Given 11/10/24 6332)       NEW PRESCRIPTIONS STARTED AT TODAY'S ER VISIT  New Prescriptions    No medications on file     Modified Medications    No medications on file       =================================================================    HPI    Patient information was obtained from: patient    Use of : N/A        Mariela Hawkins is a 34 year old female with no pertinent history who presents to this ED for evaluation of chest pain.    Per patient, her kids were sick recently and she thinks she had a sinus infection because she was congested and had a post nasal drip. She used a Neti pot and other home treatments which relived the pressure. However after that her chest began to hurt to breath especially on the right side. Due to her pain and coughing she cannot take deep breathes. The pain does not radiate. The pain is consistent and sharp.     She had no abdominal pain, nausea, vomiting, leg pain, or leg swelling. She has no history of lung issues or blood clots in her legs/lungs. She is not on birth control and has no chance of pregnancy. She did not mention taking anything for her symptoms today (11/10/2024). No daily medications were mentioned.    Chart review: N/A    PHYSICAL EXAM    /60   Pulse 101   Temp 97.4  F (36.3  C) (Temporal)   Resp 20   Ht 1.676 m (5' 6\")   Wt 145.2 kg (320 lb)   LMP 04/12/2019   SpO2 97%   BMI 51.65 kg/m      Constitutional: Well developed, well nourished. Comfortable appearing.  Head: Normocephalic, atraumatic, mucous membranes moist, nose normal.   Neck: Supple, gross ROM intact.   Eyes: Pupils mid-range, sclera white.  Respiratory: Clear to auscultation bilaterally, no respiratory distress, no wheezing, speaks full sentences easily.  Cardiovascular: " tachycardic heart rate, regular rhythm, no murmurs. No lower extremity edema.   Chest: Mild anterior chest wall tenderness to palpation.  GI: Soft, no tenderness to deep palpation in all quadrants.  Musculoskeletal: Moving all 4 extremities intentionally and without pain. No obvious deformity. No calf pain, tenderness, or swelling.  Skin: Warm, dry, no rash.  Neurologic: Alert & oriented x 3, speech clear, moving all extremities spontaneously   Psychiatric: Affect normal, cooperative.      LAB:  All pertinent labs reviewed and interpreted.  Results for orders placed or performed during the hospital encounter of 11/10/24   XR Chest 2 Views    Impression    IMPRESSION: Negative chest.   Basic metabolic panel   Result Value Ref Range    Sodium 138 135 - 145 mmol/L    Potassium 3.9 3.4 - 5.3 mmol/L    Chloride 104 98 - 107 mmol/L    Carbon Dioxide (CO2) 23 22 - 29 mmol/L    Anion Gap 11 7 - 15 mmol/L    Urea Nitrogen 7.6 6.0 - 20.0 mg/dL    Creatinine 0.66 0.51 - 0.95 mg/dL    GFR Estimate >90 >60 mL/min/1.73m2    Calcium 9.3 8.8 - 10.4 mg/dL    Glucose 113 (H) 70 - 99 mg/dL   Result Value Ref Range    Troponin T, High Sensitivity <6 <=14 ng/L   D dimer quantitative   Result Value Ref Range    D-Dimer Quantitative <=0.27 0.00 - 0.50 ug/mL FEU   CBC with platelets and differential   Result Value Ref Range    WBC Count 8.0 4.0 - 11.0 10e3/uL    RBC Count 4.01 3.80 - 5.20 10e6/uL    Hemoglobin 12.3 11.7 - 15.7 g/dL    Hematocrit 36.8 35.0 - 47.0 %    MCV 92 78 - 100 fL    MCH 30.7 26.5 - 33.0 pg    MCHC 33.4 31.5 - 36.5 g/dL    RDW 12.9 10.0 - 15.0 %    Platelet Count 204 150 - 450 10e3/uL    % Neutrophils 71 %    % Lymphocytes 22 %    % Monocytes 6 %    % Eosinophils 0 %    % Basophils 0 %    % Immature Granulocytes 0 %    NRBCs per 100 WBC 0 <1 /100    Absolute Neutrophils 5.7 1.6 - 8.3 10e3/uL    Absolute Lymphocytes 1.8 0.8 - 5.3 10e3/uL    Absolute Monocytes 0.5 0.0 - 1.3 10e3/uL    Absolute Eosinophils 0.0 0.0 - 0.7  10e3/uL    Absolute Basophils 0.0 0.0 - 0.2 10e3/uL    Absolute Immature Granulocytes 0.0 <=0.4 10e3/uL    Absolute NRBCs 0.0 10e3/uL       RADIOLOGY:  Reviewed all pertinent imaging. Please see official radiology report.  XR Chest 2 Views   Final Result   IMPRESSION: Negative chest.          EKG:    Performed at: 11/10/2024 at 9:25 PM    Impression: Sinus tachycardia, otherwise normal ECG    Rate: 107 BPM  Rhythm: Sinus  MS Interval: 120 ms  QRS Interval: 84 ms  QTc Interval: 472 ms  ST Changes: No ST changes  Comparison: When compared to ECG from 05/25/2024 at 9:53 PM no significant changes were found    I have independently reviewed and interpreted the EKG(s) documented above.      Fredy Lebron MD  [facility] EMERGENCY ROOM  [facility] [facility] [address]  [phone]   =================================================================    BILLING:  Data  Category 1  Non-ED record review, if applicable. External record reviewed: N/A     Clinical information was obtained from an independent historian. History was obtained from: Patient     The following testing was considered but ultimately not selected after discussion with patient/family: N/A     Category 2  My independent interpretation of EKG, rhythm strip, radiology study: Chest x-ray did not reveal large pneumothorax     Category 3  Discussion of management with other physician/healthcare provider/other source: N/A       Risk  Prescription medication was considered, but ultimately not given after discussion with patient/family: I considered ordering a prescription for narcotic pain medicine.  However, I feel patient's condition can be adequately treated with non-narcotic medications and that the risk of a narcotic pain medicine prescription outweighs the benefits.     Chronic conditions affecting care: N/A     Care significantly affected by Social Determinants of Health: N/A     Consideration of  Admission/Observation: Escalation of care including admission/observation was considered given the complexity and risk of the patient's presenting complaint, exam findings, and/or their underlying comorbidities. However, ultimately I feel the patient is safe for outpatient management with close follow up. Reasoning: Work-up reassuring, does not reveal any acute life/organ threatening processes, patient's symptoms well controlled upon reevaluation, reexamination is reassuring, vitals are stable, patient agreeable with discharge, reliable for follow-up.   Consider admission for chest pain however workup overall reassuring,  EKG did not show signs of STEMI and troponin was undetectably low and believe patient can follow-up with her primary care provider as of the low suspicion for ACS, PE aortic dissection and higher concern for musculoskeletal chest pain, costochondritis or pleurisy as above.               I, Andriy Gama, am serving as a scribe to document services personally performed by Fredy Lebron MD based on my observation and the provider's statements to me. I, Fredy Lebron MD, attest that Andriy Gama is acting in a scribe capacity, has observed my performance of the services and has documented them in accordance with my direction.      Fredy Lebron MD  11/10/24 [id number]

## 2024-12-02 ENCOUNTER — MYC MEDICAL ADVICE (OUTPATIENT)
Dept: SURGERY | Facility: CLINIC | Age: 34
End: 2024-12-02

## 2024-12-02 ENCOUNTER — VIRTUAL VISIT (OUTPATIENT)
Dept: SURGERY | Facility: CLINIC | Age: 34
End: 2024-12-02
Payer: COMMERCIAL

## 2024-12-02 DIAGNOSIS — E66.01 MORBID OBESITY (H): Primary | ICD-10-CM

## 2024-12-02 PROCEDURE — 97802 MEDICAL NUTRITION INDIV IN: CPT | Mod: 95

## 2024-12-02 NOTE — PROGRESS NOTES
"Mariela is a 34 year old who is being evaluated via a billable video visit.      The patient has been notified of following:     \"This video visit will be conducted via a call between you and your physician/provider. We have found that certain health care needs can be provided without the need for an in-person physical exam.  This service lets us provide the care you need with a video conversation.  If a prescription is necessary we can send it directly to your pharmacy.  If lab work is needed we can place an order for that and you can then stop by our lab to have the test done at a later time.    Video visits are billed at different rates depending on your insurance coverage.  Please reach out to your insurance provider with any questions.    If during the course of the call the physician/provider feels a video visit is not appropriate, you will not be charged for this service.\"    Patient has given verbal consent for Video visit? Yes    How would you like to obtain your AVS? MyChart    If the video visit is dropped, the invitation should be resent by: Text to cell phone: [phone]    Will anyone else be joining your video visit? No    I    Video-Visit Details    Type of service:  Video Visit    Originating Location (pt. Location): Home    Distant Location (provider location):   [facility]    Platform used for Video Visit: [employer]    Video Start Time: 10:30    Video End Time: 11:18      New Bariatric Nutrition Consultation Note    Reason For Visit: Nutrition Assessment    Mariela Hawkins is a 34 year old presenting today for new bariatric nutrition consult.  Pt is interested in laparoscopic  undecided .  Patient is accompanied by self.        10/30/2024     1:03 AM   Support System Reviewed With Patient   Who do you have in your support network that can be available to help you for the first 2 weeks after surgery? My    Who can you count on for support throughout " "your weight loss surgery journey? Yes       ANTHROPOMETRICS:  Estimated body mass index is 51.65 kg/m  as calculated from the following:    Height as of 11/10/24: 5' 6\" (1.676 m).    Weight as of 11/10/24: 320 lb (145.2 kg).    Required weight loss goal pre-op: 0 lbs from initial consult weight (goal weight 320 lbs or less before surgery)        10/30/2024     1:03 AM   --   I have tried the following methods to lose weight Watching portions or calories    Exercise    Prescription Medications           10/30/2024     1:03 AM   Weight Loss Questions Reviewed With Patient   How long have you been overweight? Since early childhood       SUPPLEMENT INFORMATION:  none      MEDICATION FOR WEIGHT LOSS  Phentermine      NUTRITION HISTORY: up dates done during visit      10/30/2024     1:03 AM   Recall Diet Questions Reviewed With Patient   Describe what you typically consume for breakfast (typical or most recent) Skips most days, Black or with whipping cream coffee   Describe what you typically consume for lunch (typical or most recent) 2 Cheese stick and or blueberries @ 1-2 PM   Describe what you typically consume for supper (typical or most recent) proteins and veggies or chicken, rice, veggies @ 4PM   How many ounces of water, or other low calorie drinks, do you drink daily (8 oz=1 glass)? 48 oz   How many ounces of caffeine (coffee, tea, pop) do you drink daily (8 oz=1 glass)? 8 oz-coffee   How many ounces of carbonated (pop, beer, sparkling water) drinks do you drinky daily (8 oz=1 glass)? 16 oz-sparkling water or occasional diet soda   How many ounces of juice, pop, sweet tea, sports drinks, protein drinks, other sweetened drinks, do you drink daily (8 oz=1 glass)? 0 oz   How many ounces of milk do you drink daily (8 oz=1 glass) 0 oz   How often do you drink alcohol? Never           10/30/2024     1:03 AM   Eating Habits   What foods do you crave? sweets or bread   Craves sweets after dinner when not on " Phentermine    EATING BEHAVIORS:       10/30/2024     1:03 AM   --   Have you or anyone else thought that you had an eating disorder? No   Do you currently binge eat (eat a large amount of food in a short time)? No   Are you an emotional eater? No   Do you get up to eat after falling asleep? No   Can you afford 3 meals a day? Yes   Can you afford 50-60 dollars a month for vitamins? Yes     ADDITIONAL INFORMATION:  Patient has tried Phentermine for weight loss and recently restarted it. Not 100% sure she wants weight loss surgery. Patient shop and cook for her kids an spouse. Patient does not tolerate lactose.  Has been skipping meals since starting Phentermine. Does not know any one who has had weight loss surgery.          10/30/2024     1:03 AM   Dining Out History Reviewed With Patient   How often do you dine out? Rarely.   Where do you dine out? (select all that apply) take out   What types of food do you order when you dine out? stew Bhatia           10/30/2024     1:03 AM   Physical Activity Reviewed With Patient   How often do you exercise? Never   What keeps you from being more active? I am as active as I can possbily be    I should be more active but I just have not gotten around to it    Too tired   Has a busy schedule with 3 kids    NUTRITION DIAGNOSIS:  Obesity r/t long history of self-monitoring deficit and excessive energy intake aeb BMI >30 kg/m2.    INTERVENTION:  Intervention Provided/Education Provided on post-op diet guidelines, vitamins/minerals essential post-operatively, GI anatomy of bariatric surgeries, ways to help prepare for post-op diet guidelines pre-operatively, portion/calorie-control. Provided pt with list of goals RD contact information.          10/30/2024     1:03 AM   Questions Reviewed With Patient   How ready are you to make changes regarding your weight? Number 1 = Not ready at all to make changes up to 10 = very ready. 10   How confident are you that you can change? 1 =  Not confident that you will be successful making changes up to 10 = very confident. 10       Patient Understanding: fair-good  Expected Compliance: fair-good    GOALS:  Start: 1 multivitamin mineral, 5000 international unit(s) vitamin D ( per message from weight loss provider)  Eat breakfast daily or have protein drink  Criteria for selecting a protein drink/8-12 ounces:  < 250 calories  15-30 grams protein  < 10 grams total fat  < 10 grams sugar   Schedule to do exercise video 2-3X per week for 20-30 minutes       Follow-Up:   Recommend 2-3 follow up visits to assist with lifestyle changes or per insurance.    Time spent with patient: 47 minutes.  Trell De La Garza RD, LD  [facility], [place]

## 2024-12-02 NOTE — PATIENT INSTRUCTIONS
Jeffrey Sierra-  Jodee to the [facility], [place]! It was great to visit with you and learn about  your interest in weight loss. Below are the goals we discussed.  GOALS:  Start: 1 multivitamin mineral, 5000 international unit(s) vitamin D ( per message from weight loss provider)  Eat breakfast daily or have protein drink  Criteria for selecting a protein drink/8-12 ounces:  < 250 calories  15-30 grams protein  < 10 grams total fat  < 10 grams sugar   Schedule to do exercise video 2-3X per week for 20-30 minutes     Nutrition Educational Materials:    Your Stage 1 Diet: Clear Liquids     Your Stage 2 Diet: Low-fat Full Liquids     Your Stage 3 Diet: Pureed Foods     Your Stage 4 Diet: Soft Foods    Your Stage 5 Diet: Regular Foods    Diet Guidelines after Weight-Loss Surgery     Supplements after Sleeve Gastrectomy, Gastric Bypass or Single Anastomosis Duodenal Switch Surgery    My Plate     Protein Sources for Weight Loss     Please call [phone] to schedule your next visit with a Dietitian  Thanks!  Trell De La Garza RD, LD  [facility], [place]

## 2024-12-03 DIAGNOSIS — R21 RASH: Primary | ICD-10-CM

## 2024-12-03 RX ORDER — NYSTATIN 100000 [USP'U]/G
POWDER TOPICAL 2 TIMES DAILY PRN
Qty: 30 G | Refills: 3 | Status: SHIPPED | OUTPATIENT
Start: 2024-12-03

## 2024-12-03 NOTE — TELEPHONE ENCOUNTER
Medication Appeal Initiation    Medication: WEGOVY 0.25 MG/0.5ML SC SOAJ  Appeal Start Date:  12/3/2024  Insurance Company: [employer] Phone: [phone]  Insurance Fax: [phone]  Comments:       FAXED LETTER OF MEDICAL NECESSITY AND CHART NOTES TO INSURANCE -

## 2025-02-22 NOTE — PROGRESS NOTES
[facility] Non Surgical Follow up Visit   Date of visit: 2/25/2025  Physician: KATE Lowery MD, MD  Primary Care is Kenzie Mccoy.  Mariela Hawkins   34 year old  female     Initial Weight: 333.5#  Initial BMI: 53.8  Today's Weight:   Wt Readings from Last 1 Encounters:   02/25/25 (!) 145.4 kg (320 lb 8 oz)     Body mass index is 51.73 kg/m .           Assessment and Plan   Assessment: Mariela is a 34 year old year old female who presents for medical weight management.      Plan:    1. Morbid obesity (H) (Primary)  Patient was congratulated on her success thus far. Healthy habits to assist with further weight loss were discussed. She is now strongly considering surgery. Task list reviewed. We will set up monthly visits with the dietitian and a consultation with Dr. Quiros. She will continue the phentermine.      2. Yeast dermatitis  She will use nystatin prn     Follow up next available with the dietitian and myself.            INTERIM HISTORY  Patient was taking wegovy for appetite and craving control and and got up to the 1.0 mg dose. She last took it 5 days ago. She did get nausea, headache and bloating from it and didn't feel it was helping to control her appetite.. She started taking phentermine on Sunday. She is noticing appetite reduction with it.    DIETARY HISTORY  Meals Per Day: 3  Eating Protein First?: sometimes  Food Diary: B:coffee and fruit or sometimes egg L:turkey or chicken and vegetables or protein shake D:starch and protein   Snacks Per Day: evenings- hunger driven  Typical Snack: sweets, peanut butter toast, peanut butter wafer bar  Fluid Intake:4-5 bottles per day  Portion Control: decreased  Calorie Containing Beverages: none    Meals at Restaurant per week:0-1    Positive Changes Since Last Visit: trying to get adequate protein, water intake, exercise  Struggling With: didn't do well with wegovy    Knowledgeable in Reading Food Labels: yes  Getting Adequate Protein: working  "on it      PHYSICAL ACTIVITY PATTERNS:  Goes to the gym, bikes 6 miles or treadmill for 30 minutes or weight training (1-2 x per week)    REVIEW OF SYSTEMS  GENERAL/CONSTITUTIONAL:  Fatigue: worse on wegovy  HEENT:   glaucoma: no  CARDIOVASCULAR:  History of heart disease: no  GI:  Pancreatitis: no  PSYCHIATRIC:  Moods: stable  MUSCULOSKELETAL/RHEUMATOLOGIC  Arthralgias: no  Myalgias: no  ENDOCRINE:  Monitoring Blood Sugars: no  Sugars Well Controlled: na  No personal or family history of medullary thyroid cancer no  No personal or family history of MEN2   :  Birth control: hysterectomy  History of kidney stones: yes     Patient Profile   Social History     Social History Narrative    Not on file        Past Medical History   Past Medical History:   Diagnosis Date    Acanthosis nigricans     Anemia     Anxiety     BV (bacterial vaginosis)     Claustrophobia     History of anesthesia complications     Mental disorder     Migraines     Migraines     Missed ab     Morbid obesity (H)     Ovarian cystic mass     Pain in limb     Pelvic pain in female     PONV (postoperative nausea and vomiting)      Patient Active Problem List   Diagnosis    Vasa previa    History of  section    S/P     Ventral hernia without obstruction or gangrene    Ventral hernia    Morbid obesity (H)    Left sided abdominal pain       Past Surgical History  She has a past surgical history that includes  section; Recession resection (repair strabismus) bilateral (Bilateral, 2014); Recession resection (repair strabismus) bilateral (Bilateral, 2015);  section, immediate hysterectomy, combined (N/A, 2019);  Section; Eye Muscle Surgery (Bilateral); Dilation and curettage (N/A, 2016);  Section (N/A, 2017); and Herniorrhaphy ventral (N/A, 2018).     Examination   /76 (BP Location: Right arm, Patient Position: Sitting, Cuff Size: Adult Regular)   Ht 1.676 m (5' 6\")   Wt " (!) 145.4 kg (320 lb 8 oz)   LMP 04/12/2019   BMI 51.73 kg/m    Wt Readings from Last 4 Encounters:   02/25/25 (!) 145.4 kg (320 lb 8 oz)   11/10/24 145.2 kg (320 lb)   10/31/24 (!) 151.3 kg (333 lb 8 oz)   06/05/22 145.2 kg (320 lb)      BP Readings from Last 3 Encounters:   02/25/25 124/76   11/10/24 121/60   10/31/24 128/74      GEN: Alert and oriented in no acute distress.   HEENT: mucous membranes moist  CV: RRR no MRG         Counseling:   We reviewed the important post op bariatric recommendations:  -eating 3 meals daily  -eating protein first, getting >60gm protein daily  -eating slowly, chewing food well  -avoiding/limiting calorie containing beverages  -limiting starchy vegetables and carbohydrates, choosing wheat, not white with breads,   crackers, pastas, francis, bagels, tortillas, rice  -limiting restaurant or cafeteria eating to twice a week or less    We discussed the importance of restorative sleep and stress management in maintaining a healthy weight.  We discussed the National Weight Control Registry healthy weight maintenance strategies and ways to optimize metabolism.  We discussed the importance of physical activity including cardiovascular and strength training in maintaining a healthier weight.    Total time spent on the date of this encounter doing: chart review, review of test results, patient visit, physical exam, education, counseling, developing plan of care and documenting = 46 minutes.         KATE Lowery MD  [facility]

## 2025-02-25 ENCOUNTER — OFFICE VISIT (OUTPATIENT)
Dept: SURGERY | Facility: CLINIC | Age: 35
End: 2025-02-25
Payer: COMMERCIAL

## 2025-02-25 VITALS
SYSTOLIC BLOOD PRESSURE: 124 MMHG | DIASTOLIC BLOOD PRESSURE: 76 MMHG | HEIGHT: 66 IN | BODY MASS INDEX: 47.09 KG/M2 | WEIGHT: 293 LBS

## 2025-02-25 DIAGNOSIS — E66.01 MORBID OBESITY (H): Primary | ICD-10-CM

## 2025-02-25 DIAGNOSIS — B37.2 YEAST DERMATITIS: ICD-10-CM

## 2025-02-25 PROCEDURE — 3078F DIAST BP <80 MM HG: CPT | Performed by: FAMILY MEDICINE

## 2025-02-25 PROCEDURE — 3074F SYST BP LT 130 MM HG: CPT | Performed by: FAMILY MEDICINE

## 2025-02-25 PROCEDURE — 99215 OFFICE O/P EST HI 40 MIN: CPT | Performed by: FAMILY MEDICINE

## 2025-02-25 RX ORDER — PHENTERMINE HYDROCHLORIDE 37.5 MG/1
TABLET ORAL
Qty: 90 TABLET | Refills: 0 | Status: SHIPPED | OUTPATIENT
Start: 2025-02-25

## 2025-02-25 NOTE — PATIENT INSTRUCTIONS
Before being submitted for insurance approval, you will need the following:    -Clearance by the Psychologist  -Clearance by the dietitian  -Routine Health Care Maintenance should be up to date (mammograms yearly after age 50, paps as recommended by your primary provider,  colonoscopy after age 50, earlier if high risk.  -Establish a Primary Care Provider if you do not already have one  -Pre Operative Lab work-ordered today  -Achieve weight loss goals prior to surgery if given  -Structured weight loss visits IF mandated by your insurance carrier  -Surgeon consult  -You will need to be using CPAP for at least one month before surgery if you have sleep apnea. Make sure to bring your CPAP or BiPAP to the hospital at the time of surgery.  -You will need to be tobacco free for 2 months before surgery and remain a non-smoker thereafter. If you are currently smoking or have recently quit, your urine will be evaluated for tobacco metabolites pre-operatively.  -If you are on insulin, you might be referred to an endocrinologist who will manage your insulin during the liquid diet and around the time of surgery. This endocrinologist does not replace your primary provider or your endocrinologist.   -You will need an exercise plan which includes MOVE, ie., walking and MUSCLE, ie.,calisthenics, bands, weight, machines, etc...  ______________________________________________________________________    Remember that after your bariatric surgery, vitamin supplementation is a lifelong need.    You will take:    B-12 1000mcg or higher sublingual (under the tongue) daily or by injection 1-2X/month  D3 5000U daily   Multivitamin containing 18mg of iron twice a day  Calcium citrate 1 or 2 daily  Iron with vitamin C if you are a menstruating female  B Complex 50 mg every day or thiamine 100 mg once a week may be indicated    To keep your weight off and your vitamin levels up, follow-up is important.    Your labs will be monitored every 6  months for the first two years and yearly thereafter.    To avoid ulcers in your stomach avoid tobacco forever, alcohol in excess, caffeine in excess and anti-inflammatories (NSAIDS)  (Aspirin, Ibuprofen, Naproxen and similar medications). Tylenol is fine.  If you are told by your physician take Aspirin to protect your heart or for another reason, make sure to take omeprazole or similar medication (protonix, nexium, prevacid) to protect your stomach.    Remember that alcohol affects you differently after bariatric surgery. If you have even ONE drink DO NOT DRIVE.        Bariatric Task List    Fax:  Please fax all paperwork to: [phone] -     Status:  Is patient a candidate for bariatric surgery?:  patient is a candidate for bariatric surgery -     Cleared to schedule surgeon consult?:  not cleared to schedule surgeon consult -     Status:  surgery evaluation in process -     Surgeon: Kristen Flores -        Insurance: Insurance:  [email or web address] -      Contact insurance to discuss coverage: Needed -        Referral:  Needed - Jeanine sent in referral 10/31/2024   Other:  Please call Jeanine David at [phone] to discuss insurance requirements -        Patient Info: Initial Weight:  333.5 -     Date of Initial Weight/Height:  10/31/2024 -     Required Weight Loss:  no weight gain -     Surgery Type:  undecided -        Dietician Visits: Structured weight loss required by insurance?:  no structured weight loss required -     Dietician Visit 1:  Completed - 12/2/24: 320 lb   Clearance from dietician to see surgeon?:  Needed - Margie      Psychological Evaluation: Psych eval:  Needed Mike Garrett      Lab Work: Complete Blood Count:  Completed -     Comprehensive Metabolic Panel:  Completed -     Vitamin D:  Completed -     PTH:  Completed -     Hgb A1c:  Completed -      Lipids: Completed -      TSH ([facility], [facility], [place] [facility]): Completed -       Ferritin: Completed -       Folate: Completed -     Thiamine: Completed -    "  Vitamin A: Completed -     Vitamin B12: Completed -     Zinc: Completed -        PCP: Establish care with PCP:  Completed -        Health Maintenance: Colonoscopy(> 50 yrs or family hx):  NOT Needed -     Mammogram (> 40 yrs or family hx):  NOT Needed -     Pap Smear (women): NOT Needed - hysterectomy      Stopping Smoking/ Alcohol Use/Cannabis Use: Quit tobacco use (3 months smoke free)?:  Completed -     Quit date:    - June 2024      Patient Education:  Information Session:  Completed -     Given \"Making your decision\" handout?:  Yes -     Given \"A Roadmap to you Weight Loss Surgery\" handout?: Yes -     Given \"Epic Care Companion\" information?: No -     Attended support group?:  Needed - must attend at least once prior to surgery   Support plan in place?:  Completed -       Additional Surgery Requirements: Review Coag plan:  Completed - standard   Final nicotine screen:  Needed - w/pre-op labs.   Birth control plan:  Completed - s/p Hyst   Gallstone prevention plan (Actigall for 6 months postop): Needed -        Final Tasks:  Before surgery online preop class:  Needed - Prior to surgery date.   After surgery online class:  Needed - 1 week after surgery w/dietitian   History and Physical: Primary Care Provider -   Needed - within 30 days of surgery   Final labs per clinic: Needed - within 30 days of surgery   Chest xray per clinic: Needed - within 90 days of surgery   Electrocardiogram (ECG) per clinic: Needed - within 90 days of surgery   Schedule postop appointments: Needed - Jeanine to set up when scheduling surgery         Welcome to [facility]!      We are grateful that you have chosen us to partner with you on your journey to better health!  We have included some very important information for you to read as you begin your journey towards weight loss surgery. We will discuss parts of this as you move closer to surgery but please reach out if you have any questions or " concerns.      Please click on the following links and they will lead you to a printable version of each handout or copy into your browser to view/print at home:    Making Your Decision, Understanding Weight Loss Surgery  [email or web address]    A Roadmap to Your Weight Loss Surgery  [email or web address]    Honoring Choices - Your Rights  [email or web address]    Honoring Choices - [place] Health Care Directive (form that can be filled out)  [email or web address]      Insurance Coverage and Approval for Surgery  Every insurance plan is different.  Many companies approve benefits for surgery, but many have specific requirements that must be completed prior to being approved.    Verification of benefits is the patient's responsibility.  You will be responsible for any charges not covered by your insurance plan - including, but not limited to copays, deductible, out of pocket, any amount over your cap limit, etc.  Using the guideline below, please contact your insurance plan (we recommend you call the Customer Service number on the back of your card).      Once all of the requirements for surgery are complete, you will see the surgeon.  Following this visit, we will submit your information to your insurance plan for Prior Authorization.  We strongly encourage you to submit any documentation that supports your weight loss attempts to us.    Questions that are important to ask your insurance company when you call them:    Are [facility] in my network?  Is bariatric surgery a covered benefit under my policy?  If so, what is my estimated out of pocket expense?  Are there any exclusions or cap limits on my plan for bariatric surgery?  If so, what are they?  What are the criteria necessary to be approved for bariatric surgery?  Do you require medically supervised weight loss visits for approval of surgery?  If yes, for how many months?  Within the last #  of years?  Are the following procedures a covered benefit under my plan?  Laparoscopic Gastric Bypass (CPT Code 21458)  Laparoscopic Sleeve Gastrectomy (CPT Code 84699)  Nutrition/Dietitian Consult (CPT Code 11417  Nutrition/Dietitian Reassessment (CPT Code 36213  Psychological Assessment (CPT Codes 85203 & 78516      Initial labs for Bariatric Surgery    Some lab orders were placed by your doctor in the [email or web address] system and can be drawn at any of our outpatient hospital labs or your clinic. If you do them at one of three hospitals ([facility] in [place], [facility] in [place], [facility] in [place]) you do not need an appointment but if you'd like to do them at a clinic, you will need to schedule an appointment with that clinic.       You will need to be fasting 10-12 hours prior (you can continue to drink water) and should have them drawn sooner verses later so if any corrections need to be made we will have time to address them.      [facility]'s lab is open 7 am - 4:30 pm Monday through Friday and 9am-12:30 pm on Saturdays.  [facility]'s lab is open 7 am -4:30 pm Monday through Friday and 8am to 11:30am on Saturday and Sundays.     If you would like them done at a clinic outside the [email or web address] system, then we will need to know where to fax the orders.   If you have any questions or would like help scheduling a lab appointment at the [facility] [facility], please give us a call on the Bariatric Nurse Line at [phone].        [place] [facility] for Bariatric Surgery  Eat 3 nutrient-rich meals each day     Don't skip meals--it will cause you to overeat later in the day! Space meals 4-6 hours apart    Eat around the same times each day to develop a routine eating schedule    Avoid snacking unless physically hungry.   Planned snacks: 1-2 times per day and no more than 150 calories    Eating protein and fiber (vegetables/fruits/whole grains) with meals helps you stay full     Choose foods  with less than 10 grams of sugar and 5-10 grams of fat per serving to prevent excess calories and weight gain  Eat protein first    Protein helps with healing, maintaining muscle mass and good nutrition, and reducing hunger     For RNY Gastric Bypass, Sleeve Gastrectomy, and Duodenal Switch: aim for 60-80 grams of protein per day    Eat protein first, then veggies and the fruits or grains  Stop drinking 15-30 minutes before meals and wait 30-45 minutes after eating to drink    Drinking too soon before a meal may cause you to be too full to eat    Drinking too soon after you eat will cause the food to  wash  through your new stomach too quickly--leaving you hungry and likely to eat more    Eat S-[id number]-Y    Take 20-30 minutes to eat each meal by taking small bites, chewing foods to applesauce consistency or 20-30 times before you swallow    Not chewing food properly can block the opening of your pouch--causing pain, nausea, vomiting    Eating foods too fast can delay those full signals and increase overeating   Slow down your eating by smaller plates/bowls, toddler utensils, putting your fork/spoon down between bites and not watching TV/computer during meals!  Make a list of activities to prevent boredom, stress and emotional eating    Go for a walk or lift weights while watching your favorite TV show    Talk to a co-worker or email/call a friend     Keep your hands and mind busy with woodworking, puzzles, knitting or painting your nails    Practice deep breathing or meditation  Drink 64 ounces of 0-Calorie drinks between meals     Water    Zero calorie Propel , Vitamin Water Zero  or SoBe Lifewater , Crystal Light , Sugar-Free David-Aid , and other sugar-free lemonade or flavored sharpe    Decaffeinated, unsweetened coffee/ tea (1 cup or less per day)   Avoid Caffeine and Carbonation- NO STRAWS    Caffeine can irritate your new pouch, increase risk for ulcers, and can increase your appetite      Carbonation can lead  to increased bloating/gas and discomfort   Work up to a total of 30-60 minutes of physical activity most days of the week    Helps with losing weight and preventing weight regain after surgery     Do a combination of cardio (walking/water exercises/biking/swimming) and strength training  Take daily vitamin/mineral supplements after surgery    Daily use of vitamins/minerals is necessary for the rest of your life      Psychological Evaluation for Bariatric Surgery      Why do I need a psychological evaluation before bariatric surgery?     The psychologist's main purpose is to provide the support and education to ensure you have the most successful outcome after surgery.   Preparing for bariatric surgery often involves changing behavior patterns. Working on these changes before surgery allows you to achieve the best results after surgery as some of these behaviors have been entrenched for many years. In addition, your relationship with food may need to change. Psychologists are experts in behavior change and are there to guide and support you as you make these important changes.   A significant life change, like losing a lot of weight, may affect many areas of your life--self-concept, physical activity and relationships with others. Your psychologist will help you prepare to adjust to these changes in a healthy way.   If you have mental health symptoms, relationship struggles, or other challenges, your psychologist will suggest how to best address these concerns so that they do not interfere with your progress toward a healthier lifestyle.       Is the psychologist looking for reasons to say I can't have surgery?   The goal is to not find specific psychological problems. Instead, the psychologist will be working with your strengths to ensure you have the most optimal outcome after surgery.  There is no expectation that you will have everything figured out already or that you must have  perfect  behaviors before moving  forward with surgery. Your psychologist is expecting that you will have some behavior changes that will need to occur concurrent with the surgery.   You can feel comfortable that the psychologist is not there to    you or your habits. The psychologist is there to provide support and encourage your progress.      What should I expect during the evaluation?   During the interview, which could take place over 2 or more sessions, the psychologist will ask about:   -weight history, current eating pattern and fluid intake, and previous attempts at weight loss   -activity level   -psychiatric history  -drug, alcohol and nicotine use   -social and developmental history  -support system   -current stressors and coping mechanisms   -understanding of the risks of surgery   -expectations for outcomes after surgery   You will complete various questionnaires that will focus on coping strategies, eating behaviors, quality of life and adverse childhood experiences in order to provide additional information to inform the assessment.   Your psychologist will likely give you some  homework assignments  to practice as you prepare for surgery. This will be individually tailored to your specific needs.   Your psychologist will talk with you about some of the typical challenges that patients might encounter after surgery and how to prepare for these.   A final report will be generated and any treatment recommendations will be discussed with you and the bariatric team to determine next steps.   If you would like, you may have any support people (e.g., spouse) join a session of the evaluation to provide additional information.       Bariatric surgery offers a physical  tool  for weight management. Similarly, your work with the psychologist will provide you with some additional emotional and behavioral  tools  as you work toward your healthier lifestyle goals.      Support Group    Support and accountability is an important part of your  weight loss journey and maintenance once you reach your health goals.  Because of this, we require that you attend at least one of our support groups before you meet with the surgeon so you get a feel for what they are like and hopefully establish a connection to others who are going through a similar process or have had surgery before so you can ask your questions.    We currently have two options for support group but they are in the virtual format only at this time.  Both groups are using Microsoft Teams for their platform and you can access it through the web or an wilder that can be downloaded to a smart phone if you have one.      The Pre- and Post-op Connections Group is on the third Tuesday of the month from 6:30-8pm and is hosted by Gerry Quiros, PhD.  If you are interested in this group, you will need to email him at [email or web address] each month and then he will in turn send you the invitation to join.      We also have a Post-op focused Connections Group the 4th Thursday of the month from 11am-12pm that is mostly geared toward post-operative patients who are past three months post-op.  This group is hosted by Melva Arias, ALICIA, CBN, CIC and you can email her to join the group at [email or web address] each month and she will send you an invite similar to Gerry's group.  If you decide you would like to be a regular attender at this group, we can add you to an automatic invitation list of people.    You can see more information about available support groups that the [facility] offers to our patients as well by following the link:    The following Support Group information can also be found on our website:  [email or web address]      Please let us know if you have any questions about all the information above and we will be talking more about this during future visits.     Thank you,   [facility] Bariatric Team

## 2025-02-25 NOTE — LETTER
2/25/2025      Mariela Hawkins  [address]  [facility] [address]      Dear Colleague,    Thank you for referring your patient, Mariela Hawkins, to the [facility] [place]. Please see a copy of my visit note below.    Bariatric Care Clinic Non Surgical Follow up Visit   Date of visit: 2/25/2025  Physician: KATE Lowery MD, MD  Primary Care is Kenzie Mccoy.  Mariela Hawkins   34 year old  female     Initial Weight: 333.5#  Initial BMI: 53.8  Today's Weight:   Wt Readings from Last 1 Encounters:   02/25/25 (!) 145.4 kg (320 lb 8 oz)     Body mass index is 51.73 kg/m .           Assessment and Plan   Assessment: Mariela is a 34 year old year old female who presents for medical weight management.      Plan:    1. Morbid obesity (H) (Primary)  Patient was congratulated on her success thus far. Healthy habits to assist with further weight loss were discussed. She is now strongly considering surgery. Task list reviewed. We will set up monthly visits with the dietitian and a consultation with Dr. Quiros. She will continue the phentermine.      2. Yeast dermatitis  She will use nystatin prn     Follow up next available with the dietitian and myself.            INTERIM HISTORY  Patient was taking wegovy for appetite and craving control and and got up to the 1.0 mg dose. She last took it 5 days ago. She did get nausea, headache and bloating from it and didn't feel it was helping to control her appetite.. She started taking phentermine on Sunday. She is noticing appetite reduction with it.    DIETARY HISTORY  Meals Per Day: 3  Eating Protein First?: sometimes  Food Diary: B:coffee and fruit or sometimes egg L:turkey or chicken and vegetables or protein shake D:starch and protein   Snacks Per Day: evenings- hunger driven  Typical Snack: sweets, peanut butter toast, peanut butter wafer bar  Fluid Intake:4-5 bottles per day  Portion Control: decreased  Calorie Containing  Beverages: none    Meals at Restaurant per week:0-1    Positive Changes Since Last Visit: trying to get adequate protein, water intake, exercise  Struggling With: didn't do well with wegovy    Knowledgeable in Reading Food Labels: yes  Getting Adequate Protein: working on it      PHYSICAL ACTIVITY PATTERNS:  Goes to the gym, bikes 6 miles or treadmill for 30 minutes or weight training (1-2 x per week)    REVIEW OF SYSTEMS  GENERAL/CONSTITUTIONAL:  Fatigue: worse on wegovy  HEENT:   glaucoma: no  CARDIOVASCULAR:  History of heart disease: no  GI:  Pancreatitis: no  PSYCHIATRIC:  Moods: stable  MUSCULOSKELETAL/RHEUMATOLOGIC  Arthralgias: no  Myalgias: no  ENDOCRINE:  Monitoring Blood Sugars: no  Sugars Well Controlled: na  No personal or family history of medullary thyroid cancer no  No personal or family history of MEN2   :  Birth control: hysterectomy  History of kidney stones: yes     Patient Profile   Social History     Social History Narrative     Not on file        Past Medical History   Past Medical History:   Diagnosis Date     Acanthosis nigricans      Anemia      Anxiety      BV (bacterial vaginosis)      Claustrophobia      History of anesthesia complications      Mental disorder      Migraines      Migraines      Missed ab      Morbid obesity (H)      Ovarian cystic mass      Pain in limb      Pelvic pain in female      PONV (postoperative nausea and vomiting)      Patient Active Problem List   Diagnosis     Vasa previa     History of  section     S/P      Ventral hernia without obstruction or gangrene     Ventral hernia     Morbid obesity (H)     Left sided abdominal pain       Past Surgical History  She has a past surgical history that includes  section; Recession resection (repair strabismus) bilateral (Bilateral, 2014); Recession resection (repair strabismus) bilateral (Bilateral, 2015);  section, immediate hysterectomy, combined (N/A, 2019);   "Section; Eye Muscle Surgery (Bilateral); Dilation and curettage (N/A, 2016);  Section (N/A, 2017); and Herniorrhaphy ventral (N/A, 2018).     Examination   /76 (BP Location: Right arm, Patient Position: Sitting, Cuff Size: Adult Regular)   Ht 1.676 m (5' 6\")   Wt (!) 145.4 kg (320 lb 8 oz)   LMP 2019   BMI 51.73 kg/m    Wt Readings from Last 4 Encounters:   25 (!) 145.4 kg (320 lb 8 oz)   11/10/24 145.2 kg (320 lb)   10/31/24 (!) 151.3 kg (333 lb 8 oz)   22 145.2 kg (320 lb)      BP Readings from Last 3 Encounters:   25 124/76   11/10/24 121/60   10/31/24 128/74      GEN: Alert and oriented in no acute distress.   HEENT: mucous membranes moist  CV: RRR no MRG         Counseling:   We reviewed the important post op bariatric recommendations:  -eating 3 meals daily  -eating protein first, getting >60gm protein daily  -eating slowly, chewing food well  -avoiding/limiting calorie containing beverages  -limiting starchy vegetables and carbohydrates, choosing wheat, not white with breads,   crackers, pastas, francis, bagels, tortillas, rice  -limiting restaurant or cafeteria eating to twice a week or less    We discussed the importance of restorative sleep and stress management in maintaining a healthy weight.  We discussed the National Weight Control Registry healthy weight maintenance strategies and ways to optimize metabolism.  We discussed the importance of physical activity including cardiovascular and strength training in maintaining a healthier weight.    Total time spent on the date of this encounter doing: chart review, review of test results, patient visit, physical exam, education, counseling, developing plan of care and documenting = 46 minutes.         KATE Lowery MD  [facility]               Again, thank you for allowing me to participate in the care of your patient.        Sincerely,        KATE Lowery, " MD    Electronically signed No

## 2025-03-04 ENCOUNTER — TELEPHONE (OUTPATIENT)
Dept: SURGERY | Facility: CLINIC | Age: 35
End: 2025-03-04
Payer: COMMERCIAL

## 2025-03-04 NOTE — TELEPHONE ENCOUNTER
I reached out to Mariela this morning to follow up with her after her last visit with Dr. Lowery.  She has decided to go surgical after being MWM since last fall.  I let her know that I have submitted a referral to [facility] for her to participate in the weight loss phone program.  She will need to complete 5 phone calls, be cleared by Gerry and her RD and complete her task list.  She understands that she needs to attend at least one support group.

## 2025-03-04 NOTE — TELEPHONE ENCOUNTER
Tasklist updated.     Shayy Vasquez RN, CBN  [facility]  P [phone]  F [phone]

## 2025-03-19 ENCOUNTER — VIRTUAL VISIT (OUTPATIENT)
Dept: SURGERY | Facility: CLINIC | Age: 35
End: 2025-03-19
Payer: COMMERCIAL

## 2025-03-19 DIAGNOSIS — E66.01 MORBID OBESITY (H): Primary | ICD-10-CM

## 2025-03-19 NOTE — PROGRESS NOTES
Virtual Visit Details    Type of service:  Video Visit   Video Start Time:  11:45 AM  Video End Time: 12:40 PM    Originating Location (pt. Location): Other car    Distant Location (provider location):  Off-site  Platform used for Video Visit: Zoom (Telehealth)    Mariela would like to follow through with bariatric surgery for health reasons. She has struggled with her weight for much of her life and now is concerned about various comorbidities. She has a history of depression, although no current symptoms. She has good knowledge of surgery and good support. She will follow up and complete psychological testing. F50.9; E66.01

## 2025-04-02 ENCOUNTER — TRANSFERRED RECORDS (OUTPATIENT)
Dept: HEALTH INFORMATION MANAGEMENT | Facility: CLINIC | Age: 35
End: 2025-04-02

## 2025-04-02 ENCOUNTER — VIRTUAL VISIT (OUTPATIENT)
Dept: SURGERY | Facility: CLINIC | Age: 35
End: 2025-04-02
Payer: COMMERCIAL

## 2025-04-02 DIAGNOSIS — E66.01 MORBID OBESITY (H): Primary | ICD-10-CM

## 2025-04-02 ASSESSMENT — SLEEP AND FATIGUE QUESTIONNAIRES
HOW LIKELY ARE YOU TO NOD OFF OR FALL ASLEEP WHILE WATCHING TV: SLIGHT CHANCE OF DOZING
HOW LIKELY ARE YOU TO NOD OFF OR FALL ASLEEP WHILE SITTING AND TALKING TO SOMEONE: WOULD NEVER DOZE
HOW LIKELY ARE YOU TO NOD OFF OR FALL ASLEEP WHEN YOU ARE A PASSENGER IN A CAR FOR AN HOUR WITHOUT A BREAK: WOULD NEVER DOZE
HOW LIKELY ARE YOU TO NOD OFF OR FALL ASLEEP WHILE SITTING AND READING: WOULD NEVER DOZE
HOW LIKELY ARE YOU TO NOD OFF OR FALL ASLEEP WHILE SITTING QUIETLY AFTER LUNCH WITHOUT ALCOHOL: WOULD NEVER DOZE
HOW LIKELY ARE YOU TO NOD OFF OR FALL ASLEEP WHILE SITTING INACTIVE IN A PUBLIC PLACE: WOULD NEVER DOZE
HOW LIKELY ARE YOU TO NOD OFF OR FALL ASLEEP WHILE LYING DOWN TO REST IN THE AFTERNOON WHEN CIRCUMSTANCES PERMIT: SLIGHT CHANCE OF DOZING
HOW LIKELY ARE YOU TO NOD OFF OR FALL ASLEEP IN A CAR, WHILE STOPPED FOR A FEW MINUTES IN TRAFFIC: WOULD NEVER DOZE

## 2025-04-02 NOTE — PROGRESS NOTES
Virtual Visit Details    Type of service:  Video Visit   Video Start Time:  10:45 AM  Video End Time: 11:25 AM    Originating Location (pt. Location): Home    Distant Location (provider location):  Off-site  Platform used for Video Visit: Melyssa Sierra has made quality changes in eating and lifestyle and appears more mindful about her eating. She is now ready to proceed with surgery. A report was sent to the clinic. F50.9; E66.01

## 2025-04-02 NOTE — PROGRESS NOTES
Outpatient Bariatric Medicine Progress Note-Structured Weight Loss   Indication: Medical bariatric therapy to precede bariatric surgery    Surgery:   undecided    Surgeon: not determined    Impression     34 year old female with Body mass index is 50.44 kg/m . in the setting of morbid obesity which satisfies the NIH criteria for bariatric surgery.  Comorbidities:  Patient Active Problem List   Diagnosis    Vasa previa    History of  section    S/P     Ventral hernia without obstruction or gangrene    Ventral hernia    Morbid obesity (H)    Left sided abdominal pain       Plan   Weight will need to be 334 or less prior to the 2 week pre-operative diet.  Initial labs complete and reviewed: yes  Dietician visits initiated/cleared: yes  Psychologist visits initiated/cleared: yes  HCM UTD: yes  Heparin Protocol: standard  CPAP: na  Actigall: yes  Exercise Plan: goes to gym, treadmill or bike and weight lifting  Contraception Plan: hysterectomy  Agrees to take vitamins for Life: yes  A1C < 8.0: yes  Letters of support/clearance: na  Using CPAP if needed: none  Tobacco use: quit 2024, needs final nicotine screen with pre op labs  Support group needed  Finish [facility] calls  She will continue the phentermine until 1 week before surgery  Once the task list has been completed she will meet with the surgeon    Past Surgical History     Past Surgical History:   Procedure Laterality Date     SECTION       SECTION      X2     SECTION N/A 2017    Procedure: REPEAT  SECTION;  Surgeon: Shayy Aviles MD;  Location: [facility];  Service:      SECTION, IMMEDIATE HYSTERECTOMY, COMBINED N/A 2019    Procedure: Repeat  Section, Hysterectomy, Exploratory Laparotomy, Bilateral Salpingectomy, Cystoscopy;  Surgeon: Joan Carmona MD;  Location:  OR    DILATION AND CURETTAGE N/A 2016    Procedure: DILATION AND CURETTAGE SUCTION;   "Surgeon: Ant Parikh MD;  Location: [facility];  Service:     EYE MUSCLE SURGERY Bilateral     strabismus    HERNIORRHAPHY VENTRAL N/A 6/29/2018    Procedure: REPAIR, HERNIA, VENTRAL, OPEN;  Surgeon: Alfredo Boland MD;  Location: [facility];  Service:     RECESSION RESECTION (REPAIR STRABISMUS) BILATERAL Bilateral 12/11/2014    Procedure: RECESSION RESECTION (REPAIR STRABISMUS) BILATERAL;  Surgeon: Titi Awan MD;  Location: [facility]    RECESSION RESECTION (REPAIR STRABISMUS) BILATERAL Bilateral 4/2/2015    Procedure: RECESSION RESECTION (REPAIR STRABISMUS) BILATERAL;  Surgeon: Titi Awan MD;  Location: [facility]     Family History, Social History   Reviewed as documented. See time and date confirmation.  Medications and Allergies      Current Outpatient Medications   Medication Sig Dispense Refill    nystatin (MYCOSTATIN) 421256 UNIT/GM external ointment Use 2 x daily prn 15 g 1    nystatin (MYCOSTATIN) 546045 UNIT/GM external powder Apply topically 2 times daily as needed for other (rash). 30 g 3    phentermine (ADIPEX-P) 37.5 MG tablet Take 1 tablet every morning with breakfast. 90 tablet 0     Allergies: Covid-19 mrna vacc (moderna), Penicillins, and Amoxicillin  Dietary History   Patient has been working closely with our dietician  Positive Changes Since Last Visit: mindful eating, portion control, exercise  Struggling With: vitamins make her nauseated at times  Review of Systems   GENERAL/CONSTITUTIONAL:  Fatigue: sometimes  HEENT:  Dental Pain: no  Trouble Swallowing: no  PULMONARY:  Breathing difficulties  GASTROINTESTINAL:  GERD/Heartburn: no  PSYCHIATRIC:  Moods: stable  ENDOCRINE:  Monitoring Blood Sugars: no  Sugars Well Controlled: na  Physical Exam   Vitals: /76 (BP Location: Right arm, Patient Position: Sitting, Cuff Size: Adult Large)   Ht 1.676 m (5' 6\")   Wt (!) 141.7 kg (312 lb 8 oz)   LMP 04/12/2019   BMI 50.44 kg/m    Body mass index is " 50.44 kg/m .  GENERAL: appears her stated age. Ambulatory.  HEART: Rhythm regular, rate regular, no murmur     Counseling   We discussed the National Weight Control Registry and Healthy Weight Maintenance Strategies.   We discussed ways to optimize her metabolism.  We discussed specific exercise goals and dietary habits conducive to a healthy weight.  We discussed the importance of lifelong vitamin supplementation and the potential medical complications of vitamin non-compliance.  We discussed the importance of restorative sleep and stress management in maintaining a healthy weight.  I reminded her to avoid alcohol for 1 year post-operatively, to avoid NSAIDS for life unless specifically indicated and used with a concomitant PPI, to avoid caffeine in excess, and explained the importance of lifelong tobacco abstinence.    Total time spent on the date of this encounter doing: chart review, review of test results, patient visit, physical exam, education, counseling, developing plan of care and documenting = 53 minutes.

## 2025-04-03 ENCOUNTER — OFFICE VISIT (OUTPATIENT)
Dept: SURGERY | Facility: CLINIC | Age: 35
End: 2025-04-03
Payer: COMMERCIAL

## 2025-04-03 VITALS
BODY MASS INDEX: 47.09 KG/M2 | HEIGHT: 66 IN | WEIGHT: 293 LBS | SYSTOLIC BLOOD PRESSURE: 130 MMHG | DIASTOLIC BLOOD PRESSURE: 76 MMHG

## 2025-04-03 DIAGNOSIS — R21 RASH: ICD-10-CM

## 2025-04-03 RX ORDER — NYSTATIN 100000 [USP'U]/G
POWDER TOPICAL 2 TIMES DAILY PRN
Qty: 30 G | Refills: 3 | Status: SHIPPED | OUTPATIENT
Start: 2025-04-03

## 2025-04-03 NOTE — LETTER
4/3/2025      Mariela Hawkins  [address] [address]      Dear Colleague,    Thank you for referring your patient, Mariela Hawkins, to the [facility] [place]. Please see a copy of my visit note below.    Outpatient Bariatric Medicine Progress Note-Structured Weight Loss   Indication: Medical bariatric therapy to precede bariatric surgery    Surgery:   undecided    Surgeon: not determined    Impression     34 year old female with Body mass index is 50.44 kg/m . in the setting of morbid obesity which satisfies the NIH criteria for bariatric surgery.  Comorbidities:  Patient Active Problem List   Diagnosis     Vasa previa     History of  section     S/P      Ventral hernia without obstruction or gangrene     Ventral hernia     Morbid obesity (H)     Left sided abdominal pain       Plan   Weight will need to be 334 or less prior to the 2 week pre-operative diet.  Initial labs complete and reviewed: yes  Dietician visits initiated/cleared: yes  Psychologist visits initiated/cleared: yes  HCM UTD: yes  Heparin Protocol: standard  CPAP: na  Actigall: yes  Exercise Plan: goes to gym, treadmill or bike and weight lifting  Contraception Plan: hysterectomy  Agrees to take vitamins for Life: yes  A1C < 8.0: yes  Letters of support/clearance: na  Using CPAP if needed: none  Tobacco use: quit 2024, needs final nicotine screen with pre op labs  Support group needed  Finish [facility] calls  She will continue the phentermine until 1 week before surgery  Once the task list has been completed she will meet with the surgeon    Past Surgical History     Past Surgical History:   Procedure Laterality Date      SECTION        SECTION      X2      SECTION N/A 2017    Procedure: REPEAT  SECTION;  Surgeon: Shayy Aviles MD;  Location: [facility] OR;  Service:       SECTION, IMMEDIATE HYSTERECTOMY,  COMBINED N/A 2019    Procedure: Repeat  Section, Hysterectomy, Exploratory Laparotomy, Bilateral Salpingectomy, Cystoscopy;  Surgeon: Joan Carmona MD;  Location:  OR     DILATION AND CURETTAGE N/A 2016    Procedure: DILATION AND CURETTAGE SUCTION;  Surgeon: Ant Parikh MD;  Location: [facility];  Service:      EYE MUSCLE SURGERY Bilateral     strabismus     HERNIORRHAPHY VENTRAL N/A 2018    Procedure: REPAIR, HERNIA, VENTRAL, OPEN;  Surgeon: Alfredo Boland MD;  Location: [facility];  Service:      RECESSION RESECTION (REPAIR STRABISMUS) BILATERAL Bilateral 2014    Procedure: RECESSION RESECTION (REPAIR STRABISMUS) BILATERAL;  Surgeon: Titi Awan MD;  Location: [facility]     RECESSION RESECTION (REPAIR STRABISMUS) BILATERAL Bilateral 2015    Procedure: RECESSION RESECTION (REPAIR STRABISMUS) BILATERAL;  Surgeon: Titi Awan MD;  Location: [facility]     Family History, Social History   Reviewed as documented. See time and date confirmation.  Medications and Allergies      Current Outpatient Medications   Medication Sig Dispense Refill     nystatin (MYCOSTATIN) 802321 UNIT/GM external ointment Use 2 x daily prn 15 g 1     nystatin (MYCOSTATIN) 481111 UNIT/GM external powder Apply topically 2 times daily as needed for other (rash). 30 g 3     phentermine (ADIPEX-P) 37.5 MG tablet Take 1 tablet every morning with breakfast. 90 tablet 0     Allergies: Covid-19 mrna vacc (moderna), Penicillins, and Amoxicillin  Dietary History   Patient has been working closely with our dietician  Positive Changes Since Last Visit: mindful eating, portion control, exercise  Struggling With: vitamins make her nauseated at times  Review of Systems   GENERAL/CONSTITUTIONAL:  Fatigue: sometimes  HEENT:  Dental Pain: no  Trouble Swallowing: no  PULMONARY:  Breathing difficulties  GASTROINTESTINAL:  GERD/Heartburn: no  PSYCHIATRIC:  Moods:  "stable  ENDOCRINE:  Monitoring Blood Sugars: no  Sugars Well Controlled: na  Physical Exam   Vitals: /76 (BP Location: Right arm, Patient Position: Sitting, Cuff Size: Adult Large)   Ht 1.676 m (5' 6\")   Wt (!) 141.7 kg (312 lb 8 oz)   LMP 04/12/2019   BMI 50.44 kg/m    Body mass index is 50.44 kg/m .  GENERAL: appears her stated age. Ambulatory.  HEART: Rhythm regular, rate regular, no murmur     Counseling   We discussed the National Weight Control Registry and Healthy Weight Maintenance Strategies.   We discussed ways to optimize her metabolism.  We discussed specific exercise goals and dietary habits conducive to a healthy weight.  We discussed the importance of lifelong vitamin supplementation and the potential medical complications of vitamin non-compliance.  We discussed the importance of restorative sleep and stress management in maintaining a healthy weight.  I reminded her to avoid alcohol for 1 year post-operatively, to avoid NSAIDS for life unless specifically indicated and used with a concomitant PPI, to avoid caffeine in excess, and explained the importance of lifelong tobacco abstinence.    Total time spent on the date of this encounter doing: chart review, review of test results, patient visit, physical exam, education, counseling, developing plan of care and documenting = 53 minutes.          I, Mariela Hawkins, verbally decline a resident to be present in today's visit.       Again, thank you for allowing me to participate in the care of your patient.        Sincerely,        KATE Lowery MD    Electronically signed"

## 2025-04-03 NOTE — Clinical Note
Hi. She has questions about the [facility] calls. She thinks she has 2 left and will be attending support group in April. She is otherwise good to go. Could you please call her?

## 2025-04-14 ENCOUNTER — TELEPHONE (OUTPATIENT)
Dept: SURGERY | Facility: CLINIC | Age: 35
End: 2025-04-14
Payer: COMMERCIAL

## 2025-04-14 NOTE — TELEPHONE ENCOUNTER
Mariela has her 4th call with [facility] today, going to support group tomorrow April 15 and her last [facility] call is on May 5.  She will call me once she's completed the calls and I will get her scheduled for her surgical consult    At the request of Dr. Lowery, I reached out to Mariela to answer some questions she has.  Still needs to attend support group and has 2 more calls with [facility].  When I called she was at an appointment with her child and asked if I could call her back.  I did call back and left a message that she could call me directly and my contact info

## 2025-05-08 ENCOUNTER — DOCUMENTATION ONLY (OUTPATIENT)
Dept: SURGERY | Facility: CLINIC | Age: 35
End: 2025-05-08

## 2025-05-08 ENCOUNTER — OFFICE VISIT (OUTPATIENT)
Dept: SURGERY | Facility: CLINIC | Age: 35
End: 2025-05-08
Payer: COMMERCIAL

## 2025-05-08 VITALS — WEIGHT: 293 LBS | BODY MASS INDEX: 50.28 KG/M2

## 2025-05-08 DIAGNOSIS — E66.01 MORBID OBESITY WITH BMI OF 50.0-59.9, ADULT (H): Primary | ICD-10-CM

## 2025-05-08 RX ORDER — ACETAMINOPHEN 325 MG/1
975 TABLET ORAL ONCE
OUTPATIENT
Start: 2025-05-08 | End: 2025-05-08

## 2025-05-08 RX ORDER — HEPARIN SODIUM 5000 [USP'U]/.5ML
5000 INJECTION, SOLUTION INTRAVENOUS; SUBCUTANEOUS ONCE
OUTPATIENT
Start: 2025-05-08 | End: 2025-05-08

## 2025-05-08 RX ORDER — GABAPENTIN 300 MG/1
600 CAPSULE ORAL
OUTPATIENT
Start: 2025-05-08

## 2025-05-08 RX ORDER — ONDANSETRON 2 MG/ML
4 INJECTION INTRAMUSCULAR; INTRAVENOUS
OUTPATIENT
Start: 2025-05-08

## 2025-05-08 RX ORDER — PREDNISOLONE ACETATE 10 MG/ML
SUSPENSION/ DROPS OPHTHALMIC
COMMUNITY
Start: 2025-05-01

## 2025-05-08 NOTE — PROGRESS NOTES
HPI: Mariela Hawkins is a 34 year old female here today for consideration of metabolic and bariatric surgery. She is referred by Dr. Mccoy.  She has struggled with obesity for most of her life, going back into her early childhood years.  She is currently down from her highest weight of 330 pounds.  She has undertaken multiple efforts at weight loss over the years, including self-directed low calorie diets, increased exercise regimens, and physician supervised weight loss efforts utilizing prescription medications including phentermine.  She has been working with our group since October of last year on weight loss with a 20 pound weight loss since she began.. Didn't tolerate GLP1 therapy well.      Her bariatric comorbidities include prediabetes, migraine headaches.    Bariatric comorbidities not present include type 2 diabetes, hypertension, obstructive sleep apnea, GERD.      Allergies:Covid-19 mrna vacc (moderna), Penicillins, and Amoxicillin    Past Medical History:   Diagnosis Date    Acanthosis nigricans     Anemia     Anxiety     BV (bacterial vaginosis)     Claustrophobia     History of anesthesia complications     Mental disorder     Migraines     Migraines     Missed ab     Morbid obesity (H)     Ovarian cystic mass     Pain in limb     Pelvic pain in female     PONV (postoperative nausea and vomiting)        Past Surgical History:   Procedure Laterality Date     SECTION       SECTION      X2     SECTION N/A 2017    Procedure: REPEAT  SECTION;  Surgeon: Shayy Aviles MD;  Location: [facility];  Service:      SECTION, IMMEDIATE HYSTERECTOMY, COMBINED N/A 2019    Procedure: Repeat  Section, Hysterectomy, Exploratory Laparotomy, Bilateral Salpingectomy, Cystoscopy;  Surgeon: Joan Carmona MD;  Location:  OR    DILATION AND CURETTAGE N/A 2016    Procedure: DILATION AND CURETTAGE SUCTION;  Surgeon: Ant Parikh MD;   Location: [facility];  Service:     EYE MUSCLE SURGERY Bilateral     strabismus    HERNIORRHAPHY VENTRAL N/A 6/29/2018    Procedure: REPAIR, HERNIA, VENTRAL, OPEN;  Surgeon: Alfredo Boland MD;  Location: [facility];  Service:     RECESSION RESECTION (REPAIR STRABISMUS) BILATERAL Bilateral 12/11/2014    Procedure: RECESSION RESECTION (REPAIR STRABISMUS) BILATERAL;  Surgeon: Titi Awan MD;  Location: [facility]    RECESSION RESECTION (REPAIR STRABISMUS) BILATERAL Bilateral 4/2/2015    Procedure: RECESSION RESECTION (REPAIR STRABISMUS) BILATERAL;  Surgeon: Titi Awan MD;  Location: [facility]       CURRENT MEDS:  Prior to Admission medications    Medication Sig Start Date End Date Taking? Authorizing Provider   prednisoLONE acetate (PRED FORTE) 1 % ophthalmic suspension SHAKE LIQUID AND INSTILL 1 DROP IN RIGHT EYE FOUR TIMES DAILY FOR 7 DAYS 5/1/25  Yes Reported, Patient   nystatin (MYCOSTATIN) 076093 UNIT/GM external ointment Use 2 x daily prn 10/31/24   JULIETA Lowery MD   nystatin (MYCOSTATIN) 964273 UNIT/GM external powder Apply topically 2 times daily as needed for other (rash). 4/3/25   JULIETA Lowery MD   phentermine (ADIPEX-P) 37.5 MG tablet Take 1 tablet every morning with breakfast. 2/25/25   JULIETA Lowery MD         Social Connections: Not on file       Family History   Problem Relation Age of Onset    Anxiety Disorder Mother     Asthma Mother     Thyroid Disease Mother     No Known Problems Father     Diabetes Son         t1d    Cerebrovascular Disease Maternal Grandmother     Cancer Paternal Grandmother     No Known Problems Son     No Known Problems Son         reports that she has never smoked. She has never used smokeless tobacco. She reports that she does not drink alcohol and does not use drugs.    History   Smoking Status    Never   Smokeless Tobacco    Never       Review of Systems -  A complete ROS was reviewed and except for what is listed in the HPI  above, all others are negative  PSYCHIATRIC: She has undergone a lifestyle assessment and has been deemed a good candidate for bariatric surgery by the psychologist.    Wt (!) 141.3 kg (311 lb 8 oz)   LMP 04/12/2019   BMI 50.28 kg/m      Wt Readings from Last 4 Encounters:   05/08/25 (!) 141.3 kg (311 lb 8 oz)   04/03/25 (!) 141.7 kg (312 lb 8 oz)   02/25/25 (!) 145.4 kg (320 lb 8 oz)   11/10/24 145.2 kg (320 lb)        Body mass index is 50.28 kg/m .    EXAM:  GENERAL: This is a well-developed 34 year old female who appears her stated age  HEAD & NECK: Grossly normal.  No visible goiter or scars  CARDIAC: Regular rate and rhythm  CHEST/LUNG: Normal respiratory effort  ABDOMEN: Obese.  No visible hernias or masses appreciated.  LYMPHATIC:  No significant adenopathy visualized.    EXTREMITIES: Grossly normal.  No evidence of chronic venous stasis.    NEUROLOGIC: Focally intact  INTEGUMENT: No open lesions or ulcers appreciated visually  PSYCHIATRIC: Normal affect. She has a good grasp on the nature of her obesity and the treatment options.    LABS:      Assessment/Plan: 34 year old female who is an excellent candidate for bariatric and metabolic surgery.  After a careful conversation with the patient it was decided that a laparoscopic sleeve gastrectomy would be her best option.       I went over the surgery in detail with her.  I went over the nature of the operation and some of the potential consequences of the surgery.  I went over the expected hospital course and discussed laparoscopic versus open surgery, understanding that we will plan on doing this laparoscopically with the possibility of having to convert to an open operation.  I went over some of the risks and complications of the operation including, but not limited to, DVT, pulmonary emboli, pneumonia, postoperative bleeding, wound infection, staple line leak, intra-abdominal sepsis, and possible death.  I also went over some of the potential nutritional  concerns such as vitamin B-12, iron, vitamin D, vitamin A, calcium and protein deficiencies.  I will also went over the need for lifelong nutritional surveillance.  This includes our regular scheduled follow up of a 1 week post op dietician visit, 2 week post op surgeon visit, an then additional dietician visits at 1 month, 3 months, and 9 months.  They are to follow up with a physician in our program at 6 months and 1 year, and annual thereafter.  The patient understands and wants to proceed with surgery.  We will submit for prior authorization.      Garrick Flores MD ,MD  [facility] Department of Surgery

## 2025-05-08 NOTE — LETTER
2025      Mariela Hawkins  [address] [facility] [address]      Dear Colleague,    Thank you for referring your patient, Mariela Hawkins, to the [facility] [place]. Please see a copy of my visit note below.    [facility] Informed Consent for Bariatric Surgery from the clinic was given to the patient, reviewed, signed and sent for scanning.       HPI: Mariela Hawkins is a 34 year old female here today for consideration of metabolic and bariatric surgery. She is referred by Dr. Mccoy.  She has struggled with obesity for most of her life, going back into her early childhood years.  She is currently down from her highest weight of 330 pounds.  She has undertaken multiple efforts at weight loss over the years, including self-directed low calorie diets, increased exercise regimens, and physician supervised weight loss efforts utilizing prescription medications including phentermine.  She has been working with our group since October of last year on weight loss with a 20 pound weight loss since she began.. Didn't tolerate GLP1 therapy well.      Her bariatric comorbidities include prediabetes, migraine headaches.    Bariatric comorbidities not present include type 2 diabetes, hypertension, obstructive sleep apnea, GERD.      Allergies:Covid-19 mrna vacc (moderna), Penicillins, and Amoxicillin    Past Medical History:   Diagnosis Date     Acanthosis nigricans      Anemia      Anxiety      BV (bacterial vaginosis)      Claustrophobia      History of anesthesia complications      Mental disorder      Migraines      Migraines      Missed ab      Morbid obesity (H)      Ovarian cystic mass      Pain in limb      Pelvic pain in female      PONV (postoperative nausea and vomiting)        Past Surgical History:   Procedure Laterality Date      SECTION        SECTION      X2      SECTION N/A 2017    Procedure: REPEAT  SECTION;   Surgeon: Shayy Aviles MD;  Location: [facility] L[facility] OR;  Service:       SECTION, IMMEDIATE HYSTERECTOMY, COMBINED N/A 2019    Procedure: Repeat  Section, Hysterectomy, Exploratory Laparotomy, Bilateral Salpingectomy, Cystoscopy;  Surgeon: Joan Carmona MD;  Location:  OR     DILATION AND CURETTAGE N/A 2016    Procedure: DILATION AND CURETTAGE SUCTION;  Surgeon: Ant Parikh MD;  Location: [facility] OR;  Service:      EYE MUSCLE SURGERY Bilateral     strabismus     HERNIORRHAPHY VENTRAL N/A 2018    Procedure: REPAIR, HERNIA, VENTRAL, OPEN;  Surgeon: Alfredo Boland MD;  Location: [facility] OR;  Service:      RECESSION RESECTION (REPAIR STRABISMUS) BILATERAL Bilateral 2014    Procedure: RECESSION RESECTION (REPAIR STRABISMUS) BILATERAL;  Surgeon: Titi Awan MD;  Location: [facility]     RECESSION RESECTION (REPAIR STRABISMUS) BILATERAL Bilateral 2015    Procedure: RECESSION RESECTION (REPAIR STRABISMUS) BILATERAL;  Surgeon: Titi Awan MD;  Location: [facility]       CURRENT MEDS:  Prior to Admission medications    Medication Sig Start Date End Date Taking? Authorizing Provider   prednisoLONE acetate (PRED FORTE) 1 % ophthalmic suspension SHAKE LIQUID AND INSTILL 1 DROP IN RIGHT EYE FOUR TIMES DAILY FOR 7 DAYS 25  Yes Reported, Patient   nystatin (MYCOSTATIN) 892032 UNIT/GM external ointment Use 2 x daily prn 10/31/24   JULIETA Lowery MD   nystatin (MYCOSTATIN) 999319 UNIT/GM external powder Apply topically 2 times daily as needed for other (rash). 4/3/25   JULIETA Lowery MD   phentermine (ADIPEX-P) 37.5 MG tablet Take 1 tablet every morning with breakfast. 25   JULIETA Lowery MD         Social Connections: Not on file       Family History   Problem Relation Age of Onset     Anxiety Disorder Mother      Asthma Mother      Thyroid Disease Mother      No Known Problems Father      Diabetes Son         t1d      Cerebrovascular Disease Maternal Grandmother      Cancer Paternal Grandmother      No Known Problems Son      No Known Problems Son         reports that she has never smoked. She has never used smokeless tobacco. She reports that she does not drink alcohol and does not use drugs.    History   Smoking Status     Never   Smokeless Tobacco     Never       Review of Systems -  A complete ROS was reviewed and except for what is listed in the HPI above, all others are negative  PSYCHIATRIC: She has undergone a lifestyle assessment and has been deemed a good candidate for bariatric surgery by the psychologist.    Wt (!) 141.3 kg (311 lb 8 oz)   LMP 04/12/2019   BMI 50.28 kg/m      Wt Readings from Last 4 Encounters:   05/08/25 (!) 141.3 kg (311 lb 8 oz)   04/03/25 (!) 141.7 kg (312 lb 8 oz)   02/25/25 (!) 145.4 kg (320 lb 8 oz)   11/10/24 145.2 kg (320 lb)        Body mass index is 50.28 kg/m .    EXAM:  GENERAL: This is a well-developed 34 year old female who appears her stated age  HEAD & NECK: Grossly normal.  No visible goiter or scars  CARDIAC: Regular rate and rhythm  CHEST/LUNG: Normal respiratory effort  ABDOMEN: Obese.  No visible hernias or masses appreciated.  LYMPHATIC:  No significant adenopathy visualized.    EXTREMITIES: Grossly normal.  No evidence of chronic venous stasis.    NEUROLOGIC: Focally intact  INTEGUMENT: No open lesions or ulcers appreciated visually  PSYCHIATRIC: Normal affect. She has a good grasp on the nature of her obesity and the treatment options.    LABS:      Assessment/Plan: 34 year old female who is an excellent candidate for bariatric and metabolic surgery.  After a careful conversation with the patient it was decided that a laparoscopic sleeve gastrectomy would be her best option.       I went over the surgery in detail with her.  I went over the nature of the operation and some of the potential consequences of the surgery.  I went over the expected hospital course and discussed  laparoscopic versus open surgery, understanding that we will plan on doing this laparoscopically with the possibility of having to convert to an open operation.  I went over some of the risks and complications of the operation including, but not limited to, DVT, pulmonary emboli, pneumonia, postoperative bleeding, wound infection, staple line leak, intra-abdominal sepsis, and possible death.  I also went over some of the potential nutritional concerns such as vitamin B-12, iron, vitamin D, vitamin A, calcium and protein deficiencies.  I will also went over the need for lifelong nutritional surveillance.  This includes our regular scheduled follow up of a 1 week post op dietician visit, 2 week post op surgeon visit, an then additional dietician visits at 1 month, 3 months, and 9 months.  They are to follow up with a physician in our program at 6 months and 1 year, and annual thereafter.  The patient understands and wants to proceed with surgery.  We will submit for prior authorization.      Garrick Flores MD ,MD  [facility] Department of Surgery    Again, thank you for allowing me to participate in the care of your patient.        Sincerely,        Garrick Flores MD    Electronically signed

## 2025-05-08 NOTE — PROGRESS NOTES
[facility] Informed Consent for Bariatric Surgery from the clinic was given to the patient, reviewed, signed and sent for scanning.

## 2025-05-18 ENCOUNTER — APPOINTMENT (OUTPATIENT)
Dept: RADIOLOGY | Facility: CLINIC | Age: 35
End: 2025-05-18
Attending: EMERGENCY MEDICINE

## 2025-05-18 ENCOUNTER — HOSPITAL ENCOUNTER (EMERGENCY)
Facility: CLINIC | Age: 35
Discharge: HOME | End: 2025-05-18
Attending: EMERGENCY MEDICINE | Admitting: EMERGENCY MEDICINE

## 2025-05-18 ENCOUNTER — APPOINTMENT (OUTPATIENT)
Dept: CT IMAGING | Facility: CLINIC | Age: 35
End: 2025-05-18
Attending: EMERGENCY MEDICINE

## 2025-05-18 VITALS
BODY MASS INDEX: 47.09 KG/M2 | TEMPERATURE: 97.6 F | OXYGEN SATURATION: 100 % | DIASTOLIC BLOOD PRESSURE: 55 MMHG | HEIGHT: 66 IN | SYSTOLIC BLOOD PRESSURE: 116 MMHG | WEIGHT: 293 LBS | RESPIRATION RATE: 16 BRPM | HEART RATE: 67 BPM
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
## 2025-05-18 DIAGNOSIS — R10.84 GENERALIZED ABDOMINAL PAIN: ICD-10-CM

## 2025-05-18 DIAGNOSIS — M54.50 ACUTE MIDLINE LOW BACK PAIN WITHOUT SCIATICA: ICD-10-CM

## 2025-05-18 PROCEDURE — 250N000013 HC RX MED GY IP 250 OP 250 PS 637: Performed by: EMERGENCY MEDICINE

## 2025-05-18 PROCEDURE — 250N000011 HC RX IP 250 OP 636: Performed by: EMERGENCY MEDICINE

## 2025-05-18 PROCEDURE — 74177 CT ABD & PELVIS W/CONTRAST: CPT

## 2025-05-18 PROCEDURE — 99285 EMERGENCY DEPT VISIT HI MDM: CPT | Mod: 25

## 2025-05-18 PROCEDURE — 72100 X-RAY EXAM L-S SPINE 2/3 VWS: CPT

## 2025-05-18 RX ORDER — HYDROCODONE BITARTRATE AND ACETAMINOPHEN 5; 325 MG/1; MG/1
2 TABLET ORAL ONCE
Refills: 0 | Status: COMPLETED | OUTPATIENT
Start: 2025-05-18 | End: 2025-05-18

## 2025-05-18 RX ORDER — ONDANSETRON 4 MG/1
4 TABLET, ORALLY DISINTEGRATING ORAL ONCE
Status: COMPLETED | OUTPATIENT
Start: 2025-05-18 | End: 2025-05-18

## 2025-05-18 RX ORDER — HYDROCODONE BITARTRATE AND ACETAMINOPHEN 5; 325 MG/1; MG/1
1 TABLET ORAL EVERY 6 HOURS PRN
Qty: 8 TABLET | Refills: 0 | Status: SHIPPED | OUTPATIENT
Start: 2025-05-18 | End: 2025-05-21

## 2025-05-18 RX ORDER — IOPAMIDOL 755 MG/ML
100 INJECTION, SOLUTION INTRAVASCULAR ONCE
Status: COMPLETED | OUTPATIENT
Start: 2025-05-18 | End: 2025-05-18

## 2025-05-18 RX ADMIN — IOPAMIDOL 100 ML: 755 INJECTION, SOLUTION INTRAVENOUS at 20:45

## 2025-05-18 RX ADMIN — HYDROCODONE BITARTRATE AND ACETAMINOPHEN 2 TABLET: 5; 325 TABLET ORAL at 19:43

## 2025-05-18 RX ADMIN — ONDANSETRON 4 MG: 4 TABLET, ORALLY DISINTEGRATING ORAL at 19:43

## 2025-05-18 ASSESSMENT — ACTIVITIES OF DAILY LIVING (ADL)
ADLS_ACUITY_SCORE: 46

## 2025-05-18 ASSESSMENT — COLUMBIA-SUICIDE SEVERITY RATING SCALE - C-SSRS
6. HAVE YOU EVER DONE ANYTHING, STARTED TO DO ANYTHING, OR PREPARED TO DO ANYTHING TO END YOUR LIFE?: NO
2. HAVE YOU ACTUALLY HAD ANY THOUGHTS OF KILLING YOURSELF IN THE PAST MONTH?: NO
1. IN THE PAST MONTH, HAVE YOU WISHED YOU WERE DEAD OR WISHED YOU COULD GO TO SLEEP AND NOT WAKE UP?: NO
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
## 2025-05-20 ENCOUNTER — APPOINTMENT (OUTPATIENT)
Dept: RADIOLOGY | Facility: CLINIC | Age: 35
End: 2025-05-20
Attending: EMERGENCY MEDICINE
Payer: COMMERCIAL

## 2025-05-20 PROCEDURE — 72220 X-RAY EXAM SACRUM TAILBONE: CPT

## 2025-05-20 PROCEDURE — 72170 X-RAY EXAM OF PELVIS: CPT

## 2025-05-20 PROCEDURE — 99285 EMERGENCY DEPT VISIT HI MDM: CPT | Mod: 25

## 2025-05-20 RX ORDER — URSODIOL 300 MG/1
300 CAPSULE ORAL 2 TIMES DAILY
Qty: 180 CAPSULE | Refills: 1 | Status: SHIPPED | OUTPATIENT
Start: 2025-06-24

## 2025-05-20 RX ORDER — OMEPRAZOLE 20 MG/1
20 CAPSULE, DELAYED RELEASE ORAL DAILY
Qty: 90 CAPSULE | Refills: 0 | Status: SHIPPED | OUTPATIENT
Start: 2025-06-11

## 2025-05-20 RX ORDER — PEDI MULTIVIT NO.25/FOLIC ACID 300 MCG
1 TABLET,CHEWABLE ORAL 2 TIMES DAILY
Qty: 180 TABLET | Refills: 3 | Status: SHIPPED | OUTPATIENT
Start: 2025-05-20

## 2025-05-20 ASSESSMENT — COLUMBIA-SUICIDE SEVERITY RATING SCALE - C-SSRS
6. HAVE YOU EVER DONE ANYTHING, STARTED TO DO ANYTHING, OR PREPARED TO DO ANYTHING TO END YOUR LIFE?: NO
1. IN THE PAST MONTH, HAVE YOU WISHED YOU WERE DEAD OR WISHED YOU COULD GO TO SLEEP AND NOT WAKE UP?: NO
2. HAVE YOU ACTUALLY HAD ANY THOUGHTS OF KILLING YOURSELF IN THE PAST MONTH?: NO

## 2025-05-20 NOTE — PROGRESS NOTES
Patient attended group class to review pre-op instructions and dietary plan for upcoming surgery.     Discussed admission process, pre-op testing, and hospital course.  Medication recommendations and information about them before and after surgery given and explained.  Patient was given exercises to work on post-op for maintaining muscle mass and strengthening.  Bariatric quiz reviewed together to assess understanding.  Discharge instructions and follow up appointments given and reviewed with pt at this time and patient verbalized understanding. She will have her H&P on 5/30/2025 with JOEL Denton, CNP and do her  pre-op testing during that visit. Prescriptions for vitamins, Omeprazole, and Actigall sent to the patient's pharmacy to be started after surgery.      Melva Arias RN, CBN

## 2025-05-20 NOTE — PATIENT INSTRUCTIONS
Patient name: Mariela Hawkins  Surgery:  Sleeve Gastrectomy  Surgery Date:  6/10/2025  Surgery Time: 11:50 am  (*This time is just a tentative time and may end up changing.*)  Arrival Time to Hospital: 10:20 am (90 minutes prior to surgery time)  Surgeon: Garrick Flores MD       MONTH BEFORE SURGERY    Schedule and have Pre-operative History and Physical with your primary care in addition to the labs, CXR and EKG.  These should be completed within a month of surgery and no closer than 5 days.  ALL of the following tests must be completed per anesthesia and your surgeon prior to your surgery:    Chest Xray  EKG  Complete Blood Count with Differential  Complete Metabolic Panel  INR  APTT(PTT)  Urine Analysis (UA) with Urine Culture if UA abnormal  Hemaglobin A1c if hasn't been done in the last 3 months AND you are diabetic.  Urine Cotinine with Metabolites if you quit smoking within the last 6 months.    * Please let us know if you aren't feeling well or have tested positive for Covid-19 between now and your surgery date. Let us know if you have any questions regarding this.      2 WEEKS BEFORE SURGERY    Start Preoperative Liquid Diet per dietitian instructions      WEEK BEFORE SURGERY CHECKLIST       Continue Full Liquid Diet per dietitian instructions    2.   Purchase diet components for after surgery      3.   Arrange for someone to stay with you the first 1-2 nights you return home.     4.   Arrange for a ride home from the hospital.  We can't give an exact time for  because it depends on how you are doing with your drinking and pain control.  Most people are reaching their goals for discharge by lunch time and you will need to have a ride ready and waiting by 11 am.    5.   Do your grocery/vitamin shopping for before AND after surgery.    6.   Make sure you have a bowel movement if you haven t gone in a while. There is no need for a bowel prep before surgery.       7.   Make sure you have picked up  the prescriptions/supplements that were sent to your pharmacy - [facility]     NEW PRESCRIPTIONS:  In preparation for surgery, we have prescribed some medication that you will need to  as soon as possible     A. Vitamins: Chewable Multivitamin and Vitamin B12 (if you weren't taking already)  - You can start taking the Multivitamin and B12 as soon as you pick this up from the pharmacy.  Some insurance companies will not cover the vitamins because they are available over the counter, so in those cases you will need to purchase them yourselves.  Do not take the morning of surgery.    B. Acid Reducer:  Omeprazole (Prilosec) - If not already taking, you will get a prescription to start when you get home from the hospital.  Tablets cannot be cut or crushed.  If a capsule, entire capsule contents may be sprinkled on a spoonful of applesauce and taken immediately without chewing.  If only on a full liquid diet, dump capsule contents into a spoonful of liquid and take without chewing.  May swallow whole again starting 6 weeks after surgery but can try swallowing sooner if having issues with opening into food.  Continue after surgery for at least 3 months even without symptoms of acid reflux. Do not take the morning of surgery.     C. Gallstone Reduction: Ursodiol (Actigall ) if needed - With rapid weight loss, your risk of gallstones increases.  If your still have your gallbladder after surgery, you will be given a prescription for Actigall  that you should start taking 2 weeks after surgery.  Actigall  will help prevent gallstones from forming.  If you are prescribed this medication, you will usually take it for six months. We encourage you to take this tablet or capsule whole with warm or hot liquid to help it dissolve before it reaches your stomach pouch.  This medication is bigger than the   inch size restriction, but we will not have you cut/crush it due to it's unpleasant metallic taste. Common Side  Effects: constipation, diarrhea, upset stomach, nausea, dizziness       BEFORE Surgery Medication Considerations:  Certain medications may need to be stopped before major surgery. Some medications may increase your risk of bleeding, others may change the way your blood clots, and other medications can affect your lab work. The bariatric clinic will give you specific instructions about when to stop these medications.    This timeline shows when certain medications should be stopped before surgery: This is a general timeline, if your bariatric nurse or surgeon gives you other instructions, follow those specific instructions.    30 Days (One Month) Before Surgery  Birth control pills & patches that contain estrogen  You must use alternative forms of contraception  Hormone replacement therapy   Premarin  Testosterone  14 Days (Two Weeks) Before Surgery  Appetite control medications   Phentermine  Other: __________  Diuretics ( water pills )   Talk to your primary doctor.  You may need an alternative medication,  Hydrochlorothiazide (HCTZ)  Furosemide (Lasix )  Bumetanide (Bumex )  Spironolactone (Aldactone )  Chlorthalidone  Any blood pressure medication that is combined with a diuretic  Herbal supplements  Fish oil or Omega 3   Homeopathic remedies  7 Days (One Week) Before Surgery  Anti-platelet medications and medications that help to prevent blood clots:  Clopidogrel (Plavix ), Prasugrel (Effient ), Ticagrelor (Brilinta )  Aspirin/Dipyridamole (Aggrenox ), Dipyridamole (Persantine )  Cilostazol (Pletal )  All Non-Steroidal Anti-Inflammatory Drugs (NSAIDs):   Non-prescription: Ibuprofen (Advil , Motrin , Nuprin ), Naproxen (Naprosyn , Aleve , Anaprox ),   Prescription: Piroxicam (Feldene ), Sulindac (Clinoril ), Tolmentin (Tolectin ), Celecoxib (Celebrex ), Diclofenac (Voltaren ), Indomethacin (Indocin ), Nambumetone (Relafen ), Flurbiprofen (Ansaid ), Ketoprofen (Orudis ), Etodolac (Lodine ), Meloxicam (Mobic ),  Oxaprozin (Daypro )  Aspirin (including low-dose (81mg) and chewable  baby aspirin )  Warfarin (Coumadin , Jantoven )  When warfarin is restarted (usually 24-48 hrs after surgery), dosing and INR monitoring will be managed by the Bariatric Clinic for 4-6 weeks after your surgery date.   The Day Before Surgery  Diabetes medications: Follow the instructions on the  Diabetes Management for the Bariatric Surgery Patient  form.  The Day of Surgery  Diabetes medications: Follow the instructions on the  Diabetes Management for the Bariatric Surgery Patient  form.      HOME MEDICATION RECOMMENDATIONS:  Below you will see your specific medication instructions.  JOEL Denton, CNP will also review these with you at your H&P visit coming up on 5/30/2025    Hydrocodone/Acetaminophen (Brand Name: Vicodin or Norco)  Ok to cut/crush.    Nystatin  Ok to use. You are encouraged to bring to the hospital on the day of surgery if needed daily.    Phentermine (Brand Name: Adipex)    Stop taking 2 weeks before surgery.  Talk with your Bariatrician prior to restarting this medication after surgery.     Prednisolone Acetate (Pred-Forte eye drop)   Okay to continue to use and bring with you to the hospital if needed daily.        Packing for the Hospital  When packing for the hospital, anticipate staying overnight. Make a checklist so that you don't forget things you really want to have with you.    Bring a couple protein shakes with you to the hospital.  This is especially necessary if you are dairy intolerant because they don't have what you would need available at the hospital.  Bring a folder with your printed patient handouts to keep handy and add discharge paperwork to if you want.  Bring your insurance card.  Bring a current medication list OR update list in [facility] (including vitamins, over-the-counter medication, eye drops, inhalers, patches, ointments and herbal products). Include the name, correct dose and the times you  "take them each day. List all allergies.  If you use a CPAP or BIPAP, bring it with you.  You may also want to bring a water bottle of the water you use in your machine.  Bring a copy of your health care or advanced directive document if you have one.  You may bring your own gown/pajamas and robe if you don't want to wear the hospital-supplied items once IV is disconnected.  Slippers or shoes should have a non-slip sole.  You may bring your own personal care items such as toothbrush, toothpaste, shampoo, denture cleanser, comb, skin care products, deodorant, make-up, and hair dryer.  If you wear a hearing aid, bring a labeled storage container and extra batteries.  If you wear glasses or contacts, bring a labeled case and saline for contacts. Do not plan to wear contact lenses the day of surgery. It is best to bring your glasses so that you can wear them in the recovery room. You cannot wear contact lenses during surgery.  Bring loose-fitting clothing to wear home. Bring telephone numbers (including work numbers) of family and friends.  You may want to bring a journal to document your thoughts and feelings.  You may not wear any jewelry on or in any area of your body to the operating room.   Leave money, jewelry, or any valuables at home.        DAY BEFORE SURGERY CHECKLIST      Clear liquid diet until 4 hours prior to your surgery     2.  Nothing to eat or drink 4 hours prior to your surgery time.       3.  Pack a couple preferred protein shakes to have available in the hospital -See approved list of liquids from dietitian      4.  Use Hibiclens packet to shower.  Use a fresh, clean towel to dry off. See \"Showering Before Surgery handout\" below.       MORNING OF SURGERY CHECKLIST      Use second Hibiclens packet to shower. Use a fresh, clean towel to dry off. See \"Showering Before Surgery handout\" below.     2.   Remove all jewelry and piercings.      3.   Take any medications you have been advised to take the " morning of surgery with a sip of water. (See med list above)     4.   Arrive 90 minutes prior to your scheduled surgery time.    Showering Before Surgery  [email or web address]     Preparing Your Skin  [email or web address]    HOSPITAL STAY    Park in the [facility] Visitor [facility]ing Lot in [place] and check in at the information desk where they will escort you to the [facility].     You will be allowed to have 2 support people with you in the pre-operative area at the hospital.  After surgery on the recovery unit, you can have up to 4 visitors and they must leave when visitor hours are over.     Meet your surgical team which includes an RN, CRNA, anesthesiologist and your surgeon.    IV will be started for fluid management, pain medication and possible antibiotics.    Anticoagulant therapy (blood thinner) will be given and continued until you are discharged.    Pneumatic compression stockings will be placed on your legs before surgery to help prevent blood clots. You can also move your feet up and down frequently to aid in circulation.    An incentive spirometer will be used to promote good lung expansion and prevent pneumonia. This must be within arm's reach of you, and you should be doing it ten times every hour while awake.      PAIN MEDICATION IN THE HOSPITAL  Treating pain and discomfort is important to your recovery.  Your surgeon will order pain medication for you in the hospital.  You will also get a take home prescription for pain medication after surgery.  Our goal is not pain free, but an acceptable level of discomfort; you should be able to move without pain limiting your activity    In the Preoperative area, you will receive additional pain treatment   Intrathecal Spinae Block (Duramorph)- which is an ultrasound guided injection for pain medication   Ketorolac (Toradol )  This is an anti-inflammatory medication used to treat swelling and moderate pain  It is used only for 24  hours after surgery to decrease inflammation and pain  Given through your IV  Takes about 30 minutes to take effect  Common side effects: nausea, upset stomach  If your pain is not well controlled with this, you may be given a narcotic pain medication in addition to ketorolac    In Recovery, you will receive additional IV pain medication as needed and then you will be switched over to things you can take by mouth in preparation for going home.   Fentanyl   Hydromorphone (Dilaudid )    Common side effects: sleepiness, dizziness, upset stomach, constipation   Narcotic medications can alter your judgment, coordination and reaction time.  Do NOT drive or do anything that requires clear thinking and coordination until you have been completely off narcotic medications for at least 24 hours.    Use only to treat moderate to severe pain.  Ultram (Tramadol)  This is a non-narcotic prescription pain medication used for moderate to severe pain if needed.  Immediate release tablets can be cut or crushed.  Tramadol can cause or worsen seizures. Your risk of seizures is higher if you're taking other certain medications. These drugs include other opioid pain drugs or certain medications for depression, other mood disorders, or psychosis.  Tramadol oral tablet may cause drowsiness. You should not drive, use heavy machinery, or perform any dangerous activities until you know how this drug affects you.  Acetaminophen (Tylenol)   This will be giving by IV to start and then by mouth once you are ready  Use for mild pain or discomfort  Available without a prescription and comes in liquid, tablets, capsules and powder.  The adult strength powder packs can be helpful right after surgery and can be found online.  Maximum daily dosage: 3,000mg per 24 hours  Works best as a scheduled dose for the first three days once you get home in addition to the gabapentin as the inflammation goes down  Gabapentin  Used together with the acetaminophen to  "provide pain relief.  Can be taken every 8 hours as needed  The medication works by calming the nerve cells that transmit pain signals to the brain.  This is best in liquid form right after surgery but can have a unpleasant taste.    * Important Reminder: Do NOT take NSAID or aspirin medications that are available without a prescription (examples: Ibuprofen, Motrin , Advil , Aleve , Naproxen)      OPERATING ROOM    Hover Mat will be used to move you from one surface to another.    Nelida Hugger Gowns/Blankets are used to keep you warm.    Urinary catheters are not usually needed.    Surgery takes 30 minutes-3 hours depending on the procedure.    *Remember: How you go to sleep tends to be how you wake up - so pleasant thoughts are important!    RECOVERY ROOM    The surgeon will give an update to your family or support people as you are on your way to the recovery room.    You will wake up with a blood pressure cuff, oxygen and pulse monitor (pulse oximeter).    Frequent vital signs.    Pain Scale.      HOSPITAL ROOM    You will stay overnight on Patient Care Unit P2.    You will have a private room.    [facility] has been awarded an \"American College of Surgeons Center of Excellence\" partnered with the American Society for Metabolic and Bariatric Surgery and has a proven track records for quality care and good outcomes.     The staff has specialty training in the care of weight-loss surgery patients.       WHAT TO EXPECT AFTER SURGERY    You will start walking the day of surgery and continue several times a day, especially once you get home.    Drinking and eating will follow plan discussed with the dietitian.    Shower in the hospital.    Incentive Spirometer use and deep breathing/cough    Splinting your incision and wearing the abdominal binder when moving may help with discomfort.    Discharge the day after surgery is expected for laparoscopic procedures after you are seen by your bariatric surgery team.    A nurse " from the clinic will call you within 3 days after discharge.      ONCE YOU ARE HOME CHECKLIST      Continue your liquid intake daily and track     Oral intake:      12-4pm         4pm-8pm         8pm-Midnight         6am-10am       2.   Walks      12-4pm      4pm-8pm      8pm-Midnight      6am-10am       3.   Incentive Spirometer/ Deep Breaths and Coughing      ACTIVITY AFTER SURGERY    Walking several times a day, increasing endurance and energy level over time.    No lifting over 20 lbs. for the first 2 weeks (6 weeks for open procedure) and then let your body be your guide.    Can be the    in terms of chores at home.    Pushing and pulling uses the same core muscles and those activities may cause pain or discomfort.    Do each exercise 10-15 times, twice a day and increase weight when you can consistently do 12 repetitions of activity.    No swimming, bathing, or hot tub use until incisions are completely healed (scars).    Do not plan to fly or take a road trip within 1 to 3 months after surgery.    See handouts below for exercises that can be done after surgery.    Seated Exercises for Arms and Legs (can be done before or after surgery)  [email or web address]    Exercise Guidelines after Weight Loss Surgery (1st 4-6 weeks)  [email or web address]    Exercises after Weight Loss Surgery (strengthening, when no weight lifting restrictions - after 4-6 weeks)  [email or web address]      MEDICATIONS AFTER SURGERY    Here is a simple graph that might help makes things more clear for when to start certain medications after surgery.  (Zofran, Tylenol, Gabapentin and Hyoscyamine will be sent home with you from the hospital as needed)    Medication Dose When to Start   Vitamin B12  1000 mcg Once a day under the tongue (sublingual), weekly nasal spray, or monthly injections Day after surgery   Chewable Multivitamin with   18 mg Iron  (Must also contain Vitamin A and  Zinc)  NO GUMMIES 1 tablet twice daily Day after surgery   Omeprazole  20 mg 1 capsule daily - open and sprinkle on food or swallow with fluid Day after surgery. Take even if no acid reflux symptoms.   Zofran 4 mg  (if ordered) 4mg tablet every 8 hours as needed for nausea As needed after surgery   Tylenol 1000 mg every 6 hours for three days after surgery.  After first three days after surgery, switch to as needed and do not take more than 3000mg daily Once you get home   (you will be sent home from the hospital with this)   Hyoscyamine  (if ordered) 0.125 mg tablet every 4 hours as needed for stomach spasm pain As needed after surgery   Liquid Gabapentin  Must be kept in fridge   250 mg liquid 3 times a day for at least 3 days after surgery Once you get home   (you will be sent home from the hospital with this)   Actigall (Ursodiol)- 300 mg for gallbladder if you still have Twice daily - swallow whole with warm water Two weeks after surgery   Chewable Calcium Citrate 2 tablets twice daily Three weeks after surgery   Vitamin D - (125 mcg)  5000 units 1 daily Three weeks after surgery        LIFELONG VITAMINS:    First 3 Months after Surgery  Choose chewable, liquid or crushable forms of Multivitamin and Calcium Citrate and then you can switch to tablets you can swallow whole if you would like.    1.)   Sublingual Vitamin B12: Take 2681-0137 mcg 1 time daily    Also available in injectable form monthly.  Discuss with provider if interested.  2.)   Multivitamin with Iron: Take 1 multivitamin 2 times daily  Needs 18 mg of IRON per dose along with Vitamin A and Zinc in them  Generic Children's Chewable multivitamin with iron (Equate, Up & Up brand, etc.)  Centrum   Chewable  Centrum, Women's One-A-Day or Generic (after 3 months)  NO GUMMY Vitamins (these do not contain iron)  3.)   Calcium Citrate with Vitamin D: Take 400-600 mg 2 times daily (Start 3 weeks after surgery)  Bariatric Advantage: Citrate Lozenges  (500mg)--2 Daily, or Chewy Bites (250 mg)--4 Daily  www.[email or web address] [email or web address]  or 1-424.338.9520  Celebrate Calcium: Calcium Plus 500 (500mg)--2 Daily, or Calcet Creamy Bites (500 mg)--2 Daily, or Calcium Citrate Chews (250mg) - 4 Daily   [email or web address].[employer]  or 1-350.149.6366  UNJURY Opurity: Calcium Citrate Plus (300mg)--3 Daily  www.[email or web address]  or 1-684.879.6748  Up-Casey D: Nutrition Direct: Flavorless Calcium Powder (500 mg with 250 IU Vitamin D)  www.amazon.com  or 1-545.352.1916--3 Scoops Daily  Wellesse Liquid Calcium Citrate--1 Tbsp.  (500 mg)--2 Times Daily  Loopd Via  After 3 months post op:  Citracal Petites (or Generic version) (200mg) - 4 daily  Any grocery store or Pharmacy  4.)   Vitamin D3 : Take 5000 IU Daily (Start 3 weeks after surgery)  This can remain a small gel cap    AFTER Surgery Medication Considerations:      1.  Cutting or crushing meds:  Before surgery, tablet size does not matter.  After surgery there will be swelling around your new stomach pouch or sleeve.  Therefore, it is important to limit the size of medications for the first six weeks after surgery.      What this means for you:  For the first six weeks after surgery, you will need to cut or crush tablets that are larger than   inch (about the size of an eraser on a standard pencil)  Some capsules may be opened and the contents sprinkled onto soft food.  Once sprinkled on food, eat immediately being careful not to chew.    You will be given a pill cutter at the hospital  Refer to your medication list. This list will tell you which medications can be cut or crushed, and which capsules can be opened.     * Important Reminder: You will be given specific instructions in the hospital before discharge reminding you of the plan for your home medications.    2.  Long acting medications are not the best option anymore  You may have to switch to immediate release medications for the first 6 weeks depending on the  medication.  Many types of bariatric surgery involves removing a portion of the small intestine. Long acting or extended release medications release slowly throughout the entire small intestine. Because your surgery has changed your stomach and/or intestine, you may not get the full effect of the long acting medication.  Short acting medications may work better for you after surgery.     3.  Avoid NSAIDs for the rest of your life  NSAIDs are Non-Steroidal Anti-Inflammatory Drugs  The NSAID class of medications is used to relieve minor aches, pains or to treat fever. They are commonly used to treat pain caused by a cold, flu, sore throat, headache, and arthritis.  Some NSAIDs are available over-the-counter while others need a prescription from your doctor.     NSAIDs should be avoided after bariatric surgery because they can cause stomach ulcers, scarring or bleeding.  You will not be able to take any NSAID mediation for the rest of your life after bariatric surgery.  Below is a list of common NSAID medications:    OVER-THE-COUNTER NSAIDs    Ibuprofen  - Brand names Advil   , Motrin  , Nuprin     Naproxen - Brand names Aleve  , Naprosyn  , Anaprox      PRESCRIPTION NSAIDs   Celebrex   (Celecoxib)                                       Orudis   (Ketoprofen)  Mobic   (Meloxicam)                                           Lodine   (Etodolac)  Voltaren  (Diclofenac)                                        Ansaid   (Flurbiprofen)  Relafen   Nabumetone                                       Clinoril   (Sulindac)  Feldene   (Piroxicam)                                         Tolectin   Tolmentin  Indocin   (Indomethacin)                                    Daypro  (Oxaprozin)       After surgery, the only safe non-prescription pain reliever is acetaminophen (Tylenol ).  Acetaminophen is available in 325mg and 500mg tablets, liquid and powder packs.  If acetaminophen does not control your pain, call your primary care provider to  discuss other options.    4. Other medications you may have to avoid  Aspirin  Do not use aspirin for headaches, body aches, or muscle aches after bariatric surgery.  This is because aspirin can also cause stomach ulcers.  However, your primary care provider may tell you to take aspirin for certain conditions.  A maximum of 81mg of enteric coated aspirin (ex: Ecotrin ) once daily is usually okay to take if directed to take aspirin by your primary care doctor.   Be sure to take with food or milk.    Alendronate (Fosamax ), risedronate (Actonel ) (risedronate), ibandronate (Boniva ):  These are common osteoporosis medications that can cause erosions or ulcers in the esophagus and stomach.   Remind your primary care provider that you have had bariatric surgery and discuss other medication options.    Diuretics ( water pills )  These medications are used to treat high blood pressure.  They can also reduce swelling and water retention caused by various medical conditions.  Diuretics should not be used for patients who are having weight loss surgery.  Your diuretic will not be restarted immediately after surgery.  This is because it is often difficult to drink enough fluids after surgery to keep up with the fluid loss caused by the diuretic/ water pill .  Call your primary care provider to discuss alternative medications to treat high blood pressure.      5. Medications for chronic conditions  It is likely that the need for some of your medications will change after weight loss surgery.    High Blood Pressure Medications   As you lose weight, your blood pressure may change. Talk with your primary care provider about how to manage your high blood pressure after surgery. You may need the dose of your blood pressure medication(s) adjusted.  Keep track of your blood pressure, and call your primary doctor if you have low blood pressure symptoms, such as: dizziness, faintness, weakness, light-headedness (especially when going  from sitting to standing)    Diabetes Medications  As you lose weight, your blood sugars may change. Talk with your primary care provider about how to monitor and manage your diabetes after surgery. You may need the dose of your diabetes medication(s) adjusted.  Call your primary doctor if you have any of these symptoms.  Keep track of your blood sugars, and call your primary doctor if you have low blood sugar symptoms, such as: sweating, shaking, nervousness, headache, light-headedness, dizziness, weakness, or fainting    6. Other information    Medication Absorption  Some medications may not be absorbed as well after bariatric surgery.  Watch for changes in symptoms.  It is important to discuss your medications with your doctor if you think your medications are not working as well as they were before.  You may need to have medication doses adjusted.    Non-prescription medications:  You may need medication for seasonal allergies, colds, headaches, or minor aches and pains.  It is important to read the package label carefully.  Below are some helpful hints for choosing the right medication:   Look at the active ingredient(s) list to be sure the medication does not contain caffeine, NSAIDs or aspirin (maximum aspirin dose: 81 mg daily).  It is okay to use liquid medications with the following cautions:  Watch the sugar concentration.  High sugar content in medications could cause dumping syndrome.  Diluting the liquid medication with an equal amount of water will help prevent dumping syndrome.  Do not use products that contain high fructose corn syrup, corn syrup, sugar, or sorbitol.  Look for sugar-free products as an alternative.  Chewable tablets or tablets that dissolve on your tongue ( oral-dissolving tablet ) are generally safe to use.    Supplements  Refer to the Vitamins & Supplements Handout provided to you by the Bariatric Dietitian for recommended doses and products.       After Bariatric Surgery  Discharge Instructions  [facility]     Note: Ask your nurse to order your medications from the pharmacy. Be sure you have your medications with you when you leave.    What should my diet be for the first 2 weeks after surgery?  Follow the bariatric diet the dietitian discussed with you.    How much fluid should I drink?  Strive for 48-64 ounces daily.  Carry a water bottle with you without a straw or sports top. Drink from it often.  Keep track of how much fluid you drink in a day.  Remember:  -Do not use straws, chew gum or suck on hard candies. They may cause painful gas.    -Sip, don't slurp when you drink.    -Practice small sips using a medicine cup for the first week postop.   -No ice or cold drinks. This could cause gas or spasms.   -No coffee, soda pop or drinks with caffeine. These may cause stomach pain.   -No alcohol. It is bad for your liver and will cause stomach pain.    How often should I do my deep breathing and coughing?  Use your incentive spirometer (small plastic breathing device) every hour while awake after you get home. Using the incentive spirometer helps you deep breath. Continuing to cough and deep breath will help prevent fevers and pneumonia.   If you do not have a fever after one week, you can stop using the incentive spirometer and discard it.     You can continue to take deep breaths without the incentive spirometer every one to two hours while awake for the month after surgery.    What kinds of activity can I do?  Get plenty of rest the first few days after surgery and try to balance rest and activity. You will need some time to recover - you may be more tired than you realize at first. You'll feel better and heal faster if you take good care of yourself.  For 2 weeks after surgery (Please review restrictions at your two week visit, they could change based on how well you are doing):   -Don't lift more than 20 pounds.   -Take 4-5 short walks  every day.  -Don't jog, run, or do belly exercises.  Don't swim, bathe or use a hot tub until your cuts are healed (scabs are gone).    You may shower right away after surgery.  Don't plan to fly or take a road trip within the first 1 to 3 months after surgery.  You could get a blood clot in your legs. If you must travel, get up and move around every hour for at least 5 minutes before continuing on your journey.  Your care team can help you decide when it's safe for you to travel.     What can I do for pain control?  You had major belly surgery that involves all layers of your belly muscles. Pain is expected, even for some as far out as 6-8 weeks after surgery. Moving, sneezing, coughing, and breathing will cause discomfort because these activities use your belly muscles.   Please see your after visit summary medication review for what pain medication will be continued, discontinued and newly started for you.    You can take opioid pain medicine if prescribed and if needed. Try to wean off from it as soon as you feel comfortable.   Do not drive while you are taking opioid pain medicine. This is dangerous.  The first three days after surgery, take your acetaminophen (Tylenol) scheduled every 6 hours.  After that you can take it as needed.  -Acetaminophen formulation options:   -Liquid   -Caplet (Cut caplet in half before taking)   -We will have you on a higher dose of Tylenol for the first three days post-op but then but then you should not exceed 3000mg per day.  -You will also take Tylenol for pain in place of the opioid pain medicine (check with your care team for specifics).   You can apply ice or heat to the affected area(s). Just remember to wrap the ice in something and limit icing sessions to 20 minutes. Excessive icing can irritate the skin or cause skin damage.  You can apply heat with a hot, wet towel or heating pad. Just like cold therapy, limit heat application to 20 minutes. Never sleep with a heating  pad on. It could cause severe burns to your skin.  Wear your binder to support your belly muscles if you have one.  Take this off a little more each day and try to be off completely by 2 weeks after surgery. If you don't need your binder for comfort or support, you don't need to wear it.   You may not be able to sleep in a comfortable position for a few weeks after surgery. This is normal. You may be more comfortable sleeping in a recliner or propped up with 3 pillows for the first couple of weeks after surgery.  You may feel gas pains in the upper chest or between your shoulder blades from the laparoscopic surgery which should get better with walking around, warm/cold packs and pain medication.  Do not take NSAID's (Non-Steroidal Anti-Inflammatory Drugs) (examples: ibuprofen, Motrin, Advil, Aleve, and Naproxen), aspirin, or use pain patches with NSAID's. They will increase your risk of bleeding or getting an ulcer.    Please call the clinic for any of the following pain concerns, we would like to talk to you:  -pain that does not improve with rest  -pain that gets worse and worse  -pain that is not controlled by your pain medicine  -a sudden severe increase in pain    What medications will I need to take after surgery?  You may be discharged with the following types of medications: antacids, pain medications, anti-nausea medications, etc.  Please see your after visit summary medication review for what will be continued, discontinued and newly started.  It is important to reduce the amount of acid in your new stomach for a couple of months after surgery while it is still healing. We will prescribe an acid reducer or antacid. Take it as directed. This will help prevent ulcers, heartburn and acid reflux.  If you took an acid reducer before you had surgery, your care team will let you know what acid reducer you will take after surgery.   It is okay to swallow any medications smaller than   inch in size.   -If it is  larger than   inch, it may need to be cut, crushed, or in a liquid form. See the above medication section for what is most appropriate for you and your medications.    What should I know about my incisions (cuts)?  Your incisions are covered with white steri strips or butterfly tape and have band aids or gauze over the top. If you have gauze or band aids, they can come off in the hospital.  Leave your steri strips on until they fall off on their own. If the steri strips don't fall off after 1 week, you can take them off. If they fall off earlier, replace them with clean band aids trying to avoid touching the incision itself.   If you have gauze covered by a clear dressing, remove 2-3 days after surgery or as directed by your care team.  You may shower in the hospital after surgery and can get your incision coverings wet.  Do not submerge in water (e.g. No baths, swimming pools, hot tubs) until your incisions are completely healed (scabs are gone).    Call the clinic if you have any of these signs or symptoms of infection:  -Redness around the site.  -Drainage that smells bad.  -Drainage that is thick yellow or green.  -An increase in pain around the incision site  -An increase in swelling around the incision site  -Heat or warmth around incision site   -Fever of 101.5  F (38.3  C) or higher when taken under the tongue.    -Chills    Will my urine or bowel movements change?   Your first bowel movements (stools) will likely be liquid. You may also notice old blood or a darker color (black or maroon color) in your bowel movements.  This is not unusual and usually goes away after the first week, if not sooner. You may not have a bowel movement for a week.   If you have not had a bowel movement for at least three days after your surgery date and are passing gas, you can use over the counter stool softeners.  Please stop taking the stool softeners and laxatives if your stools are loose.  Increasing fluids and activity as  well as getting off narcotics will help prevent constipation.    Call the clinic if:   -You have stomach pain.   -You continue to have constipation.  -You have excessive bloating after walking and passing gas.  -You are having 3 or more loose stools a day.  You may have an infection that we need to treat.    How can I prevent dehydration if I feel nauseated (sick to my stomach) and vomit (throw up)?   Vomiting is not normal after surgery. If you continue to have nausea and vomiting, call the clinic.   Nausea can be a sign of dehydration. That is why it is very important to track your fluids. Do not nap more than one hour during the day. Set a timer to wake yourself up, if needed. Too much sleep will keep you from drinking enough fluid during the day and lead to dehydration.  No outside activity in hot, humid weather until you can drink 48 to 64 ounces of fluid in 24 hours. If you sweat a lot, your body may lose too much water.  If having difficulty getting in your fluids, try to take a 1 ounce sip of water (one medicine cup) every 15 minutes.  Set a timer to remind yourself.    If you notice any of the following symptoms, please don't delay in calling the clinic.  Early identification gives us more options for treatment:  -Dark colored urine  -Urinating (pass water) less than 2-3 times per day  -Lack of energy  -Nausea  -Dizziness  -Headache  -Metallic taste in your mouth    Call the clinic ANY TIME at [phone] if:  -Your pain medicine is not working.  -You have a fever ? 101.5 F.  -You have belly or left shoulder pain that gets worse and worse.  -You have a swollen leg with redness, warmth, or pain behind the knee or calf.  -You have chest pain   -You feel very short of breath.  -You have a sudden severe increase in heart rate.  -You have vomiting that gets worse and worse.  -You have constant nausea (feeling sick to your stomach) that does not go away with medication.  -You have trouble swallowing.  -You have  "an increasing feeling that \"something is not right\".  -You have hiccups that do not stop.  -You have any questions or concerns.    If you have to go to the Emergency Room, we prefer you go to the hospital that did your surgery. Please let them know that you had bariatric surgery and to notify your surgeon.    When should I go back to the clinic?  Follow up with your care team in 1 week.   If this appointment was not already made, please call: [phone]    IMPORTANT PHONE NUMBERS:    Bariatric Nurse line (M-F 8am to 4:30pm) = [phone]  Main line (after hours/holidays/weekends) = [phone]    These are some additional handouts that are very important to read through and use after surgery.  They are good tools for after your surgery as you recover.      After Your Weight Loss Surgery  [email or web address]      Mindfulness Meditation  [email or web address]    Conscious Breathing  [email or web address]    Keeping Track of Your Fluids (for after surgery)  [email or web address]      AFTER SURGERY APPOINTMENT SCHEDULE    1 week with Dietitian (J CARLOS)  Dietitian Virtual Group Class scheduled for Tuesday 6/17/2025 from 1-2 pm with one of the dietitians. She will send you an email invitation to join the week of this class and the purpose of it is to check in with you and give you the next set of dietary instructions.  It will be using [employer] Teams, NOT in person or through [employer].    2 weeks with Surgeon  Surgeon video follow-up visit that will be done through [employer] which is already scheduled for you.    1 month with RD    3 months with RD    3 months with Psychologist    6 months with Bariatrician with labs    9 months with RD     12 months with Bariatrician with labs    18 months with RD or Bariatrician-possible labs    Annually after that with Bariatrician with labs and RD if needed      POP QUIZ    1.  How long do you need to be on full liquids after " surgery? ________    2.  Name the 4 vitamin/mineral supplements you ll need to take for the rest of your life.    __________  _________  _________  __________    3.  How many grams of protein should you get in a day? ________    4.   Which meal would give you the most protein?    a.   1 oz. Cheese on 1 saltine cracker and 3 Tbsp applesauce.    b.     cup mashed potatoes and gravy with 2 Tablespoons applesauce    c.   3 Reduced Fat Wheat Thins, 1 oz. green beans, and 2 peeled grapes     5.   Concentrated urine, lack of energy, nausea, dizziness, headache, and a bad taste in your mouth are signs of:    a. Dumping    b.  Dehydration   c. Clogging    d. None of the above    6.     What is the minimum amount of time recommended for exercise?    a.  30 minutes 7 days a week    b.  30 minutes 3 days per week    c.   1 hour 5 days per week    d.  None of the above    7.     Drinking liquids with meals can cause:    a.  Vomiting     b.   Weight gain   c.   Desire to Snack    d.   All of the above    8.     The long-term success of my weight loss surgery depends on:    a.      Me      b.   My Surgeon     c.  My Support Group     d.  My Family    9.      If you receive ice, carbonation or straws in the hospital post-op, you should:    a.      Eat and drink whatever is given to you by the hospital staff    b.      Remind the hospital staff you are not to have ice, straws or carbonation.    c.      Take the ice but not the carbonation or straws    d.      Call 911    10.  Name two possible complications after bariatric surgery:    ________________________   ______________________    11. Name two habits of healthy bariatric surgery patients:    _______________________  ________________________    True or False    12.  This operation for obesity will require routine visits with my surgeon for the first year, and then I will be okay on my own once I lose my weight and change my diet.    13.  Obesity is a disease that can be managed  with a variety of tools.  However, some individuals fail to lose weight or regain their weight because they resume snacking, binging, take in high fat or carbohydrate food & lack sufficient liquids and exercise.    14.  Women should avoid becoming pregnant for at least 18 months to 2 years following bariatric surgery.    15.  I can still drink 2-3 cans of soda or carbonated juices, water or other beverages after my surgery, as long as it is in moderation.    16.   Re-operation is sometimes necessary due to bleeding, hernias, ulceration, breakdown of stitches or staples, leakage and blockage of the intestines.    17.   I should call the clinic if I develop increased redness or swelling in or around my incision, an oral temperature of 101.5 or greater, increasing severe abdominal or shoulder pain, increasing heart rate or anytime I just don t feel right.    18.   Gaining weight prior to surgery is dangerous because it can enlarge the liver, increase surgical risk and extend your recovery period.    19.   Once I lose my weight, I can drink alcohol as long as it is in moderation.    20.  It is possible I will have more emotional difficulties after surgery.    21.  I can take Aleve but not Ibuprofen or Aspirin after surgery.    22.  If I eat yogurt and drink milk daily, I don t have to take the recommended dose of calcium supplements    23.  Dumping Syndrome only occurs in gastric sleeve patients.    24.  It is possible you will gain weight or not lose much weight immediately after surgery.       We wish you the best and please let us know if you have any questions or concerns!    Melva Arias RN and Shayy Vasquez RN    [facility]  Formerly [facility], [facility], [address]  Phone: [phone]  Fax:  [phone]

## 2025-05-21 ENCOUNTER — ALLIED HEALTH/NURSE VISIT (OUTPATIENT)
Dept: SURGERY | Facility: CLINIC | Age: 35
End: 2025-05-21
Payer: COMMERCIAL

## 2025-05-21 ENCOUNTER — HOSPITAL ENCOUNTER (EMERGENCY)
Facility: CLINIC | Age: 35
Discharge: HOME | End: 2025-05-21
Attending: EMERGENCY MEDICINE | Admitting: EMERGENCY MEDICINE
Payer: COMMERCIAL

## 2025-05-21 VITALS
HEART RATE: 78 BPM | DIASTOLIC BLOOD PRESSURE: 75 MMHG | SYSTOLIC BLOOD PRESSURE: 115 MMHG | HEIGHT: 66 IN | WEIGHT: 293 LBS | BODY MASS INDEX: 47.09 KG/M2 | TEMPERATURE: 98 F | RESPIRATION RATE: 18 BRPM | OXYGEN SATURATION: 100 %

## 2025-05-21 DIAGNOSIS — M47.26 OSTEOARTHRITIS OF SPINE WITH RADICULOPATHY, LUMBAR REGION: ICD-10-CM

## 2025-05-21 DIAGNOSIS — K21.9 ACID REFLUX: ICD-10-CM

## 2025-05-21 DIAGNOSIS — Z98.84 STATUS POST LAPAROSCOPIC SLEEVE GASTRECTOMY: ICD-10-CM

## 2025-05-21 DIAGNOSIS — M54.16 LUMBAR RADICULOPATHY: ICD-10-CM

## 2025-05-21 DIAGNOSIS — R63.4 RAPID WEIGHT LOSS: ICD-10-CM

## 2025-05-21 DIAGNOSIS — S39.012D LUMBAR STRAIN, SUBSEQUENT ENCOUNTER: ICD-10-CM
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
## 2025-05-21 DIAGNOSIS — K91.2 POSTGASTRECTOMY MALABSORPTION: ICD-10-CM

## 2025-05-21 DIAGNOSIS — Z01.818 PRE-OPERATIVE CLEARANCE: Primary | ICD-10-CM

## 2025-05-21 DIAGNOSIS — Z90.3 POSTGASTRECTOMY MALABSORPTION: ICD-10-CM

## 2025-05-21 PROCEDURE — 96375 TX/PRO/DX INJ NEW DRUG ADDON: CPT

## 2025-05-21 PROCEDURE — 96376 TX/PRO/DX INJ SAME DRUG ADON: CPT

## 2025-05-21 PROCEDURE — 250N000013 HC RX MED GY IP 250 OP 250 PS 637: Performed by: EMERGENCY MEDICINE

## 2025-05-21 PROCEDURE — 96374 THER/PROPH/DIAG INJ IV PUSH: CPT

## 2025-05-21 PROCEDURE — 250N000011 HC RX IP 250 OP 636: Mod: JZ | Performed by: EMERGENCY MEDICINE

## 2025-05-21 RX ORDER — GABAPENTIN 300 MG/1
300 CAPSULE ORAL 3 TIMES DAILY
Qty: 20 CAPSULE | Refills: 0 | Status: SHIPPED | OUTPATIENT
Start: 2025-05-21

## 2025-05-21 RX ORDER — LORAZEPAM 2 MG/ML
0.5 INJECTION INTRAMUSCULAR ONCE
Status: COMPLETED | OUTPATIENT
Start: 2025-05-21 | End: 2025-05-21

## 2025-05-21 RX ORDER — ONDANSETRON 2 MG/ML
4 INJECTION INTRAMUSCULAR; INTRAVENOUS EVERY 30 MIN PRN
Status: DISCONTINUED | OUTPATIENT
Start: 2025-05-21 | End: 2025-05-21 | Stop reason: HOSPADM

## 2025-05-21 RX ORDER — GABAPENTIN 300 MG/1
300 CAPSULE ORAL ONCE
Status: COMPLETED | OUTPATIENT
Start: 2025-05-21 | End: 2025-05-21

## 2025-05-21 RX ORDER — KETOROLAC TROMETHAMINE 15 MG/ML
15 INJECTION, SOLUTION INTRAMUSCULAR; INTRAVENOUS ONCE
Status: COMPLETED | OUTPATIENT
Start: 2025-05-21 | End: 2025-05-21

## 2025-05-21 RX ORDER — HYDROMORPHONE HYDROCHLORIDE 1 MG/ML
0.5 INJECTION, SOLUTION INTRAMUSCULAR; INTRAVENOUS; SUBCUTANEOUS
Refills: 0 | Status: COMPLETED | OUTPATIENT
Start: 2025-05-21 | End: 2025-05-21

## 2025-05-21 RX ORDER — ONDANSETRON 4 MG/1
4 TABLET, ORALLY DISINTEGRATING ORAL EVERY 8 HOURS PRN
Qty: 10 TABLET | Refills: 0 | Status: SHIPPED | OUTPATIENT
Start: 2025-05-21 | End: 2025-05-24

## 2025-05-21 RX ORDER — METHOCARBAMOL 500 MG/1
500 TABLET, FILM COATED ORAL 3 TIMES DAILY PRN
Qty: 20 TABLET | Refills: 0 | Status: SHIPPED | OUTPATIENT
Start: 2025-05-21

## 2025-05-21 RX ORDER — OXYCODONE HYDROCHLORIDE 5 MG/1
5 TABLET ORAL EVERY 4 HOURS PRN
Qty: 12 TABLET | Refills: 0 | Status: SHIPPED | OUTPATIENT
Start: 2025-05-21 | End: 2025-05-25

## 2025-05-21 RX ORDER — METHOCARBAMOL 500 MG/1
500 TABLET, FILM COATED ORAL ONCE
Status: COMPLETED | OUTPATIENT
Start: 2025-05-21 | End: 2025-05-21

## 2025-05-21 RX ORDER — PREDNISONE 20 MG/1
TABLET ORAL
Qty: 10 TABLET | Refills: 0 | Status: SHIPPED | OUTPATIENT
Start: 2025-05-21

## 2025-05-21 RX ORDER — DEXAMETHASONE SODIUM PHOSPHATE 10 MG/ML
10 INJECTION, SOLUTION INTRAMUSCULAR; INTRAVENOUS ONCE
Status: COMPLETED | OUTPATIENT
Start: 2025-05-21 | End: 2025-05-21

## 2025-05-21 RX ADMIN — KETOROLAC TROMETHAMINE 15 MG: 15 INJECTION, SOLUTION INTRAMUSCULAR; INTRAVENOUS at 01:12

## 2025-05-21 RX ADMIN — HYDROMORPHONE HYDROCHLORIDE 0.5 MG: 1 INJECTION, SOLUTION INTRAMUSCULAR; INTRAVENOUS; SUBCUTANEOUS at 01:12

## 2025-05-21 RX ADMIN — DEXAMETHASONE SODIUM PHOSPHATE 10 MG: 10 INJECTION INTRAMUSCULAR; INTRAVENOUS at 03:14

## 2025-05-21 RX ADMIN — ONDANSETRON 4 MG: 2 INJECTION, SOLUTION INTRAMUSCULAR; INTRAVENOUS at 03:16

## 2025-05-21 RX ADMIN — ONDANSETRON 4 MG: 2 INJECTION, SOLUTION INTRAMUSCULAR; INTRAVENOUS at 01:10

## 2025-05-21 RX ADMIN — LORAZEPAM 0.5 MG: 2 INJECTION INTRAMUSCULAR; INTRAVENOUS at 01:53

## 2025-05-21 RX ADMIN — METHOCARBAMOL 500 MG: 500 TABLET ORAL at 03:14

## 2025-05-21 RX ADMIN — GABAPENTIN 300 MG: 300 CAPSULE ORAL at 01:12

## 2025-05-21 ASSESSMENT — ACTIVITIES OF DAILY LIVING (ADL)
ADLS_ACUITY_SCORE: 46

## 2025-05-21 NOTE — DISCHARGE INSTRUCTIONS
As discussed in the ER, recommend 5-day course of steroids to help relieve inflammation, muscle relaxers and as needed and gabapentin starting twice daily and increasing to 3 times daily to help with shooting nerve pain or radicular pain down the leg.  As discussed you may take the stronger pain medication as prescribed today as well as muscle relaxer however recommend spacing these out by at least 2 hours to prevent sedation from them while watching how sleepy you feel from them.  Referral was placed for the spine center in [place] and they will contact you for an appointment.
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
## 2025-05-21 NOTE — PROGRESS NOTES
Pt attended the pre-surgery class for bariatric surgery class and was educated on the pre and post surgery liquid diets. Patient received information via [employer] for the pre-op liquid diet and the post-op liquid diet. Discussed appropriate liquids and discussed portions. Reviewed appropriate calories, protein, and fluid goals for each stage before and after surgery. Educated on correct vitamins/minerals, dosage, and frequency to take after surgery. Provided grocery list and sample menu plan for each diet stage.      Pt will begin pre-op liquid diet 5/27/2025 and will do clear liquids the day before surgery. Pt is scheduled for LSG on 6/10/2025 and will then follow 1 week post-op liquid diet. Pt will follow up with RD 1-week post-op for education on the pureed and soft/regular diet stages.    Yudi Cabrera RD

## 2025-05-28 ENCOUNTER — HOSPITAL ENCOUNTER (OUTPATIENT)
Dept: CARDIOLOGY | Facility: CLINIC | Age: 35
Discharge: HOME | End: 2025-05-28
Attending: NURSE PRACTITIONER
Payer: COMMERCIAL

## 2025-05-28 DIAGNOSIS — Z01.818 PRE-OPERATIVE CLEARANCE: ICD-10-CM

## 2025-05-28 LAB
ATRIAL RATE - MUSE: 82 BPM
DIASTOLIC BLOOD PRESSURE - MUSE: NORMAL MMHG
INTERPRETATION ECG - MUSE: NORMAL
P AXIS - MUSE: 63 DEGREES
PR INTERVAL - MUSE: 124 MS
QRS DURATION - MUSE: 84 MS
QT - MUSE: 368 MS
QTC - MUSE: 429 MS
R AXIS - MUSE: 66 DEGREES
SYSTOLIC BLOOD PRESSURE - MUSE: NORMAL MMHG
T AXIS - MUSE: 58 DEGREES
VENTRICULAR RATE- MUSE: 82 BPM

## 2025-05-28 PROCEDURE — 93005 ELECTROCARDIOGRAM TRACING: CPT

## 2025-05-30 ENCOUNTER — HOSPITAL ENCOUNTER (OUTPATIENT)
Dept: RADIOLOGY | Facility: HOSPITAL | Age: 35
Discharge: HOME | End: 2025-05-30
Attending: NURSE PRACTITIONER | Admitting: NURSE PRACTITIONER
Payer: COMMERCIAL

## 2025-05-30 DIAGNOSIS — Z01.818 PRE-OPERATIVE CLEARANCE: ICD-10-CM

## 2025-05-30 PROCEDURE — 71046 X-RAY EXAM CHEST 2 VIEWS: CPT

## 2025-05-30 NOTE — H&P (VIEW-ONLY)
Preoperative Evaluation  [facility] [address]  [facility] [address]  Phone: [phone]  Fax: [phone]  Primary Provider: Kenzie Mccoy MD  Pre-op Performing Provider: JOSE Weaver CNP  May 30, 2025               5/30/2025   Surgical Information   What procedure is being done? LS   Facility or Hospital where procedure/surgery will be performed: [facility]   Who is doing the procedure / surgery? Dr. Flores   Date of surgery / procedure: 6/10/25   Where do you plan to recover after surgery? at home with family     Fax number for surgical facility: Note does not need to be faxed, will be available electronically in Epic.    Assessment & Plan     The proposed surgical procedure is considered INTERMEDIATE risk.    Prediabetes  controlled    Morbid obesity (H)  Having bariatric surgery    Anemia, unspecified type  Hgb stable for the past 3 years    History of anesthesia complications  Mainly PONV    PONV (postoperative nausea and vomiting)  Had this with each surgery. Zofran helped    Claustrophobia  Patient experiences anxiety with the mask during anesthesia administration          - No identified additional risk factors other than previously addressed          HOME MEDICATION RECOMMENDATIONS:  Below you will see your specific medication instructions.      Gabapentin (Neurontin)  Ok to cut/crush if a tablet, or ok to open capsule after surgery. It also comes in a liquid form if needed. Do not take the morning of surgery.     Methocarbamol (Robaxin)  Ok to cut/crush after surgery. Do not take the morning of surgery.    Nystatin  Ok to use. You are encouraged to bring to the hospital on the day of surgery if needed daily.     Phentermine (Brand Name: Adipex)    Stop taking 2 weeks before surgery. You will not restart this after surgery.     Prednisolone Acetate (Pred-Forte eye drop)  Okay to continue to use and bring with you to the  hospital if needed daily.                Recommendation  Approval given to proceed with proposed procedure pending review of diagnostic evaluation. (Labs and chest xray)      Tea Sierra is a 34 year old, presenting for the following:  Pre-Op Exam        HPI: Patient is a 34 year old presenting for preoperative clearance for LSG. Patient has no concerns at this time; please see questionnaire responses below.          5/30/2025   Pre-Op Questionnaire   Have you ever had a heart attack or stroke? No   Have you ever had surgery on your heart or blood vessels, such as a stent placement, a coronary artery bypass, or surgery on an artery in your head, neck, heart, or legs? No   Do you have chest pain with activity? No   Do you have a history of heart failure? No   Do you currently have a cold, bronchitis or symptoms of other infection? No   Do you have a cough, shortness of breath, or wheezing? No   Do you or anyone in your family have previous history of blood clots? No   Do you or does anyone in your family have a serious bleeding problem such as prolonged bleeding following surgeries or cuts? No   Have you ever had problems with anemia or been told to take iron pills? (!) YES stable hgb for years    Have you had any abnormal blood loss such as black, tarry or bloody stools, or abnormal vaginal bleeding? No   Have you ever had a blood transfusion? (!) UNKNOWN Is not sure if she was given blood with her Csection   Are you willing to have a blood transfusion if it is medically needed before, during, or after your surgery? Yes   Have you or any of your relatives ever had problems with anesthesia? (!) YES PONV    Do you have sleep apnea, excessive snoring or daytime drowsiness? No   Do you have any artifical heart valves or other implanted medical devices like a pacemaker, defibrillator, or continuous glucose monitor? No   Do you have artificial joints? No   Are you allergic to latex? No       Significant value      Advance Care Planning    Discussed advance care planning with patient; however, patient declined at this time.6}    Status of Chronic Conditions:  See problem list for active medical problems.  Problems all longstanding and stable, except as noted/documented.  See ROS for pertinent symptoms related to these conditions.    Patient Active Problem List    Diagnosis Date Noted    Left sided abdominal pain 10/19/2021     Priority: Medium    Ventral hernia without obstruction or gangrene 10/18/2021     Priority: Medium    Ventral hernia 10/18/2021     Priority: Medium    S/P  2019     Priority: Medium    Vasa previa 2019     Priority: Medium    History of  section 2019     Priority: Medium     Added automatically from request for surgery [id number]      Morbid obesity (H) 03/15/2010     Priority: Medium     Formatting of this note might be different from the original.  BMI 51        Past Medical History:   Diagnosis Date    Acanthosis nigricans     Anemia     Anxiety     BV (bacterial vaginosis)     Claustrophobia     History of anesthesia complications     Mental disorder     Migraines     Migraines     Missed ab     Morbid obesity (H)     Ovarian cystic mass     Pain in limb     Pelvic pain in female     PONV (postoperative nausea and vomiting)      Past Surgical History:   Procedure Laterality Date     SECTION       SECTION      X2     SECTION N/A 2017    Procedure: REPEAT  SECTION;  Surgeon: Shayy Aviles MD;  Location: [facility];  Service:      SECTION, IMMEDIATE HYSTERECTOMY, COMBINED N/A 2019    Procedure: Repeat  Section, Hysterectomy, Exploratory Laparotomy, Bilateral Salpingectomy, Cystoscopy;  Surgeon: Joan Carmona MD;  Location:  OR    DILATION AND CURETTAGE N/A 2016    Procedure: DILATION AND CURETTAGE SUCTION;  Surgeon: Ant Parikh MD;  Location: [facility] OR;  Service:      EYE MUSCLE SURGERY Bilateral     strabismus    HERNIORRHAPHY VENTRAL N/A 6/29/2018    Procedure: REPAIR, HERNIA, VENTRAL, OPEN;  Surgeon: Alfredo Boland MD;  Location: [facility];  Service:     RECESSION RESECTION (REPAIR STRABISMUS) BILATERAL Bilateral 12/11/2014    Procedure: RECESSION RESECTION (REPAIR STRABISMUS) BILATERAL;  Surgeon: Titi Awan MD;  Location: [facility]    RECESSION RESECTION (REPAIR STRABISMUS) BILATERAL Bilateral 4/2/2015    Procedure: RECESSION RESECTION (REPAIR STRABISMUS) BILATERAL;  Surgeon: Titi Awan MD;  Location: [facility]     Current Outpatient Medications   Medication Sig Dispense Refill    childrens multivitamin chewable tablet Take 1 tablet by mouth 2 times daily. Multivitamin must contain Vitamin A, Zinc and at least 18 mg of iron. 180 tablet 3    cyanocobalamin (VITAMIN B-12) 1000 MCG sublingual tablet Place 1 tablet (1,000 mcg) under the tongue daily. 90 tablet 3    gabapentin (NEURONTIN) 300 MG capsule Take 1 capsule (300 mg) by mouth 3 times daily. 20 capsule 0    methocarbamol (ROBAXIN) 500 MG tablet Take 1 tablet (500 mg) by mouth 3 times daily as needed for muscle spasms. 20 tablet 0    nystatin (MYCOSTATIN) 360100 UNIT/GM external powder Apply topically 2 times daily as needed for other (rash). 30 g 3    [START ON 6/11/2025] omeprazole (PRILOSEC) 20 MG DR capsule Take 1 capsule (20 mg) by mouth daily. Start day after surgery, open contents and sprinkle on food for first 6 weeks. Take daily for 3 months after surgery. 90 capsule 0    phentermine (ADIPEX-P) 37.5 MG tablet Take 1 tablet every morning with breakfast. 90 tablet 0    prednisoLONE acetate (PRED FORTE) 1 % ophthalmic suspension SHAKE LIQUID AND INSTILL 1 DROP IN RIGHT EYE FOUR TIMES DAILY FOR 7 DAYS      [START ON 6/24/2025] ursodiol (ACTIGALL) 300 MG capsule Take 1 capsule (300 mg) by mouth 2 times daily. Begin two weeks after surgery. Take with warm water and swallow whole, do  "NOT open capsules.  Take for 6 months post-op (Patient not taking: Reported on 5/30/2025) 180 capsule 1       Allergies   Allergen Reactions    Covid-19 Mrna Vacc (Moderna) Anaphylaxis    Penicillins Hives and Rash     rash    Amoxicillin Rash        Social History     Tobacco Use    Smoking status: Never    Smokeless tobacco: Never   Substance Use Topics    Alcohol use: No     Family History   Problem Relation Age of Onset    Anxiety Disorder Mother     Asthma Mother     Thyroid Disease Mother     No Known Problems Father     Diabetes Son         t1d    Cerebrovascular Disease Maternal Grandmother     Cancer Paternal Grandmother     No Known Problems Son     No Known Problems Son      History   Drug Use No             Review of Systems  Constitutional, neuro, ENT, endocrine, pulmonary, cardiac, gastrointestinal, genitourinary, musculoskeletal, integument and psychiatric systems are negative, except as otherwise noted.    Objective    /70 (BP Location: Right arm)   Ht 1.676 m (5' 6\")   Wt (!) 142.7 kg (314 lb 11.2 oz)   LMP 04/12/2019   BMI 50.79 kg/m     Estimated body mass index is 50.79 kg/m  as calculated from the following:    Height as of this encounter: 1.676 m (5' 6\").    Weight as of this encounter: 142.7 kg (314 lb 11.2 oz).    General Appearance  No acute distress. Obesity   Alert and Oriented   Ambulatory without a device  HEENT  PERRLA, EOMI; Melampati Score II  Neck  Stout; No carotid bruits or JVD  Cardiovascular  Regular rate and rhythm  No murmur or gallop  Pulmonary  LS CTA bilaterally  Abdomen  Soft, NT/ND, No rebound, no guarding  No rashes  Extremities:  Pitting edema: Not present  Distal pulses palpable  Neurologic  Cranial nerves grossly intact; no tremors present  Psychiatric  Thought content organized  Mood appears stable  Endocrine  Polanco Facies not present  Dorsal Thoracic Prominence not present  Skin tags not present  Acanthosis nigricans not present  Purple Striae not " present  Dermatologic  Intertrigo not present  Hirsutism not present     Traci Cohen, JOSE CNP  [phone]  [facility] General and Bariatric Surgery

## 2025-06-03 DIAGNOSIS — N30.00 ACUTE CYSTITIS WITHOUT HEMATURIA: Primary | ICD-10-CM

## 2025-06-03 RX ORDER — SULFAMETHOXAZOLE AND TRIMETHOPRIM 800; 160 MG/1; MG/1
1 TABLET ORAL 2 TIMES DAILY
Qty: 6 TABLET | Refills: 0 | Status: SHIPPED | OUTPATIENT
Start: 2025-06-03

## 2025-06-03 RX ORDER — FLUCONAZOLE 150 MG/1
150 TABLET ORAL ONCE
Qty: 1 TABLET | Refills: 0 | Status: SHIPPED | OUTPATIENT
Start: 2025-06-03 | End: 2025-06-03

## 2025-06-06 ENCOUNTER — OFFICE VISIT (OUTPATIENT)
Dept: PHYSICAL MEDICINE AND REHAB | Facility: CLINIC | Age: 35
End: 2025-06-06
Attending: EMERGENCY MEDICINE

## 2025-06-06 ENCOUNTER — HOSPITAL ENCOUNTER (OUTPATIENT)
Dept: GENERAL RADIOLOGY | Facility: HOSPITAL | Age: 35
Discharge: HOME | End: 2025-06-06
Attending: NURSE PRACTITIONER | Admitting: NURSE PRACTITIONER

## 2025-06-06 VITALS
BODY MASS INDEX: 47.09 KG/M2 | WEIGHT: 293 LBS | HEIGHT: 66 IN | HEART RATE: 77 BPM | SYSTOLIC BLOOD PRESSURE: 127 MMHG | DIASTOLIC BLOOD PRESSURE: 86 MMHG

## 2025-06-06 DIAGNOSIS — S39.012D LUMBAR STRAIN, SUBSEQUENT ENCOUNTER: ICD-10-CM

## 2025-06-06 DIAGNOSIS — M47.26 OSTEOARTHRITIS OF SPINE WITH RADICULOPATHY, LUMBAR REGION: ICD-10-CM
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
## 2025-06-06 DIAGNOSIS — M54.41 ACUTE MIDLINE LOW BACK PAIN WITH BILATERAL SCIATICA: ICD-10-CM
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
## 2025-06-06 DIAGNOSIS — M54.42 ACUTE MIDLINE LOW BACK PAIN WITH BILATERAL SCIATICA: ICD-10-CM

## 2025-06-06 DIAGNOSIS — M54.16 LUMBAR RADICULOPATHY: ICD-10-CM
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
## 2025-06-06 PROCEDURE — 99204 OFFICE O/P NEW MOD 45 MIN: CPT | Performed by: NURSE PRACTITIONER

## 2025-06-06 PROCEDURE — 3074F SYST BP LT 130 MM HG: CPT | Performed by: NURSE PRACTITIONER

## 2025-06-06 PROCEDURE — 72072 X-RAY EXAM THORAC SPINE 3VWS: CPT

## 2025-06-06 PROCEDURE — 3079F DIAST BP 80-89 MM HG: CPT | Performed by: NURSE PRACTITIONER

## 2025-06-06 PROCEDURE — 1125F AMNT PAIN NOTED PAIN PRSNT: CPT | Performed by: NURSE PRACTITIONER

## 2025-06-06 RX ORDER — CYCLOBENZAPRINE HCL 10 MG
10 TABLET ORAL 3 TIMES DAILY PRN
Qty: 45 TABLET | Refills: 0 | Status: SHIPPED | OUTPATIENT
Start: 2025-06-06

## 2025-06-06 RX ORDER — GABAPENTIN 300 MG/1
300 CAPSULE ORAL 3 TIMES DAILY
Qty: 21 CAPSULE | Refills: 0 | Status: ON HOLD | OUTPATIENT
Start: 2025-06-06 | End: 2025-06-11

## 2025-06-06 ASSESSMENT — PAIN SCALES - GENERAL: PAINLEVEL_OUTOF10: SEVERE PAIN (9)

## 2025-06-06 NOTE — PATIENT INSTRUCTIONS
Continue gabapentin    Start diclofenac gel 1% as directed    Start flexeril 10mg three times daily as needed for muscle spasms    Follow up with PCP if pain is severe enough to warrant narcotics       Could add an NSAID or oral steroids or plan a lumbar steroid injection but with upcoming surgery we are not able to do so unless they clear you      Imaging (Xray, CT, or MRI) has been ordered today.   Radiology will call you to schedule. Please call below if you do not hear from them in the next couple of days.     [facility] Radiology Scheduling:  Please call [phone] to schedule your image(s) (select option #1).    There are 3 different locations you may go as a walk-in to have same-day Xrays done:    [facility]  [address] [facility] [place]  [address] [facility] [address]   [address]  [address] [place] [address]     Radicular Pain    Radicular pain in either the arm or leg is usually from a bulging disc in the spine. A portion of the herniated disc may press against the nerves as the nerves exit the spine. This causes pain which is felt down the arm or leg. Other causes of radicular pain may include:  Fractures.  Heart disease.  Cancer.  An abnormal and usually degenerative state of the nervous system or nerves (neuropathy).    In most cases, radicular pain is treated without imaging unless symptoms do not start to improve. If that is the case, your provider may order a CT or MRI scan to determine the cause.     Nerves in the cervical spine (neck) may cause radicular pain into the outer shoulder and down the arm. It can spread down to the thumb and fingers. The symptoms vary depending on which nerve root has been affected. In most cases, radicular pain improves with conservative treatment such as physical therapy, cervical traction, medications, and epidural steroid injections. A program for  "neck rehabilitation with stretching and strengthening exercises is an important part of management. Treatment may take months, and surgery may be considered as a last resort if the symptoms do not improve.    Nerves in the lumbar spine (lower back) may cause radicular pain into the hip and down the leg. The commonly used term for this type of pain is \"sciatica\". Conservative treatment is also recommended for this problem. Most patients feel better after 2 to 4 weeks of rest and other supportive care. You should avoid bending, lifting, and all other activities which can make your pain worse. Physical therapy, traction, medications, and epidural steroid injections can be good options to help with your recovery. A program for back injury rehabilitation with stretching and strengthening exercises is an important part of management. Surgery may be considered as a last resort if symptoms do not improve with conservative treatment.     You may take over-the-counter or prescription medicines for pain, discomfort, or fever as directed by your caregiver. Muscle relaxants may help by relieving more severe pain and spasm. Neuropathic medication (such as gabapentin, lyrica, or cymbalta) can help decrease your symptoms of nerve pain as well. Cold or massage can also give significant relief. Spinal manipulation is not recommended as it can increase the degree of disc protrusion. We do not recommend taking narcotic medication such as percocet, oxycodone, norco, dilaudid, or others unless pain is severe and not controlled with any other oral options.    Epidural steroid injections are often effective treatment for radicular pain. These injections deliver strong anti-inflammatory medicine to the area directly around the nerve root in the space between your back bones (vertebrae). Your provider can give you more information about steroid injections. These injections are most effective when given within two weeks of the onset of acute " pain. You should see your provider for follow up care as recommended.     In most cases, radicular pain is treated without imaging unless symptoms do not start to improve. If that is the case, your provider may order a CT or MRI scan to determine the cause.     SEEK IMMEDIATE MEDICAL CARE IF:  You develop increased pain, weakness, or numbness in your arm or leg.  You develop difficulty with bladder or bowel control.  You develop abdominal pain.    Document Released: 01/25/2006 Document Revised: 03/11/2013 Document Reviewed: 04/12/2010  Patient Information  2013 [employer].       ~Please call our [facility] Nurse Navigation line [phone] with any questions or concerns about your treatment plan, if symptoms worsen and you would like to be seen urgently, or if you have any new or worsening numbness, weakness, or problems controlling bladder and bowel function.  ~You are also welcome to contact Yola Maguire via [employer], but please be aware that responses to [employer] message may take 2-3 days due to the high volume of patients seen in clinic.
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
## 2025-06-10 ENCOUNTER — ANESTHESIA (OUTPATIENT)
Dept: SURGERY | Facility: HOSPITAL | Age: 35
End: 2025-06-10
Payer: COMMERCIAL

## 2025-06-10 ENCOUNTER — HOSPITAL ENCOUNTER (INPATIENT)
Facility: HOSPITAL | Age: 35
LOS: 1 days | Discharge: HOME | End: 2025-06-11
Attending: SURGERY | Admitting: SURGERY
Payer: COMMERCIAL

## 2025-06-10 ENCOUNTER — TELEPHONE (OUTPATIENT)
Dept: SURGERY | Facility: CLINIC | Age: 35
End: 2025-06-10

## 2025-06-10 ENCOUNTER — ANESTHESIA EVENT (OUTPATIENT)
Dept: SURGERY | Facility: HOSPITAL | Age: 35
End: 2025-06-10
Payer: COMMERCIAL

## 2025-06-10 DIAGNOSIS — E66.01 MORBID (SEVERE) OBESITY DUE TO EXCESS CALORIES (H): Primary | ICD-10-CM

## 2025-06-10 DIAGNOSIS — Z98.84 STATUS POST LAPAROSCOPIC SLEEVE GASTRECTOMY: ICD-10-CM

## 2025-06-10 DIAGNOSIS — M54.16 LUMBAR RADICULOPATHY: ICD-10-CM

## 2025-06-10 DIAGNOSIS — Z98.84 S/P LAPAROSCOPIC SLEEVE GASTRECTOMY: ICD-10-CM

## 2025-06-10 DIAGNOSIS — K91.2 POSTGASTRECTOMY MALABSORPTION: ICD-10-CM

## 2025-06-10 DIAGNOSIS — Z90.3 POSTGASTRECTOMY MALABSORPTION: ICD-10-CM
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
## 2025-06-10 LAB
GLUCOSE BLDC GLUCOMTR-MCNC: 148 MG/DL (ref 70–99)
GLUCOSE BLDC GLUCOMTR-MCNC: 94 MG/DL (ref 70–99)

## 2025-06-10 PROCEDURE — 43775 LAP SLEEVE GASTRECTOMY: CPT | Mod: AS | Performed by: NURSE PRACTITIONER

## 2025-06-10 PROCEDURE — 250N000011 HC RX IP 250 OP 636: Performed by: SURGERY

## 2025-06-10 PROCEDURE — 250N000011 HC RX IP 250 OP 636: Performed by: ANESTHESIOLOGY

## 2025-06-10 PROCEDURE — 250N000013 HC RX MED GY IP 250 OP 250 PS 637: Performed by: NURSE PRACTITIONER

## 2025-06-10 PROCEDURE — 258N000003 HC RX IP 258 OP 636: Performed by: NURSE PRACTITIONER

## 2025-06-10 PROCEDURE — 120N000001 HC R&B MED SURG/OB

## 2025-06-10 PROCEDURE — 88305 TISSUE EXAM BY PATHOLOGIST: CPT | Mod: TC | Performed by: SURGERY

## 2025-06-10 PROCEDURE — 250N000025 HC SEVOFLURANE, PER MIN: Performed by: SURGERY

## 2025-06-10 PROCEDURE — 250N000011 HC RX IP 250 OP 636: Performed by: NURSE PRACTITIONER

## 2025-06-10 PROCEDURE — 370N000017 HC ANESTHESIA TECHNICAL FEE, PER MIN: Performed by: SURGERY

## 2025-06-10 PROCEDURE — 250N000009 HC RX 250: Performed by: NURSE ANESTHETIST, CERTIFIED REGISTERED

## 2025-06-10 PROCEDURE — 250N000013 HC RX MED GY IP 250 OP 250 PS 637: Performed by: SURGERY

## 2025-06-10 PROCEDURE — 999N000248 HC STATISTIC IV INSERT WITH US BY RN

## 2025-06-10 PROCEDURE — 258N000003 HC RX IP 258 OP 636: Performed by: NURSE ANESTHETIST, CERTIFIED REGISTERED

## 2025-06-10 PROCEDURE — 250N000009 HC RX 250: Performed by: SURGERY

## 2025-06-10 PROCEDURE — 360N000077 HC SURGERY LEVEL 4, PER MIN: Performed by: SURGERY

## 2025-06-10 PROCEDURE — 258N000003 HC RX IP 258 OP 636: Performed by: ANESTHESIOLOGY

## 2025-06-10 PROCEDURE — 43775 LAP SLEEVE GASTRECTOMY: CPT | Performed by: SURGERY

## 2025-06-10 PROCEDURE — 710N000009 HC RECOVERY PHASE 1, LEVEL 1, PER MIN: Performed by: SURGERY

## 2025-06-10 PROCEDURE — 272N000001 HC OR GENERAL SUPPLY STERILE: Performed by: SURGERY

## 2025-06-10 PROCEDURE — 0DB64Z3 EXCISION OF STOMACH, PERCUTANEOUS ENDOSCOPIC APPROACH, VERTICAL: ICD-10-PCS | Performed by: SURGERY

## 2025-06-10 PROCEDURE — 999N000141 HC STATISTIC PRE-PROCEDURE NURSING ASSESSMENT: Performed by: SURGERY

## 2025-06-10 PROCEDURE — 250N000011 HC RX IP 250 OP 636: Performed by: NURSE ANESTHETIST, CERTIFIED REGISTERED

## 2025-06-10 RX ORDER — SODIUM CHLORIDE, SODIUM LACTATE, POTASSIUM CHLORIDE, CALCIUM CHLORIDE 600; 310; 30; 20 MG/100ML; MG/100ML; MG/100ML; MG/100ML
INJECTION, SOLUTION INTRAVENOUS CONTINUOUS
Status: DISCONTINUED | OUTPATIENT
Start: 2025-06-10 | End: 2025-06-10 | Stop reason: HOSPADM

## 2025-06-10 RX ORDER — NALBUPHINE HYDROCHLORIDE 20 MG/ML
5 INJECTION INTRAMUSCULAR; INTRAVENOUS; SUBCUTANEOUS EVERY 4 HOURS PRN
Status: DISCONTINUED | OUTPATIENT
Start: 2025-06-10 | End: 2025-06-11 | Stop reason: HOSPADM

## 2025-06-10 RX ORDER — HEPARIN SODIUM 5000 [USP'U]/.5ML
5000 INJECTION, SOLUTION INTRAVENOUS; SUBCUTANEOUS ONCE
Status: DISCONTINUED | OUTPATIENT
Start: 2025-06-10 | End: 2025-06-10 | Stop reason: HOSPADM

## 2025-06-10 RX ORDER — BUPIVACAINE HYDROCHLORIDE AND EPINEPHRINE 2.5; 5 MG/ML; UG/ML
INJECTION, SOLUTION EPIDURAL; INFILTRATION; INTRACAUDAL; PERINEURAL PRN
Status: DISCONTINUED | OUTPATIENT
Start: 2025-06-10 | End: 2025-06-10 | Stop reason: HOSPADM

## 2025-06-10 RX ORDER — HYDROMORPHONE HCL IN WATER/PF 6 MG/30 ML
0.2 PATIENT CONTROLLED ANALGESIA SYRINGE INTRAVENOUS EVERY 5 MIN PRN
Status: DISCONTINUED | OUTPATIENT
Start: 2025-06-10 | End: 2025-06-10 | Stop reason: HOSPADM

## 2025-06-10 RX ORDER — LORAZEPAM 2 MG/ML
.5-1 INJECTION INTRAMUSCULAR
Status: DISCONTINUED | OUTPATIENT
Start: 2025-06-10 | End: 2025-06-10 | Stop reason: HOSPADM

## 2025-06-10 RX ORDER — FENTANYL CITRATE 50 UG/ML
50 INJECTION, SOLUTION INTRAMUSCULAR; INTRAVENOUS EVERY 5 MIN PRN
Status: DISCONTINUED | OUTPATIENT
Start: 2025-06-10 | End: 2025-06-10 | Stop reason: HOSPADM

## 2025-06-10 RX ORDER — OXYCODONE HYDROCHLORIDE 5 MG/1
5 TABLET ORAL
Status: DISCONTINUED | OUTPATIENT
Start: 2025-06-10 | End: 2025-06-10 | Stop reason: HOSPADM

## 2025-06-10 RX ORDER — ONDANSETRON 4 MG/1
4 TABLET, ORALLY DISINTEGRATING ORAL EVERY 30 MIN PRN
Status: DISCONTINUED | OUTPATIENT
Start: 2025-06-10 | End: 2025-06-10 | Stop reason: HOSPADM

## 2025-06-10 RX ORDER — DEXTROSE MONOHYDRATE, SODIUM CHLORIDE, AND POTASSIUM CHLORIDE 50; 1.49; 4.5 G/1000ML; G/1000ML; G/1000ML
INJECTION, SOLUTION INTRAVENOUS CONTINUOUS
Status: DISCONTINUED | OUTPATIENT
Start: 2025-06-10 | End: 2025-06-11 | Stop reason: HOSPADM

## 2025-06-10 RX ORDER — ACETAMINOPHEN 10 MG/ML
1000 INJECTION, SOLUTION INTRAVENOUS EVERY 6 HOURS
Status: DISCONTINUED | OUTPATIENT
Start: 2025-06-10 | End: 2025-06-11 | Stop reason: HOSPADM

## 2025-06-10 RX ORDER — ACETAMINOPHEN 325 MG/1
975 TABLET ORAL ONCE
Status: COMPLETED | OUTPATIENT
Start: 2025-06-10 | End: 2025-06-10

## 2025-06-10 RX ORDER — NALOXONE HYDROCHLORIDE 0.4 MG/ML
0.2 INJECTION, SOLUTION INTRAMUSCULAR; INTRAVENOUS; SUBCUTANEOUS
Status: DISCONTINUED | OUTPATIENT
Start: 2025-06-10 | End: 2025-06-10

## 2025-06-10 RX ORDER — MORPHINE SULFATE 1 MG/ML
150 INJECTION, SOLUTION EPIDURAL; INTRATHECAL; INTRAVENOUS ONCE
Refills: 0 | Status: DISCONTINUED | OUTPATIENT
Start: 2025-06-10 | End: 2025-06-10 | Stop reason: HOSPADM

## 2025-06-10 RX ORDER — ONDANSETRON 2 MG/ML
INJECTION INTRAMUSCULAR; INTRAVENOUS PRN
Status: DISCONTINUED | OUTPATIENT
Start: 2025-06-10 | End: 2025-06-10

## 2025-06-10 RX ORDER — ENALAPRILAT 1.25 MG/ML
1.25 INJECTION INTRAVENOUS EVERY 6 HOURS PRN
Status: DISCONTINUED | OUTPATIENT
Start: 2025-06-10 | End: 2025-06-11 | Stop reason: HOSPADM

## 2025-06-10 RX ORDER — NALOXONE HYDROCHLORIDE 0.4 MG/ML
0.1 INJECTION, SOLUTION INTRAMUSCULAR; INTRAVENOUS; SUBCUTANEOUS
Status: DISCONTINUED | OUTPATIENT
Start: 2025-06-10 | End: 2025-06-10 | Stop reason: HOSPADM

## 2025-06-10 RX ORDER — ONDANSETRON 2 MG/ML
4 INJECTION INTRAMUSCULAR; INTRAVENOUS EVERY 30 MIN PRN
Status: DISCONTINUED | OUTPATIENT
Start: 2025-06-10 | End: 2025-06-10 | Stop reason: HOSPADM

## 2025-06-10 RX ORDER — ONDANSETRON 2 MG/ML
4 INJECTION INTRAMUSCULAR; INTRAVENOUS EVERY 6 HOURS PRN
Status: DISCONTINUED | OUTPATIENT
Start: 2025-06-10 | End: 2025-06-11 | Stop reason: HOSPADM

## 2025-06-10 RX ORDER — HYOSCYAMINE SULFATE 0.12 MG/1
125 TABLET SUBLINGUAL EVERY 4 HOURS
Status: DISCONTINUED | OUTPATIENT
Start: 2025-06-10 | End: 2025-06-11 | Stop reason: HOSPADM

## 2025-06-10 RX ORDER — ACETAMINOPHEN 500 MG
1000 TABLET ORAL EVERY 6 HOURS PRN
Status: ON HOLD | COMMUNITY
End: 2025-06-11

## 2025-06-10 RX ORDER — LIDOCAINE 40 MG/G
CREAM TOPICAL
Status: DISCONTINUED | OUTPATIENT
Start: 2025-06-10 | End: 2025-06-11 | Stop reason: HOSPADM

## 2025-06-10 RX ORDER — LORAZEPAM 2 MG/ML
.5-1 INJECTION INTRAMUSCULAR EVERY 6 HOURS PRN
Status: DISCONTINUED | OUTPATIENT
Start: 2025-06-10 | End: 2025-06-11 | Stop reason: HOSPADM

## 2025-06-10 RX ORDER — OXYCODONE HYDROCHLORIDE 5 MG/1
10 TABLET ORAL
Status: DISCONTINUED | OUTPATIENT
Start: 2025-06-10 | End: 2025-06-10 | Stop reason: HOSPADM

## 2025-06-10 RX ORDER — ALBUTEROL SULFATE 0.83 MG/ML
2.5 SOLUTION RESPIRATORY (INHALATION) EVERY 4 HOURS PRN
Status: DISCONTINUED | OUTPATIENT
Start: 2025-06-10 | End: 2025-06-10 | Stop reason: HOSPADM

## 2025-06-10 RX ORDER — CHOLECALCIFEROL (VITAMIN D3) 50 MCG
1 TABLET ORAL DAILY
Status: ON HOLD | COMMUNITY
End: 2025-06-11

## 2025-06-10 RX ORDER — ENOXAPARIN SODIUM 100 MG/ML
40 INJECTION SUBCUTANEOUS EVERY 24 HOURS
Status: DISCONTINUED | OUTPATIENT
Start: 2025-06-10 | End: 2025-06-11 | Stop reason: HOSPADM

## 2025-06-10 RX ORDER — ONDANSETRON 2 MG/ML
4 INJECTION INTRAMUSCULAR; INTRAVENOUS EVERY 30 MIN PRN
Status: COMPLETED | OUTPATIENT
Start: 2025-06-10 | End: 2025-06-10

## 2025-06-10 RX ORDER — FENTANYL CITRATE 50 UG/ML
25 INJECTION, SOLUTION INTRAMUSCULAR; INTRAVENOUS EVERY 5 MIN PRN
Status: DISCONTINUED | OUTPATIENT
Start: 2025-06-10 | End: 2025-06-10 | Stop reason: HOSPADM

## 2025-06-10 RX ORDER — ONDANSETRON 2 MG/ML
4 INJECTION INTRAMUSCULAR; INTRAVENOUS
Status: COMPLETED | OUTPATIENT
Start: 2025-06-10 | End: 2025-06-10

## 2025-06-10 RX ORDER — NALOXONE HYDROCHLORIDE 0.4 MG/ML
0.4 INJECTION, SOLUTION INTRAMUSCULAR; INTRAVENOUS; SUBCUTANEOUS
Status: DISCONTINUED | OUTPATIENT
Start: 2025-06-10 | End: 2025-06-10

## 2025-06-10 RX ORDER — METOPROLOL TARTRATE 1 MG/ML
INJECTION, SOLUTION INTRAVENOUS PRN
Status: DISCONTINUED | OUTPATIENT
Start: 2025-06-10 | End: 2025-06-10

## 2025-06-10 RX ORDER — MEPERIDINE HYDROCHLORIDE 25 MG/ML
12.5 INJECTION INTRAMUSCULAR; INTRAVENOUS; SUBCUTANEOUS EVERY 5 MIN PRN
Status: DISCONTINUED | OUTPATIENT
Start: 2025-06-10 | End: 2025-06-10 | Stop reason: HOSPADM

## 2025-06-10 RX ORDER — ACETAMINOPHEN 325 MG/1
975 TABLET ORAL EVERY 6 HOURS
Status: DISCONTINUED | OUTPATIENT
Start: 2025-06-10 | End: 2025-06-11 | Stop reason: HOSPADM

## 2025-06-10 RX ORDER — DEXAMETHASONE SODIUM PHOSPHATE 4 MG/ML
4 INJECTION, SOLUTION INTRA-ARTICULAR; INTRALESIONAL; INTRAMUSCULAR; INTRAVENOUS; SOFT TISSUE
Status: DISCONTINUED | OUTPATIENT
Start: 2025-06-10 | End: 2025-06-10 | Stop reason: HOSPADM

## 2025-06-10 RX ORDER — FENTANYL CITRATE 50 UG/ML
50 INJECTION, SOLUTION INTRAMUSCULAR; INTRAVENOUS
Refills: 0 | Status: COMPLETED | OUTPATIENT
Start: 2025-06-10 | End: 2025-06-10

## 2025-06-10 RX ORDER — FENTANYL CITRATE 50 UG/ML
INJECTION, SOLUTION INTRAMUSCULAR; INTRAVENOUS PRN
Status: DISCONTINUED | OUTPATIENT
Start: 2025-06-10 | End: 2025-06-10

## 2025-06-10 RX ORDER — ACETAMINOPHEN 325 MG/1
975 TABLET ORAL
Status: DISCONTINUED | OUTPATIENT
Start: 2025-06-10 | End: 2025-06-10 | Stop reason: HOSPADM

## 2025-06-10 RX ORDER — DIAZEPAM 10 MG/2ML
5 INJECTION, SOLUTION INTRAMUSCULAR; INTRAVENOUS ONCE
Status: DISCONTINUED | OUTPATIENT
Start: 2025-06-10 | End: 2025-06-10 | Stop reason: HOSPADM

## 2025-06-10 RX ORDER — KETOROLAC TROMETHAMINE 15 MG/ML
15 INJECTION, SOLUTION INTRAMUSCULAR; INTRAVENOUS EVERY 6 HOURS
Status: DISCONTINUED | OUTPATIENT
Start: 2025-06-10 | End: 2025-06-11 | Stop reason: HOSPADM

## 2025-06-10 RX ORDER — LIDOCAINE HYDROCHLORIDE 10 MG/ML
INJECTION, SOLUTION INFILTRATION; PERINEURAL PRN
Status: DISCONTINUED | OUTPATIENT
Start: 2025-06-10 | End: 2025-06-10

## 2025-06-10 RX ORDER — ONDANSETRON 4 MG/1
4 TABLET, ORALLY DISINTEGRATING ORAL EVERY 6 HOURS PRN
Status: DISCONTINUED | OUTPATIENT
Start: 2025-06-10 | End: 2025-06-11 | Stop reason: HOSPADM

## 2025-06-10 RX ORDER — HYDROXYZINE HYDROCHLORIDE 25 MG/1
25 TABLET, FILM COATED ORAL EVERY 6 HOURS PRN
Status: DISCONTINUED | OUTPATIENT
Start: 2025-06-10 | End: 2025-06-10 | Stop reason: HOSPADM

## 2025-06-10 RX ORDER — HALOPERIDOL 5 MG/ML
2 INJECTION INTRAMUSCULAR
Status: COMPLETED | OUTPATIENT
Start: 2025-06-10 | End: 2025-06-10

## 2025-06-10 RX ORDER — ACETAMINOPHEN 325 MG/1
975 TABLET ORAL ONCE
Status: DISCONTINUED | OUTPATIENT
Start: 2025-06-10 | End: 2025-06-10 | Stop reason: HOSPADM

## 2025-06-10 RX ORDER — LIDOCAINE 40 MG/G
CREAM TOPICAL
Status: DISCONTINUED | OUTPATIENT
Start: 2025-06-10 | End: 2025-06-10 | Stop reason: HOSPADM

## 2025-06-10 RX ORDER — OMEPRAZOLE 20 MG/1
20 CAPSULE, DELAYED RELEASE ORAL
Status: DISCONTINUED | OUTPATIENT
Start: 2025-06-11 | End: 2025-06-11 | Stop reason: HOSPADM

## 2025-06-10 RX ORDER — PROPOFOL 10 MG/ML
INJECTION, EMULSION INTRAVENOUS PRN
Status: DISCONTINUED | OUTPATIENT
Start: 2025-06-10 | End: 2025-06-10

## 2025-06-10 RX ORDER — PROCHLORPERAZINE MALEATE 10 MG
10 TABLET ORAL EVERY 6 HOURS PRN
Status: DISCONTINUED | OUTPATIENT
Start: 2025-06-10 | End: 2025-06-11 | Stop reason: HOSPADM

## 2025-06-10 RX ORDER — MULTIVIT WITH IRON,MINERALS
1 TABLET,CHEWABLE ORAL 2 TIMES DAILY
Status: DISCONTINUED | OUTPATIENT
Start: 2025-06-11 | End: 2025-06-11 | Stop reason: HOSPADM

## 2025-06-10 RX ORDER — CEFAZOLIN SODIUM/WATER 3 G/30 ML
3 SYRINGE (ML) INTRAVENOUS
Status: COMPLETED | OUTPATIENT
Start: 2025-06-10 | End: 2025-06-10

## 2025-06-10 RX ORDER — TRAMADOL HYDROCHLORIDE 50 MG/1
100 TABLET ORAL EVERY 6 HOURS PRN
Status: DISCONTINUED | OUTPATIENT
Start: 2025-06-10 | End: 2025-06-11 | Stop reason: HOSPADM

## 2025-06-10 RX ORDER — DEXAMETHASONE SODIUM PHOSPHATE 10 MG/ML
INJECTION, SOLUTION INTRAMUSCULAR; INTRAVENOUS PRN
Status: DISCONTINUED | OUTPATIENT
Start: 2025-06-10 | End: 2025-06-10

## 2025-06-10 RX ORDER — MORPHINE SULFATE 1 MG/ML
INJECTION, SOLUTION EPIDURAL; INTRATHECAL; INTRAVENOUS
Status: DISCONTINUED | OUTPATIENT
Start: 2025-06-10 | End: 2025-06-10

## 2025-06-10 RX ORDER — ACETAMINOPHEN 325 MG/10.15ML
975 LIQUID ORAL EVERY 6 HOURS
Status: DISCONTINUED | OUTPATIENT
Start: 2025-06-10 | End: 2025-06-11 | Stop reason: HOSPADM

## 2025-06-10 RX ORDER — GABAPENTIN 250 MG/5ML
250 SOLUTION ORAL EVERY 8 HOURS SCHEDULED
Status: DISCONTINUED | OUTPATIENT
Start: 2025-06-10 | End: 2025-06-11 | Stop reason: HOSPADM

## 2025-06-10 RX ORDER — GABAPENTIN 300 MG/1
600 CAPSULE ORAL
Status: COMPLETED | OUTPATIENT
Start: 2025-06-10 | End: 2025-06-10

## 2025-06-10 RX ORDER — CEFAZOLIN SODIUM/WATER 3 G/30 ML
3 SYRINGE (ML) INTRAVENOUS SEE ADMIN INSTRUCTIONS
Status: DISCONTINUED | OUTPATIENT
Start: 2025-06-10 | End: 2025-06-10 | Stop reason: HOSPADM

## 2025-06-10 RX ORDER — ONDANSETRON 4 MG/1
4 TABLET, ORALLY DISINTEGRATING ORAL EVERY 30 MIN PRN
Status: COMPLETED | OUTPATIENT
Start: 2025-06-10 | End: 2025-06-10

## 2025-06-10 RX ORDER — FENTANYL CITRATE 50 UG/ML
50 INJECTION, SOLUTION INTRAMUSCULAR; INTRAVENOUS
Refills: 0 | Status: DISCONTINUED | OUTPATIENT
Start: 2025-06-10 | End: 2025-06-10 | Stop reason: HOSPADM

## 2025-06-10 RX ORDER — HYDROMORPHONE HCL IN WATER/PF 6 MG/30 ML
0.4 PATIENT CONTROLLED ANALGESIA SYRINGE INTRAVENOUS EVERY 5 MIN PRN
Status: DISCONTINUED | OUTPATIENT
Start: 2025-06-10 | End: 2025-06-10 | Stop reason: HOSPADM

## 2025-06-10 RX ADMIN — HYOSCYAMINE SULFATE 125 MCG: 0.12 TABLET ORAL; SUBLINGUAL at 22:11

## 2025-06-10 RX ADMIN — ACETAMINOPHEN 1000 MG: 10 INJECTION, SOLUTION INTRAVENOUS at 16:41

## 2025-06-10 RX ADMIN — ONDANSETRON 4 MG: 2 INJECTION, SOLUTION INTRAMUSCULAR; INTRAVENOUS at 09:06

## 2025-06-10 RX ADMIN — ACETAMINOPHEN 975 MG: 325 TABLET ORAL at 08:59

## 2025-06-10 RX ADMIN — GABAPENTIN 250 MG: 250 SUSPENSION ORAL at 22:11

## 2025-06-10 RX ADMIN — FENTANYL CITRATE 50 MCG: 50 INJECTION INTRAMUSCULAR; INTRAVENOUS at 11:29

## 2025-06-10 RX ADMIN — HALOPERIDOL LACTATE 2 MG: 5 INJECTION, SOLUTION INTRAMUSCULAR at 11:53

## 2025-06-10 RX ADMIN — ROCURONIUM 50 MG: 50 INJECTION, SOLUTION INTRAVENOUS at 10:16

## 2025-06-10 RX ADMIN — KETOROLAC TROMETHAMINE 15 MG: 15 INJECTION, SOLUTION INTRAMUSCULAR; INTRAVENOUS at 16:44

## 2025-06-10 RX ADMIN — PROCHLORPERAZINE EDISYLATE 5 MG: 5 INJECTION INTRAMUSCULAR; INTRAVENOUS at 12:32

## 2025-06-10 RX ADMIN — KETOROLAC TROMETHAMINE 15 MG: 15 INJECTION, SOLUTION INTRAMUSCULAR; INTRAVENOUS at 22:15

## 2025-06-10 RX ADMIN — FENTANYL CITRATE 50 MCG: 50 INJECTION INTRAMUSCULAR; INTRAVENOUS at 12:15

## 2025-06-10 RX ADMIN — DEXAMETHASONE SODIUM PHOSPHATE 10 MG: 10 INJECTION, SOLUTION INTRAMUSCULAR; INTRAVENOUS at 10:26

## 2025-06-10 RX ADMIN — ONDANSETRON 4 MG: 2 INJECTION, SOLUTION INTRAMUSCULAR; INTRAVENOUS at 11:30

## 2025-06-10 RX ADMIN — ONDANSETRON 4 MG: 2 INJECTION INTRAMUSCULAR; INTRAVENOUS at 10:26

## 2025-06-10 RX ADMIN — PROPOFOL 200 MG: 10 INJECTION, EMULSION INTRAVENOUS at 10:09

## 2025-06-10 RX ADMIN — HYDROMORPHONE HYDROCHLORIDE 0.4 MG: 0.2 INJECTION, SOLUTION INTRAMUSCULAR; INTRAVENOUS; SUBCUTANEOUS at 14:13

## 2025-06-10 RX ADMIN — MIDAZOLAM HYDROCHLORIDE 2 MG: 1 INJECTION, SOLUTION INTRAMUSCULAR; INTRAVENOUS at 10:04

## 2025-06-10 RX ADMIN — LIDOCAINE HYDROCHLORIDE 5 ML: 10 INJECTION, SOLUTION INFILTRATION; PERINEURAL at 10:09

## 2025-06-10 RX ADMIN — HYDROMORPHONE HYDROCHLORIDE 0.4 MG: 0.2 INJECTION, SOLUTION INTRAMUSCULAR; INTRAVENOUS; SUBCUTANEOUS at 12:29

## 2025-06-10 RX ADMIN — FENTANYL CITRATE 50 MCG: 50 INJECTION, SOLUTION INTRAMUSCULAR; INTRAVENOUS at 09:11

## 2025-06-10 RX ADMIN — Medication 0.15 MG: at 09:37

## 2025-06-10 RX ADMIN — MIDAZOLAM HYDROCHLORIDE 1 MG: 1 INJECTION, SOLUTION INTRAMUSCULAR; INTRAVENOUS at 09:10

## 2025-06-10 RX ADMIN — HYDROMORPHONE HYDROCHLORIDE 0.5 MG: 1 INJECTION, SOLUTION INTRAMUSCULAR; INTRAVENOUS; SUBCUTANEOUS at 10:45

## 2025-06-10 RX ADMIN — Medication 100 MG: at 10:09

## 2025-06-10 RX ADMIN — GABAPENTIN 600 MG: 300 CAPSULE ORAL at 08:59

## 2025-06-10 RX ADMIN — SODIUM CHLORIDE, SODIUM LACTATE, POTASSIUM CHLORIDE, AND CALCIUM CHLORIDE: .6; .31; .03; .02 INJECTION, SOLUTION INTRAVENOUS at 10:54

## 2025-06-10 RX ADMIN — ONDANSETRON 4 MG: 2 INJECTION, SOLUTION INTRAMUSCULAR; INTRAVENOUS at 16:38

## 2025-06-10 RX ADMIN — ACETAMINOPHEN 975 MG: 325 SOLUTION ORAL at 22:11

## 2025-06-10 RX ADMIN — SODIUM CHLORIDE, SODIUM LACTATE, POTASSIUM CHLORIDE, AND CALCIUM CHLORIDE: .6; .31; .03; .02 INJECTION, SOLUTION INTRAVENOUS at 09:30

## 2025-06-10 RX ADMIN — ONDANSETRON 4 MG: 2 INJECTION, SOLUTION INTRAMUSCULAR; INTRAVENOUS at 14:02

## 2025-06-10 RX ADMIN — HYOSCYAMINE SULFATE 125 MCG: 0.12 TABLET ORAL; SUBLINGUAL at 23:46

## 2025-06-10 RX ADMIN — METOPROLOL TARTRATE 2 MG: 5 INJECTION INTRAVENOUS at 10:45

## 2025-06-10 RX ADMIN — SUGAMMADEX 300 MG: 100 INJECTION, SOLUTION INTRAVENOUS at 11:01

## 2025-06-10 RX ADMIN — FENTANYL CITRATE 50 MCG: 50 INJECTION INTRAMUSCULAR; INTRAVENOUS at 11:37

## 2025-06-10 RX ADMIN — POTASSIUM CHLORIDE: 2 INJECTION, SOLUTION, CONCENTRATE INTRAVENOUS at 16:52

## 2025-06-10 RX ADMIN — FENTANYL CITRATE 50 MCG: 50 INJECTION INTRAMUSCULAR; INTRAVENOUS at 11:53

## 2025-06-10 RX ADMIN — FAMOTIDINE 20 MG: 10 INJECTION, SOLUTION INTRAVENOUS at 09:05

## 2025-06-10 RX ADMIN — METOPROLOL TARTRATE 1 MG: 5 INJECTION INTRAVENOUS at 10:50

## 2025-06-10 RX ADMIN — Medication 3 G: at 09:59

## 2025-06-10 RX ADMIN — FENTANYL CITRATE 100 MCG: 50 INJECTION, SOLUTION INTRAMUSCULAR; INTRAVENOUS at 10:09

## 2025-06-10 RX ADMIN — METOPROLOL TARTRATE 2 MG: 5 INJECTION INTRAVENOUS at 10:54

## 2025-06-10 RX ADMIN — HYOSCYAMINE SULFATE 125 MCG: 0.12 TABLET ORAL; SUBLINGUAL at 16:44

## 2025-06-10 RX ADMIN — PROCHLORPERAZINE EDISYLATE 10 MG: 5 INJECTION INTRAMUSCULAR; INTRAVENOUS at 19:54

## 2025-06-10 RX ADMIN — HYDROMORPHONE HYDROCHLORIDE 0.5 MG: 1 INJECTION, SOLUTION INTRAMUSCULAR; INTRAVENOUS; SUBCUTANEOUS at 10:43

## 2025-06-10 RX ADMIN — ENOXAPARIN SODIUM 40 MG: 40 INJECTION SUBCUTANEOUS at 22:11

## 2025-06-10 RX ADMIN — SODIUM CHLORIDE 16 MCG: 9 INJECTION, SOLUTION INTRAVENOUS at 10:09

## 2025-06-10 RX ADMIN — SODIUM CHLORIDE 4 MCG: 9 INJECTION, SOLUTION INTRAVENOUS at 10:33

## 2025-06-10 ASSESSMENT — ACTIVITIES OF DAILY LIVING (ADL)
ADLS_ACUITY_SCORE: 23
ADLS_ACUITY_SCORE: 30
ADLS_ACUITY_SCORE: 23
ADLS_ACUITY_SCORE: 29
ADLS_ACUITY_SCORE: 23
ADLS_ACUITY_SCORE: 25
ADLS_ACUITY_SCORE: 25
ADLS_ACUITY_SCORE: 23
ADLS_ACUITY_SCORE: 30
ADLS_ACUITY_SCORE: 23
ADLS_ACUITY_SCORE: 25

## 2025-06-10 NOTE — ANESTHESIA PROCEDURE NOTES
"Intrathecal injection Procedure Note    Pre-Procedure   Staff -        Anesthesiologist:  Behrens, Christopher J, MD       Performed By: anesthesiologist       Location: pre-op       Pre-Anesthestic Checklist: patient identified, IV checked, risks and benefits discussed, informed consent, monitors and equipment checked, pre-op evaluation, at physician/surgeon's request and post-op pain management  Timeout:       Correct Patient: Yes        Correct Procedure: Yes        Correct Site: Yes        Correct Position: Yes   Procedure Documentation  Procedure: intrathecal injection         Patient Position: sitting       Skin prep: Chloraprep       Insertion Site: L4-5. (midline approach).       Introducer used       # of attempts: 1 and  # of redirects:     Assessment/Narrative         Paresthesias: No.       CSF fluid: clear.       Opening pressure was cmH2O while  Sitting.      Medication(s) Administered   Morphine PF 1 mg/mL (Intrathecal) - Intrathecal   0.15 mg - 6/10/2025 9:37:00 AM    FOR [facility] ONLY:   Pain Team Contact information: please page the Pain Team Via [employer]. Search \"Pain\". During daytime hours, please page the attending first. At night please page the resident first.      "

## 2025-06-10 NOTE — CONFIDENTIAL NOTE
PACU nurse called asking for admission orders. Relayed message to Traci COVARRUBIAS who will place orders    Kamala Escoto RN  [facility]  General Surgery  [facility]  [address] [place] [address]  Office:[phone]  Employed by [facility] [employer]

## 2025-06-10 NOTE — ANESTHESIA POSTPROCEDURE EVALUATION
Patient: Mariela Hawkins    Procedure: Procedure(s):  GASTRECTOMY, SLEEVE, LAPAROSCOPIC       Anesthesia Type:  General    Note:  Disposition: Admission   Postop Pain Control: Uneventful            Sign Out: Well controlled pain   PONV: No   Neuro/Psych: Uneventful            Sign Out: Acceptable/Baseline neuro status   Airway/Respiratory: Uneventful            Sign Out: Acceptable/Baseline resp. status   CV/Hemodynamics: Uneventful            Sign Out: Acceptable CV status   Other NRE: NONE   DID A NON-ROUTINE EVENT OCCUR? No           Last vitals:  Vitals Value Taken Time   /60 06/10/25 14:15   Temp 34  C (93.2  F) 06/10/25 14:16   Pulse 73 06/10/25 14:23   Resp 20 06/10/25 14:14   SpO2 94 % 06/10/25 14:23   Vitals shown include unfiled device data.    Electronically Signed By: Christopher J. Behrens, MD  Cathleen 10, 2025  2:24 PM

## 2025-06-10 NOTE — PHARMACY-ADMISSION MEDICATION HISTORY
Pharmacist Admission Medication History    Admission medication history is complete. The information provided in this note is only as accurate as the sources available at the time of the update.    Information Source(s): Patient, Clinic records, and CareEverywhere/SureScripts via in-person    Pertinent Information: none    Changes made to PTA medication list:  Added: acetaminophen  Deleted: None  Changed: None    Allergies reviewed with patient and updates made in EHR: yes    Medication History Completed By: Aurelio Maguire [facility] 6/10/2025 9:13 AM    PTA Med List   Medication Sig Last Dose/Taking    acetaminophen (TYLENOL) 500 MG tablet Take 1,000 mg by mouth every 6 hours as needed for mild pain. 6/9/2025    childrens multivitamin chewable tablet Take 1 tablet by mouth 2 times daily. Multivitamin must contain Vitamin A, Zinc and at least 18 mg of iron. 6/9/2025    cyanocobalamin (VITAMIN B-12) 1000 MCG sublingual tablet Place 1 tablet (1,000 mcg) under the tongue daily. 6/9/2025    cyclobenzaprine (FLEXERIL) 10 MG tablet Take 1 tablet (10 mg) by mouth 3 times daily as needed for muscle spasms. Past Week    diclofenac (VOLTAREN) 1 % topical gel Apply 2 g topically 4 times daily as needed. Wash your hands thoroughly after use. Stop if bleeding or blood in stool occurs. Past Week    gabapentin (NEURONTIN) 300 MG capsule Take 1 capsule (300 mg) by mouth 3 times daily. (Patient taking differently: Take 300 mg by mouth 3 times daily as needed.) Past Week    phentermine (ADIPEX-P) 37.5 MG tablet Take 1 tablet every morning with breakfast. Past Month    vitamin D3 (CHOLECALCIFEROL) 50 mcg (2000 units) tablet Take 1 tablet by mouth daily. 6/9/2025

## 2025-06-10 NOTE — ANESTHESIA CARE TRANSFER NOTE
Patient: Mariela Hawkins    Procedure: Procedure(s):  GASTRECTOMY, SLEEVE, LAPAROSCOPIC       Diagnosis: Morbid obesity with BMI of 50.0-59.9, adult (H) [E66.01, Z68.43]  Diagnosis Additional Information: No value filed.    Anesthesia Type:   General     Note:    Oropharynx: oropharynx clear of all foreign objects and spontaneously breathing  Level of Consciousness: drowsy  Oxygen Supplementation: face mask  Level of Supplemental Oxygen (L/min / FiO2): 8  Independent Airway: airway patency satisfactory and stable  Dentition: dentition unchanged  Vital Signs Stable: post-procedure vital signs reviewed and stable  Report to RN Given: handoff report given  Patient transferred to: PACU    Handoff Report: Identifed the Patient, Identified the Reponsible Provider, Reviewed the pertinent medical history, Discussed the surgical course, Reviewed Intra-OP anesthesia mangement and issues during anesthesia, Set expectations for post-procedure period and Allowed opportunity for questions and acknowledgement of understanding  Vitals:  Vitals Value Taken Time   /100 06/10/25 11:15   Temp 35.9  C (96.62  F) 06/10/25 11:17   Pulse 83 06/10/25 11:17   Resp 18 06/10/25 11:17   SpO2 99 % 06/10/25 11:17   Vitals shown include unfiled device data.    Electronically Signed By: JOSE Cabrera CRNA  Cathleen 10, 2025  11:18 AM

## 2025-06-10 NOTE — ANESTHESIA PROCEDURE NOTES
Airway       Patient location during procedure: OR       Procedure Start/Stop Times: 6/10/2025 10:10 AM  Staff -        CRNA: Brielle Villeda APRN CRNA       Performed By: CRNA  Consent for Airway        Urgency: elective  Indications and Patient Condition       Indications for airway management: anthony-procedural       Induction type:intravenous       Mask difficulty assessment: 1 - vent by mask    Final Airway Details       Final airway type: endotracheal airway       Successful airway: ETT - single  Endotracheal Airway Details        ETT size (mm): 7.5       Cuffed: yes       Cuff volume (mL): 5       Successful intubation technique: direct laryngoscopy       DL Blade Type: MAC 3       Grade View of Cords: 1       Adjucts: stylet       Position: Right       Measured from: gums/teeth       Secured at (cm): 21       Bite block used: Soft    Post intubation assessment        Placement verified by: capnometry, equal breath sounds and chest rise        Number of attempts at approach: 1       Number of other approaches attempted: 0       Secured with: tape       Ease of procedure: easy       Dentition: Intact    Medication(s) Administered   Medication Administration Time: 6/10/2025 10:10 AM

## 2025-06-10 NOTE — OP NOTE
[facility]  Operative Note    Pre-operative diagnosis: Morbid obesity with BMI of 50.0-59.9, adult (H) [E66.01, Z68.43]   Post-operative diagnosis morbid obesity   Procedure: Procedure(s):  GASTRECTOMY, SLEEVE, LAPAROSCOPIC   Surgeon: Garrick Flores MD   Assistants(s): The assistance of Traci Cohen CNP, was necessary for retraction and exposure during the course of the case.   Anesthesia: General with Block    Estimated blood loss: Less than 50 ml        Operative Indication:  Mariela Hawkins has a Body mass index is 50.52 kg/m . with associated co morbidities including prediabetes, migraine headache. she has attempted weight loss through medical management, diet, and exercise alone without longterm success.  she is therefore pursuing bariatric surgery as a means of achieving long term weight loss and resolution of her bariatric comorbidities.       Drains: None   Specimens: Sleeve Gastrectomy       Findings: None.   Complications: None.           Description of procedure:    The patient was brought to the operating room where after induction of general anesthesia with endotracheal intubation, they were positioned with both arms out.  After a procedural pause, we began by injecting quarter percent Marcaine and the skin 20 cm inferior to the xiphoid process and just to the left of midline.  This was then sharply incised and access to the abdomen gained with an optical 10 mm trocar.  The abdomen was insufflated.  We then proceeded to place 2 additional 5 mm ports, and one additional 15 mm port across the upper abdomen after injection of local anesthetic.  An incision was made in the epigastric region for placement of the Diego liver retractor.  Bilateral TAP blocks were performed under visual guidance with 0.25% marcaine, 25 cc per side.  The patient was then put into reverse Trendelenburg body positioning.    On initial survey, the stomach appeared normal with no evidence of a  hiatal hernia.  We began our dissection by dividing the omentum from the greater curvature of the stomach with the Harmonic scalpel.  Distally this was mobilized until we were within 6 cm of the pylorus, which was identified by palpation with the laparoscopic graspers.  Proximally this dissection was carried up along the greater curvature of the stomach, dividing the short gastric vessels and posterior adhesions between the stomach and retroperitoneum until we reached the angle of His.  The left branch of the diaphragmatic doris was identified.  Satisfied with our mobilization of the stomach, we then had our anesthesia colleagues advance a 32 Polish red rubber catheter into the stomach where was then manipulated until it lay flush against the lesser curvature stomach, terminating near the pylorus.      We then proceeded to begin dividing the stomach, first with a 60 mm black load Endo WILFRIDO stapler which was positioned adjacent to the red rubber catheter across the antrum, with approximately 1 cm of distance between the lateral edge of the catheter and medial edge of the stapler.  Adequate space at the incisura was ensured.  After firing the staple load, we then proceeded to perform the remainder of the sleeve gastrectomy, dividing the stomach with sequential firings of the green, yellow, and blue staple loads until we reached the angle of His.  For each firing, we ensured there is approximately 1 cm of space between the lateral aspect of the catheter and medial aspect of the stapler.  No staple line reinforcement was utilized.  The staple line was not over sewn.  A total of 5 staple loads were utilized.  With the stomach thus fully resected, we inspected our staple line which appeared hemostatic.  Clips were then placed along the staple line we could see vessels extending visibly to the edge.  We then sutured the proximal aspect of the staple line to the left branch of the diaphragmatic doris with a 2-0 silk suture.       The resected stomach was then removed from the abdominal cavity through the 15 mm port site.  This fascial defect was then closed with a interrupted 0 Vicryl suture.  The remainder of the local anesthetic was then injected into the skin and fascia surrounding the port sites.  The Diego liver retractor was removed under direct visualization and insufflation then released.  The ports were then removed and the skin closed with interrupted 4-0 Vicryl sutures.      Garrick Flores MD, FACS  Office: [phone]  [facility]   General and Bariatric Surgery

## 2025-06-10 NOTE — INTERVAL H&P NOTE
"I have reviewed the surgical (or preoperative) H&P that is linked to this encounter, and examined the patient. There are no significant changes    Garrick Flores MD, [facility]  Office: [phone]  [facility]   General and Bariatric Surgery      Clinical Conditions Present on Arrival:  Clinically Significant Risk Factors Present on Admission                       # Morbid Obesity: Estimated body mass index is 50.52 kg/m  as calculated from the following:    Height as of 6/6/25: 1.674 m (5' 5.91\").    Weight as of this encounter: 141.6 kg (312 lb 1.6 oz).       "

## 2025-06-10 NOTE — ANESTHESIA PREPROCEDURE EVALUATION
Anesthesia Pre-Procedure Evaluation    Patient: Mariela Hawkins   MRN: [id number] : 1990          Procedure : Procedure(s):  GASTRECTOMY, SLEEVE, LAPAROSCOPIC         Past Medical History:   Diagnosis Date    Acanthosis nigricans     Anemia     Anxiety     BV (bacterial vaginosis)     Claustrophobia     History of anesthesia complications     Mental disorder     Migraines     Migraines     Missed ab     Morbid obesity (H)     Ovarian cystic mass     Pain in limb     Pelvic pain in female     PONV (postoperative nausea and vomiting)     Ventral hernia without obstruction or gangrene 10/18/2021      Past Surgical History:   Procedure Laterality Date     SECTION       SECTION      X2     SECTION N/A 2017    Procedure: REPEAT  SECTION;  Surgeon: Shayy Aviles MD;  Location: [facility] L+D OR;  Service:      SECTION, IMMEDIATE HYSTERECTOMY, COMBINED N/A 2019    Procedure: Repeat  Section, Hysterectomy, Exploratory Laparotomy, Bilateral Salpingectomy, Cystoscopy;  Surgeon: Joan Carmona MD;  Location:  OR    DILATION AND CURETTAGE N/A 2016    Procedure: DILATION AND CURETTAGE SUCTION;  Surgeon: Ant Parikh MD;  Location: [facility] OR;  Service:     EYE MUSCLE SURGERY Bilateral     strabismus    HERNIORRHAPHY VENTRAL N/A 2018    Procedure: REPAIR, HERNIA, VENTRAL, OPEN;  Surgeon: Alfredo Boland MD;  Location: [facility] OR;  Service:     RECESSION RESECTION (REPAIR STRABISMUS) BILATERAL Bilateral 2014    Procedure: RECESSION RESECTION (REPAIR STRABISMUS) BILATERAL;  Surgeon: Titi Awan MD;  [facility]:  [facility]    RECESSION RESECTION (REPAIR STRABISMUS) BILATERAL Bilateral 2015    Procedure: RECESSION RESECTION (REPAIR STRABISMUS) BILATERAL;  Surgeon: Titi Awan MD;  Location:  [facility]      Allergies   Allergen Reactions    Covid-19 Mrna Vacc (Moderna) Anaphylaxis    Penicillins  Hives and Rash    Amoxicillin Rash      Social History     Tobacco Use    Smoking status: Never    Smokeless tobacco: Never   Substance Use Topics    Alcohol use: No      Wt Readings from Last 1 Encounters:   06/10/25 (!) 141.6 kg (312 lb 1.6 oz)        Anesthesia Evaluation        History of anesthetic complications  - PONV.      ROS/MED HX  ENT/Pulmonary:       Neurologic:       Cardiovascular:       METS/Exercise Tolerance:     Hematologic:       Musculoskeletal:       GI/Hepatic:       Renal/Genitourinary:       Endo:     (+)               Obesity,       Psychiatric/Substance Use:       Infectious Disease:       Malignancy:       Other:              Physical Exam  Airway  Mallampati: II  TM distance: >3 FB  Neck ROM: full  Upper bite lip test: II  Mouth opening: >= 4 cm    Cardiovascular   Rhythm: regular  Rate: normal rate     Dental   (+) Minor Abnormalities - some fillings, tiny chips      Pulmonary Breath sounds clear to auscultation        Neurological   She appears awake, alert and oriented x3.    Other Findings       OUTSIDE LABS:  CBC:   Lab Results   Component Value Date    WBC 8.2 05/30/2025    WBC 8.0 11/10/2024    HGB 11.7 05/30/2025    HGB 12.3 11/10/2024    HCT 35.5 05/30/2025    HCT 36.8 11/10/2024     05/30/2025     11/10/2024     BMP:   Lab Results   Component Value Date     05/30/2025     11/10/2024    POTASSIUM 4.1 05/30/2025    POTASSIUM 3.9 11/10/2024    CHLORIDE 104 05/30/2025    CHLORIDE 104 11/10/2024    CO2 25 05/30/2025    CO2 23 11/10/2024    BUN 21.9 (H) 05/30/2025    BUN 7.6 11/10/2024    CR 0.63 05/30/2025    CR 0.66 11/10/2024    GLC 79 05/30/2025     (H) 11/10/2024     COAGS:   Lab Results   Component Value Date    PTT 33 05/30/2025    INR 1.06 05/30/2025     POC:   Lab Results   Component Value Date    HCG Negative 06/29/2018     HEPATIC:   Lab Results   Component Value Date    ALBUMIN 4.2 05/30/2025    PROTTOTAL 6.8 05/30/2025    ALT 42  "05/30/2025    AST 17 05/30/2025    ALKPHOS 62 05/30/2025    BILITOTAL 0.3 05/30/2025     OTHER:   Lab Results   Component Value Date    LACT 0.7 10/19/2021    A1C 5.7 (H) 10/31/2024    NURIS 9.1 05/30/2025    MAG 1.8 10/19/2021    LIPASE 13 07/28/2019    TSH 1.81 10/31/2024    CRP 9.9 (H) 09/22/2018       Anesthesia Plan    ASA Status:  3      NPO Status: NPO Appropriate   Anesthesia Type: General.  Airway: oral.  Induction: intravenous.  Maintenance: Inhalation.   Techniques and Equipment:       - Monitoring Plan: standard ASA monitoring     Consents    Anesthesia Plan(s) and associated risks, benefits, and realistic alternatives discussed. Questions answered and patient/representative(s) expressed understanding.     - Discussed: anesthesiologist     - Discussed with:  Patient        - Pt is DNR/DNI Status: no DNR     Blood Consent:      - Discussed with: patient.     - Consented: consented to blood products     Postoperative Care    Pain management: non-narcotic analgesics, plan for postoperative opioid use.     Comments:                   Christopher J. Behrens, MD    I have reviewed the pertinent notes and labs in the chart from the past 30 days and (re)examined the patient.  Any updates or changes from those notes are reflected in this note.    Clinically Significant Risk Factors Present on Admission                             # Morbid Obesity: Estimated body mass index is 50.52 kg/m  as calculated from the following:    Height as of 6/6/25: 1.674 m (5' 5.91\").    Weight as of this encounter: 141.6 kg (312 lb 1.6 oz).                    "

## 2025-06-11 ENCOUNTER — RESULTS FOLLOW-UP (OUTPATIENT)
Dept: PHYSICAL MEDICINE AND REHAB | Facility: CLINIC | Age: 35
End: 2025-06-11

## 2025-06-11 VITALS
DIASTOLIC BLOOD PRESSURE: 58 MMHG | TEMPERATURE: 98.3 F | BODY MASS INDEX: 51.13 KG/M2 | RESPIRATION RATE: 18 BRPM | SYSTOLIC BLOOD PRESSURE: 109 MMHG | OXYGEN SATURATION: 95 % | WEIGHT: 293 LBS | HEART RATE: 84 BPM

## 2025-06-11 LAB
PATH REPORT.COMMENTS IMP SPEC: NORMAL
PATH REPORT.COMMENTS IMP SPEC: NORMAL
PATH REPORT.FINAL DX SPEC: NORMAL
PATH REPORT.GROSS SPEC: NORMAL
PATH REPORT.MICROSCOPIC SPEC OTHER STN: NORMAL
PATH REPORT.RELEVANT HX SPEC: NORMAL
PHOTO IMAGE: NORMAL

## 2025-06-11 PROCEDURE — 250N000011 HC RX IP 250 OP 636: Performed by: NURSE PRACTITIONER

## 2025-06-11 PROCEDURE — 88305 TISSUE EXAM BY PATHOLOGIST: CPT | Mod: 26 | Performed by: PATHOLOGY

## 2025-06-11 PROCEDURE — 258N000003 HC RX IP 258 OP 636: Performed by: NURSE PRACTITIONER

## 2025-06-11 PROCEDURE — 250N000013 HC RX MED GY IP 250 OP 250 PS 637: Performed by: NURSE PRACTITIONER

## 2025-06-11 RX ORDER — TRAMADOL HYDROCHLORIDE 50 MG/1
50 TABLET ORAL EVERY 6 HOURS PRN
Qty: 10 TABLET | Refills: 0 | Status: SHIPPED | OUTPATIENT
Start: 2025-06-11

## 2025-06-11 RX ORDER — NALOXONE HYDROCHLORIDE 0.4 MG/ML
0.2 INJECTION, SOLUTION INTRAMUSCULAR; INTRAVENOUS; SUBCUTANEOUS
Status: DISCONTINUED | OUTPATIENT
Start: 2025-06-11 | End: 2025-06-11 | Stop reason: HOSPADM

## 2025-06-11 RX ORDER — HYOSCYAMINE SULFATE 0.12 MG/1
125 TABLET SUBLINGUAL EVERY 4 HOURS PRN
Qty: 20 TABLET | Refills: 0 | Status: SHIPPED | OUTPATIENT
Start: 2025-06-11 | End: 2025-06-13

## 2025-06-11 RX ORDER — GABAPENTIN 250 MG/5ML
250 SOLUTION ORAL EVERY 8 HOURS
Qty: 210 ML | Refills: 0 | Status: SHIPPED | OUTPATIENT
Start: 2025-06-11 | End: 2025-06-25

## 2025-06-11 RX ORDER — CHOLECALCIFEROL (VITAMIN D3) 50 MCG
1 TABLET ORAL DAILY
COMMUNITY
Start: 2025-06-11

## 2025-06-11 RX ORDER — NALOXONE HYDROCHLORIDE 0.4 MG/ML
0.4 INJECTION, SOLUTION INTRAMUSCULAR; INTRAVENOUS; SUBCUTANEOUS
Status: DISCONTINUED | OUTPATIENT
Start: 2025-06-11 | End: 2025-06-11 | Stop reason: HOSPADM

## 2025-06-11 RX ORDER — ACETAMINOPHEN 500 MG
1000 TABLET ORAL EVERY 6 HOURS PRN
COMMUNITY
Start: 2025-06-11

## 2025-06-11 RX ORDER — GABAPENTIN 300 MG/1
300 CAPSULE ORAL 3 TIMES DAILY PRN
COMMUNITY
Start: 2025-06-11

## 2025-06-11 RX ADMIN — POTASSIUM CHLORIDE: 2 INJECTION, SOLUTION, CONCENTRATE INTRAVENOUS at 01:12

## 2025-06-11 RX ADMIN — Medication 1 TABLET: at 09:11

## 2025-06-11 RX ADMIN — ACETAMINOPHEN 975 MG: 325 SOLUTION ORAL at 09:12

## 2025-06-11 RX ADMIN — KETOROLAC TROMETHAMINE 15 MG: 15 INJECTION, SOLUTION INTRAMUSCULAR; INTRAVENOUS at 09:13

## 2025-06-11 RX ADMIN — Medication 1000 MCG: at 09:11

## 2025-06-11 RX ADMIN — GABAPENTIN 250 MG: 250 SUSPENSION ORAL at 05:06

## 2025-06-11 RX ADMIN — HYOSCYAMINE SULFATE 125 MCG: 0.12 TABLET ORAL; SUBLINGUAL at 03:53

## 2025-06-11 RX ADMIN — ACETAMINOPHEN 975 MG: 325 SOLUTION ORAL at 03:52

## 2025-06-11 RX ADMIN — OMEPRAZOLE 20 MG: 20 CAPSULE, DELAYED RELEASE ORAL at 06:35

## 2025-06-11 RX ADMIN — HYOSCYAMINE SULFATE 125 MCG: 0.12 TABLET ORAL; SUBLINGUAL at 09:11

## 2025-06-11 RX ADMIN — KETOROLAC TROMETHAMINE 15 MG: 15 INJECTION, SOLUTION INTRAMUSCULAR; INTRAVENOUS at 03:52

## 2025-06-11 ASSESSMENT — ACTIVITIES OF DAILY LIVING (ADL)
ADLS_ACUITY_SCORE: 29
ADLS_ACUITY_SCORE: 30
ADLS_ACUITY_SCORE: 29
ADLS_ACUITY_SCORE: 30
ADLS_ACUITY_SCORE: 29

## 2025-06-11 NOTE — PROGRESS NOTES
Time in: 1:00 pm  /Time out: 1:30 pm     Pt presents for 1 week post op dietitian follow up. Educated pt on pureed and soft to bariatric regular diets. Provided grocery list and sample meal plan for each diet stage.  Pt will begin pureed diet on 6/18/2025 and advance as tolerated to softs/bariatric regular on 7/2/2025. Instructed pt to begin 500 mg calcium citrate BID and 5000IU Vitamin D3 3 weeks post op. Pt to follow up with RD at 1 & 3 months post op.     Mireya Monitel RD

## 2025-06-11 NOTE — PLAN OF CARE
Care Plan Progress Note:    Name: Mariela Hawkins  :   1990  MRN:   [id number]    Problem: Bariatric Procedure  Goal: Prevent post-op bariatric complications.  Appropriate oral intake.  Discharge needs are met.  Intervention:   Post Op Day 0/1 (progress as patient tolerates)   -Notify MD of excessive nausea   -NPO per MD orders; read exceptions to the order   -Toothette with 1/2 inch warm water at bedside until able to take PO   -Do not give ice chips, straws, or carbonation    -Whenever drinking liquids, patient must be upright with both feet on the floor   -No more than 30mL per sip   -All liquids must be at approximately room temperature (no extreme temperatures).   -After 2 hours of 30mL PO q30min, you may take 30mL as tolerated and advance to a clear liquid diet    -No oral pills until after the patient tolerates the 2 hours of 30mL sips (exceptions: sublingual medications can be given anytime; liquid gabapentin can be given after 1 hours of sips)   -Strict intake and output   -Bladder scan every 4 hours until voiding freely   -If tolerating sips, start bariatric clear liquid diet   -If tolerating bariatric clears, advance to a bariatric full liquid diet  Discharge Planning   -Educate patient about pill size   -Patient should take no pill greater than 1/4 inch   -Send pill cutter home with patient   -Nurse to review home discharge instructions  Outcome:    Shift  Intake: 480 ml  Output: 600  Bladder Scan: TOV ended  Pain: 7/10 abdominal pain after ambulating to bathroom at 0350. Improved following scheduled tylenol, toradol and gabapentin.   Incision: CDI  Nausea: denies  Activity: up to bathroom once overnight  Incentive Spirometry: sleeping overnight  Abdominal Binder: On    Aidlene Ayers RN  2025

## 2025-06-11 NOTE — CARE PLAN
..    Care Plan Progress Note:    Name: Mariela Hawkins  :   1990  MRN:   [id number]    Problem: Bariatric Procedure  Goal: Prevent post-op bariatric complications.  Appropriate oral intake.  Discharge needs are met.  Intervention:   Post Op Day 0/1 (progress as patient tolerates)   -Notify MD of excessive nausea   -NPO per MD orders; read exceptions to the order   -Toothette with 1/2 inch warm water at bedside until able to take PO   -Do not give ice chips, straws, or carbonation    -Whenever drinking liquids, patient must be upright with both feet on the floor   -No more than 30mL per sip   -All liquids must be at approximately room temperature (no extreme temperatures).   -After 2 hours of 30mL PO q30min, you may take 30mL as tolerated and advance to a clear liquid diet    -No oral pills until after the patient tolerates the 2 hours of 30mL sips (exceptions: sublingual medications can be given anytime; liquid gabapentin can be given after 1 hours of sips)   -Strict intake and output   -Bladder scan every 4 hours until voiding freely   -If tolerating sips, start bariatric clear liquid diet   -If tolerating bariatric clears, advance to a bariatric full liquid diet  Discharge Planning   -Educate patient about pill size   -Patient should take no pill greater than 1/4 inch   -Send pill cutter home with patient   -Nurse to review home discharge instructions  Outcome:    Shift  Intake: 540  Output: 300  Bladder Scan: 160  Pain: denies  Incision: clean, dry and intact  Nausea: Intermittent, had a small blood tinged emesis, had zofran and compazine prn  Activity: Was dizzy, up to the BR x2, was up in chair  Incentive Spirometry: used while awake, up to 3000  Abdominal Binder: In room    Divya Khan RN  6/10/2025  10:27 PM

## 2025-06-11 NOTE — PROGRESS NOTES
Bariatric Surgery Progress Note    1 Day Post-Op    Procedure(s):  GASTRECTOMY, SLEEVE, LAPAROSCOPIC    Subjective:   Patient reports pain is controlled. Patient is tolerating bariatric full liquid diet, ambulating and voiding. Patient denies fever, chills, dizziness, blurred vision, headache, nausea, vomiting, SOB, CP, and leg pain.    Patient Vitals for the past 24 hrs:   BP Temp Temp src Pulse Resp SpO2 Weight   06/11/25 0743 109/58 98.3  F (36.8  C) Oral 84 18 95 % --   06/11/25 0341 127/59 98.1  F (36.7  C) Oral 77 18 92 % --   06/10/25 2353 129/67 98  F (36.7  C) Oral 62 18 92 % --   06/10/25 1610 116/54 97.8  F (36.6  C) Oral 78 18 97 % --   06/10/25 1502 -- -- -- -- -- 92 % --   06/10/25 1500 -- -- -- -- -- (!) 89 % --   06/10/25 1457 133/62 97.8  F (36.6  C) Oral 81 17 (!) 91 % (!) 143.3 kg (315 lb 14.4 oz)   06/10/25 1415 136/60 -- -- 79 20 94 % --   06/10/25 1413 136/60 -- -- 79 16 96 % --   06/10/25 1400 (!) 141/67 -- -- 72 11 93 % --   06/10/25 1345 (!) 151/91 96.8  F (36  C) Core 72 11 94 % --   06/10/25 1330 (!) 148/87 96.8  F (36  C) -- 75 14 94 % --   06/10/25 1315 (!) 154/87 96.8  F (36  C) Core 80 12 92 % --   06/10/25 1300 (!) 161/90 96.8  F (36  C) Core 80 12 (!) 91 % --   06/10/25 1245 (!) 158/83 96.8  F (36  C) Core 84 13 92 % --   06/10/25 1230 (!) 167/91 96.8  F (36  C) Core 80 13 93 % --   06/10/25 1229 (!) 167/91 96.8  F (36  C) -- 79 13 93 % --   06/10/25 1215 (!) 152/84 (!) 96.6  F (35.9  C) Core 82 15 93 % --   06/10/25 1200 (!) 162/97 96.8  F (36  C) Core 83 14 94 % --   06/10/25 1153 -- 97  F (36.1  C) -- 87 16 97 % --   06/10/25 1145 (!) 172/95 97  F (36.1  C) Core 83 13 96 % --   06/10/25 1140 -- 97  F (36.1  C) -- 83 16 (!) 88 % --   06/10/25 1137 -- 97  F (36.1  C) -- 82 16 95 % --   06/10/25 1135 (!) 176/98 96.8  F (36  C) Core 83 23 95 % --   06/10/25 1130 -- (!) 96.6  F (35.9  C) -- 79 14 98 % --   06/10/25 1115 (!) 171/100 96.8  F (36  C) Core 85 13 100 % --   06/10/25 0950  112/63 -- -- 74 22 100 % --   06/10/25 0945 110/63 -- -- 74 23 100 % --   06/10/25 0940 109/55 -- -- 74 17 100 % --   06/10/25 0935 118/55 -- -- 79 24 100 % --   06/10/25 0930 99/66 -- -- 79 21 100 % --   06/10/25 0925 118/58 -- -- 79 23 100 % --   06/10/25 0920 (!) 159/79 -- -- 85 16 92 % --   06/10/25 0915 130/84 -- -- 85 16 99 % --   06/10/25 0910 (!) 123/90 -- -- 86 (!) 31 99 % --       Physical Exam:  General: A/O, NAD  Ab:       Soft, + BS, expected ttp POD 1; The wound/ dressings are clean, dry, and intact  :      Patient is voiding adequate amount    Admission on 06/10/2025   Component Date Value    GLUCOSE BY METER POCT 06/10/2025 94     GLUCOSE BY METER POCT 06/10/2025 148 (H)        Assessment/Plan:   Patient is progressing well after surgery. Once she is meeting oral intake goals, she should be able to discharge home today. Discharge orders and instructions are placed    Discussed discharge instructions with the patient along with signs and symptoms to watch for post-op.  Patient verbalized understanding of the plan, including:    - Use of incentive spirometer and walking as tolerated   - Use of abdominal binder if needed   - Importance of getting 48-64 ounces of water in daily for hydration    - Use of medication cups for measuring out sips for the next 3-4 days.   - Bariatric full liquid diet for the next week.   - Drink a protein shake providing 20-30 grams of protein in addition to meals daily; aim for at least 40 grams of protein daily   - Attend the post-op dietary class next week   - Take omeprazole daily for the next 3 months and avoid/eliminate NSAID usage   - Take chewable MVI with 18mg iron twice a day and SL B12 1,000-2,500 mcg daily.    - Call Bariatric clinic with any questions or concerns   - If patient needs to be seen in the emergency department for any reason, she needs to return to [facility].    Patient verbalized understanding of the plan. Bariatric nurse clinician will  call her in a couple days when she gets home to check in with her.    Greater than 45 minutes were spent with this patient with more than 50% of that time spent on education.    Traci Cohen, CNP  [phone]  [facility] General and Bariatric Surgery

## 2025-06-11 NOTE — PLAN OF CARE
Problem: Adult Inpatient Plan of Care  Goal: Absence of Hospital-Acquired Illness or Injury  Intervention: Prevent Skin Injury  Recent Flowsheet Documentation  Taken 2025 by Massimo Donovan RN  Body Position: position changed independently  Goal: Optimal Comfort and Wellbeing  Outcome: Adequate for Care Transition  Goal: Readiness for Transition of Care  Outcome: Adequate for Care Transition     Problem: Bariatric Surgery  Goal: Nausea and Vomiting Relief  Outcome: Adequate for Care Transition  Goal: Effective Urinary Elimination  Outcome: Adequate for Care Transition  Goal: Effective Oxygenation and Ventilation  Intervention: Optimize Oxygenation and Ventilation  Recent Flowsheet Documentation  Taken 2025 by Massimo Donovan, RN  Head of Bed (HOB) Positioning: HOB at 30-45 degrees   Goal Outcome Evaluation:           Care Plan Progress Note:    Name: Mariela Hawkins  :   1990  MRN:   [id number]    Problem: Bariatric Procedure  Goal: Prevent post-op bariatric complications.  Appropriate oral intake.  Discharge needs are met.  Intervention:   Post Op Day 0/1 (progress as patient tolerates)   -Notify MD of excessive nausea   -NPO per MD orders; read exceptions to the order   -Toothette with 1/2 inch warm water at bedside until able to take PO   -Do not give ice chips, straws, or carbonation    -Whenever drinking liquids, patient must be upright with both feet on the floor   -No more than 30mL per sip   -All liquids must be at approximately room temperature (no extreme temperatures).   -After 2 hours of 30mL PO q30min, you may take 30mL as tolerated and advance to a clear liquid diet    -No oral pills until after the patient tolerates the 2 hours of 30mL sips (exceptions: sublingual medications can be given anytime; liquid gabapentin can be given after 1 hours of sips)   -Strict intake and output   -Bladder scan every 4 hours until voiding freely   -If tolerating sips, start bariatric  clear liquid diet   -If tolerating bariatric clears, advance to a bariatric full liquid diet  Discharge Planning   -Educate patient about pill size   -Patient should take no pill greater than 1/4 inch   -Send pill cutter home with patient   -Nurse to review home discharge instructions  Outcome:patient discharged to home    Shift  Intake:450  Output:Voiding  Bladder Scan: N/A  Pain:Denies  Incision:CDI  Nausea:Denies  Activity:ambulate  Incentive Spirometry:2500 ml  Abdominal Binder:Applied    Massimo Donovan RN  6/11/2025  11:09 AM

## 2025-06-11 NOTE — DISCHARGE INSTRUCTIONS
If you are worried about whether or not you need to be seen or if something is wrong, please call your bariatric nurse line at [phone] or the bariatric clinic at [phone]. If you need to be seen in the emergency department for any reason in the next 30 days, please return to [facility]. The bariatric team should be involved in your care/diet even if the problem is unrelated to your surgery.     (3) no apparent problem

## 2025-06-11 NOTE — DISCHARGE SUMMARY
Bariatric Surgery Discharge Summary    Primary Care Physician:  Kenzie Mccoy    Discharge Provider: JOSE Weaver CNP  Admission Date: 6/10/2025  Discharge Date: 2025     Primary Diagnosis at Discharge:   Patient Active Problem List   Diagnosis    Vasa previa    History of  section    S/P     Ventral hernia without obstruction or gangrene    Ventral hernia    Morbid obesity (H)    Left sided abdominal pain    Morbid (severe) obesity due to excess calories (H)      Disposition: Home   Condition at Discharge: Stable     Surgery: Laparoscopic Sleeve Gastrectomy     Hospital Summary:   Mariela Hawkins was admitted for morbid obesity.  she underwent an uncomplicated laparoscopic sleeve gastrectomy.  Following a brief recovery in the PACU, she was transferred to the Med/Surg floor for the remainder of her stay.  her post operative course was uneventful.  On POD # 1 she was discharged home tolerating a full liquid diet, ambulating without assistance, and pain controlled with oral analgesics.        Discharge Medications:      Medication List        Started      hyoscyamine 0.125 MG sublingual tablet  Commonly known as: LEVSIN/SL  125 mcg, Sublingual, EVERY 4 HOURS PRN     traMADol 50 MG tablet  Commonly known as: ULTRAM  50 mg, Oral, EVERY 6 HOURS PRN            Modified      * acetaminophen 500 MG tablet  Commonly known as: TYLENOL  What changed: additional instructions     * Tylenol Dissolve Packs 500 MG Pack  Generic drug: Acetaminophen  1,000 mg, Oral, EVERY 6 HOURS  What changed: You were already taking a medication with the same name, and this prescription was added. Make sure you understand how and when to take each.     * gabapentin 300 MG capsule  Commonly known as: NEURONTIN  What changed:   when to take this  reasons to take this     * gabapentin 250 MG/5ML solution  Commonly known as: NEURONTIN  250 mg, Oral, EVERY 8 HOURS  What changed: You were already taking a medication  with the same name, and this prescription was added. Make sure you understand how and when to take each.     vitamin D3 50 mcg (2000 units) tablet  Commonly known as: CHOLECALCIFEROL  What changed: additional instructions           * This list has 4 medication(s) that are the same as other medications prescribed for you. Read the directions carefully, and ask your doctor or other care provider to review them with you.                Discontinued      phentermine 37.5 MG tablet  Commonly known as: ADIPEX-P              Discharge Instructions:  Follow up appointment with Primary Care Physician: Kenzie Mccoy  Follow up appointment with Dr. Flores in 2 weeks  Attend the post-op dietary class as scheduled    Post operative instructions:   Diet:     For one week following your surgery or until you meet with the dietician, you will be on a full liquid diet.  It is extremely important to follow the liquid diet to allow for optimal healing and to prevent food from becoming lodged at the gastric outlet.    Protein is an important part of the diet after surgery; therefore, it is necessary to drink fluids that are high in protein.  Proteins are building blocks that help you heal after surgery and help preserve your muscle mass while you are losing weight.      Including carbohydrates in the diet is also important after surgery to prevent your body from burning fat for energy (Ketosis).  These carbohydrates include: unsweetened applesauce, blended canned fruit (in its own juice), Stage I baby food fruit, thin cream of wheat, light yogurt (no fruit chunks), or 100% fruit juice diluted (50% juice/50% water).     Remember to take 1 Multivitamin with Iron 2 times daily and 1000 mcg of Sublingual B-12 daily.       Aim for 40-50 grams of protein daily during this week.    Sip 48-64 ounces of liquid per day in addition to full liquid meals/supplements.    After 2 weeks of the full liquid diet, you will advance to the pureed diet, as  instructed.    Activity: You should continue to be active at home including ambulating frequently.  If possible try to limit the amount of time spent in bed.    Restrictions: you should avoid lifting anything more than 20 pounds or strenuous physical activity for a total of 2 weeks.        JOSE [facility]  [phone]  [facility] General and Bariatric Surgery

## 2025-06-12 DIAGNOSIS — G89.18 ACUTE POST-OPERATIVE PAIN: Primary | ICD-10-CM

## 2025-06-12 DIAGNOSIS — R11.0 POSTOPERATIVE NAUSEA: ICD-10-CM

## 2025-06-12 DIAGNOSIS — Z98.890 POSTOPERATIVE NAUSEA: ICD-10-CM

## 2025-06-12 RX ORDER — OXYCODONE HYDROCHLORIDE 5 MG/1
5 TABLET ORAL EVERY 6 HOURS PRN
Qty: 12 TABLET | Refills: 0 | Status: SHIPPED | OUTPATIENT
Start: 2025-06-12 | End: 2025-06-15

## 2025-06-12 RX ORDER — ONDANSETRON 4 MG/1
4-8 TABLET, ORALLY DISINTEGRATING ORAL EVERY 8 HOURS PRN
Qty: 20 TABLET | Refills: 0 | Status: SHIPPED | OUTPATIENT
Start: 2025-06-12

## 2025-06-17 ENCOUNTER — VIRTUAL VISIT (OUTPATIENT)
Dept: SURGERY | Facility: CLINIC | Age: 35
End: 2025-06-17
Payer: COMMERCIAL

## 2025-06-17 DIAGNOSIS — Z98.84 BARIATRIC SURGERY STATUS: Primary | ICD-10-CM

## 2025-07-03 ENCOUNTER — TELEPHONE (OUTPATIENT)
Dept: SURGERY | Facility: CLINIC | Age: 35
End: 2025-07-03
Payer: COMMERCIAL

## 2025-07-03 NOTE — TELEPHONE ENCOUNTER
Talked with pt after discussing with  and EVAN Aviles.  Gave her reassurance that the pain she is feeling is normal as things heal and that she can do things like stretching and massage. As far as her eating/drinking, we talked about having to do some trial and error as far as which foods work for her. Traci would like her to not be juicing and we talked a little more about the fact that scrambled eggs sometimes don't work for pts after surgery. Pt verbalized understanding of all and will let us know if she has any further questions or concerns.     Shayy Vasquez RN  [facility]  P [phone]  F [phone]

## 2025-07-03 NOTE — TELEPHONE ENCOUNTER
Pt is a little over 3 weeks s/p LSG and called in with multiple concerns. She says that she has abd pain on the right side of her abd at the level of her umbilicus. She says it's the pulling/tugging pain she's had since surgery but for the past 3 days, when she bends, turns or moves certain ways, it's stabbing. She is also having issues with eating/drinking. She says it's hard to explain but she just feels like she will vomit whenever she eats or drinks and that it's not going down. She has been on soft/pureed foods and is mainly doing things like yogurt, applesauce, jello, protein shakes and popsicles (she could not tell me if they are sugar free but kept saying they only have 40 calories). Prior to her not feeling well, she did do scrambled eggs with chorizo which did not go well. She is also doing soup, danimals smoothies drinkable yogurt and is juicing fruits and vegetables. Yesterday was the day that she was to transition from pureed to soft/regular but she really hasn't done any foods from that stage besides the scrambled eggs and she attempted tuna but said she couldn't eat it. She is taking her vitamins and drinking plenty of water throughout the day. I let her know that I will talk to the team and get back to her. Pt verbalized understanding.     Shayy BAEZ [facility]  P [phone]  F [phone]

## 2025-07-10 ENCOUNTER — VIRTUAL VISIT (OUTPATIENT)
Dept: SURGERY | Facility: CLINIC | Age: 35
End: 2025-07-10
Payer: COMMERCIAL

## 2025-07-10 DIAGNOSIS — R63.4 RAPID WEIGHT LOSS: Primary | ICD-10-CM

## 2025-07-10 DIAGNOSIS — Z98.84 S/P LAPAROSCOPIC SLEEVE GASTRECTOMY: ICD-10-CM

## 2025-07-10 NOTE — LETTER
7/10/2025      Mariela Hawkins  [address] [place] [id number]      Dear Colleague,    Thank you for referring your patient, Mariela Hawkins, to the [facility] BAR[facility]ATR[facility]. Please see a copy of my visit note below.    Patient presents for 1 month post op dietitian follow up visit. Reports tolerating soft food diet well. Denies reflux, nausea/vomiting, constipation/diarrhea, or significant pain. Taking MVI and Vitamin B12 appropriately. Instructed patient to begin taking 500 mg calcium citrate BID and 5000 IU Vitamin D3. Educated patient on soft to bariatric regular diets, medications, developing an exercise regimen, and other dietary points of care. Provided education handout on items discussed. Patient to follow up with RD at 3 months post op.     Have you been to the hospital or seen by a doctor for any reason since your surgery? No    30 oz of water or hydration     Yudi Cabrera RD          Again, thank you for allowing me to participate in the care of your patient.        Sincerely,        Yudi Cabrera RD    Electronically signed

## 2025-07-10 NOTE — PROGRESS NOTES
Patient presents for 1 month post op dietitian follow up visit. Reports tolerating soft food diet well. Denies reflux, nausea/vomiting, constipation/diarrhea, or significant pain. Taking MVI and Vitamin B12 appropriately. Instructed patient to begin taking 500 mg calcium citrate BID and 5000 IU Vitamin D3. Educated patient on soft to bariatric regular diets, medications, developing an exercise regimen, and other dietary points of care. Provided education handout on items discussed. Patient to follow up with RD at 3 months post op.     Have you been to the hospital or seen by a doctor for any reason since your surgery? No    30 oz of water or hydration     Yudi Cabrera RD

## 2025-07-13 ENCOUNTER — HEALTH MAINTENANCE LETTER (OUTPATIENT)
Age: 35
End: 2025-07-13

## (undated) DEVICE — RETR PANNICULUS TRAXI FOR PT POSITIONING PRS-1030

## (undated) DEVICE — SU VICRYL 0 CT-2 CR 8X18" J727D

## (undated) DEVICE — DRSG BANDAID 1X3" FABRIC CURITY LATEX FREE KC44101

## (undated) DEVICE — ESU LIGASURE MARYLAND LAPAROSCOPIC SLR/DVDR 5MMX37CM LF1937

## (undated) DEVICE — SU VICRYL 4-0 PS-2 18" UND J496H

## (undated) DEVICE — PACK C-SECTION LF PL15OTA83B

## (undated) DEVICE — SU VICRYL+ 0 27 UR6 VLT VCP603H

## (undated) DEVICE — DRSG TELFA ISLAND 4X8" 7541

## (undated) DEVICE — COVER CAMERA IN-LIGHT DISP LT-C02

## (undated) DEVICE — CLIP LIGACLIP LG YELLOW LT400

## (undated) DEVICE — GLOVE BIOGEL PI ULTRATOUCH G SZ 6.5 42165

## (undated) DEVICE — PREP CHLORAPREP 26ML TINTED HI-LITE ORANGE 930815

## (undated) DEVICE — LINEN LEG DRAPE 5457

## (undated) DEVICE — SOL WATER IRRIG 1000ML BOTTLE 2F7114

## (undated) DEVICE — Device

## (undated) DEVICE — ANTIFOG SOLUTION SEE SHARP 150M TROCAR SWABS 30978 (COI)

## (undated) DEVICE — VIAL DECANTER STERILE WHITE DYNJDEC06

## (undated) DEVICE — SUCTION MANIFOLD DORNOCH ULTRA CART UL-CL500

## (undated) DEVICE — BLADE CLIPPER SGL USE 9680

## (undated) DEVICE — STRAP KNEE/BODY 31143004

## (undated) DEVICE — GLOVE BIOGEL PI SZ 8.0 40880

## (undated) DEVICE — STPL SKIN 35W ROTATING HEAD PRW35

## (undated) DEVICE — GOWN IMPERVIOUS BREATHABLE SMART XLG 89045

## (undated) DEVICE — ENDO TROCAR FIRST ENTRY KII FIOS Z-THRD 12X100MM CTF73

## (undated) DEVICE — ESU GROUND PAD UNIVERSAL W/O CORD

## (undated) DEVICE — DRAPE MAYO STAND 23X54 8337

## (undated) DEVICE — GLOVE PROTEXIS W/NEU-THERA 6.5  2D73TE65

## (undated) DEVICE — DRSG WOUND VAC GRANUFOAM LG BLACK 26X15CM M8275053/5

## (undated) DEVICE — TUBING SUCTION MEDI-VAC 1/4"X20' N620A

## (undated) DEVICE — SYSTEM CALIBRATION GASTRECTOMY SLEEVE VISIGI 3D 32FR 5232B

## (undated) DEVICE — LINEN TOWEL PACK X5 5464

## (undated) DEVICE — SU SILK 0 SH 30" K834H

## (undated) DEVICE — TUBING SMOKE EVAC PNEUMOCLEAR HIGH FLOW 0620050250

## (undated) DEVICE — CATH TRAY FOLEY SURESTEP 16FR W/URINE MTR STATLK LF A303416A

## (undated) DEVICE — ESU PENCIL W/HOLSTER E2350H

## (undated) DEVICE — ENDO TROCAR OPTICAL ACCESS KII Z-THRD 05X100MM CTR03

## (undated) DEVICE — DRAPE SHEET REV FOLD 3/4 9349

## (undated) DEVICE — ESU ELEC BLADE 6" COATED E1450-6

## (undated) DEVICE — DRAPE IOBAN C-SECTION W/POUCH 30X35" 6657

## (undated) DEVICE — ESU LIGASURE ATLAS 10MM  LS1037

## (undated) DEVICE — SU VICRYL+ 4-0 UNDYED PS-2 VCP496ZH

## (undated) DEVICE — SU VICRYL 0 CT-1 36" J346H

## (undated) DEVICE — STPL RELOAD REG TISSUE ECHELON 60 X 3.6MM BLUE GST60B

## (undated) DEVICE — LINEN GOWN LG 5406

## (undated) DEVICE — SPONGE RAY-TEC 4X8" 7318

## (undated) DEVICE — SUCTION TIP YANKAUER W/O VENT K86

## (undated) DEVICE — LINEN TOWEL PACK X30 5481

## (undated) DEVICE — PREP SKIN SCRUB TRAY 4461A

## (undated) DEVICE — STPL POWERED ECHELON LONG 60MM PLEE60A

## (undated) DEVICE — ENDO TROCAR SLEEVE KII Z-THREADED 05X100MM CTS02

## (undated) DEVICE — SOL NACL 0.9% IRRIG 1000ML BOTTLE 2F7124

## (undated) DEVICE — DRAPE WARMER 66X44" ORS-300

## (undated) DEVICE — SU PDS II 0 CTX 60" Z990G

## (undated) DEVICE — DRAPE LAVH/LAPAROSCOPY W/POUCH 29474

## (undated) DEVICE — PREP CHLORAPREP 26ML TINTED ORANGE  260815

## (undated) DEVICE — CANISTER WOUND VAC W/GEL 500ML M8275063/5

## (undated) DEVICE — SYR 30ML LL W/O NDL 302832

## (undated) DEVICE — SU MONOCRYL 0 CT-1 36" Y346H

## (undated) DEVICE — BNDG ABDOMINAL BINDER 12X72-84" 79-89280

## (undated) DEVICE — PAD CHUX UNDERPAD 30X36" P3036C

## (undated) DEVICE — TUBING IRRIG TUR Y TYPE 96" LF 6543-01

## (undated) DEVICE — SPONGE LAP 18X18" X8435

## (undated) DEVICE — SYR BULB IRRIG 50ML LATEX FREE 0035280

## (undated) DEVICE — SU VICRYL 0 TIE 54" J608H

## (undated) DEVICE — ENDO TROCAR OPTICAL ACCESS KII Z-THRD 15X100MM C0R37

## (undated) DEVICE — ENDO SHEARS RENEW LAP ENDOCUT SCISSOR TIP 16.5MM 3142

## (undated) DEVICE — BLADE KNIFE SURG 10 371110

## (undated) DEVICE — GLOVE PROTEXIS BLUE W/NEU-THERA 7.0  2D73EB70

## (undated) DEVICE — DRAPE SETUP C-SECTION INVISISHIELD 54X90" DYNJE5600

## (undated) RX ORDER — FENTANYL CITRATE 50 UG/ML
INJECTION, SOLUTION INTRAMUSCULAR; INTRAVENOUS
Status: DISPENSED
Start: 2019-12-13

## (undated) RX ORDER — HYDROMORPHONE HYDROCHLORIDE 1 MG/ML
INJECTION, SOLUTION INTRAMUSCULAR; INTRAVENOUS; SUBCUTANEOUS
Status: DISPENSED
Start: 2019-12-13

## (undated) RX ORDER — FENTANYL CITRATE 50 UG/ML
INJECTION, SOLUTION INTRAMUSCULAR; INTRAVENOUS
Status: DISPENSED
Start: 2019-12-17

## (undated) RX ORDER — MORPHINE SULFATE 1 MG/ML
INJECTION, SOLUTION EPIDURAL; INTRATHECAL; INTRAVENOUS
Status: DISPENSED
Start: 2019-12-13

## (undated) RX ORDER — METOPROLOL TARTRATE 1 MG/ML
INJECTION, SOLUTION INTRAVENOUS
Status: DISPENSED
Start: 2025-06-10

## (undated) RX ORDER — BUPIVACAINE HCL/EPINEPHRINE 0.25-.0005
VIAL (ML) INJECTION
Status: DISPENSED
Start: 2025-06-10

## (undated) RX ORDER — FENTANYL CITRATE 50 UG/ML
INJECTION, SOLUTION INTRAMUSCULAR; INTRAVENOUS
Status: DISPENSED
Start: 2025-06-10

## (undated) RX ORDER — ACETAMINOPHEN 325 MG/1
TABLET ORAL
Status: DISPENSED
Start: 2019-12-13

## (undated) RX ORDER — KETOROLAC TROMETHAMINE 30 MG/ML
INJECTION, SOLUTION INTRAMUSCULAR; INTRAVENOUS
Status: DISPENSED
Start: 2019-12-13

## (undated) RX ORDER — BUPIVACAINE HYDROCHLORIDE AND EPINEPHRINE 2.5; 5 MG/ML; UG/ML
INJECTION, SOLUTION EPIDURAL; INFILTRATION; INTRACAUDAL; PERINEURAL
Status: DISPENSED
Start: 2025-06-10

## (undated) RX ORDER — CITRIC ACID/SODIUM CITRATE 334-500MG
SOLUTION, ORAL ORAL
Status: DISPENSED
Start: 2019-12-13

## (undated) RX ORDER — DEXAMETHASONE SODIUM PHOSPHATE 10 MG/ML
INJECTION, SOLUTION INTRAMUSCULAR; INTRAVENOUS
Status: DISPENSED
Start: 2025-06-10

## (undated) RX ORDER — OXYCODONE HYDROCHLORIDE 5 MG/1
TABLET ORAL
Status: DISPENSED
Start: 2019-12-13